# Patient Record
Sex: FEMALE | Race: WHITE | NOT HISPANIC OR LATINO | Employment: OTHER | URBAN - METROPOLITAN AREA
[De-identification: names, ages, dates, MRNs, and addresses within clinical notes are randomized per-mention and may not be internally consistent; named-entity substitution may affect disease eponyms.]

---

## 2017-01-04 ENCOUNTER — COMMUNICATION - HEALTHEAST (OUTPATIENT)
Dept: CARDIOLOGY | Facility: CLINIC | Age: 62
End: 2017-01-04

## 2017-01-04 DIAGNOSIS — I25.10 CORONARY ARTERY DISEASE DUE TO CALCIFIED CORONARY LESION: ICD-10-CM

## 2017-01-04 DIAGNOSIS — I25.84 CORONARY ARTERY DISEASE DUE TO CALCIFIED CORONARY LESION: ICD-10-CM

## 2017-01-11 ENCOUNTER — COMMUNICATION - HEALTHEAST (OUTPATIENT)
Dept: FAMILY MEDICINE | Facility: CLINIC | Age: 62
End: 2017-01-11

## 2017-01-11 DIAGNOSIS — K21.9 GERD (GASTROESOPHAGEAL REFLUX DISEASE): ICD-10-CM

## 2017-02-08 ENCOUNTER — OFFICE VISIT - HEALTHEAST (OUTPATIENT)
Dept: FAMILY MEDICINE | Facility: CLINIC | Age: 62
End: 2017-02-08

## 2017-02-08 DIAGNOSIS — I10 ESSENTIAL HYPERTENSION: ICD-10-CM

## 2017-02-08 DIAGNOSIS — I67.89 ACUTE, BUT ILL-DEFINED, CEREBROVASCULAR DISEASE: ICD-10-CM

## 2017-02-08 DIAGNOSIS — Z00.00 ROUTINE GENERAL MEDICAL EXAMINATION AT A HEALTH CARE FACILITY: ICD-10-CM

## 2017-02-08 DIAGNOSIS — I25.10 CORONARY ATHEROSCLEROSIS: ICD-10-CM

## 2017-02-08 DIAGNOSIS — E11.9 DIABETES MELLITUS (H): ICD-10-CM

## 2017-02-08 DIAGNOSIS — E66.9 OBESITY, UNSPECIFIED: ICD-10-CM

## 2017-02-08 LAB
CHOLEST SERPL-MCNC: 236 MG/DL
FASTING STATUS PATIENT QL REPORTED: YES
HBA1C MFR BLD: 6.2 % (ref 3.5–6)
HDLC SERPL-MCNC: 50 MG/DL
LDLC SERPL CALC-MCNC: 169 MG/DL
TRIGL SERPL-MCNC: 87 MG/DL

## 2017-02-08 ASSESSMENT — MIFFLIN-ST. JEOR: SCORE: 1475.97

## 2017-02-09 ENCOUNTER — COMMUNICATION - HEALTHEAST (OUTPATIENT)
Dept: FAMILY MEDICINE | Facility: CLINIC | Age: 62
End: 2017-02-09

## 2017-02-09 DIAGNOSIS — J45.909 ASTHMA: ICD-10-CM

## 2017-02-17 ENCOUNTER — COMMUNICATION - HEALTHEAST (OUTPATIENT)
Dept: FAMILY MEDICINE | Facility: CLINIC | Age: 62
End: 2017-02-17

## 2017-02-17 DIAGNOSIS — I10 HYPERTENSION: ICD-10-CM

## 2017-03-06 ENCOUNTER — COMMUNICATION - HEALTHEAST (OUTPATIENT)
Dept: FAMILY MEDICINE | Facility: CLINIC | Age: 62
End: 2017-03-06

## 2017-03-07 ENCOUNTER — COMMUNICATION - HEALTHEAST (OUTPATIENT)
Dept: SCHEDULING | Facility: CLINIC | Age: 62
End: 2017-03-07

## 2017-03-08 ENCOUNTER — OFFICE VISIT - HEALTHEAST (OUTPATIENT)
Dept: FAMILY MEDICINE | Facility: CLINIC | Age: 62
End: 2017-03-08

## 2017-03-08 DIAGNOSIS — R51.9 HEADACHE: ICD-10-CM

## 2017-03-08 DIAGNOSIS — I25.84 CORONARY ATHEROSCLEROSIS DUE TO SEVERELY CALCIFIED CORONARY LESION: ICD-10-CM

## 2017-03-08 DIAGNOSIS — I10 ESSENTIAL HYPERTENSION: ICD-10-CM

## 2017-03-08 DIAGNOSIS — I10 ESSENTIAL HYPERTENSION, MALIGNANT: ICD-10-CM

## 2017-03-09 ENCOUNTER — COMMUNICATION - HEALTHEAST (OUTPATIENT)
Dept: SCHEDULING | Facility: CLINIC | Age: 62
End: 2017-03-09

## 2017-03-13 ENCOUNTER — OFFICE VISIT - HEALTHEAST (OUTPATIENT)
Dept: FAMILY MEDICINE | Facility: CLINIC | Age: 62
End: 2017-03-13

## 2017-03-13 ENCOUNTER — COMMUNICATION - HEALTHEAST (OUTPATIENT)
Dept: FAMILY MEDICINE | Facility: CLINIC | Age: 62
End: 2017-03-13

## 2017-03-13 DIAGNOSIS — I67.89 ACUTE, BUT ILL-DEFINED, CEREBROVASCULAR DISEASE: ICD-10-CM

## 2017-03-13 DIAGNOSIS — I25.10 CORONARY ATHEROSCLEROSIS: ICD-10-CM

## 2017-03-13 DIAGNOSIS — I10 ESSENTIAL HYPERTENSION: ICD-10-CM

## 2017-03-13 DIAGNOSIS — R00.2 PALPITATIONS: ICD-10-CM

## 2017-03-13 DIAGNOSIS — E78.00 PURE HYPERCHOLESTEROLEMIA: ICD-10-CM

## 2017-03-13 DIAGNOSIS — M79.10 MYALGIA: ICD-10-CM

## 2017-03-13 LAB
ATRIAL RATE - MUSE: 61 BPM
DIASTOLIC BLOOD PRESSURE - MUSE: NORMAL MMHG
INTERPRETATION ECG - MUSE: NORMAL
P AXIS - MUSE: 16 DEGREES
PR INTERVAL - MUSE: 144 MS
QRS DURATION - MUSE: 106 MS
QT - MUSE: 448 MS
QTC - MUSE: 450 MS
R AXIS - MUSE: 7 DEGREES
SYSTOLIC BLOOD PRESSURE - MUSE: NORMAL MMHG
T AXIS - MUSE: -4 DEGREES
VENTRICULAR RATE- MUSE: 61 BPM

## 2017-03-13 ASSESSMENT — MIFFLIN-ST. JEOR: SCORE: 1480.96

## 2017-03-14 ENCOUNTER — COMMUNICATION - HEALTHEAST (OUTPATIENT)
Dept: FAMILY MEDICINE | Facility: CLINIC | Age: 62
End: 2017-03-14

## 2017-03-15 ENCOUNTER — RECORDS - HEALTHEAST (OUTPATIENT)
Dept: ADMINISTRATIVE | Facility: OTHER | Age: 62
End: 2017-03-15

## 2017-03-16 ENCOUNTER — AMBULATORY - HEALTHEAST (OUTPATIENT)
Dept: NURSING | Facility: CLINIC | Age: 62
End: 2017-03-16

## 2017-03-16 ENCOUNTER — COMMUNICATION - HEALTHEAST (OUTPATIENT)
Dept: FAMILY MEDICINE | Facility: CLINIC | Age: 62
End: 2017-03-16

## 2017-03-23 ENCOUNTER — AMBULATORY - HEALTHEAST (OUTPATIENT)
Dept: NURSING | Facility: CLINIC | Age: 62
End: 2017-03-23

## 2017-03-23 ENCOUNTER — COMMUNICATION - HEALTHEAST (OUTPATIENT)
Dept: FAMILY MEDICINE | Facility: CLINIC | Age: 62
End: 2017-03-23

## 2017-04-04 ENCOUNTER — OFFICE VISIT - HEALTHEAST (OUTPATIENT)
Dept: CARDIOLOGY | Facility: CLINIC | Age: 62
End: 2017-04-04

## 2017-04-04 DIAGNOSIS — I10 ESSENTIAL HYPERTENSION: ICD-10-CM

## 2017-04-04 DIAGNOSIS — I25.10 CORONARY ARTERY DISEASE INVOLVING NATIVE CORONARY ARTERY OF NATIVE HEART WITHOUT ANGINA PECTORIS: ICD-10-CM

## 2017-04-04 DIAGNOSIS — E78.5 DYSLIPIDEMIA: ICD-10-CM

## 2017-04-04 ASSESSMENT — MIFFLIN-ST. JEOR: SCORE: 1499.1

## 2017-04-05 ENCOUNTER — COMMUNICATION - HEALTHEAST (OUTPATIENT)
Dept: CARDIOLOGY | Facility: CLINIC | Age: 62
End: 2017-04-05

## 2017-04-05 DIAGNOSIS — I10 HTN (HYPERTENSION): ICD-10-CM

## 2017-04-09 ENCOUNTER — COMMUNICATION - HEALTHEAST (OUTPATIENT)
Dept: FAMILY MEDICINE | Facility: CLINIC | Age: 62
End: 2017-04-09

## 2017-04-09 DIAGNOSIS — J45.909 ASTHMA: ICD-10-CM

## 2017-04-09 DIAGNOSIS — K21.9 GERD (GASTROESOPHAGEAL REFLUX DISEASE): ICD-10-CM

## 2017-04-10 ENCOUNTER — COMMUNICATION - HEALTHEAST (OUTPATIENT)
Dept: FAMILY MEDICINE | Facility: CLINIC | Age: 62
End: 2017-04-10

## 2017-04-10 DIAGNOSIS — K21.9 GERD (GASTROESOPHAGEAL REFLUX DISEASE): ICD-10-CM

## 2017-04-24 ENCOUNTER — COMMUNICATION - HEALTHEAST (OUTPATIENT)
Dept: CARDIOLOGY | Facility: CLINIC | Age: 62
End: 2017-04-24

## 2017-04-24 DIAGNOSIS — I10 HTN (HYPERTENSION): ICD-10-CM

## 2017-04-26 ENCOUNTER — COMMUNICATION - HEALTHEAST (OUTPATIENT)
Dept: CARDIOLOGY | Facility: CLINIC | Age: 62
End: 2017-04-26

## 2017-05-01 ENCOUNTER — COMMUNICATION - HEALTHEAST (OUTPATIENT)
Dept: FAMILY MEDICINE | Facility: CLINIC | Age: 62
End: 2017-05-01

## 2017-05-01 DIAGNOSIS — I25.84 CORONARY ATHEROSCLEROSIS DUE TO SEVERELY CALCIFIED CORONARY LESION: ICD-10-CM

## 2017-05-01 DIAGNOSIS — I10 ESSENTIAL HYPERTENSION, MALIGNANT: ICD-10-CM

## 2017-05-02 ENCOUNTER — HOSPITAL ENCOUNTER (OUTPATIENT)
Dept: ULTRASOUND IMAGING | Facility: CLINIC | Age: 62
Discharge: HOME | End: 2017-05-02
Attending: OTOLARYNGOLOGY

## 2017-05-02 ENCOUNTER — RECORDS - HEALTHEAST (OUTPATIENT)
Dept: ADMINISTRATIVE | Facility: OTHER | Age: 62
End: 2017-05-02

## 2017-05-02 DIAGNOSIS — K11.8 PAROTID GLAND FULLNESS: ICD-10-CM

## 2017-05-08 ENCOUNTER — OFFICE VISIT - HEALTHEAST (OUTPATIENT)
Dept: CARDIOLOGY | Facility: CLINIC | Age: 62
End: 2017-05-08

## 2017-05-08 DIAGNOSIS — E78.5 DYSLIPIDEMIA: ICD-10-CM

## 2017-05-08 DIAGNOSIS — I25.10 CORONARY ARTERY DISEASE INVOLVING NATIVE CORONARY ARTERY OF NATIVE HEART WITHOUT ANGINA PECTORIS: ICD-10-CM

## 2017-05-08 DIAGNOSIS — I10 ESSENTIAL HYPERTENSION: ICD-10-CM

## 2017-05-08 ASSESSMENT — MIFFLIN-ST. JEOR: SCORE: 1499.1

## 2017-05-17 ENCOUNTER — COMMUNICATION - HEALTHEAST (OUTPATIENT)
Dept: CARDIOLOGY | Facility: CLINIC | Age: 62
End: 2017-05-17

## 2017-05-17 DIAGNOSIS — I10 HYPERTENSION: ICD-10-CM

## 2017-05-22 ENCOUNTER — HOSPITAL ENCOUNTER (OUTPATIENT)
Dept: MAMMOGRAPHY | Facility: CLINIC | Age: 62
Discharge: HOME | End: 2017-05-22
Attending: FAMILY MEDICINE

## 2017-05-22 DIAGNOSIS — Z12.31 VISIT FOR SCREENING MAMMOGRAM: ICD-10-CM

## 2017-05-24 ENCOUNTER — COMMUNICATION - HEALTHEAST (OUTPATIENT)
Dept: CARDIOLOGY | Facility: CLINIC | Age: 62
End: 2017-05-24

## 2017-05-31 ENCOUNTER — COMMUNICATION - HEALTHEAST (OUTPATIENT)
Dept: FAMILY MEDICINE | Facility: CLINIC | Age: 62
End: 2017-05-31

## 2017-05-31 DIAGNOSIS — J45.909 ASTHMA: ICD-10-CM

## 2017-06-01 ENCOUNTER — OFFICE VISIT - HEALTHEAST (OUTPATIENT)
Dept: FAMILY MEDICINE | Facility: CLINIC | Age: 62
End: 2017-06-01

## 2017-06-01 DIAGNOSIS — E66.9 OBESITY: ICD-10-CM

## 2017-06-01 DIAGNOSIS — R06.02 SOB (SHORTNESS OF BREATH): ICD-10-CM

## 2017-06-01 DIAGNOSIS — I10 HYPERTENSION: ICD-10-CM

## 2017-06-01 ASSESSMENT — MIFFLIN-ST. JEOR: SCORE: 1499.1

## 2017-06-08 ENCOUNTER — COMMUNICATION - HEALTHEAST (OUTPATIENT)
Dept: CARDIOLOGY | Facility: CLINIC | Age: 62
End: 2017-06-08

## 2017-06-08 ENCOUNTER — HOSPITAL ENCOUNTER (OUTPATIENT)
Dept: RESPIRATORY THERAPY | Facility: CLINIC | Age: 62
Discharge: HOME | End: 2017-06-08
Attending: FAMILY MEDICINE

## 2017-06-08 DIAGNOSIS — I10 HTN (HYPERTENSION): ICD-10-CM

## 2017-06-08 DIAGNOSIS — R06.02 SOB (SHORTNESS OF BREATH): ICD-10-CM

## 2017-06-13 ENCOUNTER — OFFICE VISIT - HEALTHEAST (OUTPATIENT)
Dept: CARDIOLOGY | Facility: CLINIC | Age: 62
End: 2017-06-13

## 2017-06-13 DIAGNOSIS — I25.10 CORONARY ARTERY DISEASE INVOLVING NATIVE CORONARY ARTERY OF NATIVE HEART WITHOUT ANGINA PECTORIS: ICD-10-CM

## 2017-06-13 DIAGNOSIS — E78.5 DYSLIPIDEMIA: ICD-10-CM

## 2017-06-13 DIAGNOSIS — I10 ESSENTIAL HYPERTENSION: ICD-10-CM

## 2017-06-13 ASSESSMENT — MIFFLIN-ST. JEOR: SCORE: 1490.03

## 2017-06-23 ENCOUNTER — COMMUNICATION - HEALTHEAST (OUTPATIENT)
Dept: CARDIOLOGY | Facility: CLINIC | Age: 62
End: 2017-06-23

## 2017-06-23 DIAGNOSIS — I25.84 CORONARY ATHEROSCLEROSIS DUE TO SEVERELY CALCIFIED CORONARY LESION: ICD-10-CM

## 2017-07-04 ENCOUNTER — COMMUNICATION - HEALTHEAST (OUTPATIENT)
Dept: FAMILY MEDICINE | Facility: CLINIC | Age: 62
End: 2017-07-04

## 2017-07-04 DIAGNOSIS — J45.909 ASTHMA: ICD-10-CM

## 2017-07-07 ENCOUNTER — COMMUNICATION - HEALTHEAST (OUTPATIENT)
Dept: CARDIOLOGY | Facility: CLINIC | Age: 62
End: 2017-07-07

## 2017-07-07 DIAGNOSIS — I25.10 CAD (CORONARY ARTERY DISEASE): ICD-10-CM

## 2017-07-14 ENCOUNTER — HOSPITAL ENCOUNTER (OUTPATIENT)
Dept: CARDIOLOGY | Facility: CLINIC | Age: 62
Discharge: HOME | End: 2017-07-14
Attending: INTERNAL MEDICINE

## 2017-07-14 DIAGNOSIS — I25.10 CAD (CORONARY ARTERY DISEASE): ICD-10-CM

## 2017-07-14 LAB
AORTIC ROOT: 3.3 CM
AORTIC VALVE MEAN VELOCITY: 104 CM/S
AV DIMENSIONLESS INDEX VTI: 0.5
AV MEAN GRADIENT: 5 MMHG
AV PEAK GRADIENT: 12 MMHG
AV VALVE AREA: 1.4 CM2
AV VELOCITY RATIO: 0.5
BSA FOR ECHO PROCEDURE: 2.08 M2
CV BLOOD PRESSURE: NORMAL MMHG
CV ECHO HEIGHT: 62 IN
CV ECHO WEIGHT: 218 LBS
DOP CALC AO PEAK VEL: 173 CM/S
DOP CALC AO VTI: 41.7 CM
DOP CALC LVOT AREA: 2.83 CM2
DOP CALC LVOT DIAMETER: 1.9 CM
DOP CALC LVOT PEAK VEL: 83.5 CM/S
DOP CALC LVOT STROKE VOLUME: 58.1 CM3
DOP CALCLVOT PEAK VEL VTI: 20.5 CM
ECHO EJECTION FRACTION ESTIMATED: 65 %
EJECTION FRACTION: 62 % (ref 55–75)
FRACTIONAL SHORTENING: 48.6 % (ref 28–44)
INTERVENTRICULAR SEPTUM IN END DIASTOLE: 1.1 CM (ref 0.6–0.9)
IVS/PW RATIO: 1
LA AREA 1: 23.7 CM2
LA AREA 2: 21 CM2
LEFT ATRIUM LENGTH: 5.82 CM
LEFT ATRIUM SIZE: 3 CM
LEFT ATRIUM VOLUME INDEX: 34.9 ML/M2
LEFT ATRIUM VOLUME: 72.7 CM3
LEFT VENTRICLE CARDIAC INDEX: 1.7 L/MIN/M2
LEFT VENTRICLE CARDIAC OUTPUT: 3.5 L/MIN
LEFT VENTRICLE DIASTOLIC VOLUME INDEX: 32.7 CM3/M2 (ref 34–74)
LEFT VENTRICLE DIASTOLIC VOLUME: 68 CM3 (ref 46–106)
LEFT VENTRICLE HEART RATE: 60 BPM
LEFT VENTRICLE MASS INDEX: 102.2 G/M2
LEFT VENTRICLE SYSTOLIC VOLUME INDEX: 12.5 CM3/M2 (ref 11–31)
LEFT VENTRICLE SYSTOLIC VOLUME: 26 CM3 (ref 14–42)
LEFT VENTRICULAR INTERNAL DIMENSION IN DIASTOLE: 5.08 CM (ref 3.8–5.2)
LEFT VENTRICULAR INTERNAL DIMENSION IN SYSTOLE: 2.61 CM (ref 2.2–3.5)
LEFT VENTRICULAR MASS: 212.5 G
LEFT VENTRICULAR OUTFLOW TRACT MEAN GRADIENT: 2 MMHG
LEFT VENTRICULAR OUTFLOW TRACT MEAN VELOCITY: 57.3 CM/S
LEFT VENTRICULAR OUTFLOW TRACT PEAK GRADIENT: 3 MMHG
LEFT VENTRICULAR POSTERIOR WALL IN END DIASTOLE: 1.1 CM (ref 0.6–0.9)
LV STROKE VOLUME INDEX: 27.9 ML/M2
MITRAL VALVE E/A RATIO: 1.4
MV AVERAGE E/E' RATIO: 15.7 CM/S
MV DECELERATION TIME: 197 MS
MV E'TISSUE VEL-LAT: 6.92 CM/S
MV E'TISSUE VEL-MED: 5.56 CM/S
MV LATERAL E/E' RATIO: 14.2
MV MEDIAL E/E' RATIO: 17.7
MV PEAK A VELOCITY: 68.1 CM/S
MV PEAK E VELOCITY: 98.2 CM/S
NUC REST DIASTOLIC VOLUME INDEX: 3488 LBS
NUC REST SYSTOLIC VOLUME INDEX: 62 IN
TRICUSPID REGURGITATION PEAK PRESSURE GRADIENT: 13.4 MMHG
TRICUSPID VALVE ANULAR PLANE SYSTOLIC EXCURSION: 2.2 CM
TRICUSPID VALVE PEAK REGURGITANT VELOCITY: 183 CM/S

## 2017-07-14 ASSESSMENT — MIFFLIN-ST. JEOR: SCORE: 1482.09

## 2017-08-04 ENCOUNTER — COMMUNICATION - HEALTHEAST (OUTPATIENT)
Dept: CARDIOLOGY | Facility: CLINIC | Age: 62
End: 2017-08-04

## 2017-08-07 ENCOUNTER — COMMUNICATION - HEALTHEAST (OUTPATIENT)
Dept: CARDIOLOGY | Facility: CLINIC | Age: 62
End: 2017-08-07

## 2017-08-07 DIAGNOSIS — R06.02 SHORTNESS OF BREATH: ICD-10-CM

## 2017-08-15 ENCOUNTER — HOSPITAL ENCOUNTER (OUTPATIENT)
Dept: NUCLEAR MEDICINE | Facility: CLINIC | Age: 62
Discharge: HOME | End: 2017-08-15
Attending: INTERNAL MEDICINE

## 2017-08-15 ENCOUNTER — HOSPITAL ENCOUNTER (OUTPATIENT)
Dept: CARDIOLOGY | Facility: CLINIC | Age: 62
Discharge: HOME | End: 2017-08-15
Attending: INTERNAL MEDICINE

## 2017-08-15 DIAGNOSIS — R06.02 SHORTNESS OF BREATH: ICD-10-CM

## 2017-08-15 LAB
CV STRESS CURRENT BP HE: NORMAL
CV STRESS CURRENT HR HE: 100
CV STRESS CURRENT HR HE: 102
CV STRESS CURRENT HR HE: 103
CV STRESS CURRENT HR HE: 103
CV STRESS CURRENT HR HE: 64
CV STRESS CURRENT HR HE: 66
CV STRESS CURRENT HR HE: 68
CV STRESS CURRENT HR HE: 68
CV STRESS CURRENT HR HE: 69
CV STRESS CURRENT HR HE: 70
CV STRESS CURRENT HR HE: 72
CV STRESS CURRENT HR HE: 77
CV STRESS CURRENT HR HE: 83
CV STRESS CURRENT HR HE: 90
CV STRESS CURRENT HR HE: 94
CV STRESS CURRENT HR HE: 94
CV STRESS CURRENT HR HE: 96
CV STRESS CURRENT HR HE: 98
CV STRESS DEVIATION TIME HE: NORMAL
CV STRESS ECHO PERCENT HR HE: NORMAL
CV STRESS EXERCISE STAGE HE: NORMAL
CV STRESS FINAL RESTING BP HE: NORMAL
CV STRESS FINAL RESTING HR HE: 68
CV STRESS MAX HR HE: 103
CV STRESS MAX TREADMILL GRADE HE: 0
CV STRESS MAX TREADMILL SPEED HE: 0
CV STRESS PEAK DIA BP HE: NORMAL
CV STRESS PEAK SYS BP HE: NORMAL
CV STRESS PHASE HE: NORMAL
CV STRESS PROTOCOL HE: NORMAL
CV STRESS RESTING PT POSITION HE: NORMAL
CV STRESS ST DEVIATION AMOUNT HE: NORMAL
CV STRESS ST DEVIATION ELEVATION HE: NORMAL
CV STRESS ST EVELATION AMOUNT HE: NORMAL
CV STRESS TEST TYPE HE: NORMAL
CV STRESS TOTAL STAGE TIME MIN 1 HE: NORMAL
NUC STRESS EJECTION FRACTION: 70 %
STRESS ECHO BASELINE BP: NORMAL
STRESS ECHO BASELINE HR: 73
STRESS ECHO CALCULATED PERCENT HR: 65 %
STRESS ECHO LAST STRESS BP: NORMAL
STRESS ECHO LAST STRESS HR: 98

## 2017-08-18 ENCOUNTER — OFFICE VISIT - HEALTHEAST (OUTPATIENT)
Dept: FAMILY MEDICINE | Facility: CLINIC | Age: 62
End: 2017-08-18

## 2017-08-18 DIAGNOSIS — R05.9 COUGH: ICD-10-CM

## 2017-08-18 DIAGNOSIS — J06.9 VIRAL URI: ICD-10-CM

## 2017-09-02 ENCOUNTER — COMMUNICATION - HEALTHEAST (OUTPATIENT)
Dept: FAMILY MEDICINE | Facility: CLINIC | Age: 62
End: 2017-09-02

## 2017-09-02 DIAGNOSIS — I25.84 CORONARY ATHEROSCLEROSIS DUE TO SEVERELY CALCIFIED CORONARY LESION: ICD-10-CM

## 2017-09-14 ENCOUNTER — OFFICE VISIT - HEALTHEAST (OUTPATIENT)
Dept: CARDIOLOGY | Facility: CLINIC | Age: 62
End: 2017-09-14

## 2017-09-14 DIAGNOSIS — I25.10 CORONARY ARTERY DISEASE INVOLVING NATIVE CORONARY ARTERY OF NATIVE HEART WITHOUT ANGINA PECTORIS: ICD-10-CM

## 2017-09-14 DIAGNOSIS — I10 ESSENTIAL HYPERTENSION: ICD-10-CM

## 2017-09-14 DIAGNOSIS — E78.5 DYSLIPIDEMIA: ICD-10-CM

## 2017-09-14 ASSESSMENT — MIFFLIN-ST. JEOR: SCORE: 1459.41

## 2017-09-21 ENCOUNTER — COMMUNICATION - HEALTHEAST (OUTPATIENT)
Dept: FAMILY MEDICINE | Facility: CLINIC | Age: 62
End: 2017-09-21

## 2017-09-21 DIAGNOSIS — I10 ESSENTIAL HYPERTENSION, MALIGNANT: ICD-10-CM

## 2017-09-30 ENCOUNTER — COMMUNICATION - HEALTHEAST (OUTPATIENT)
Dept: FAMILY MEDICINE | Facility: CLINIC | Age: 62
End: 2017-09-30

## 2017-09-30 DIAGNOSIS — K21.9 GERD (GASTROESOPHAGEAL REFLUX DISEASE): ICD-10-CM

## 2018-03-06 ENCOUNTER — COMMUNICATION - HEALTHEAST (OUTPATIENT)
Dept: FAMILY MEDICINE | Facility: CLINIC | Age: 63
End: 2018-03-06

## 2018-03-06 DIAGNOSIS — J45.909 ASTHMA: ICD-10-CM

## 2018-03-20 ENCOUNTER — RECORDS - HEALTHEAST (OUTPATIENT)
Dept: ADMINISTRATIVE | Facility: OTHER | Age: 63
End: 2018-03-20

## 2018-04-03 ENCOUNTER — COMMUNICATION - HEALTHEAST (OUTPATIENT)
Dept: FAMILY MEDICINE | Facility: CLINIC | Age: 63
End: 2018-04-03

## 2018-04-03 ENCOUNTER — COMMUNICATION - HEALTHEAST (OUTPATIENT)
Dept: CARDIOLOGY | Facility: CLINIC | Age: 63
End: 2018-04-03

## 2018-04-03 DIAGNOSIS — I25.84 CORONARY ATHEROSCLEROSIS DUE TO SEVERELY CALCIFIED CORONARY LESION: ICD-10-CM

## 2018-04-03 DIAGNOSIS — I10 ESSENTIAL HYPERTENSION, MALIGNANT: ICD-10-CM

## 2018-04-11 ENCOUNTER — COMMUNICATION - HEALTHEAST (OUTPATIENT)
Dept: CARDIOLOGY | Facility: CLINIC | Age: 63
End: 2018-04-11

## 2018-04-11 DIAGNOSIS — I25.10 CORONARY ARTERY DISEASE DUE TO CALCIFIED CORONARY LESION: ICD-10-CM

## 2018-04-11 DIAGNOSIS — I25.84 CORONARY ARTERY DISEASE DUE TO CALCIFIED CORONARY LESION: ICD-10-CM

## 2018-04-19 ENCOUNTER — OFFICE VISIT - HEALTHEAST (OUTPATIENT)
Dept: FAMILY MEDICINE | Facility: CLINIC | Age: 63
End: 2018-04-19

## 2018-04-19 DIAGNOSIS — E78.00 PURE HYPERCHOLESTEROLEMIA: ICD-10-CM

## 2018-04-19 DIAGNOSIS — I10 ESSENTIAL HYPERTENSION: ICD-10-CM

## 2018-04-19 DIAGNOSIS — I25.10 CORONARY ATHEROSCLEROSIS: ICD-10-CM

## 2018-04-19 DIAGNOSIS — Z00.00 HEALTH CARE MAINTENANCE: ICD-10-CM

## 2018-04-19 LAB
ERYTHROCYTE [DISTWIDTH] IN BLOOD BY AUTOMATED COUNT: 12.5 % (ref 11–14.5)
HCT VFR BLD AUTO: 42.5 % (ref 35–47)
HGB BLD-MCNC: 14.1 G/DL (ref 12–16)
MCH RBC QN AUTO: 28.4 PG (ref 27–34)
MCHC RBC AUTO-ENTMCNC: 33.1 G/DL (ref 32–36)
MCV RBC AUTO: 86 FL (ref 80–100)
PLATELET # BLD AUTO: 315 THOU/UL (ref 140–440)
PMV BLD AUTO: 8.1 FL (ref 7–10)
RBC # BLD AUTO: 4.95 MILL/UL (ref 3.8–5.4)
WBC: 9.2 THOU/UL (ref 4–11)

## 2018-04-19 ASSESSMENT — MIFFLIN-ST. JEOR: SCORE: 1445.8

## 2018-04-20 LAB
ANION GAP SERPL CALCULATED.3IONS-SCNC: 15 MMOL/L (ref 5–18)
BUN SERPL-MCNC: 25 MG/DL (ref 8–22)
CALCIUM SERPL-MCNC: 10.4 MG/DL (ref 8.5–10.5)
CHLORIDE BLD-SCNC: 102 MMOL/L (ref 98–107)
CHOLEST SERPL-MCNC: 241 MG/DL
CO2 SERPL-SCNC: 23 MMOL/L (ref 22–31)
CREAT SERPL-MCNC: 0.88 MG/DL (ref 0.6–1.1)
FASTING STATUS PATIENT QL REPORTED: NO
GFR SERPL CREATININE-BSD FRML MDRD: >60 ML/MIN/1.73M2
GLUCOSE BLD-MCNC: 103 MG/DL (ref 70–125)
HDLC SERPL-MCNC: 48 MG/DL
LDLC SERPL CALC-MCNC: 172 MG/DL
POTASSIUM BLD-SCNC: 3.6 MMOL/L (ref 3.5–5)
SODIUM SERPL-SCNC: 140 MMOL/L (ref 136–145)
TRIGL SERPL-MCNC: 105 MG/DL
TSH SERPL DL<=0.005 MIU/L-ACNC: 0.97 UIU/ML (ref 0.3–5)

## 2018-05-13 ENCOUNTER — COMMUNICATION - HEALTHEAST (OUTPATIENT)
Dept: CARDIOLOGY | Facility: CLINIC | Age: 63
End: 2018-05-13

## 2018-05-13 DIAGNOSIS — I10 HYPERTENSION: ICD-10-CM

## 2018-05-17 ENCOUNTER — COMMUNICATION - HEALTHEAST (OUTPATIENT)
Dept: FAMILY MEDICINE | Facility: CLINIC | Age: 63
End: 2018-05-17

## 2018-05-17 DIAGNOSIS — J45.909 ASTHMA: ICD-10-CM

## 2018-05-29 ENCOUNTER — COMMUNICATION - HEALTHEAST (OUTPATIENT)
Dept: FAMILY MEDICINE | Facility: CLINIC | Age: 63
End: 2018-05-29

## 2018-05-29 DIAGNOSIS — J45.909 ASTHMA: ICD-10-CM

## 2018-07-24 ENCOUNTER — COMMUNICATION - HEALTHEAST (OUTPATIENT)
Dept: FAMILY MEDICINE | Facility: CLINIC | Age: 63
End: 2018-07-24

## 2018-07-25 ENCOUNTER — OFFICE VISIT - HEALTHEAST (OUTPATIENT)
Dept: FAMILY MEDICINE | Facility: CLINIC | Age: 63
End: 2018-07-25

## 2018-07-25 DIAGNOSIS — J32.9 SINUSITIS: ICD-10-CM

## 2018-07-25 ASSESSMENT — MIFFLIN-ST. JEOR: SCORE: 1425.39

## 2018-07-26 ENCOUNTER — COMMUNICATION - HEALTHEAST (OUTPATIENT)
Dept: FAMILY MEDICINE | Facility: CLINIC | Age: 63
End: 2018-07-26

## 2018-08-04 ENCOUNTER — COMMUNICATION - HEALTHEAST (OUTPATIENT)
Dept: FAMILY MEDICINE | Facility: CLINIC | Age: 63
End: 2018-08-04

## 2018-08-04 DIAGNOSIS — I10 ESSENTIAL HYPERTENSION, MALIGNANT: ICD-10-CM

## 2018-08-15 ENCOUNTER — OFFICE VISIT - HEALTHEAST (OUTPATIENT)
Dept: FAMILY MEDICINE | Facility: CLINIC | Age: 63
End: 2018-08-15

## 2018-08-15 DIAGNOSIS — R73.9 HYPERGLYCEMIA: ICD-10-CM

## 2018-08-15 DIAGNOSIS — M79.10 MYALGIA: ICD-10-CM

## 2018-08-15 LAB
ERYTHROCYTE [SEDIMENTATION RATE] IN BLOOD BY WESTERGREN METHOD: 9 MM/HR (ref 0–20)
HBA1C MFR BLD: 6.5 % (ref 3.5–6)

## 2018-08-15 ASSESSMENT — MIFFLIN-ST. JEOR: SCORE: 1423.12

## 2018-08-16 LAB
C REACTIVE PROTEIN LHE: 0.2 MG/DL (ref 0–0.8)
CK SERPL-CCNC: 69 U/L (ref 30–190)

## 2018-08-17 LAB — B BURGDOR IGG+IGM SER QL: 0.03 INDEX VALUE

## 2018-08-20 LAB — ANA SER QL: 0.6 U

## 2018-08-28 ENCOUNTER — COMMUNICATION - HEALTHEAST (OUTPATIENT)
Dept: ADMINISTRATIVE | Facility: CLINIC | Age: 63
End: 2018-08-28

## 2018-08-28 ENCOUNTER — COMMUNICATION - HEALTHEAST (OUTPATIENT)
Dept: FAMILY MEDICINE | Facility: CLINIC | Age: 63
End: 2018-08-28

## 2018-09-05 ENCOUNTER — COMMUNICATION - HEALTHEAST (OUTPATIENT)
Dept: FAMILY MEDICINE | Facility: CLINIC | Age: 63
End: 2018-09-05

## 2018-09-05 DIAGNOSIS — I25.84 CORONARY ATHEROSCLEROSIS DUE TO SEVERELY CALCIFIED CORONARY LESION: ICD-10-CM

## 2018-09-27 ENCOUNTER — OFFICE VISIT - HEALTHEAST (OUTPATIENT)
Dept: FAMILY MEDICINE | Facility: CLINIC | Age: 63
End: 2018-09-27

## 2018-09-27 DIAGNOSIS — E11.9 DM II (DIABETES MELLITUS, TYPE II), CONTROLLED (H): ICD-10-CM

## 2018-09-27 DIAGNOSIS — Z23 NEED FOR TETANUS BOOSTER: ICD-10-CM

## 2018-09-27 DIAGNOSIS — E66.01 MORBID OBESITY (H): ICD-10-CM

## 2018-09-27 DIAGNOSIS — Z23 NEED FOR VARICELLA VACCINE: ICD-10-CM

## 2018-09-27 DIAGNOSIS — J32.1 FRONTAL SINUSITIS: ICD-10-CM

## 2018-09-27 DIAGNOSIS — I10 ESSENTIAL HYPERTENSION: ICD-10-CM

## 2018-09-27 DIAGNOSIS — Z23 NEED FOR INFLUENZA VACCINATION: ICD-10-CM

## 2018-09-27 DIAGNOSIS — Z00.00 HEALTH CARE MAINTENANCE: ICD-10-CM

## 2018-09-27 LAB
ANION GAP SERPL CALCULATED.3IONS-SCNC: 13 MMOL/L (ref 5–18)
BASOPHILS # BLD AUTO: 0.1 THOU/UL (ref 0–0.2)
BASOPHILS NFR BLD AUTO: 1 % (ref 0–2)
BUN SERPL-MCNC: 14 MG/DL (ref 8–22)
CALCIUM SERPL-MCNC: 10.6 MG/DL (ref 8.5–10.5)
CHLORIDE BLD-SCNC: 100 MMOL/L (ref 98–107)
CO2 SERPL-SCNC: 27 MMOL/L (ref 22–31)
CREAT SERPL-MCNC: 0.78 MG/DL (ref 0.6–1.1)
EOSINOPHIL # BLD AUTO: 0.2 THOU/UL (ref 0–0.4)
EOSINOPHIL NFR BLD AUTO: 3 % (ref 0–6)
ERYTHROCYTE [DISTWIDTH] IN BLOOD BY AUTOMATED COUNT: 12.2 % (ref 11–14.5)
GFR SERPL CREATININE-BSD FRML MDRD: >60 ML/MIN/1.73M2
GLUCOSE BLD-MCNC: 94 MG/DL (ref 70–125)
HBA1C MFR BLD: 6.2 % (ref 3.5–6)
HCT VFR BLD AUTO: 41.2 % (ref 35–47)
HGB BLD-MCNC: 14.1 G/DL (ref 12–16)
LYMPHOCYTES # BLD AUTO: 3 THOU/UL (ref 0.8–4.4)
LYMPHOCYTES NFR BLD AUTO: 38 % (ref 20–40)
MCH RBC QN AUTO: 29.1 PG (ref 27–34)
MCHC RBC AUTO-ENTMCNC: 34.2 G/DL (ref 32–36)
MCV RBC AUTO: 85 FL (ref 80–100)
MONOCYTES # BLD AUTO: 0.6 THOU/UL (ref 0–0.9)
MONOCYTES NFR BLD AUTO: 8 % (ref 2–10)
NEUTROPHILS # BLD AUTO: 4.1 THOU/UL (ref 2–7.7)
NEUTROPHILS NFR BLD AUTO: 51 % (ref 50–70)
PLATELET # BLD AUTO: 328 THOU/UL (ref 140–440)
PMV BLD AUTO: 7.8 FL (ref 7–10)
POTASSIUM BLD-SCNC: 3.9 MMOL/L (ref 3.5–5)
RBC # BLD AUTO: 4.84 MILL/UL (ref 3.8–5.4)
SODIUM SERPL-SCNC: 140 MMOL/L (ref 136–145)
WBC: 7.9 THOU/UL (ref 4–11)

## 2018-09-27 ASSESSMENT — MIFFLIN-ST. JEOR: SCORE: 1400.44

## 2018-10-11 ENCOUNTER — COMMUNICATION - HEALTHEAST (OUTPATIENT)
Dept: ADMINISTRATIVE | Facility: CLINIC | Age: 63
End: 2018-10-11

## 2018-10-12 ENCOUNTER — COMMUNICATION - HEALTHEAST (OUTPATIENT)
Dept: CARDIOLOGY | Facility: CLINIC | Age: 63
End: 2018-10-12

## 2018-10-12 ENCOUNTER — COMMUNICATION - HEALTHEAST (OUTPATIENT)
Dept: FAMILY MEDICINE | Facility: CLINIC | Age: 63
End: 2018-10-12

## 2018-10-12 DIAGNOSIS — K21.9 GERD (GASTROESOPHAGEAL REFLUX DISEASE): ICD-10-CM

## 2018-10-12 DIAGNOSIS — I25.84 CORONARY ATHEROSCLEROSIS DUE TO SEVERELY CALCIFIED CORONARY LESION: ICD-10-CM

## 2018-11-05 ENCOUNTER — OFFICE VISIT - HEALTHEAST (OUTPATIENT)
Dept: FAMILY MEDICINE | Facility: CLINIC | Age: 63
End: 2018-11-05

## 2018-11-05 DIAGNOSIS — L65.9 HAIR LOSS: ICD-10-CM

## 2018-11-05 DIAGNOSIS — I10 ESSENTIAL HYPERTENSION: ICD-10-CM

## 2018-11-05 DIAGNOSIS — E66.01 MORBID OBESITY (H): ICD-10-CM

## 2018-11-05 DIAGNOSIS — N39.41 URGE INCONTINENCE OF URINE: ICD-10-CM

## 2018-11-05 LAB
ALBUMIN SERPL-MCNC: 3.9 G/DL (ref 3.5–5)
ALP SERPL-CCNC: 94 U/L (ref 45–120)
ALT SERPL W P-5'-P-CCNC: 21 U/L (ref 0–45)
ANION GAP SERPL CALCULATED.3IONS-SCNC: 10 MMOL/L (ref 5–18)
AST SERPL W P-5'-P-CCNC: 17 U/L (ref 0–40)
BILIRUB SERPL-MCNC: 0.3 MG/DL (ref 0–1)
BUN SERPL-MCNC: 16 MG/DL (ref 8–22)
CALCIUM SERPL-MCNC: 10.4 MG/DL (ref 8.5–10.5)
CHLORIDE BLD-SCNC: 101 MMOL/L (ref 98–107)
CO2 SERPL-SCNC: 30 MMOL/L (ref 22–31)
CREAT SERPL-MCNC: 0.8 MG/DL (ref 0.6–1.1)
ERYTHROCYTE [DISTWIDTH] IN BLOOD BY AUTOMATED COUNT: 12.1 % (ref 11–14.5)
GFR SERPL CREATININE-BSD FRML MDRD: >60 ML/MIN/1.73M2
GLUCOSE BLD-MCNC: 119 MG/DL (ref 70–125)
HCT VFR BLD AUTO: 41.2 % (ref 35–47)
HGB BLD-MCNC: 13.8 G/DL (ref 12–16)
IRON SATN MFR SERPL: 12 % (ref 20–50)
IRON SERPL-MCNC: 49 UG/DL (ref 42–175)
MAGNESIUM SERPL-MCNC: 2.3 MG/DL (ref 1.8–2.6)
MCH RBC QN AUTO: 28.4 PG (ref 27–34)
MCHC RBC AUTO-ENTMCNC: 33.4 G/DL (ref 32–36)
MCV RBC AUTO: 85 FL (ref 80–100)
PLATELET # BLD AUTO: 349 THOU/UL (ref 140–440)
PMV BLD AUTO: 7.8 FL (ref 7–10)
POTASSIUM BLD-SCNC: 3.9 MMOL/L (ref 3.5–5)
PROT SERPL-MCNC: 7.2 G/DL (ref 6–8)
RBC # BLD AUTO: 4.83 MILL/UL (ref 3.8–5.4)
SODIUM SERPL-SCNC: 141 MMOL/L (ref 136–145)
T4 FREE SERPL-MCNC: 1.2 NG/DL (ref 0.7–1.8)
TIBC SERPL-MCNC: 413 UG/DL (ref 313–563)
TRANSFERRIN SERPL-MCNC: 330 MG/DL (ref 212–360)
TSH SERPL DL<=0.005 MIU/L-ACNC: 0.91 UIU/ML (ref 0.3–5)
WBC: 8.5 THOU/UL (ref 4–11)

## 2018-11-05 ASSESSMENT — MIFFLIN-ST. JEOR: SCORE: 1418.59

## 2018-11-06 ENCOUNTER — COMMUNICATION - HEALTHEAST (OUTPATIENT)
Dept: FAMILY MEDICINE | Facility: CLINIC | Age: 63
End: 2018-11-06

## 2018-11-06 ENCOUNTER — AMBULATORY - HEALTHEAST (OUTPATIENT)
Dept: FAMILY MEDICINE | Facility: CLINIC | Age: 63
End: 2018-11-06

## 2018-11-06 LAB
25(OH)D3 SERPL-MCNC: 28.4 NG/ML (ref 30–80)
25(OH)D3 SERPL-MCNC: 28.4 NG/ML (ref 30–80)

## 2018-11-13 ENCOUNTER — OFFICE VISIT - HEALTHEAST (OUTPATIENT)
Dept: EDUCATION SERVICES | Facility: CLINIC | Age: 63
End: 2018-11-13

## 2018-11-13 DIAGNOSIS — E11.9 DIABETES MELLITUS, TYPE 2 (H): ICD-10-CM

## 2018-11-28 ENCOUNTER — RECORDS - HEALTHEAST (OUTPATIENT)
Dept: ADMINISTRATIVE | Facility: OTHER | Age: 63
End: 2018-11-28

## 2018-12-05 ENCOUNTER — COMMUNICATION - HEALTHEAST (OUTPATIENT)
Dept: CARDIOLOGY | Facility: CLINIC | Age: 63
End: 2018-12-05

## 2018-12-05 DIAGNOSIS — I10 HYPERTENSION: ICD-10-CM

## 2018-12-06 ENCOUNTER — OFFICE VISIT - HEALTHEAST (OUTPATIENT)
Dept: CARDIOLOGY | Facility: CLINIC | Age: 63
End: 2018-12-06

## 2018-12-06 DIAGNOSIS — E78.5 DYSLIPIDEMIA: ICD-10-CM

## 2018-12-06 DIAGNOSIS — I10 ESSENTIAL HYPERTENSION: ICD-10-CM

## 2018-12-06 DIAGNOSIS — I25.10 CORONARY ARTERY DISEASE INVOLVING NATIVE CORONARY ARTERY OF NATIVE HEART WITHOUT ANGINA PECTORIS: ICD-10-CM

## 2018-12-06 ASSESSMENT — MIFFLIN-ST. JEOR: SCORE: 1423.12

## 2018-12-10 ENCOUNTER — COMMUNICATION - HEALTHEAST (OUTPATIENT)
Dept: FAMILY MEDICINE | Facility: CLINIC | Age: 63
End: 2018-12-10

## 2018-12-20 ENCOUNTER — AMBULATORY - HEALTHEAST (OUTPATIENT)
Dept: FAMILY MEDICINE | Facility: CLINIC | Age: 63
End: 2018-12-20

## 2018-12-20 ENCOUNTER — AMBULATORY - HEALTHEAST (OUTPATIENT)
Dept: NURSING | Facility: CLINIC | Age: 63
End: 2018-12-20

## 2018-12-20 DIAGNOSIS — Z23 NEED FOR VACCINATION: ICD-10-CM

## 2018-12-21 ENCOUNTER — COMMUNICATION - HEALTHEAST (OUTPATIENT)
Dept: SCHEDULING | Facility: CLINIC | Age: 63
End: 2018-12-21

## 2019-01-17 ENCOUNTER — COMMUNICATION - HEALTHEAST (OUTPATIENT)
Dept: CARDIOLOGY | Facility: CLINIC | Age: 64
End: 2019-01-17

## 2019-01-17 DIAGNOSIS — I10 HYPERTENSION: ICD-10-CM

## 2019-01-17 DIAGNOSIS — I25.84 CORONARY ATHEROSCLEROSIS DUE TO SEVERELY CALCIFIED CORONARY LESION: ICD-10-CM

## 2019-01-18 ENCOUNTER — COMMUNICATION - HEALTHEAST (OUTPATIENT)
Dept: CARDIOLOGY | Facility: CLINIC | Age: 64
End: 2019-01-18

## 2019-01-18 DIAGNOSIS — I10 ESSENTIAL HYPERTENSION: ICD-10-CM

## 2019-02-14 ENCOUNTER — OFFICE VISIT - HEALTHEAST (OUTPATIENT)
Dept: FAMILY MEDICINE | Facility: CLINIC | Age: 64
End: 2019-02-14

## 2019-02-14 DIAGNOSIS — J06.9 UPPER RESPIRATORY TRACT INFECTION, UNSPECIFIED TYPE: ICD-10-CM

## 2019-02-15 ENCOUNTER — COMMUNICATION - HEALTHEAST (OUTPATIENT)
Dept: SCHEDULING | Facility: CLINIC | Age: 64
End: 2019-02-15

## 2019-02-17 ENCOUNTER — COMMUNICATION - HEALTHEAST (OUTPATIENT)
Dept: SCHEDULING | Facility: CLINIC | Age: 64
End: 2019-02-17

## 2019-04-09 ENCOUNTER — COMMUNICATION - HEALTHEAST (OUTPATIENT)
Dept: FAMILY MEDICINE | Facility: CLINIC | Age: 64
End: 2019-04-09

## 2019-04-15 ENCOUNTER — OFFICE VISIT - HEALTHEAST (OUTPATIENT)
Dept: FAMILY MEDICINE | Facility: CLINIC | Age: 64
End: 2019-04-15

## 2019-04-15 DIAGNOSIS — R10.31 RIGHT INGUINAL PAIN: ICD-10-CM

## 2019-05-17 ENCOUNTER — OFFICE VISIT - HEALTHEAST (OUTPATIENT)
Dept: FAMILY MEDICINE | Facility: CLINIC | Age: 64
End: 2019-05-17

## 2019-05-17 DIAGNOSIS — Z12.31 VISIT FOR SCREENING MAMMOGRAM: ICD-10-CM

## 2019-05-17 DIAGNOSIS — R10.31 CHRONIC GROIN PAIN, RIGHT: ICD-10-CM

## 2019-05-17 DIAGNOSIS — G47.33 OBSTRUCTIVE SLEEP APNEA (ADULT) (PEDIATRIC): ICD-10-CM

## 2019-05-17 DIAGNOSIS — J30.2 SEASONAL ALLERGIES: ICD-10-CM

## 2019-05-17 DIAGNOSIS — G89.29 CHRONIC GROIN PAIN, RIGHT: ICD-10-CM

## 2019-05-17 DIAGNOSIS — E11.9 CONTROLLED TYPE 2 DIABETES MELLITUS WITHOUT COMPLICATION, WITHOUT LONG-TERM CURRENT USE OF INSULIN (H): ICD-10-CM

## 2019-05-17 DIAGNOSIS — T78.3XXA ANGIOEDEMA, INITIAL ENCOUNTER: ICD-10-CM

## 2019-05-17 DIAGNOSIS — R06.02 SHORTNESS OF BREATH: ICD-10-CM

## 2019-05-17 DIAGNOSIS — I25.10 ATHEROSCLEROSIS OF NATIVE CORONARY ARTERY OF NATIVE HEART WITHOUT ANGINA PECTORIS: ICD-10-CM

## 2019-05-17 DIAGNOSIS — I10 ESSENTIAL HYPERTENSION: ICD-10-CM

## 2019-05-17 LAB
ALBUMIN SERPL-MCNC: 4.1 G/DL (ref 3.5–5)
ALP SERPL-CCNC: 88 U/L (ref 45–120)
ALT SERPL W P-5'-P-CCNC: 25 U/L (ref 0–45)
ANION GAP SERPL CALCULATED.3IONS-SCNC: 11 MMOL/L (ref 5–18)
AST SERPL W P-5'-P-CCNC: 18 U/L (ref 0–40)
ATRIAL RATE - MUSE: 65 BPM
BASOPHILS # BLD AUTO: 0.1 THOU/UL (ref 0–0.2)
BASOPHILS NFR BLD AUTO: 1 % (ref 0–2)
BILIRUB SERPL-MCNC: 0.2 MG/DL (ref 0–1)
BUN SERPL-MCNC: 23 MG/DL (ref 8–22)
CALCIUM SERPL-MCNC: 10.7 MG/DL (ref 8.5–10.5)
CHLORIDE BLD-SCNC: 103 MMOL/L (ref 98–107)
CO2 SERPL-SCNC: 26 MMOL/L (ref 22–31)
CREAT SERPL-MCNC: 0.85 MG/DL (ref 0.6–1.1)
DIASTOLIC BLOOD PRESSURE - MUSE: NORMAL MMHG
EOSINOPHIL # BLD AUTO: 0.3 THOU/UL (ref 0–0.4)
EOSINOPHIL NFR BLD AUTO: 3 % (ref 0–6)
ERYTHROCYTE [DISTWIDTH] IN BLOOD BY AUTOMATED COUNT: 12.5 % (ref 11–14.5)
GFR SERPL CREATININE-BSD FRML MDRD: >60 ML/MIN/1.73M2
GLUCOSE BLD-MCNC: 96 MG/DL (ref 70–125)
HBA1C MFR BLD: 6.1 % (ref 3.5–6)
HCT VFR BLD AUTO: 41.5 % (ref 35–47)
HGB BLD-MCNC: 13.7 G/DL (ref 12–16)
INTERPRETATION ECG - MUSE: NORMAL
LYMPHOCYTES # BLD AUTO: 3.2 THOU/UL (ref 0.8–4.4)
LYMPHOCYTES NFR BLD AUTO: 33 % (ref 20–40)
MCH RBC QN AUTO: 29.5 PG (ref 27–34)
MCHC RBC AUTO-ENTMCNC: 33 G/DL (ref 32–36)
MCV RBC AUTO: 89 FL (ref 80–100)
MONOCYTES # BLD AUTO: 0.8 THOU/UL (ref 0–0.9)
MONOCYTES NFR BLD AUTO: 8 % (ref 2–10)
NEUTROPHILS # BLD AUTO: 5.3 THOU/UL (ref 2–7.7)
NEUTROPHILS NFR BLD AUTO: 55 % (ref 50–70)
P AXIS - MUSE: 10 DEGREES
PLATELET # BLD AUTO: 398 THOU/UL (ref 140–440)
PMV BLD AUTO: 8.2 FL (ref 7–10)
POTASSIUM BLD-SCNC: 3.9 MMOL/L (ref 3.5–5)
PR INTERVAL - MUSE: 152 MS
PROT SERPL-MCNC: 7.3 G/DL (ref 6–8)
QRS DURATION - MUSE: 110 MS
QT - MUSE: 430 MS
QTC - MUSE: 447 MS
R AXIS - MUSE: 8 DEGREES
RBC # BLD AUTO: 4.65 MILL/UL (ref 3.8–5.4)
SODIUM SERPL-SCNC: 140 MMOL/L (ref 136–145)
SYSTOLIC BLOOD PRESSURE - MUSE: NORMAL MMHG
T AXIS - MUSE: -6 DEGREES
VENTRICULAR RATE- MUSE: 65 BPM
WBC: 9.6 THOU/UL (ref 4–11)

## 2019-05-17 ASSESSMENT — MIFFLIN-ST. JEOR: SCORE: 1473.7

## 2019-05-21 ENCOUNTER — COMMUNICATION - HEALTHEAST (OUTPATIENT)
Dept: FAMILY MEDICINE | Facility: CLINIC | Age: 64
End: 2019-05-21

## 2019-06-11 ENCOUNTER — COMMUNICATION - HEALTHEAST (OUTPATIENT)
Dept: FAMILY MEDICINE | Facility: CLINIC | Age: 64
End: 2019-06-11

## 2019-06-11 DIAGNOSIS — J45.909 ASTHMA: ICD-10-CM

## 2019-06-18 ENCOUNTER — OFFICE VISIT - HEALTHEAST (OUTPATIENT)
Dept: FAMILY MEDICINE | Facility: CLINIC | Age: 64
End: 2019-06-18

## 2019-06-18 DIAGNOSIS — T78.3XXD ANGIOEDEMA, SUBSEQUENT ENCOUNTER: ICD-10-CM

## 2019-06-18 DIAGNOSIS — E11.59 TYPE 2 DIABETES MELLITUS WITH OTHER CIRCULATORY COMPLICATION, WITHOUT LONG-TERM CURRENT USE OF INSULIN (H): ICD-10-CM

## 2019-06-18 DIAGNOSIS — E66.01 MORBID OBESITY (H): ICD-10-CM

## 2019-06-18 DIAGNOSIS — I10 ESSENTIAL HYPERTENSION: ICD-10-CM

## 2019-06-18 DIAGNOSIS — K21.9 GASTROESOPHAGEAL REFLUX DISEASE WITHOUT ESOPHAGITIS: ICD-10-CM

## 2019-06-18 ASSESSMENT — MIFFLIN-ST. JEOR: SCORE: 1476.42

## 2019-07-08 ENCOUNTER — HOSPITAL ENCOUNTER (OUTPATIENT)
Dept: MAMMOGRAPHY | Facility: CLINIC | Age: 64
Discharge: HOME | End: 2019-07-08
Attending: FAMILY MEDICINE

## 2019-07-08 DIAGNOSIS — Z12.31 VISIT FOR SCREENING MAMMOGRAM: ICD-10-CM

## 2019-07-17 ENCOUNTER — RECORDS - HEALTHEAST (OUTPATIENT)
Dept: HEALTH INFORMATION MANAGEMENT | Facility: CLINIC | Age: 64
End: 2019-07-17

## 2019-07-23 ENCOUNTER — AMBULATORY - HEALTHEAST (OUTPATIENT)
Dept: FAMILY MEDICINE | Facility: CLINIC | Age: 64
End: 2019-07-23

## 2019-07-26 ENCOUNTER — OFFICE VISIT - HEALTHEAST (OUTPATIENT)
Dept: FAMILY MEDICINE | Facility: CLINIC | Age: 64
End: 2019-07-26

## 2019-07-26 DIAGNOSIS — G47.33 OBSTRUCTIVE SLEEP APNEA (ADULT) (PEDIATRIC): ICD-10-CM

## 2019-07-26 DIAGNOSIS — I10 ESSENTIAL HYPERTENSION: ICD-10-CM

## 2019-07-26 DIAGNOSIS — I25.10 ATHEROSCLEROSIS OF CORONARY ARTERY OF NATIVE HEART WITHOUT ANGINA PECTORIS, UNSPECIFIED VESSEL OR LESION TYPE: ICD-10-CM

## 2019-07-26 DIAGNOSIS — E11.59 TYPE 2 DIABETES MELLITUS WITH OTHER CIRCULATORY COMPLICATION, WITHOUT LONG-TERM CURRENT USE OF INSULIN (H): ICD-10-CM

## 2019-07-26 LAB
CHOLEST SERPL-MCNC: 228 MG/DL
CREAT UR-MCNC: 39.2 MG/DL
FASTING STATUS PATIENT QL REPORTED: NO
HDLC SERPL-MCNC: 52 MG/DL
LDLC SERPL CALC-MCNC: 140 MG/DL
MICROALBUMIN UR-MCNC: <0.5 MG/DL (ref 0–1.99)
MICROALBUMIN/CREAT UR: NORMAL MG/G
TRIGL SERPL-MCNC: 178 MG/DL

## 2019-07-28 ENCOUNTER — AMBULATORY - HEALTHEAST (OUTPATIENT)
Dept: FAMILY MEDICINE | Facility: CLINIC | Age: 64
End: 2019-07-28

## 2019-07-28 ENCOUNTER — COMMUNICATION - HEALTHEAST (OUTPATIENT)
Dept: FAMILY MEDICINE | Facility: CLINIC | Age: 64
End: 2019-07-28

## 2019-07-28 DIAGNOSIS — E78.00 PURE HYPERCHOLESTEROLEMIA: ICD-10-CM

## 2019-08-08 ENCOUNTER — COMMUNICATION - HEALTHEAST (OUTPATIENT)
Dept: FAMILY MEDICINE | Facility: CLINIC | Age: 64
End: 2019-08-08

## 2019-08-08 DIAGNOSIS — I10 ESSENTIAL HYPERTENSION, MALIGNANT: ICD-10-CM

## 2019-08-09 ENCOUNTER — OFFICE VISIT - HEALTHEAST (OUTPATIENT)
Dept: FAMILY MEDICINE | Facility: CLINIC | Age: 64
End: 2019-08-09

## 2019-08-09 DIAGNOSIS — L23.89 ALLERGIC CONTACT DERMATITIS DUE TO OTHER AGENTS: ICD-10-CM

## 2019-08-09 DIAGNOSIS — J30.89 SEASONAL ALLERGIC RHINITIS DUE TO OTHER ALLERGIC TRIGGER: ICD-10-CM

## 2019-08-09 DIAGNOSIS — H92.02 LEFT EAR PAIN: ICD-10-CM

## 2019-08-19 ENCOUNTER — COMMUNICATION - HEALTHEAST (OUTPATIENT)
Dept: FAMILY MEDICINE | Facility: CLINIC | Age: 64
End: 2019-08-19

## 2019-08-19 DIAGNOSIS — J45.909 ASTHMA: ICD-10-CM

## 2019-09-20 ENCOUNTER — COMMUNICATION - HEALTHEAST (OUTPATIENT)
Dept: FAMILY MEDICINE | Facility: CLINIC | Age: 64
End: 2019-09-20

## 2019-09-20 DIAGNOSIS — I25.84 CORONARY ATHEROSCLEROSIS DUE TO SEVERELY CALCIFIED CORONARY LESION: ICD-10-CM

## 2019-10-20 ENCOUNTER — COMMUNICATION - HEALTHEAST (OUTPATIENT)
Dept: FAMILY MEDICINE | Facility: CLINIC | Age: 64
End: 2019-10-20

## 2019-10-20 DIAGNOSIS — K21.9 GERD (GASTROESOPHAGEAL REFLUX DISEASE): ICD-10-CM

## 2019-10-21 ENCOUNTER — OFFICE VISIT - HEALTHEAST (OUTPATIENT)
Dept: FAMILY MEDICINE | Facility: CLINIC | Age: 64
End: 2019-10-21

## 2019-10-21 DIAGNOSIS — E61.1 IRON DEFICIENCY: ICD-10-CM

## 2019-10-21 DIAGNOSIS — H92.02 OTALGIA, LEFT: ICD-10-CM

## 2019-10-21 DIAGNOSIS — I25.84 CORONARY ARTERY DISEASE DUE TO CALCIFIED CORONARY LESION: ICD-10-CM

## 2019-10-21 DIAGNOSIS — I25.10 CORONARY ARTERY DISEASE DUE TO CALCIFIED CORONARY LESION: ICD-10-CM

## 2019-10-21 DIAGNOSIS — R06.09 DOE (DYSPNEA ON EXERTION): ICD-10-CM

## 2019-10-21 DIAGNOSIS — R07.9 ACUTE CHEST PAIN: ICD-10-CM

## 2019-10-21 DIAGNOSIS — Z23 NEED FOR VACCINATION: ICD-10-CM

## 2019-10-21 DIAGNOSIS — M25.561 ACUTE PAIN OF RIGHT KNEE: ICD-10-CM

## 2019-10-21 LAB
ALBUMIN SERPL-MCNC: 4.1 G/DL (ref 3.5–5)
ALP SERPL-CCNC: 99 U/L (ref 45–120)
ALT SERPL W P-5'-P-CCNC: 27 U/L (ref 0–45)
ANION GAP SERPL CALCULATED.3IONS-SCNC: 9 MMOL/L (ref 5–18)
AST SERPL W P-5'-P-CCNC: 21 U/L (ref 0–40)
ATRIAL RATE - MUSE: 62 BPM
BASOPHILS # BLD AUTO: 0.1 THOU/UL (ref 0–0.2)
BASOPHILS NFR BLD AUTO: 1 % (ref 0–2)
BILIRUB SERPL-MCNC: 0.4 MG/DL (ref 0–1)
BUN SERPL-MCNC: 17 MG/DL (ref 8–22)
CALCIUM SERPL-MCNC: 10.9 MG/DL (ref 8.5–10.5)
CHLORIDE BLD-SCNC: 103 MMOL/L (ref 98–107)
CO2 SERPL-SCNC: 30 MMOL/L (ref 22–31)
CREAT SERPL-MCNC: 0.77 MG/DL (ref 0.6–1.1)
DIASTOLIC BLOOD PRESSURE - MUSE: NORMAL
EOSINOPHIL # BLD AUTO: 0.3 THOU/UL (ref 0–0.4)
EOSINOPHIL NFR BLD AUTO: 4 % (ref 0–6)
ERYTHROCYTE [DISTWIDTH] IN BLOOD BY AUTOMATED COUNT: 12.3 % (ref 11–14.5)
GFR SERPL CREATININE-BSD FRML MDRD: >60 ML/MIN/1.73M2
GLUCOSE BLD-MCNC: 97 MG/DL (ref 70–125)
HCT VFR BLD AUTO: 39.5 % (ref 35–47)
HGB BLD-MCNC: 13.5 G/DL (ref 12–16)
INTERPRETATION ECG - MUSE: NORMAL
IRON SATN MFR SERPL: 17 % (ref 20–50)
IRON SERPL-MCNC: 72 UG/DL (ref 42–175)
LYMPHOCYTES # BLD AUTO: 2.8 THOU/UL (ref 0.8–4.4)
LYMPHOCYTES NFR BLD AUTO: 34 % (ref 20–40)
MCH RBC QN AUTO: 29.5 PG (ref 27–34)
MCHC RBC AUTO-ENTMCNC: 34.3 G/DL (ref 32–36)
MCV RBC AUTO: 86 FL (ref 80–100)
MONOCYTES # BLD AUTO: 0.7 THOU/UL (ref 0–0.9)
MONOCYTES NFR BLD AUTO: 8 % (ref 2–10)
NEUTROPHILS # BLD AUTO: 4.4 THOU/UL (ref 2–7.7)
NEUTROPHILS NFR BLD AUTO: 54 % (ref 50–70)
P AXIS - MUSE: 46 DEGREES
PLATELET # BLD AUTO: 333 THOU/UL (ref 140–440)
PMV BLD AUTO: 7.8 FL (ref 7–10)
POTASSIUM BLD-SCNC: 4.1 MMOL/L (ref 3.5–5)
PR INTERVAL - MUSE: 176 MS
PROT SERPL-MCNC: 7.3 G/DL (ref 6–8)
QRS DURATION - MUSE: 108 MS
QT - MUSE: 450 MS
QTC - MUSE: 456 MS
R AXIS - MUSE: 4 DEGREES
RBC # BLD AUTO: 4.58 MILL/UL (ref 3.8–5.4)
SODIUM SERPL-SCNC: 142 MMOL/L (ref 136–145)
SYSTOLIC BLOOD PRESSURE - MUSE: NORMAL
T AXIS - MUSE: 1 DEGREES
TIBC SERPL-MCNC: 417 UG/DL (ref 313–563)
TRANSFERRIN SERPL-MCNC: 334 MG/DL (ref 212–360)
VENTRICULAR RATE- MUSE: 62 BPM
WBC: 8.3 THOU/UL (ref 4–11)

## 2019-10-21 ASSESSMENT — MIFFLIN-ST. JEOR: SCORE: 1481.53

## 2019-10-22 ENCOUNTER — COMMUNICATION - HEALTHEAST (OUTPATIENT)
Dept: CARDIOLOGY | Facility: CLINIC | Age: 64
End: 2019-10-22

## 2019-10-22 ENCOUNTER — OFFICE VISIT - HEALTHEAST (OUTPATIENT)
Dept: CARDIOLOGY | Facility: CLINIC | Age: 64
End: 2019-10-22

## 2019-10-22 DIAGNOSIS — I10 ESSENTIAL HYPERTENSION: ICD-10-CM

## 2019-10-22 DIAGNOSIS — R07.89 OTHER CHEST PAIN: ICD-10-CM

## 2019-10-22 LAB — TROPONIN I SERPL-MCNC: <0.01 NG/ML (ref 0–0.29)

## 2019-10-22 ASSESSMENT — MIFFLIN-ST. JEOR: SCORE: 1476.71

## 2019-10-25 ENCOUNTER — HOSPITAL ENCOUNTER (OUTPATIENT)
Dept: CARDIOLOGY | Facility: CLINIC | Age: 64
Discharge: HOME | End: 2019-10-25
Attending: INTERNAL MEDICINE

## 2019-10-25 ENCOUNTER — HOSPITAL ENCOUNTER (OUTPATIENT)
Dept: NUCLEAR MEDICINE | Facility: CLINIC | Age: 64
Discharge: HOME | End: 2019-10-25
Attending: INTERNAL MEDICINE

## 2019-10-25 DIAGNOSIS — R07.89 OTHER CHEST PAIN: ICD-10-CM

## 2019-10-25 LAB
CV STRESS CURRENT DIA BP HE: 100
CV STRESS CURRENT DIA BP HE: 104
CV STRESS CURRENT DIA BP HE: 107
CV STRESS CURRENT DIA BP HE: 107
CV STRESS CURRENT DIA BP HE: 118
CV STRESS CURRENT DIA BP HE: 95
CV STRESS CURRENT HR HE: 60
CV STRESS CURRENT HR HE: 60
CV STRESS CURRENT HR HE: 65
CV STRESS CURRENT HR HE: 77
CV STRESS CURRENT HR HE: 77
CV STRESS CURRENT HR HE: 78
CV STRESS CURRENT HR HE: 79
CV STRESS CURRENT HR HE: 81
CV STRESS CURRENT HR HE: 84
CV STRESS CURRENT HR HE: 87
CV STRESS CURRENT HR HE: 92
CV STRESS CURRENT HR HE: 92
CV STRESS CURRENT HR HE: 95
CV STRESS CURRENT HR HE: 96
CV STRESS CURRENT SYS BP HE: 185
CV STRESS CURRENT SYS BP HE: 191
CV STRESS CURRENT SYS BP HE: 195
CV STRESS CURRENT SYS BP HE: 226
CV STRESS CURRENT SYS BP HE: 226
CV STRESS CURRENT SYS BP HE: 232
CV STRESS DEVIATION TIME HE: NORMAL
CV STRESS ECHO PERCENT HR HE: NORMAL
CV STRESS EXERCISE STAGE HE: NORMAL
CV STRESS FINAL RESTING BP DIASTOLIC HE: 100
CV STRESS FINAL RESTING BP SYSTOLIC HE: 191
CV STRESS FINAL RESTING HR HE: 77
CV STRESS MAX HR HE: 97
CV STRESS MAX TREADMILL GRADE HE: 0
CV STRESS MAX TREADMILL SPEED HE: 0
CV STRESS PEAK DIA BP DIA HE: 118
CV STRESS PEAK DIA BP SYS HE: 232
CV STRESS PEAK SYS BP DIA HE: 118
CV STRESS PEAK SYS BP SYS HE: 232
CV STRESS PHASE HE: NORMAL
CV STRESS PROTOCOL HE: NORMAL
CV STRESS RESTING PT POSITION HE: NORMAL
CV STRESS ST DEVIATION AMOUNT HE: NORMAL
CV STRESS ST DEVIATION ELEVATION HE: NORMAL
CV STRESS ST EVELATION AMOUNT HE: NORMAL
CV STRESS TEST TYPE HE: NORMAL
CV STRESS TOTAL STAGE TIME MIN 1 HE: NORMAL
RATE PRESSURE PRODUCT: NORMAL
STRESS ECHO BASELINE DIASTOLIC HE: 95
STRESS ECHO BASELINE HR: 60
STRESS ECHO BASELINE SYSTOLIC BP: 195
STRESS ECHO CALCULATED PERCENT HR: 63 %
STRESS ECHO LAST STRESS DIASTOLIC BP: 118
STRESS ECHO LAST STRESS HR: 92
STRESS ECHO LAST STRESS SYSTOLIC BP: 232
STRESS ECHO TARGET HR: 155

## 2019-11-20 ENCOUNTER — OFFICE VISIT - HEALTHEAST (OUTPATIENT)
Dept: CARDIOLOGY | Facility: CLINIC | Age: 64
End: 2019-11-20

## 2019-11-20 DIAGNOSIS — I10 ESSENTIAL HYPERTENSION: ICD-10-CM

## 2019-11-20 DIAGNOSIS — E78.5 DYSLIPIDEMIA: ICD-10-CM

## 2019-11-20 DIAGNOSIS — I25.10 CORONARY ARTERY DISEASE INVOLVING NATIVE CORONARY ARTERY OF NATIVE HEART WITHOUT ANGINA PECTORIS: ICD-10-CM

## 2019-11-20 DIAGNOSIS — R06.02 SOB (SHORTNESS OF BREATH): ICD-10-CM

## 2019-11-20 LAB — BNP SERPL-MCNC: 28 PG/ML (ref 0–106)

## 2019-11-20 ASSESSMENT — MIFFLIN-ST. JEOR: SCORE: 1490.03

## 2019-12-10 ENCOUNTER — HOSPITAL ENCOUNTER (OUTPATIENT)
Dept: CARDIOLOGY | Facility: CLINIC | Age: 64
Discharge: HOME | End: 2019-12-10
Attending: INTERNAL MEDICINE

## 2019-12-10 DIAGNOSIS — R06.02 SOB (SHORTNESS OF BREATH): ICD-10-CM

## 2019-12-10 LAB
AORTIC ROOT: 3.3 CM
BSA FOR ECHO PROCEDURE: 2.09 M2
CV BLOOD PRESSURE: ABNORMAL MMHG
CV ECHO HEIGHT: 62.5 IN
CV ECHO WEIGHT: 218 LBS
DOP CALC LVOT AREA: 2.83 CM2
DOP CALC LVOT DIAMETER: 1.9 CM
DOP CALC LVOT PEAK VEL: 111 CM/S
DOP CALC LVOT STROKE VOLUME: 77.9 CM3
DOP CALCLVOT PEAK VEL VTI: 27.5 CM
EJECTION FRACTION: 62 % (ref 55–75)
FRACTIONAL SHORTENING: 33.4 % (ref 28–44)
INTERVENTRICULAR SEPTUM IN END DIASTOLE: 1.36 CM (ref 0.6–0.9)
IVS/PW RATIO: 1
LA AREA 1: 23.7 CM2
LA AREA 2: 23.7 CM2
LEFT ATRIUM LENGTH: 5.62 CM
LEFT ATRIUM SIZE: 3.7 CM
LEFT ATRIUM TO AORTIC ROOT RATIO: 1.12 NO UNITS
LEFT ATRIUM VOLUME INDEX: 40.6 ML/M2
LEFT ATRIUM VOLUME: 85 ML
LEFT VENTRICLE CARDIAC INDEX: 2.6 L/MIN/M2
LEFT VENTRICLE CARDIAC OUTPUT: 5.5 L/MIN
LEFT VENTRICLE DIASTOLIC VOLUME INDEX: 63.6 CM3/M2 (ref 29–61)
LEFT VENTRICLE DIASTOLIC VOLUME: 133 CM3 (ref 46–106)
LEFT VENTRICLE HEART RATE: 70 BPM
LEFT VENTRICLE MASS INDEX: 111.4 G/M2
LEFT VENTRICLE SYSTOLIC VOLUME INDEX: 23.9 CM3/M2 (ref 8–24)
LEFT VENTRICLE SYSTOLIC VOLUME: 50 CM3 (ref 14–42)
LEFT VENTRICULAR INTERNAL DIMENSION IN DIASTOLE: 4.34 CM (ref 3.8–5.2)
LEFT VENTRICULAR INTERNAL DIMENSION IN SYSTOLE: 2.89 CM (ref 2.2–3.5)
LEFT VENTRICULAR MASS: 232.9 G
LEFT VENTRICULAR OUTFLOW TRACT MEAN GRADIENT: 3 MMHG
LEFT VENTRICULAR OUTFLOW TRACT MEAN VELOCITY: 76 CM/S
LEFT VENTRICULAR OUTFLOW TRACT PEAK GRADIENT: 5 MMHG
LEFT VENTRICULAR POSTERIOR WALL IN END DIASTOLE: 1.42 CM (ref 0.6–0.9)
LV STROKE VOLUME INDEX: 37.3 ML/M2
MITRAL VALVE E/A RATIO: 1.3
MV AVERAGE E/E' RATIO: 15.5 CM/S
MV DECELERATION TIME: 215 MS
MV E'TISSUE VEL-LAT: 7.51 CM/S
MV E'TISSUE VEL-MED: 6.04 CM/S
MV LATERAL E/E' RATIO: 14
MV MEDIAL E/E' RATIO: 17.4
MV PEAK A VELOCITY: 81.4 CM/S
MV PEAK E VELOCITY: 105 CM/S
NUC REST DIASTOLIC VOLUME INDEX: 3488 LBS
NUC REST SYSTOLIC VOLUME INDEX: 62.5 IN
TRICUSPID REGURGITATION PEAK PRESSURE GRADIENT: 20.1 MMHG
TRICUSPID VALVE ANULAR PLANE SYSTOLIC EXCURSION: 2.5 CM
TRICUSPID VALVE PEAK REGURGITANT VELOCITY: 224 CM/S

## 2019-12-10 ASSESSMENT — MIFFLIN-ST. JEOR: SCORE: 1490.03

## 2019-12-11 ENCOUNTER — COMMUNICATION - HEALTHEAST (OUTPATIENT)
Dept: FAMILY MEDICINE | Facility: CLINIC | Age: 64
End: 2019-12-11

## 2019-12-11 DIAGNOSIS — J45.909 ASTHMA: ICD-10-CM

## 2019-12-20 ENCOUNTER — COMMUNICATION - HEALTHEAST (OUTPATIENT)
Dept: CARDIOLOGY | Facility: CLINIC | Age: 64
End: 2019-12-20

## 2019-12-20 DIAGNOSIS — I10 HYPERTENSION: ICD-10-CM

## 2019-12-23 ENCOUNTER — OFFICE VISIT - HEALTHEAST (OUTPATIENT)
Dept: FAMILY MEDICINE | Facility: CLINIC | Age: 64
End: 2019-12-23

## 2019-12-23 DIAGNOSIS — J06.9 VIRAL UPPER RESPIRATORY TRACT INFECTION: ICD-10-CM

## 2019-12-23 DIAGNOSIS — I51.89 DIASTOLIC DYSFUNCTION: ICD-10-CM

## 2019-12-23 DIAGNOSIS — E11.9 TYPE 2 DIABETES MELLITUS WITHOUT COMPLICATION, WITHOUT LONG-TERM CURRENT USE OF INSULIN (H): ICD-10-CM

## 2019-12-23 DIAGNOSIS — I10 ESSENTIAL HYPERTENSION: ICD-10-CM

## 2020-01-13 ENCOUNTER — OFFICE VISIT - HEALTHEAST (OUTPATIENT)
Dept: CARDIOLOGY | Facility: CLINIC | Age: 65
End: 2020-01-13

## 2020-01-13 DIAGNOSIS — E78.5 DYSLIPIDEMIA: ICD-10-CM

## 2020-01-13 DIAGNOSIS — E78.00 PURE HYPERCHOLESTEROLEMIA: ICD-10-CM

## 2020-01-13 DIAGNOSIS — I25.10 CORONARY ARTERY DISEASE INVOLVING NATIVE CORONARY ARTERY OF NATIVE HEART WITHOUT ANGINA PECTORIS: ICD-10-CM

## 2020-01-13 ASSESSMENT — MIFFLIN-ST. JEOR: SCORE: 1490.03

## 2020-02-25 ENCOUNTER — OFFICE VISIT - HEALTHEAST (OUTPATIENT)
Dept: FAMILY MEDICINE | Facility: CLINIC | Age: 65
End: 2020-02-25

## 2020-02-25 DIAGNOSIS — J01.90 ACUTE SINUSITIS WITH SYMPTOMS > 10 DAYS: ICD-10-CM

## 2020-02-25 DIAGNOSIS — J30.2 SEASONAL ALLERGIES: ICD-10-CM

## 2020-02-25 DIAGNOSIS — Z12.11 SCREEN FOR COLON CANCER: ICD-10-CM

## 2020-03-18 ENCOUNTER — COMMUNICATION - HEALTHEAST (OUTPATIENT)
Dept: SCHEDULING | Facility: CLINIC | Age: 65
End: 2020-03-18

## 2020-03-18 ENCOUNTER — AMBULATORY - HEALTHEAST (OUTPATIENT)
Dept: FAMILY MEDICINE | Facility: CLINIC | Age: 65
End: 2020-03-18

## 2020-03-18 DIAGNOSIS — J01.90 ACUTE SINUSITIS WITH SYMPTOMS > 10 DAYS: ICD-10-CM

## 2020-04-03 ENCOUNTER — COMMUNICATION - HEALTHEAST (OUTPATIENT)
Dept: CARDIOLOGY | Facility: CLINIC | Age: 65
End: 2020-04-03

## 2020-04-03 DIAGNOSIS — I10 HYPERTENSION: ICD-10-CM

## 2020-04-03 DIAGNOSIS — E78.00 PURE HYPERCHOLESTEROLEMIA: ICD-10-CM

## 2020-04-03 DIAGNOSIS — I10 ESSENTIAL HYPERTENSION: ICD-10-CM

## 2020-04-29 ENCOUNTER — COMMUNICATION - HEALTHEAST (OUTPATIENT)
Dept: FAMILY MEDICINE | Facility: CLINIC | Age: 65
End: 2020-04-29

## 2020-05-01 ENCOUNTER — OFFICE VISIT - HEALTHEAST (OUTPATIENT)
Dept: FAMILY MEDICINE | Facility: CLINIC | Age: 65
End: 2020-05-01

## 2020-05-01 DIAGNOSIS — T14.8XXA BRUISING: ICD-10-CM

## 2020-05-01 DIAGNOSIS — R53.83 FATIGUE, UNSPECIFIED TYPE: ICD-10-CM

## 2020-05-01 DIAGNOSIS — E11.59 TYPE 2 DIABETES MELLITUS WITH OTHER CIRCULATORY COMPLICATION, WITHOUT LONG-TERM CURRENT USE OF INSULIN (H): ICD-10-CM

## 2020-05-01 DIAGNOSIS — M79.89 SWELLING OF RIGHT HAND: ICD-10-CM

## 2020-05-01 DIAGNOSIS — Z86.39 HISTORY OF IRON DEFICIENCY: ICD-10-CM

## 2020-05-01 LAB
ALBUMIN SERPL-MCNC: 4 G/DL (ref 3.5–5)
ALP SERPL-CCNC: 97 U/L (ref 45–120)
ALT SERPL W P-5'-P-CCNC: 19 U/L (ref 0–45)
ANION GAP SERPL CALCULATED.3IONS-SCNC: 8 MMOL/L (ref 5–18)
AST SERPL W P-5'-P-CCNC: 15 U/L (ref 0–40)
BILIRUB SERPL-MCNC: 0.3 MG/DL (ref 0–1)
BUN SERPL-MCNC: 20 MG/DL (ref 8–22)
C REACTIVE PROTEIN LHE: 0.2 MG/DL (ref 0–0.8)
CALCIUM SERPL-MCNC: 10.4 MG/DL (ref 8.5–10.5)
CHLORIDE BLD-SCNC: 102 MMOL/L (ref 98–107)
CO2 SERPL-SCNC: 30 MMOL/L (ref 22–31)
CREAT SERPL-MCNC: 0.78 MG/DL (ref 0.6–1.1)
ERYTHROCYTE [DISTWIDTH] IN BLOOD BY AUTOMATED COUNT: 12.7 % (ref 11–14.5)
ERYTHROCYTE [SEDIMENTATION RATE] IN BLOOD BY WESTERGREN METHOD: 9 MM/HR (ref 0–20)
FERRITIN SERPL-MCNC: 11 NG/ML (ref 10–130)
GFR SERPL CREATININE-BSD FRML MDRD: >60 ML/MIN/1.73M2
GLUCOSE BLD-MCNC: 106 MG/DL (ref 70–125)
HBA1C MFR BLD: 6.2 % (ref 3.5–6)
HCT VFR BLD AUTO: 41.4 % (ref 35–47)
HGB BLD-MCNC: 13.7 G/DL (ref 12–16)
INR PPP: 0.94 (ref 0.9–1.1)
IRON SATN MFR SERPL: 21 % (ref 20–50)
IRON SERPL-MCNC: 92 UG/DL (ref 42–175)
MCH RBC QN AUTO: 28.9 PG (ref 27–34)
MCHC RBC AUTO-ENTMCNC: 33.1 G/DL (ref 32–36)
MCV RBC AUTO: 88 FL (ref 80–100)
PLATELET # BLD AUTO: 340 THOU/UL (ref 140–440)
PMV BLD AUTO: 8.3 FL (ref 7–10)
POTASSIUM BLD-SCNC: 3.7 MMOL/L (ref 3.5–5)
PROT SERPL-MCNC: 7.3 G/DL (ref 6–8)
RBC # BLD AUTO: 4.73 MILL/UL (ref 3.8–5.4)
SODIUM SERPL-SCNC: 140 MMOL/L (ref 136–145)
TIBC SERPL-MCNC: 433 UG/DL (ref 313–563)
TRANSFERRIN SERPL-MCNC: 346 MG/DL (ref 212–360)
WBC: 7.2 THOU/UL (ref 4–11)

## 2020-05-01 ASSESSMENT — MIFFLIN-ST. JEOR: SCORE: 1462.82

## 2020-05-05 ENCOUNTER — COMMUNICATION - HEALTHEAST (OUTPATIENT)
Dept: FAMILY MEDICINE | Facility: CLINIC | Age: 65
End: 2020-05-05

## 2020-05-05 DIAGNOSIS — M25.40 JOINT SWELLING: ICD-10-CM

## 2020-05-07 ENCOUNTER — COMMUNICATION - HEALTHEAST (OUTPATIENT)
Dept: FAMILY MEDICINE | Facility: CLINIC | Age: 65
End: 2020-05-07

## 2020-05-07 DIAGNOSIS — I10 ESSENTIAL HYPERTENSION: ICD-10-CM

## 2020-05-16 ENCOUNTER — COMMUNICATION - HEALTHEAST (OUTPATIENT)
Dept: FAMILY MEDICINE | Facility: CLINIC | Age: 65
End: 2020-05-16

## 2020-05-16 DIAGNOSIS — I10 ESSENTIAL HYPERTENSION, MALIGNANT: ICD-10-CM

## 2020-05-29 ENCOUNTER — COMMUNICATION - HEALTHEAST (OUTPATIENT)
Dept: FAMILY MEDICINE | Facility: CLINIC | Age: 65
End: 2020-05-29

## 2020-06-02 ENCOUNTER — OFFICE VISIT - HEALTHEAST (OUTPATIENT)
Dept: FAMILY MEDICINE | Facility: CLINIC | Age: 65
End: 2020-06-02

## 2020-06-02 DIAGNOSIS — G50.8: ICD-10-CM

## 2020-06-05 ENCOUNTER — RECORDS - HEALTHEAST (OUTPATIENT)
Dept: ADMINISTRATIVE | Facility: OTHER | Age: 65
End: 2020-06-05

## 2020-07-27 ENCOUNTER — RECORDS - HEALTHEAST (OUTPATIENT)
Dept: ADMINISTRATIVE | Facility: OTHER | Age: 65
End: 2020-07-27

## 2020-07-30 ENCOUNTER — RECORDS - HEALTHEAST (OUTPATIENT)
Dept: ADMINISTRATIVE | Facility: OTHER | Age: 65
End: 2020-07-30

## 2020-08-26 ENCOUNTER — RECORDS - HEALTHEAST (OUTPATIENT)
Dept: ADMINISTRATIVE | Facility: OTHER | Age: 65
End: 2020-08-26

## 2020-09-03 ENCOUNTER — RECORDS - HEALTHEAST (OUTPATIENT)
Dept: ADMINISTRATIVE | Facility: OTHER | Age: 65
End: 2020-09-03

## 2020-09-20 ENCOUNTER — COMMUNICATION - HEALTHEAST (OUTPATIENT)
Dept: FAMILY MEDICINE | Facility: CLINIC | Age: 65
End: 2020-09-20

## 2020-09-20 DIAGNOSIS — I25.84 CORONARY ATHEROSCLEROSIS DUE TO SEVERELY CALCIFIED CORONARY LESION: ICD-10-CM

## 2020-09-28 ENCOUNTER — OFFICE VISIT - HEALTHEAST (OUTPATIENT)
Dept: FAMILY MEDICINE | Facility: CLINIC | Age: 65
End: 2020-09-28

## 2020-09-28 DIAGNOSIS — E55.9 VITAMIN D DEFICIENCY: ICD-10-CM

## 2020-09-28 DIAGNOSIS — M54.2 NECK PAIN: ICD-10-CM

## 2020-09-28 DIAGNOSIS — R53.83 FATIGUE, UNSPECIFIED TYPE: ICD-10-CM

## 2020-09-28 DIAGNOSIS — E56.9 VITAMIN DEFICIENCY: ICD-10-CM

## 2020-09-28 DIAGNOSIS — Z23 NEED FOR VACCINATION: ICD-10-CM

## 2020-09-28 DIAGNOSIS — L65.9 HAIR LOSS: ICD-10-CM

## 2020-09-28 ASSESSMENT — MIFFLIN-ST. JEOR: SCORE: 1453.74

## 2020-10-01 ENCOUNTER — AMBULATORY - HEALTHEAST (OUTPATIENT)
Dept: LAB | Facility: CLINIC | Age: 65
End: 2020-10-01

## 2020-10-01 DIAGNOSIS — R53.83 FATIGUE, UNSPECIFIED TYPE: ICD-10-CM

## 2020-10-01 DIAGNOSIS — L65.9 HAIR LOSS: ICD-10-CM

## 2020-10-01 DIAGNOSIS — E55.9 VITAMIN D DEFICIENCY: ICD-10-CM

## 2020-10-01 LAB
TSH SERPL DL<=0.005 MIU/L-ACNC: 1.37 UIU/ML (ref 0.3–5)
VIT B12 SERPL-MCNC: 379 PG/ML (ref 213–816)

## 2020-10-02 ENCOUNTER — COMMUNICATION - HEALTHEAST (OUTPATIENT)
Dept: FAMILY MEDICINE | Facility: CLINIC | Age: 65
End: 2020-10-02

## 2020-10-02 DIAGNOSIS — J45.909 ASTHMA: ICD-10-CM

## 2020-10-02 LAB
25(OH)D3 SERPL-MCNC: 23 NG/ML (ref 30–80)
25(OH)D3 SERPL-MCNC: 23 NG/ML (ref 30–80)

## 2020-10-04 ENCOUNTER — AMBULATORY - HEALTHEAST (OUTPATIENT)
Dept: FAMILY MEDICINE | Facility: CLINIC | Age: 65
End: 2020-10-04

## 2020-10-04 DIAGNOSIS — E55.9 VITAMIN D DEFICIENCY: ICD-10-CM

## 2020-12-16 ENCOUNTER — COMMUNICATION - HEALTHEAST (OUTPATIENT)
Dept: FAMILY MEDICINE | Facility: CLINIC | Age: 65
End: 2020-12-16

## 2020-12-16 DIAGNOSIS — J45.909 ASTHMA: ICD-10-CM

## 2020-12-20 ENCOUNTER — OFFICE VISIT - HEALTHEAST (OUTPATIENT)
Dept: FAMILY MEDICINE | Facility: CLINIC | Age: 65
End: 2020-12-20

## 2020-12-20 ENCOUNTER — COMMUNICATION - HEALTHEAST (OUTPATIENT)
Dept: SCHEDULING | Facility: CLINIC | Age: 65
End: 2020-12-20

## 2020-12-20 DIAGNOSIS — S00.411A ABRASION OF RIGHT EAR CANAL, INITIAL ENCOUNTER: ICD-10-CM

## 2020-12-20 DIAGNOSIS — D23.21 DERMOID CYST OF EAR, RIGHT: ICD-10-CM

## 2020-12-20 DIAGNOSIS — E11.59 TYPE 2 DIABETES MELLITUS WITH OTHER CIRCULATORY COMPLICATION, WITHOUT LONG-TERM CURRENT USE OF INSULIN (H): ICD-10-CM

## 2020-12-22 ENCOUNTER — OFFICE VISIT - HEALTHEAST (OUTPATIENT)
Dept: FAMILY MEDICINE | Facility: CLINIC | Age: 65
End: 2020-12-22

## 2020-12-22 DIAGNOSIS — E78.00 PURE HYPERCHOLESTEROLEMIA: ICD-10-CM

## 2020-12-22 DIAGNOSIS — Z00.01 ENCOUNTER FOR GENERAL ADULT MEDICAL EXAMINATION WITH ABNORMAL FINDINGS: ICD-10-CM

## 2020-12-22 DIAGNOSIS — E61.1 IRON DEFICIENCY: ICD-10-CM

## 2020-12-22 DIAGNOSIS — R73.9 HYPERGLYCEMIA: ICD-10-CM

## 2020-12-22 DIAGNOSIS — Z78.0 ASYMPTOMATIC MENOPAUSE: ICD-10-CM

## 2020-12-22 DIAGNOSIS — R53.83 FATIGUE, UNSPECIFIED TYPE: ICD-10-CM

## 2020-12-22 DIAGNOSIS — E55.9 VITAMIN D DEFICIENCY: ICD-10-CM

## 2020-12-22 DIAGNOSIS — I10 ESSENTIAL HYPERTENSION: ICD-10-CM

## 2020-12-22 DIAGNOSIS — Z23 NEED FOR VACCINATION: ICD-10-CM

## 2020-12-22 DIAGNOSIS — L65.9 HAIR LOSS: ICD-10-CM

## 2020-12-22 DIAGNOSIS — Z13.820 SCREENING FOR OSTEOPOROSIS: ICD-10-CM

## 2020-12-22 LAB — HBA1C MFR BLD: 6.1 %

## 2020-12-23 ENCOUNTER — OFFICE VISIT - HEALTHEAST (OUTPATIENT)
Dept: OTOLARYNGOLOGY | Facility: CLINIC | Age: 65
End: 2020-12-23

## 2020-12-23 DIAGNOSIS — H60.321 ACUTE HEMORRHAGIC OTITIS EXTERNA OF RIGHT EAR: ICD-10-CM

## 2020-12-23 LAB
ALBUMIN SERPL-MCNC: 4.2 G/DL (ref 3.5–5)
ALP SERPL-CCNC: 82 U/L (ref 45–120)
ALT SERPL W P-5'-P-CCNC: 17 U/L (ref 0–45)
ANION GAP SERPL CALCULATED.3IONS-SCNC: 13 MMOL/L (ref 5–18)
AST SERPL W P-5'-P-CCNC: 16 U/L (ref 0–40)
BILIRUB SERPL-MCNC: 0.3 MG/DL (ref 0–1)
BUN SERPL-MCNC: 18 MG/DL (ref 8–22)
CALCIUM SERPL-MCNC: 10.5 MG/DL (ref 8.5–10.5)
CHLORIDE BLD-SCNC: 100 MMOL/L (ref 98–107)
CHOLEST SERPL-MCNC: 239 MG/DL
CO2 SERPL-SCNC: 27 MMOL/L (ref 22–31)
CREAT SERPL-MCNC: 0.94 MG/DL (ref 0.6–1.1)
CREAT UR-MCNC: 38.4 MG/DL
FASTING STATUS PATIENT QL REPORTED: ABNORMAL
FERRITIN SERPL-MCNC: 38 NG/ML (ref 10–130)
GFR SERPL CREATININE-BSD FRML MDRD: 60 ML/MIN/1.73M2
GLUCOSE BLD-MCNC: 88 MG/DL (ref 70–125)
HDLC SERPL-MCNC: 57 MG/DL
LDLC SERPL CALC-MCNC: 159 MG/DL
MICROALBUMIN UR-MCNC: 0.94 MG/DL (ref 0–1.99)
MICROALBUMIN/CREAT UR: 24.5 MG/G
POTASSIUM BLD-SCNC: 4 MMOL/L (ref 3.5–5)
PROT SERPL-MCNC: 7.3 G/DL (ref 6–8)
SODIUM SERPL-SCNC: 140 MMOL/L (ref 136–145)
TRIGL SERPL-MCNC: 115 MG/DL

## 2020-12-24 LAB
25(OH)D3 SERPL-MCNC: 35.2 NG/ML (ref 30–80)
25(OH)D3 SERPL-MCNC: 35.2 NG/ML (ref 30–80)

## 2021-01-06 ENCOUNTER — RECORDS - HEALTHEAST (OUTPATIENT)
Dept: ADMINISTRATIVE | Facility: OTHER | Age: 66
End: 2021-01-06

## 2021-01-14 ENCOUNTER — COMMUNICATION - HEALTHEAST (OUTPATIENT)
Dept: FAMILY MEDICINE | Facility: CLINIC | Age: 66
End: 2021-01-14

## 2021-01-14 DIAGNOSIS — I10 ESSENTIAL HYPERTENSION: ICD-10-CM

## 2021-01-18 ENCOUNTER — RECORDS - HEALTHEAST (OUTPATIENT)
Dept: ADMINISTRATIVE | Facility: OTHER | Age: 66
End: 2021-01-18

## 2021-01-18 ENCOUNTER — RECORDS - HEALTHEAST (OUTPATIENT)
Dept: BONE DENSITY | Facility: CLINIC | Age: 66
End: 2021-01-18

## 2021-01-18 DIAGNOSIS — Z13.820 ENCOUNTER FOR SCREENING FOR OSTEOPOROSIS: ICD-10-CM

## 2021-01-18 DIAGNOSIS — Z00.01 ENCOUNTER FOR GENERAL ADULT MEDICAL EXAMINATION WITH ABNORMAL FINDINGS: ICD-10-CM

## 2021-01-18 DIAGNOSIS — Z78.0 ASYMPTOMATIC MENOPAUSAL STATE: ICD-10-CM

## 2021-02-12 ENCOUNTER — COMMUNICATION - HEALTHEAST (OUTPATIENT)
Dept: FAMILY MEDICINE | Facility: CLINIC | Age: 66
End: 2021-02-12

## 2021-02-12 DIAGNOSIS — I10 ESSENTIAL HYPERTENSION: ICD-10-CM

## 2021-03-09 ENCOUNTER — COMMUNICATION - HEALTHEAST (OUTPATIENT)
Dept: FAMILY MEDICINE | Facility: CLINIC | Age: 66
End: 2021-03-09

## 2021-03-10 ENCOUNTER — AMBULATORY - HEALTHEAST (OUTPATIENT)
Dept: NURSING | Facility: CLINIC | Age: 66
End: 2021-03-10

## 2021-03-24 ENCOUNTER — OFFICE VISIT - HEALTHEAST (OUTPATIENT)
Dept: FAMILY MEDICINE | Facility: CLINIC | Age: 66
End: 2021-03-24

## 2021-03-24 DIAGNOSIS — G47.33 OBSTRUCTIVE SLEEP APNEA (ADULT) (PEDIATRIC): ICD-10-CM

## 2021-03-24 DIAGNOSIS — I25.10 ATHEROSCLEROSIS OF CORONARY ARTERY OF NATIVE HEART WITHOUT ANGINA PECTORIS, UNSPECIFIED VESSEL OR LESION TYPE: ICD-10-CM

## 2021-03-24 DIAGNOSIS — I10 ESSENTIAL HYPERTENSION: ICD-10-CM

## 2021-03-24 DIAGNOSIS — M47.812 CERVICAL SPONDYLOSIS WITHOUT MYELOPATHY: ICD-10-CM

## 2021-03-24 DIAGNOSIS — R40.0 SOMNOLENCE, DAYTIME: ICD-10-CM

## 2021-03-24 DIAGNOSIS — E11.59 TYPE 2 DIABETES MELLITUS WITH OTHER CIRCULATORY COMPLICATION, WITHOUT LONG-TERM CURRENT USE OF INSULIN (H): ICD-10-CM

## 2021-03-29 ENCOUNTER — COMMUNICATION - HEALTHEAST (OUTPATIENT)
Dept: CARDIOLOGY | Facility: CLINIC | Age: 66
End: 2021-03-29

## 2021-03-29 DIAGNOSIS — I10 HYPERTENSION: ICD-10-CM

## 2021-03-31 ENCOUNTER — AMBULATORY - HEALTHEAST (OUTPATIENT)
Dept: NURSING | Facility: CLINIC | Age: 66
End: 2021-03-31

## 2021-04-14 ENCOUNTER — RECORDS - HEALTHEAST (OUTPATIENT)
Dept: ADMINISTRATIVE | Facility: OTHER | Age: 66
End: 2021-04-14

## 2021-04-16 ENCOUNTER — OFFICE VISIT - HEALTHEAST (OUTPATIENT)
Dept: FAMILY MEDICINE | Facility: CLINIC | Age: 66
End: 2021-04-16

## 2021-04-16 ENCOUNTER — AMBULATORY - HEALTHEAST (OUTPATIENT)
Dept: MULTI SPECIALTY CLINIC | Facility: CLINIC | Age: 66
End: 2021-04-16

## 2021-04-16 DIAGNOSIS — M47.812 CERVICAL SPONDYLOSIS WITHOUT MYELOPATHY: ICD-10-CM

## 2021-04-16 DIAGNOSIS — T78.40XA ALLERGY, INITIAL ENCOUNTER: ICD-10-CM

## 2021-04-16 DIAGNOSIS — Z12.31 ENCOUNTER FOR SCREENING MAMMOGRAM FOR BREAST CANCER: ICD-10-CM

## 2021-04-16 ASSESSMENT — MIFFLIN-ST. JEOR: SCORE: 1395.91

## 2021-04-18 ENCOUNTER — COMMUNICATION - HEALTHEAST (OUTPATIENT)
Dept: CARDIOLOGY | Facility: CLINIC | Age: 66
End: 2021-04-18

## 2021-04-18 DIAGNOSIS — I10 ESSENTIAL HYPERTENSION: ICD-10-CM

## 2021-04-21 ENCOUNTER — COMMUNICATION - HEALTHEAST (OUTPATIENT)
Dept: FAMILY MEDICINE | Facility: CLINIC | Age: 66
End: 2021-04-21

## 2021-04-21 DIAGNOSIS — J01.00 ACUTE NON-RECURRENT MAXILLARY SINUSITIS: ICD-10-CM

## 2021-05-03 ENCOUNTER — COMMUNICATION - HEALTHEAST (OUTPATIENT)
Dept: SCHEDULING | Facility: CLINIC | Age: 66
End: 2021-05-03

## 2021-05-04 ENCOUNTER — OFFICE VISIT - HEALTHEAST (OUTPATIENT)
Dept: CARDIOLOGY | Facility: CLINIC | Age: 66
End: 2021-05-04

## 2021-05-04 DIAGNOSIS — I25.10 CORONARY ARTERY DISEASE INVOLVING NATIVE CORONARY ARTERY OF NATIVE HEART WITHOUT ANGINA PECTORIS: ICD-10-CM

## 2021-05-04 DIAGNOSIS — I10 ESSENTIAL HYPERTENSION: ICD-10-CM

## 2021-05-04 DIAGNOSIS — E78.5 DYSLIPIDEMIA: ICD-10-CM

## 2021-05-15 ENCOUNTER — HOSPITAL ENCOUNTER (OUTPATIENT)
Dept: MAMMOGRAPHY | Facility: CLINIC | Age: 66
Discharge: HOME | End: 2021-05-15
Payer: COMMERCIAL

## 2021-05-15 DIAGNOSIS — Z12.31 ENCOUNTER FOR SCREENING MAMMOGRAM FOR BREAST CANCER: ICD-10-CM

## 2021-05-25 ENCOUNTER — RECORDS - HEALTHEAST (OUTPATIENT)
Dept: ADMINISTRATIVE | Facility: CLINIC | Age: 66
End: 2021-05-25

## 2021-05-26 ENCOUNTER — RECORDS - HEALTHEAST (OUTPATIENT)
Dept: ADMINISTRATIVE | Facility: CLINIC | Age: 66
End: 2021-05-26

## 2021-05-27 ENCOUNTER — RECORDS - HEALTHEAST (OUTPATIENT)
Dept: ADMINISTRATIVE | Facility: CLINIC | Age: 66
End: 2021-05-27

## 2021-05-27 VITALS
SYSTOLIC BLOOD PRESSURE: 134 MMHG | OXYGEN SATURATION: 98 % | RESPIRATION RATE: 18 BRPM | DIASTOLIC BLOOD PRESSURE: 72 MMHG | HEART RATE: 59 BPM | WEIGHT: 198 LBS

## 2021-05-27 NOTE — TELEPHONE ENCOUNTER
Patient Returning Call  Reason for call:  Patient returning clinic call    Information relayed to patient:  Patient informed of message from clinic regarding scheduling an appointment.  Patient is scheduled for Monday 04/15 at 4:45pm with Dr. Simpson.    Patient has additional questions:  No     If YES, what are your questions/concerns:  N/A    Okay to leave a detailed message?: No call back needed

## 2021-05-27 NOTE — TELEPHONE ENCOUNTER
Left message to call back for: Pt.  Information to relay to patient:  She is due for a diabetic check. Please schedule at earliest convenience. Since Dr. Green is out Jong Claudio CNP has been seeing her pt's. She can schedule with him or anyone else she is comfortable seeing.     Jennifer Paige CMA

## 2021-05-27 NOTE — PROGRESS NOTES
Chief Complaint   Patient presents with     Abdominal Pain     Pt c/o sharp R side lower abdominal pain/cramping gassy x 3wks.      Diabetes     Pt would like her DM labs done today       HPI: 63-year-old female with a history of prediabetes, and heart disease presents today for pain in her right groin.  She has had pain in her right groin for the past 3 weeks.  She has had similar pain in the past and approximately 10 years ago she tells me that there was a tear in her femoral artery that was repaired.  Those notes unfortunately not available immediately.  She tells me that she had physical therapy after the groin repair which helped the pain.  The pain that she had at that time is very similar to what she has now.    ROS: No bowel or bladder issues.  No vomiting.  No paresthesias.    SH:    reports that she has never smoked. She has never used smokeless tobacco. She reports that she drinks alcohol. She reports that she does not use drugs.      FH: The Patient's family history includes Breast cancer in her maternal aunt; Diabetes in her mother; JEZ disease in her sister; Heart attack (age of onset: 60) in her mother; Heart disease in her maternal grandfather and mother; Hypertension in her father and mother; Stomach cancer in her mother.     Meds:  Alvina has a current medication list which includes the following prescription(s): aspirin, azelastine, blood glucose test, cholecalciferol (vitamin d3), ferrous fumarate-b12-vitamic c-folic acid, generic lancets, hydrochlorothiazide, lactobacillus rhamnosus gg, lansoprazole, lisinopril, magnesium glycinate, methylcellulose, metoprolol succinate, nifedipine, nitroglycerin, fish oil-omega-3 fatty acids, polyethylene glycol, potassium chloride, and singulair.    O:  /78   Pulse 63   Resp 16   Wt 216 lb (98 kg)   LMP 05/22/2002   SpO2 97%   BMI 39.51 kg/m    Patient is alert conversant in no acute distress  Neuro-moves all 4 extremities pupils are equal and she  gets up out of a chair and walks without difficulty  Abdomen is obese but nontender to palpation.  No pain to palpation of the right groin.  No protrusions or bulging to Valsalva maneuver.  No evidence of tissue compromise in the right groin region.  Skin Pink and dry    A/P:   1. Right inguinal pain  This most likely represents scar tissue.  We discussed possibilities in detail and she is in no distress.  She would like physical therapy which was initiated.    2.  Diabetes  -Patient's labs were reviewed showing that her last A1c was approximately 6 months ago and was 6.2.  I recommended that she make an appoint with Dr. Green for full physical exam when she returns.    3.  Heart disease  -Follow-up with cardiology  - Ambulatory referral to Adult PT- External

## 2021-05-28 ENCOUNTER — RECORDS - HEALTHEAST (OUTPATIENT)
Dept: ADMINISTRATIVE | Facility: CLINIC | Age: 66
End: 2021-05-28

## 2021-05-28 ASSESSMENT — ASTHMA QUESTIONNAIRES
ACT_TOTALSCORE: 24
ACT_TOTALSCORE: 23
ACT_TOTALSCORE: 23

## 2021-05-28 NOTE — PROGRESS NOTES
ASSESSMENT/PLAN:       1. Visit for screening mammogram    - Mammo Screening Bilateral; Future    2. Essential hypertension    - Comprehensive Metabolic Panel  - losartan (COZAAR) 100 MG tablet; Take 1 tablet (100 mg total) by mouth daily.  Dispense: 30 tablet; Refill: 11  The patient has had difficulty controlling her blood pressure and the readings today are lower than usual.  Because of the symptoms that look and sound like angioedema I would like to have her stop lisinopril and begin losartan  Blood pressure will need to be followed.    3. Controlled type 2 diabetes mellitus without complication, without long-term current use of insulin (H)    - Glycosylated Hemoglobin A1c, 6.1  - Comprehensive Metabolic Panel, reviewed and no significant abnormalities    4. Shortness of breath    - Electrocardiogram Perform and Read, incomplete right bundle branch block with some nonspecific ST changes with little change from previous EKG  - XR Chest 2 Views, borderline inspiration suggesting enlargement of the heart and possible cephalization of vasculature in the lungs however radiologist report or negative for the pulmonary vasculature and heart size  - HM1(CBC and Differential) white blood cell count and hemoglobin normal  - Comprehensive Metabolic Panel      5. Atherosclerosis of native coronary artery of native heart without angina pectoris    - Electrocardiogram Perform and Read  - HM1(CBC and Differential)  - Comprehensive Metabolic Panel  - HM1 (CBC with Diff)  The patient denies symptoms of angina.  Note that weight is down about 1-1/2 pounds over the last month.  No significant peripheral edema    6. Seasonal allergies    - cetirizine (ZYRTEC) 10 MG tablet; Take 1 tablet (10 mg total) by mouth daily.  Dispense: 30 tablet; Refill: 2  Like to have the patient resume her fluticasone nasal spray and also use her albuterol inhaler if having symptoms of shortness of breath    7. Angioedema, initial encounter  The patient  has been on lisinopril for a few years but still with her signs and symptoms I think we need to stop lisinopril as it may be part of the cause for her angioedema.  She also will keep the house closed up and use the air conditioner if needed as there is certainly are other signs and symptoms of allergic rhinitis and other allergic symptoms.    8. Obstructive sleep apnea (adult) (pediatric)  The patient will continue with the use of CPAP    9. Chronic groin pain, right  The etiology of this groin pain if persists may need to be evaluated with imaging such as CT or ultrasound.  She has good peripheral pulses and it does not seem to be an issue with her joint as the symptoms actually improved with walking and she had a negative pain in the right hip with internal and external rotation.  She has had a total hysterectomy.  Note that the comprehensive metabolic panel did not show any abnormalities of the liver function or kidney function.      She would like to be called with test results    Atilio Baez MD      PROGRESS NOTE   5/18/2019    SUBJECTIVE:  Alvina Maynard is a 63 y.o. female  who presents for   Chief Complaint   Patient presents with     Abdominal Pain     Lower abdominal pain, nauseous, hurts to the touch going on for more than 4 months      Breathing Problem     felt like throat was getting thick and it was hard to breathe, feeling closed up and sore   #1 difficulty breathing with swelling of the face throat and nasal congestion    For the last 4 nights the patient has experienced trouble breathing which seems to occur when she goes to bed and lays down.  She feels as if her throat is closing up and she is noticed swelling of the neck region and face.  She thought it might be related to allergies that she does have springtime allergies and with the warmer weather has had her windows open some.  She is also noticed a stuffy nose.  The patient does use CPAP for struct of sleep apnea.    #2 right lower  quadrant/groin pain    For about 4 months the patient has experienced some right lower abdominal/groin pain.  She has had a complete hysterectomy with oophorectomy.  Her bowel movements tend to be at times on the constipated form.  No urinary symptoms.  She points to more of the groin area and wonders if it could be related to a femoral artery tear that occurred at the time of an angiogram and scar tissue in that area.  Pain can be present with sitting but also when she first gets up and walks but often feels better as she continues to walk.  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        azelastine (ASTELIN) 137 mcg (0.1 %) nasal spray INSTILL 2 SPRAYS IN EACH NOSTRIL TWICE A DAY AS NEEDED 90 mL 3     blood glucose test strips Use 1 each As Directed 2 (two) times a day. 100 strip 3     cholecalciferol, vitamin D3, 1,000 unit tablet Take 1,000 Units by mouth daily.       generic lancets (FINGERSTIX LANCETS) 1 each by In Vitro route 2 (two) times a day. 100 each 3     hydroCHLOROthiazide (HYDRODIURIL) 25 MG tablet TAKE 2 TABLETS(50 MG) BY MOUTH DAILY 180 tablet 2     Lactobacillus rhamnosus GG (CULTURELLE) 10-15 Billion cell capsule Take 1 capsule by mouth  daily.       lansoprazole (PREVACID) 30 MG capsule Take 1 capsule (30 mg total) by mouth 2 (two) times a day. 180 capsule 3     MAGNESIUM GLYCINATE ORAL Take 400 mg by mouth daily.       methylcellulose (CITRUCEL) Take 2 g by mouth. Use as directed.  Take in the PM       metoprolol succinate (TOPROL-XL) 100 MG 24 hr tablet Take 1 tablet (100 mg total) by mouth 2 (two) times a day. 180 tablet 3     NIFEdipine (PROCARDIA XL) 30 MG 24 hr tablet Take 1 tablet (30 mg total) by mouth daily. 30 tablet 11     OMEGA-3/DHA/EPA/FISH OIL (FISH OIL-OMEGA-3 FATTY ACIDS) 300-1,000 mg capsule Take 2 g by mouth daily.       polyethylene glycol (MIRALAX) 17 gram packet Take 17 g by mouth daily.       potassium chloride (K-DUR,KLOR-CON) 20 MEQ tablet TAKE 2 TABLETS(40 MEQ) BY MOUTH TWICE DAILY 360 tablet 3     SINGULAIR 10 mg tablet TAKE 1 TABLET BY MOUTH ONCE DAILY. 90 tablet 3     cetirizine (ZYRTEC) 10 MG tablet Take 1 tablet (10 mg total) by mouth daily. 30 tablet 2     fluticasone propionate (FLONASE) 50 mcg/actuation nasal spray SHAKE LQ AND U 1 SPR IEN QD  0     losartan (COZAAR) 100 MG tablet Take 1 tablet (100 mg total) by mouth daily. 30 tablet 11     nitroglycerin (NITROSTAT) 0.4 MG SL tablet PLACE 1 TABLET UNDER THE TONGUE EVERY 5 MINUTES AS NEEDED FOR CHEST PAIN 3 Bottle 2     No current facility-administered medications for this visit.        Social History     Tobacco Use   Smoking Status Never Smoker   Smokeless Tobacco Never Used           OBJECTIVE:        Recent Results (from the past 240 hour(s))   Electrocardiogram Perform and Read   Result Value Ref Range    SYSTOLIC BLOOD PRESSURE  mmHg    DIASTOLIC BLOOD PRESSURE  mmHg    VENTRICULAR RATE 65 BPM    ATRIAL RATE 65 BPM    P-R INTERVAL 152 ms    QRS DURATION 110 ms    Q-T INTERVAL 430 ms    QTC CALCULATION (BEZET) 447 ms    P Axis 10 degrees    R AXIS 8 degrees    T AXIS -6 degrees    MUSE DIAGNOSIS       Sinus rhythm with marked sinus arrhythmia  Incomplete right  "bundle branch block  Nonspecific ST abnormality  Abnormal ECG  When compared with ECG of 13-MAR-2017 08:34,  No significant change was found  Confirmed by ARMANDO JAMISON MD LOC: ([id number]) on 5/17/2019 5:03:00 PM     Glycosylated Hemoglobin A1c   Result Value Ref Range    Hemoglobin A1c 6.1 (H) 3.5 - 6.0 %   Comprehensive Metabolic Panel   Result Value Ref Range    Sodium 140 136 - 145 mmol/L    Potassium 3.9 3.5 - 5.0 mmol/L    Chloride 103 98 - 107 mmol/L    CO2 26 22 - 31 mmol/L    Anion Gap, Calculation 11 5 - 18 mmol/L    Glucose 96 70 - 125 mg/dL    BUN 23 (H) 8 - 22 mg/dL    Creatinine 0.85 0.60 - 1.10 mg/dL    GFR MDRD Af Amer >60 >60 mL/min/1.73m2    GFR MDRD Non Af Amer >60 >60 mL/min/1.73m2    Bilirubin, Total 0.2 0.0 - 1.0 mg/dL    Calcium 10.7 (H) 8.5 - 10.5 mg/dL    Protein, Total 7.3 6.0 - 8.0 g/dL    Albumin 4.1 3.5 - 5.0 g/dL    Alkaline Phosphatase 88 45 - 120 U/L    AST 18 0 - 40 U/L    ALT 25 0 - 45 U/L   HM1 (CBC with Diff)   Result Value Ref Range    WBC 9.6 4.0 - 11.0 thou/uL    RBC 4.65 3.80 - 5.40 mill/uL    Hemoglobin 13.7 12.0 - 16.0 g/dL    Hematocrit 41.5 35.0 - 47.0 %    MCV 89 80 - 100 fL    MCH 29.5 27.0 - 34.0 pg    MCHC 33.0 32.0 - 36.0 g/dL    RDW 12.5 11.0 - 14.5 %    Platelets 398 140 - 440 thou/uL    MPV 8.2 7.0 - 10.0 fL    Neutrophils % 55 50 - 70 %    Lymphocytes % 33 20 - 40 %    Monocytes % 8 2 - 10 %    Eosinophils % 3 0 - 6 %    Basophils % 1 0 - 2 %    Neutrophils Absolute 5.3 2.0 - 7.7 thou/uL    Lymphocytes Absolute 3.2 0.8 - 4.4 thou/uL    Monocytes Absolute 0.8 0.0 - 0.9 thou/uL    Eosinophils Absolute 0.3 0.0 - 0.4 thou/uL    Basophils Absolute 0.1 0.0 - 0.2 thou/uL       Vitals:    05/17/19 1439 05/17/19 1444   BP: 145/78 140/78   Pulse: 60    SpO2: 93%    Weight: 214 lb 6.4 oz (97.3 kg)    Height: 5' 2.5\" (1.588 m)      Weight: 214 lb 6.4 oz (97.3 kg)          Physical Exam:  GENERAL APPEARANCE: Very pleasant 63-year-old female, NAD, well hydrated, well " nourished  SKIN:  Normal skin turgor, note that the patient has some redness and swelling of the skin and facial area under her eyes and onto the cheeks.  HEENT: moist mucous membranes, no rhinorrhea, there is no swelling of the tongue and the posterior pharynx is narrow but likely at her baseline with her obstructive sleep apnea  NECK: Normal without adenopathy or masses  CV: RRR, no M/G/R   LUNGS: CTAB  ABDOMEN: Patient has some right upper quadrant abdominal pain and right lower abdominal pain including right groin just over the superior femoral scar from previous femoral artery repair.  I do not appreciate any evidence to suggest a femoral hernia  EXTREMITY: no edema and full ROM of all joints  NEURO: no focal findings

## 2021-05-29 ENCOUNTER — COMMUNICATION - HEALTHEAST (OUTPATIENT)
Dept: FAMILY MEDICINE | Facility: CLINIC | Age: 66
End: 2021-05-29

## 2021-05-29 ENCOUNTER — RECORDS - HEALTHEAST (OUTPATIENT)
Dept: ADMINISTRATIVE | Facility: CLINIC | Age: 66
End: 2021-05-29

## 2021-05-29 DIAGNOSIS — I10 ESSENTIAL HYPERTENSION, MALIGNANT: ICD-10-CM

## 2021-05-29 NOTE — TELEPHONE ENCOUNTER
RN cannot approve Refill Request    RN can NOT refill this medication med is not covered by policy/route to provider     . Last office visit: 11/5/2018 Emilie Green MD Last Physical: Visit date not found Last MTM visit: Visit date not found Last visit same specialty: 5/17/2019 Atilio Baez MD.  Next visit within 3 mo: Visit date not found  Next physical within 3 mo: Visit date not found      Tonya Robb, [facility] Triage/Med Refill 6/12/2019    Requested Prescriptions   Pending Prescriptions Disp Refills     SINGULAIR 10 mg tablet [Pharmacy Med Name: SINGULAIR 10MG TABLETS] 90 tablet 3     Sig: TAKE 1 TABLET BY MOUTH ONCE DAILY.       There is no refill protocol information for this order

## 2021-05-29 NOTE — TELEPHONE ENCOUNTER
----- Message from Atilio Baez MD sent at 5/21/2019  1:27 PM CDT -----  Please call the patient and let her know the results of her tests.  The patient's metabolic panel was essentially normal with no concerning abnormalities.  Blood sugar was 96, creatinine which is a measure of your kidney function was normal, liver function is normal, and the patient's glycohemoglobin which is a measure that looks at how her blood sugars have been over the last 3 months was 6.1 which is great.  The CBC shows normal white blood cells, red blood cells and platelets.    The chest x-ray was read as normal with no infiltrate, tumors fluid in the lungs or problems with the heart.  EKG showed no significant change from previous EKGs.    Please see how she is doing in regard to her spells that she had been getting at night where she would feel like her throat was closing up and she could not breathe.  I do want her to come back in about a month for follow-up in regard to her blood pressure as we changed her blood pressure medicine.  If she is continuing to have trouble with shortness of breath with a feeling of tightness in her throat and like her throat is closing up she needs to be seen again this week..    Dr. Baez

## 2021-05-29 NOTE — PROGRESS NOTES
ASSESSMENT/PLAN:       1. Morbid obesity (H)  Current BMI is 38.7 and will continue to restrict salt out only with a saltshaker but also processed foods.    2. Essential hypertension  She is sure now that the lisinopril has been put away and she will let the pharmacy know that that medicine should be taken off her med list and she will continue with losartan.  In about 4 to 5 weeks had like for her to recheck her blood pressure and have her bring her blood pressure readings in with her.  At that time we will want to recheck a microalbumin as well as her creatinine and electrolytes.    3. Angioedema, subsequent encounter  I think is very likely that the symptoms related to the lisinopril and she will make sure that she is not taking that.  The edema that she is experiencing in her lower extremity is not related to the angioedema but more likely related to the warmer weather, humidity and also her nifedipine contributes to lower extremity edema    4. Gastroesophageal reflux disease without esophagitis  We discussed this briefly and at this point I would suggest that she not try to go off of the Prevacid we might be able to consider that in the future.  If we do that I would suggest that she begin ranitidine 150 mg twice a day and she is on that decrease the pravastatin from 30 mg twice a day to 30 mg once a day for 2 weeks then go to 30 mg every other day for 1 week and then to 30 mg every 3 days for 2 weeks and then if doing well can stop and just continue on the ranitidine    #5 history of adenomatous colon polyp  I think it would be best for the patient have another colonoscopy in 2020 because of her history of repeated adenomatous colon polyps    #6 diabetes mellitus exam  Completed today and no changes in medication but will check her microalbumin next time she goes to the lab and her foot exam was satisfactory today.          Atilio Baez MD      PROGRESS NOTE   6/18/2019    SUBJECTIVE:  Alvina Maynard is a  63 y.o. female  who presents for   Chief Complaint   Patient presents with     Follow-up     bp      1. Morbid obesity (H)  The patient felt that she is been working hard at watching her diet thought she may be was losing some weight but disappointed that her weight is essentially unchanged.  She has had some increased swelling in her lower legs and ankles lately.    2. Essential hypertension  I switch the patient's lisinopril to losartan because of angioedema symptoms which I thought might be related to the lisinopril.  This was about 3 weeks ago and she felt that her symptoms of swelling in the throat and also swelling of the face was improving however then by mistake she did add lisinopril into her pillbox and use that for about 4 days just recently started noticing her symptoms coming back but her blood pressure was good in fact almost to low.  She did not have any blood pressure readings when she was just on the losartan along with the nifedipine, metoprolol and hydrochlorothiazide    3. Angioedema, subsequent encounter  She still notices some swelling of her face tightness in her chest and some dysfunction of her vocal cords.  Has not taken any lisinopril now today    4. Gastroesophageal reflux disease without esophagitis  The patient question if she could go off of Prevacid and may be go on ranitidine.  Her last EGD was in 2015 which showed a gastric polyp but otherwise a normal esophagus and negative H. pylori.  The patient has used an acid suppressor primarily for heartburn symptoms as well as some dyspepsia.    #5 history of adenomatous colon polyps  Last colonoscopy was in 2015 and there was a 3 mm adenomatous polyp with low-grade dysplasia removed.  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        azelastine (ASTELIN) 137 mcg (0.1 %) nasal spray INSTILL 2 SPRAYS IN EACH NOSTRIL TWICE A DAY AS NEEDED 90 mL 3     blood glucose test strips Use 1 each As Directed 2 (two) times a day. 100 strip 3     cholecalciferol, vitamin D3, 1,000 unit tablet Take 1,000 Units by mouth daily.       fluticasone propionate (FLONASE) 50 mcg/actuation nasal spray SHAKE LQ AND U 1 SPR IEN QD  0     generic lancets (FINGERSTIX LANCETS) 1 each by In Vitro route 2 (two) times a day. 100 each 3     hydroCHLOROthiazide (HYDRODIURIL) 25 MG tablet TAKE 2 TABLETS(50 MG) BY MOUTH DAILY 180 tablet 2     Lactobacillus rhamnosus GG (CULTURELLE) 10-15 Billion cell capsule Take 1 capsule by mouth daily.       lansoprazole (PREVACID) 30 MG capsule Take 1 capsule (30 mg total) by mouth 2 (two) times a day. 180 capsule 3     losartan (COZAAR) 100 MG tablet Take 1 tablet (100 mg total) by mouth daily. 30 tablet 11     MAGNESIUM GLYCINATE ORAL Take 400 mg by mouth daily.       metoprolol succinate (TOPROL-XL) 100 MG 24 hr tablet Take 1 tablet (100 mg total) by mouth 2 (two) times a day. 180 tablet 3     NIFEdipine (PROCARDIA XL) 30 MG 24 hr tablet Take 1 tablet (30 mg total) by mouth daily. 30 tablet 11     OMEGA-3/DHA/EPA/FISH OIL (FISH OIL-OMEGA-3 FATTY ACIDS) 300-1,000 mg capsule Take 2 g by mouth daily.       potassium chloride (K-DUR,KLOR-CON) 20 MEQ tablet TAKE 2 TABLETS(40 MEQ) BY MOUTH TWICE DAILY 360 tablet 3     SINGULAIR  "10 mg tablet TAKE 1 TABLET BY MOUTH ONCE DAILY. 90 tablet 1     cetirizine (ZYRTEC) 10 MG tablet Take 1 tablet (10 mg total) by mouth daily. 30 tablet 2     methylcellulose (CITRUCEL) Take 2 g by mouth. Use as directed.  Take in the PM       nitroglycerin (NITROSTAT) 0.4 MG SL tablet PLACE 1 TABLET UNDER THE TONGUE EVERY 5 MINUTES AS NEEDED FOR CHEST PAIN 3 Bottle 2     polyethylene glycol (MIRALAX) 17 gram packet Take 17 g by mouth daily.       No current facility-administered medications for this visit.        Social History     Tobacco Use   Smoking Status Never Smoker   Smokeless Tobacco Never Used           OBJECTIVE:        No results found for this or any previous visit (from the past 240 hour(s)).    Vitals:    06/18/19 1601   BP: 130/84   Pulse: 65   SpO2: 98%   Weight: 215 lb (97.5 kg)   Height: 5' 2.5\" (1.588 m)     Weight: 215 lb (97.5 kg)          Physical Exam:  GENERAL APPEARANCE: 63-year-old female, NAD, well hydrated, well nourished  SKIN:  Normal skin turgor, no lesions/rashes   EXTREMITY: 1+ edema bilateral feet and ankles and full ROM of all joints   Pedal pulses 3 out of 4 bilaterally  NEURO: no focal findings, sensation with 5.07-month filament 4 out of 4 on the dorsum and plantar surface of both feet    "

## 2021-05-29 NOTE — TELEPHONE ENCOUNTER
Patient Returning Call  Reason for call:  Return call  Information relayed to patient:  Patient was informed of Dr. Baez's message below in regards to her lab work, chest x-ray, and EKG.  Patient has additional questions:  Yes  If YES, what are your questions/concerns:  Please mail these results to the patient.  Okay to leave a detailed message?: No call back needed       Patient reported she is greatly improved and the spells are better now. Patient feels her throat has opened up.

## 2021-05-29 NOTE — TELEPHONE ENCOUNTER
Left message to call back for: Pt.  Information to relay to patient:  Message Below from Dr. Baez

## 2021-05-30 ENCOUNTER — RECORDS - HEALTHEAST (OUTPATIENT)
Dept: ADMINISTRATIVE | Facility: CLINIC | Age: 66
End: 2021-05-30

## 2021-05-30 VITALS — WEIGHT: 214.9 LBS | BODY MASS INDEX: 38.08 KG/M2 | HEIGHT: 63 IN

## 2021-05-30 VITALS — BODY MASS INDEX: 38.27 KG/M2 | WEIGHT: 216 LBS | HEIGHT: 63 IN

## 2021-05-30 VITALS — HEIGHT: 63 IN | BODY MASS INDEX: 38.98 KG/M2 | WEIGHT: 220 LBS

## 2021-05-30 NOTE — PROGRESS NOTES
ASSESSMENT/PLAN:       1. Atherosclerosis of coronary artery of native heart without angina pectoris, unspecified vessel or lesion type    - Lipid [place] FASTING  Patient has had multiple intolerance to statins but certainly with her history of cerebrovascular disease and coronary disease may be a candidate for Zetia    2. Type 2 diabetes mellitus with other circulatory complication, without long-term current use of insulin (H)    - Microalbumin, Random Urine  Eye exam is been completed and negative for retinopathy  Send patient letter with test results    3. Obstructive sleep apnea (adult)   Continue with use of CPAP    4. Essential hypertension  Continue on current medication which includes 4 medicines for her blood pressure and her blood pressure seems to be satisfactory in the office with sporadic blood pressure elevations at home.  Recommended that she split up her blood pressure medicine so it is not all taken in the morning    The patient should return in about 6 months    Atilio Baez MD      PROGRESS NOTE   7/26/2019    SUBJECTIVE:  Alvina Maynard is a 63 y.o. female  who presents for   Chief Complaint   Patient presents with     Follow-up     Blood pressure follow up, left leg is a little swollen        1. Atherosclerosis of coronary artery of native heart without angina pectoris, unspecified vessel or lesion type  The patient is due to have lipids checked.  Her cholesterol in 2018 was 241 with an LDL 72  In 2017 the patient had a pharmacologic stress test with no inducible myocardial ischemia and left ventricular ejection fraction greater than 70%  Coronary angiogram done in September 2015 which showed some mild disease and previous evidence of a stent in the LAD    2. Type 2 diabetes mellitus with other circulatory complication, without long-term current use of insulin (H)  Hemoglobin A1c 6.1 in May 2019  Eye exam March 2019- except for cataracts    3. Obstructive sleep apnea (adult)   The patient  uses CPAP for obstructive sleep apnea    4. Essential hypertension    For blood pressure patient is on hydrochlorothiazide, losartan, metoprolol and Procardia and her home blood pressures tend to run occasionally in the 140s.  Blood pressure has been good in the office the last 2 times.  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        azelastine (ASTELIN) 137 mcg (0.1 %) nasal spray INSTILL 2 SPRAYS IN EACH NOSTRIL TWICE A DAY AS NEEDED 90 mL 3     cetirizine (ZYRTEC) 10 MG tablet Take 1 tablet (10 mg total) by mouth daily. 30 tablet 2     fluticasone propionate (FLONASE) 50 mcg/actuation nasal spray SHAKE LQ AND U 1 SPR IEN QD  0     hydroCHLOROthiazide (HYDRODIURIL) 25 MG tablet TAKE 2 TABLETS(50 MG) BY MOUTH DAILY 180 tablet 2     Lactobacillus rhamnosus GG (CULTURELLE) 10-15 Billion cell capsule Take 1 capsule by mouth daily.       lansoprazole (PREVACID) 30 MG  capsule Take 1 capsule (30 mg total) by mouth 2 (two) times a day. 180 capsule 3     losartan (COZAAR) 100 MG tablet Take 1 tablet (100 mg total) by mouth daily. 30 tablet 11     MAGNESIUM GLYCINATE ORAL Take 400 mg by mouth daily.       methylcellulose (CITRUCEL) Take 2 g by mouth. Use as directed.  Take in the PM       metoprolol succinate (TOPROL-XL) 100 MG 24 hr tablet Take 1 tablet (100 mg total) by mouth 2 (two) times a day. 180 tablet 3     NIFEdipine (PROCARDIA XL) 30 MG 24 hr tablet Take 1 tablet (30 mg total) by mouth daily. 30 tablet 11     OMEGA-3/DHA/EPA/FISH OIL (FISH OIL-OMEGA-3 FATTY ACIDS) 300-1,000 mg capsule Take 2 g by mouth daily.       polyethylene glycol (MIRALAX) 17 gram packet Take 17 g by mouth daily.       potassium chloride (K-DUR,KLOR-CON) 20 MEQ tablet TAKE 2 TABLETS(40 MEQ) BY MOUTH TWICE DAILY 360 tablet 3     SINGULAIR 10 mg tablet TAKE 1 TABLET BY MOUTH ONCE DAILY. 90 tablet 1     blood glucose test strips Use 1 each As Directed 2 (two) times a day. 100 strip 3     cholecalciferol, vitamin D3, 1,000 unit tablet Take 1,000 Units by mouth daily.       generic lancets (FINGERSTIX LANCETS) 1 each by In Vitro route 2 (two) times a day. 100 each 3     nitroglycerin (NITROSTAT) 0.4 MG SL tablet PLACE 1 TABLET UNDER THE TONGUE EVERY 5 MINUTES AS NEEDED FOR CHEST PAIN 3 Bottle 2     No current facility-administered medications for this visit.        Social History     Tobacco Use   Smoking Status Never Smoker   Smokeless Tobacco Never Used           OBJECTIVE:        Recent Results (from the past 240 hour(s))   Lipid [place] FASTING   Result Value Ref Range    Cholesterol 228 (H) <=199 mg/dL    Triglycerides 178 (H) <=149 mg/dL    HDL Cholesterol 52 >=50 mg/dL    LDL Calculated 140 (H) <=129 mg/dL    Patient Fasting > 8hrs? No    Microalbumin, Random Urine   Result Value Ref Range    Microalbumin, Random Urine <0.50 0.00 - 1.99 mg/dL    Creatinine, Urine 39.2 mg/dL    Microalbumin/Creatinine  Ratio Random Urine  <=19.9 mg/g       Vitals:    07/26/19 0930   BP: 135/80   Pulse: 63   SpO2: 97%   Weight: 215 lb 9.6 oz (97.8 kg)     Weight: 215 lb 9.6 oz (97.8 kg)          Physical Exam:  GENERAL APPEARANCE: 63-year-old female very pleasant, NAD, well hydrated, well nourished  SKIN:  Normal skin turgor, no lesions/rashes   HEENT: moist mucous membranes, no rhinorrhea  NECK: Normal without adenopathy or masses, no carotid bruits  CV: RRR, no M/G/R   LUNGS: CTAB  ABDOMEN: S&NT, no masses or enlarged organs   EXTREMITY: Mild edema left ankle and foot and full ROM of all joints  NEURO: no focal findings

## 2021-05-31 ENCOUNTER — RECORDS - HEALTHEAST (OUTPATIENT)
Dept: ADMINISTRATIVE | Facility: CLINIC | Age: 66
End: 2021-05-31

## 2021-05-31 VITALS — WEIGHT: 220 LBS | BODY MASS INDEX: 38.98 KG/M2 | HEIGHT: 63 IN

## 2021-05-31 VITALS — HEIGHT: 62 IN | WEIGHT: 213 LBS | BODY MASS INDEX: 39.2 KG/M2

## 2021-05-31 VITALS — WEIGHT: 212 LBS | BODY MASS INDEX: 38.78 KG/M2

## 2021-05-31 VITALS — HEIGHT: 62 IN | BODY MASS INDEX: 40.12 KG/M2 | WEIGHT: 218 LBS

## 2021-05-31 VITALS — WEIGHT: 218 LBS | HEIGHT: 63 IN | BODY MASS INDEX: 38.62 KG/M2

## 2021-05-31 VITALS — WEIGHT: 220 LBS | HEIGHT: 63 IN | BODY MASS INDEX: 38.98 KG/M2

## 2021-05-31 NOTE — TELEPHONE ENCOUNTER
Refill Approved    Rx renewed per Medication Renewal Policy. Medication was last renewed on 8/4/18.    Madeleine Oviedo, [facility] Triage/Med Refill 8/10/2019     Requested Prescriptions   Pending Prescriptions Disp Refills     potassium chloride (K-DUR,KLOR-CON) 20 MEQ tablet [Pharmacy Med Name: POTASSIUM CL 20MEQ ER TABLETS] 360 tablet 0     Sig: TAKE 2 TABLETS(40 MEQ) BY MOUTH TWICE DAILY       Potassium Supplements Refill Protocol Passed - 8/8/2019  7:46 PM        Passed - PCP or prescribing provider visit in past 12 months       Last office visit with prescriber/PCP: 11/5/2018 Emilie Green MD OR same dept: 7/26/2019 Atilio Baez MD OR same specialty: 7/26/2019 Atilio Baez MD  Last physical: Visit date not found Last MTM visit: Visit date not found   Next visit within 3 mo: Visit date not found  Next physical within 3 mo: Visit date not found  Prescriber OR PCP: Emilie Green MD  Last diagnosis associated with med order: 1. Essential hypertension, malignant  - potassium chloride (K-DUR,KLOR-CON) 20 MEQ tablet [Pharmacy Med Name: POTASSIUM CL 20MEQ ER TABLETS]; TAKE 2 TABLETS(40 MEQ) BY MOUTH TWICE DAILY  Dispense: 360 tablet; Refill: 0    If protocol passes may refill for 12 months if within 3 months of last provider visit (or a total of 15 months).             Passed - Potassium level in last 12 months     Lab Results   Component Value Date    Potassium 3.9 05/17/2019

## 2021-05-31 NOTE — TELEPHONE ENCOUNTER
Refill Approved    Rx renewed per Medication Renewal Policy. Medication was last renewed on 5/17/18.    Tonya Robb, [facility] Triage/Med Refill 8/21/2019     Requested Prescriptions   Pending Prescriptions Disp Refills     azelastine (ASTELIN) 137 mcg (0.1 %) nasal spray [Pharmacy Med Name: AZELASTINE 0.1%(137MCG) NASAL-200SP] 90 mL 0     Sig: USE 2 SPRAYS IN EACH NOSTRIL TWICE DAILY AS NEEDED       Nasal Spray Protocol Passed - 8/19/2019  7:48 PM        Passed - Patient has had office visit/physical in last 1 year     Last office visit with prescriber/PCP: 11/5/2018 Emilie Green MD OR same dept: 8/9/2019 Atilio Baez MD OR same specialty: 8/9/2019 Atilio Baez MD Last physical: Visit date not found Last MTM visit: Visit date not found    Next visit within 3 mo: Visit date not found  Next physical within 3 mo: Visit date not found  Prescriber OR PCP: Emilie Green MD  Last diagnosis associated with med order: 1. Asthma  - azelastine (ASTELIN) 137 mcg (0.1 %) nasal spray [Pharmacy Med Name: AZELASTINE 0.1%(137MCG) NASAL-200SP]; USE 2 SPRAYS IN EACH NOSTRIL TWICE DAILY AS NEEDED  Dispense: 90 mL; Refill: 0

## 2021-05-31 NOTE — PROGRESS NOTES
ASSESSMENT/PLAN:       1. Left ear pain  I told the patient that the eardrops that I looked at to prescribe for mild otitis externa all have ethylene glycol that I expect is used as a preservative.  Also considered an antibiotic drop such as fluoroquinolones but she is had allergies to those.  I did not feel comfortable irrigating the ear because I am concerned that could make her condition worse rather than better.  She has an appointment with ENT in 5 days and I think she needs to keep that appointment and have them remove the debris in the left ear canal.  She has had this done before successfully.    2. Allergic contact dermatitis due to other agents  Remaining allergies make it challenging to use something that would not cause harm to regularly with the eardrops    3. Seasonal allergic rhinitis due to other allergic trigger  I suggested that she use Zyrtec 10 mg on a daily basis as well as consider switching the nasal steroid to Nasacort and continuing the Astelin nasal spray    We talked about doing some blood test because of her nausea fever at home and fatigue but after discussion we decided not to do that.  We can certainly do those at a later date  Patient has had blood work done within the last month    25 minutes spent with the patient total face-to-face with greater than 50% of my time being spent in counseling in regard to the above problems    Atilio Baez MD      PROGRESS NOTE   8/9/2019    SUBJECTIVE:  Alvina Maynard is a 63 y.o. female  who presents for   Chief Complaint   Patient presents with     Ear Fullness     4 weeks it has been feeling full keeps closing and opening and it aches, sore throat from possible drainage, feeling nauseated, feeling like the congrestion is not coming out when using wayne pot, side of neck and side of head is hurting,     Fever     ranging from .9     For the last 4 weeks the patient has complained of fullness in the left ear with mild discomfort as well as  soreness in her throat feeling nauseous congested in the nose and a noted fever.  Slightly more short of breath tired is noticed some swelling nodes in her neck.  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        azelastine (ASTELIN) 137 mcg (0.1 %) nasal spray INSTILL 2 SPRAYS IN EACH NOSTRIL TWICE A DAY AS NEEDED 90 mL 3     blood glucose test strips Use 1 each As Directed 2 (two) times a day. 100 strip 3     cetirizine (ZYRTEC) 10 MG tablet Take 1 tablet (10 mg total) by mouth daily. 30 tablet 2     cholecalciferol, vitamin D3, 1,000 unit tablet Take 1,000 Units by mouth daily.       fluticasone propionate (FLONASE) 50 mcg/actuation nasal spray SHAKE LQ AND U 1 SPR IEN QD  0     generic lancets (FINGERSTIX LANCETS) 1 each by In Vitro route 2 (two) times a day. 100 each 3     hydroCHLOROthiazide (HYDRODIURIL) 25 MG tablet TAKE 2 TABLETS(50 MG) BY MOUTH DAILY 180 tablet 2     Lactobacillus rhamnosus GG (CULTURELLE) 10-15 Billion cell capsule Take 1 capsule by mouth daily.       lansoprazole (PREVACID) 30 MG capsule Take 1 capsule (30 mg total) by mouth 2 (two) times a day. 180  capsule 3     losartan (COZAAR) 100 MG tablet Take 1 tablet (100 mg total) by mouth daily. 30 tablet 11     MAGNESIUM GLYCINATE ORAL Take 400 mg by mouth daily.       methylcellulose (CITRUCEL) Take 2 g by mouth. Use as directed.  Take in the PM       metoprolol succinate (TOPROL-XL) 100 MG 24 hr tablet Take 1 tablet (100 mg total) by mouth 2 (two) times a day. 180 tablet 3     NIFEdipine (PROCARDIA XL) 30 MG 24 hr tablet Take 1 tablet (30 mg total) by mouth daily. 30 tablet 11     nitroglycerin (NITROSTAT) 0.4 MG SL tablet PLACE 1 TABLET UNDER THE TONGUE EVERY 5 MINUTES AS NEEDED FOR CHEST PAIN 3 Bottle 2     OMEGA-3/DHA/EPA/FISH OIL (FISH OIL-OMEGA-3 FATTY ACIDS) 300-1,000 mg capsule Take 2 g by mouth daily.       polyethylene glycol (MIRALAX) 17 gram packet Take 17 g by mouth daily.       potassium chloride (K-DUR,KLOR-CON) 20 MEQ tablet TAKE 2 TABLETS(40 MEQ) BY MOUTH TWICE DAILY 360 tablet 3     SINGULAIR 10 mg tablet TAKE 1 TABLET BY MOUTH ONCE DAILY. 90 tablet 1     ezetimibe (ZETIA) 10 mg tablet Take 1 tablet (10 mg total) by mouth daily. 30 tablet 11     No current facility-administered medications for this visit.        Social History     Tobacco Use   Smoking Status Never Smoker   Smokeless Tobacco Never Used           OBJECTIVE:        No results found for this or any previous visit (from the past 240 hour(s)).    Vitals:    08/09/19 0810   BP: 128/72   Pulse: 62   Temp: 98.2  F (36.8  C)   SpO2: (!) 87%     Weight: 215 lb 9.6 oz (97.8 kg)          Physical Exam:  GENERAL APPEARANCE: 63-year-old female, NAD, well hydrated, well nourished  SKIN:  Normal skin turgor, no lesions/rashes   HEENT: moist mucous membranes, clear rhinorrhea, left ear canal near the eardrum reveals a white rind of debris that appears to be adherent to the canal and possibly even the eardrum.  The ear canal itself is only mildly inflamed and does not seem to be tender on examination.  The right ear canal and tympanic membrane are  normal  NECK: Normal with 1+ right posterior auricular adenopathy or masses  CV: RRR, no M/G/R   LUNGS: CTAB  ABDOMEN: S&NT, no masses or enlarged organs   EXTREMITY: no edema and full ROM of all joints  NEURO: no focal findings

## 2021-06-01 ENCOUNTER — RECORDS - HEALTHEAST (OUTPATIENT)
Dept: ADMINISTRATIVE | Facility: CLINIC | Age: 66
End: 2021-06-01

## 2021-06-01 VITALS — BODY MASS INDEX: 38.64 KG/M2 | WEIGHT: 210 LBS | HEIGHT: 62 IN

## 2021-06-01 VITALS — HEIGHT: 62 IN | BODY MASS INDEX: 37.73 KG/M2 | WEIGHT: 205 LBS

## 2021-06-01 VITALS — HEIGHT: 62 IN | BODY MASS INDEX: 37.81 KG/M2 | WEIGHT: 205.5 LBS

## 2021-06-01 NOTE — TELEPHONE ENCOUNTER
Refill Approved    Rx renewed per Medication Renewal Policy. Medication was last renewed on 9/5/18.    Tonya Robb, [facility] Triage/Med Refill 9/20/2019     Requested Prescriptions   Pending Prescriptions Disp Refills     metoprolol succinate (TOPROL-XL) 100 MG 24 hr tablet [Pharmacy Med Name: METOPROLOL ER SUCCINATE 100MG TABS] 180 tablet 0     Sig: TAKE 1 TABLET(100 MG) BY MOUTH TWICE DAILY       Beta-Blockers Refill Protocol Passed - 9/20/2019  7:27 AM        Passed - PCP or prescribing provider visit in past 12 months or next 3 months     Last office visit with prescriber/PCP: 11/5/2018 Emilie Green MD OR same dept: 8/9/2019 Atilio Baez MD OR same specialty: 8/9/2019 Atilio Baez MD  Last physical: Visit date not found Last MTM visit: Visit date not found   Next visit within 3 mo: Visit date not found  Next physical within 3 mo: Visit date not found  Prescriber OR PCP: Emilie Green MD  Last diagnosis associated with med order: 1. Coronary atherosclerosis due to severely calcified coronary lesion  - metoprolol succinate (TOPROL-XL) 100 MG 24 hr tablet [Pharmacy Med Name: METOPROLOL ER SUCCINATE 100MG TABS]; TAKE 1 TABLET(100 MG) BY MOUTH TWICE DAILY  Dispense: 180 tablet; Refill: 0    If protocol passes may refill for 12 months if within 3 months of last provider visit (or a total of 15 months).             Passed - Blood pressure filed in past 12 months     BP Readings from Last 1 Encounters:   08/09/19 128/72

## 2021-06-02 ENCOUNTER — RECORDS - HEALTHEAST (OUTPATIENT)
Dept: ADMINISTRATIVE | Facility: CLINIC | Age: 66
End: 2021-06-02

## 2021-06-02 VITALS — BODY MASS INDEX: 37.54 KG/M2 | HEIGHT: 62 IN | WEIGHT: 204 LBS

## 2021-06-02 VITALS — BODY MASS INDEX: 37.73 KG/M2 | HEIGHT: 62 IN | WEIGHT: 205 LBS

## 2021-06-02 VITALS — BODY MASS INDEX: 36.8 KG/M2 | WEIGHT: 200 LBS | HEIGHT: 62 IN

## 2021-06-02 NOTE — TELEPHONE ENCOUNTER
Refill Approved    Rx renewed per Medication Renewal Policy. Medication was last renewed on 10/12/18.    Aneta Gunter, [facility] Connection Triage/Med Refill 10/20/2019     Requested Prescriptions   Pending Prescriptions Disp Refills     lansoprazole (PREVACID) 30 MG capsule [Pharmacy Med Name: LANSOPRAZOLE 30MG DR CAPSULES] 180 capsule 0     Sig: TAKE 1 CAPSULE BY MOUTH TWICE DAILY       GI Medications Refill Protocol Passed - 10/20/2019  5:05 PM        Passed - PCP or prescribing provider visit in last 12 or next 3 months.     Last office visit with prescriber/PCP: 11/5/2018 Emilie Green MD OR same dept: 8/9/2019 Atilio Baez MD OR same specialty: 8/9/2019 Atilio Baez MD  Last physical: Visit date not found Last MTM visit: Visit date not found   Next visit within 3 mo: Visit date not found  Next physical within 3 mo: Visit date not found  Prescriber OR PCP: Emilie Green MD  Last diagnosis associated with med order: 1. GERD (gastroesophageal reflux disease)  - lansoprazole (PREVACID) 30 MG capsule [Pharmacy Med Name: LANSOPRAZOLE 30MG DR CAPSULES]; TAKE 1 CAPSULE BY MOUTH TWICE DAILY  Dispense: 180 capsule; Refill: 0    If protocol passes may refill for 12 months if within 3 months of last provider visit (or a total of 15 months).

## 2021-06-03 VITALS
BODY MASS INDEX: 38.11 KG/M2 | HEART RATE: 70 BPM | RESPIRATION RATE: 16 BRPM | OXYGEN SATURATION: 98 % | HEIGHT: 63 IN | DIASTOLIC BLOOD PRESSURE: 90 MMHG | SYSTOLIC BLOOD PRESSURE: 142 MMHG | WEIGHT: 215.06 LBS

## 2021-06-03 VITALS — BODY MASS INDEX: 37.99 KG/M2 | WEIGHT: 214.4 LBS | HEIGHT: 63 IN

## 2021-06-03 VITALS — BODY MASS INDEX: 38.81 KG/M2 | WEIGHT: 215.6 LBS

## 2021-06-03 VITALS
SYSTOLIC BLOOD PRESSURE: 134 MMHG | WEIGHT: 216.13 LBS | OXYGEN SATURATION: 98 % | HEIGHT: 63 IN | DIASTOLIC BLOOD PRESSURE: 88 MMHG | HEART RATE: 68 BPM | BODY MASS INDEX: 38.29 KG/M2

## 2021-06-03 VITALS — WEIGHT: 216 LBS | BODY MASS INDEX: 39.51 KG/M2

## 2021-06-03 VITALS — WEIGHT: 215 LBS | BODY MASS INDEX: 38.09 KG/M2 | HEIGHT: 63 IN

## 2021-06-04 VITALS
WEIGHT: 211.4 LBS | OXYGEN SATURATION: 97 % | DIASTOLIC BLOOD PRESSURE: 82 MMHG | TEMPERATURE: 98.8 F | HEART RATE: 76 BPM | BODY MASS INDEX: 38.05 KG/M2 | SYSTOLIC BLOOD PRESSURE: 141 MMHG

## 2021-06-04 VITALS
SYSTOLIC BLOOD PRESSURE: 138 MMHG | WEIGHT: 218 LBS | RESPIRATION RATE: 16 BRPM | HEIGHT: 63 IN | DIASTOLIC BLOOD PRESSURE: 84 MMHG | HEART RATE: 80 BPM | BODY MASS INDEX: 38.62 KG/M2

## 2021-06-04 VITALS
SYSTOLIC BLOOD PRESSURE: 136 MMHG | BODY MASS INDEX: 37.56 KG/M2 | TEMPERATURE: 98.6 F | OXYGEN SATURATION: 98 % | HEART RATE: 67 BPM | HEIGHT: 63 IN | WEIGHT: 212 LBS | DIASTOLIC BLOOD PRESSURE: 76 MMHG

## 2021-06-04 VITALS
SYSTOLIC BLOOD PRESSURE: 138 MMHG | OXYGEN SATURATION: 97 % | RESPIRATION RATE: 16 BRPM | HEART RATE: 61 BPM | DIASTOLIC BLOOD PRESSURE: 74 MMHG

## 2021-06-04 VITALS
BODY MASS INDEX: 38.62 KG/M2 | WEIGHT: 218 LBS | SYSTOLIC BLOOD PRESSURE: 135 MMHG | HEART RATE: 72 BPM | RESPIRATION RATE: 14 BRPM | HEIGHT: 63 IN | DIASTOLIC BLOOD PRESSURE: 82 MMHG

## 2021-06-04 VITALS
BODY MASS INDEX: 38.84 KG/M2 | TEMPERATURE: 98.1 F | HEART RATE: 62 BPM | DIASTOLIC BLOOD PRESSURE: 84 MMHG | SYSTOLIC BLOOD PRESSURE: 140 MMHG | OXYGEN SATURATION: 98 % | WEIGHT: 215.8 LBS

## 2021-06-04 VITALS — HEIGHT: 63 IN | BODY MASS INDEX: 38.62 KG/M2 | WEIGHT: 218 LBS

## 2021-06-04 NOTE — TELEPHONE ENCOUNTER
RN cannot approve Refill Request    RN can NOT refill this medication med is not covered by policy/route to provider. Last office visit: Visit date not found Last Physical: Visit date not found Last MTM visit: Visit date not found Last visit same specialty: 10/21/2019 Atilio Baez MD.  Next visit within 3 mo: Visit date not found  Next physical within 3 mo: Visit date not found      Aneta Gunter, [facility] Triage/Med Refill 12/11/2019    Requested Prescriptions   Pending Prescriptions Disp Refills     SINGULAIR 10 mg tablet [Pharmacy Med Name: SINGULAIR 10MG TABLETS] 90 tablet 0     Sig: TAKE 1 TABLET BY MOUTH EVERY DAY       There is no refill protocol information for this order

## 2021-06-04 NOTE — PROGRESS NOTES
ASSESSMENT/PLAN:       1. Essential hypertension  I did not make any changes in her blood pressure medication.  She will begin to check her home blood pressure readings and encouraged her to follow-up with cardiology on January 13.      2. Diastolic dysfunction  Appreciate cardiology's input as to if they feel her dyspnea on exertion could be related to her elevated left ventricular filling pressures.  Chest pain that she describes sounds more musculoskeletal in origin and she does have some tenderness over the costochondral junction on the right side at the third and fourth juncture.  It seems that her chest pain is worsened by movements of her upper extremity.    3. Type 2 diabetes mellitus without complication, without long-term current use of insulin (H)  Will need to check a hemoglobin A1c  The patient had a comprehensive metabolic panel done in October of this year and the only abnormal value was her calcium at 10.9.  Hemoglobin was 13.5 at that time    4. Viral upper respiratory tract infection  Upper respiratory symptoms for the last 48 hours which are improving.  That makes me feel less suspicious that it is influenza.  Continue to treat symptomatically    The patient has not noticed any of her symptoms of chest pain or shortness of breath to be related to eating.  The chest symptoms do not seem to be heartburn-like.  Could consider repeating pulmonary function tests and getting a pulmonary consult if cardiology feels that her current symptoms of dyspnea are not related to the findings on her heart studies.          Atilio Baez MD      PROGRESS NOTE   12/23/2019    SUBJECTIVE:  Alvina Maynard is a 64 y.o. female  who presents for   Chief Complaint   Patient presents with     Follow-up     Patient is here today to follow up from her echo.      Fever     Patient states that yesterday she started to have a fever of 100.5, temp this morning was 95.0. Other symptoms include neck soreness/swelling, nasal  drainage, body aches     1. Essential hypertension  The patient's blood pressure is mildly elevated today.  After being evaluated by cardiology her nifedipine had been increased from 30 mg to 60 mg daily and then she developed low blood pressures with orthostasis and significant lightheadedness and on one occasion had a syncopal episode.  Thus the nifedipine is back at 30 mg daily.  She also is on hydrochlorothiazide 25 mg tablets but takes 2 of them daily, losartan 100 mg daily, metoprolol  mg daily and she said she takes potassium 40 mEq twice a day.  She does have the ability to check her blood pressure out of the office but has not been doing that    2. Diastolic dysfunction  The patient had a echocardiogram December 11, 2019 which showed normal systolic function but evidence for ventricular diastolic dysfunction.  Her ejection fraction was 62% and she had elevated left ventricle filling pressures.  No valvular heart disease.  There was no significant change on the echocardiogram now compared to 2017.  The patient has a follow-up with cardiology on January 13, 2020.  At this point there have not been any other changes in her medications.  The patient continues to complain of a gripping discomfort in her anterior chest that does not radiate.  She also did have a nuclear perfusion scan which showed no evidence for coronary disease of significance.  She also has significant dyspnea on exertion.  Of note is that patient did have an evaluation for his COPD with a pulmonary function test and 2017 which was completely.  She had a normal chest x-ray 2 months ago.    3. Type 2 diabetes mellitus without complication, without long-term current use of insulin (H)  The patient has diet-controlled diabetes with the highest A1c over the last 2 years being 6.5 and the last one 6 months ago was 6.1.    4. Viral upper respiratory tract infection  For 2 days now she has developed a respiratory infection with some myalgias  fever nasal drainage and mild cough.  Her symptoms today are better than yesterday.  O2 saturation is 98%.  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        azelastine (ASTELIN) 137 mcg (0.1 %) nasal spray USE 2 SPRAYS IN EACH NOSTRIL TWICE DAILY AS NEEDED 90 mL 3     blood glucose test strips Use 1 each As Directed 2 (two) times a day. 100 strip 3     cetirizine (ZYRTEC) 10 MG tablet Take 1 tablet (10 mg total) by mouth daily. (Patient taking differently: Take 10 mg by mouth as needed.       ) 30 tablet 2     ezetimibe (ZETIA) 10 mg tablet Take 1 tablet (10 mg total) by mouth daily. 30 tablet 11     fluticasone propionate (FLONASE) 50 mcg/actuation nasal spray SHAKE LQ AND U 1 SPR IEN QD  0     generic lancets (FINGERSTIX LANCETS) 1 each by In Vitro route 2 (two) times a day. 100 each 3     hydroCHLOROthiazide (HYDRODIURIL) 25 MG tablet TAKE 2 TABLETS(50 MG) BY MOUTH DAILY 180 tablet 2     Lactobacillus rhamnosus GG (CULTURELLE) 10-15 Billion cell capsule Take 1 capsule by mouth daily.       lansoprazole (PREVACID) 30 MG capsule Take 1 capsule (30 mg total) by mouth 2 (two) times a day. 180 capsule 3     losartan (COZAAR) 100 MG tablet Take 1 tablet (100 mg total) by mouth daily. 30 tablet 11     MAGNESIUM GLYCINATE ORAL Take 400 mg by mouth daily.       methylcellulose (CITRUCEL) Take 2 g by mouth. Use as directed.  Take in the PM       metoprolol succinate (TOPROL-XL) 100 MG 24 hr tablet TAKE 1 TABLET(100 MG) BY  MOUTH TWICE DAILY 180 tablet 3     NIFEdipine (PROCARDIA XL) 30 MG 24 hr tablet Take 1 tablet (30 mg total) by mouth daily. 90 tablet 3     nitroglycerin (NITROSTAT) 0.4 MG SL tablet PLACE 1 TABLET UNDER THE TONGUE EVERY 5 MINUTES AS NEEDED FOR CHEST PAIN 1 Bottle 2     OMEGA-3/DHA/EPA/FISH OIL (FISH OIL-OMEGA-3 FATTY ACIDS) 300-1,000 mg capsule Take 2 g by mouth daily.       polyethylene glycol (MIRALAX) 17 gram packet Take 17 g by mouth daily.       potassium chloride (K-DUR,KLOR-CON) 20 MEQ tablet TAKE 2 TABLETS(40 MEQ) BY MOUTH TWICE DAILY 360 tablet 2     SINGULAIR 10 mg tablet TAKE 1 TABLET BY MOUTH EVERY DAY 90 tablet 3     No current facility-administered medications for this visit.        Social History     Tobacco Use   Smoking Status Never Smoker   Smokeless Tobacco Never Used           OBJECTIVE:        No results found for this or any previous visit (from the past 240 hour(s)).    Vitals:    12/23/19 1543   BP: 140/84   Patient Site: Right Arm   Patient Position: Sitting   Cuff Size: Adult Large   Pulse: 62   Temp: 98.1  F (36.7  C)   TempSrc: Oral   SpO2: 98%   Weight: 215 lb 12.8 oz (97.9 kg)     Weight: 215 lb 12.8 oz (97.9 kg)          Physical Exam:  GENERAL APPEARANCE: Pleasant 64-year-old female, NAD, well hydrated, well nourished  SKIN:  Normal skin turgor, no lesions/rashes   HEENT: moist mucous membranes, no rhinorrhea, mild sinus percussion tenderness over the maxillary sinuses bilaterally, oropharynx is normal and tympanic membranes are normal  NECK: Normal without adenopathy or masses  CV: RRR, no M/G/R   LUNGS: CTAB  ABDOMEN: S&NT, no masses or enlarged organs   EXTREMITY: Trace edema bilateral ankles and full ROM of all joints  NEURO: no focal findings

## 2021-06-05 ENCOUNTER — RECORDS - HEALTHEAST (OUTPATIENT)
Dept: VASCULAR SURGERY | Facility: CLINIC | Age: 66
End: 2021-06-05

## 2021-06-05 ENCOUNTER — RECORDS - HEALTHEAST (OUTPATIENT)
Dept: NEUROLOGY | Facility: CLINIC | Age: 66
End: 2021-06-05

## 2021-06-05 VITALS
SYSTOLIC BLOOD PRESSURE: 138 MMHG | OXYGEN SATURATION: 97 % | HEIGHT: 63 IN | HEART RATE: 67 BPM | WEIGHT: 210 LBS | BODY MASS INDEX: 37.21 KG/M2 | DIASTOLIC BLOOD PRESSURE: 80 MMHG

## 2021-06-05 VITALS
DIASTOLIC BLOOD PRESSURE: 76 MMHG | WEIGHT: 200 LBS | HEART RATE: 62 BPM | SYSTOLIC BLOOD PRESSURE: 132 MMHG | BODY MASS INDEX: 36 KG/M2 | OXYGEN SATURATION: 98 %

## 2021-06-05 VITALS
BODY MASS INDEX: 37.96 KG/M2 | WEIGHT: 210.9 LBS | OXYGEN SATURATION: 98 % | SYSTOLIC BLOOD PRESSURE: 160 MMHG | HEART RATE: 60 BPM | DIASTOLIC BLOOD PRESSURE: 78 MMHG

## 2021-06-05 VITALS
SYSTOLIC BLOOD PRESSURE: 138 MMHG | DIASTOLIC BLOOD PRESSURE: 80 MMHG | WEIGHT: 199 LBS | BODY MASS INDEX: 36.62 KG/M2 | HEIGHT: 62 IN | OXYGEN SATURATION: 99 % | HEART RATE: 55 BPM

## 2021-06-05 VITALS
WEIGHT: 213 LBS | DIASTOLIC BLOOD PRESSURE: 88 MMHG | RESPIRATION RATE: 18 BRPM | SYSTOLIC BLOOD PRESSURE: 173 MMHG | HEART RATE: 74 BPM | TEMPERATURE: 98.4 F | BODY MASS INDEX: 38.34 KG/M2 | OXYGEN SATURATION: 98 %

## 2021-06-05 DIAGNOSIS — M79.609 PAIN IN SOFT TISSUES OF LIMB: ICD-10-CM

## 2021-06-05 DIAGNOSIS — R20.9 DISTURBANCE OF SKIN SENSATION: ICD-10-CM

## 2021-06-06 NOTE — PROGRESS NOTES
ASSESSMENT/PLAN:       1. Screen for colon cancer    - Ambulatory referral for Colonoscopy that should be done in May 2020.  Five-year plan history of adenomatous colon polyp    2. Acute sinusitis with symptoms > 10 days    - doxycycline (MONODOX) 100 MG capsule; Take 1 capsule (100 mg total) by mouth 2 (two) times a day for 7 days.  Dispense: 14 capsule; Refill: 0  Continue with the use of Siri pot  Drink lots of fluids  Take long hot showers  Follow-up if getting worse or not completely better in 2 weeks    3. Seasonal allergies    - cetirizine (ZYRTEC) 10 MG tablet; Take 1 tablet (10 mg total) by mouth as needed.    The patient was informed that is certainly fine for her to use the Zyrtec more regularly if needed.    The patient will need follow-up in the next 3 months in regard to her diagnosis of diabetes which is questionable.  The patient did have a A1c on one occasion of 6.5 but that was in the setting of a hospitalization with an infection.  I explained to the patient that certainly she has at a minimum prediabetes and we need to continue to monitor her A1c which will be done at the next visit.  Also she will need to address her recurrent dyspnea on exertion and chest pain which has had a cardiovascular and pulmonary evaluation over the last year with unclear etiology.        Atilio Baez MD      PROGRESS NOTE   2/25/2020    SUBJECTIVE:  Alvina Maynard is a 64 y.o. female  who presents for   Chief Complaint   Patient presents with     Nasal Congestion     going on mildly for a couple weeks but worse for 3 days, eyes are burning, hard to breathe, left side of ear and throat hurts, teeth and jaw pain, joints are achey       The patient has had a 2-week history of an upper respiratory type infection which has now for the last 3 days gotten worse with more sinus congestion pain sore throat left ear discomfort and colored nasal drainage.  It hurts into the upper teeth and worse with bending forward.  The  patient has a history of environmental and seasonal allergies including a cat allergy and the patient does have a cat in the home.  She uses Zyrtec on a as needed basis.  She has not noticed any fever chills or night sweats    The patient this spring is due for colon cancer screening because of previous colon polyps.    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        azelastine (ASTELIN) 137 mcg (0.1 %) nasal spray USE 2 SPRAYS IN EACH NOSTRIL TWICE DAILY AS NEEDED 90 mL 3     blood glucose test strips Use 1 each As Directed 2 (two) times a day. 100 strip 3     cetirizine (ZYRTEC) 10 MG tablet Take 1 tablet (10 mg total) by mouth as needed.       ezetimibe (ZETIA) 10 mg tablet Take 1 tablet (10 mg total) by mouth daily. 90 tablet 3     fluticasone propionate (FLONASE) 50 mcg/actuation nasal spray SHAKE LQ AND U 1 SPR IEN QD  0     generic lancets (FINGERSTIX LANCETS) 1 each by In Vitro route 2 (two) times a day. 100 each 3     hydroCHLOROthiazide (HYDRODIURIL) 25 MG tablet TAKE 2 TABLETS(50 MG) BY MOUTH DAILY 180 tablet 2     Lactobacillus rhamnosus GG (CULTURELLE) 10-15 Billion cell capsule Take 1 capsule by mouth daily.       lansoprazole (PREVACID) 30 MG capsule Take 1 capsule (30 mg total) by mouth 2 (two) times a day. 180 capsule 3     losartan (COZAAR) 100 MG tablet Take 1 tablet (100 mg total) by mouth daily. 30 tablet 11     MAGNESIUM GLYCINATE ORAL Take 400 mg by mouth daily.       methylcellulose  (CITRUCEL) Take 2 g by mouth. Use as directed.  Take in the PM       metoprolol succinate (TOPROL-XL) 100 MG 24 hr tablet TAKE 1 TABLET(100 MG) BY MOUTH TWICE DAILY 180 tablet 3     NIFEdipine (PROCARDIA XL) 30 MG 24 hr tablet Take 1 tablet (30 mg total) by mouth daily. 90 tablet 3     nitroglycerin (NITROSTAT) 0.4 MG SL tablet PLACE 1 TABLET UNDER THE TONGUE EVERY 5 MINUTES AS NEEDED FOR CHEST PAIN 1 Bottle 2     OMEGA-3/DHA/EPA/FISH OIL (FISH OIL-OMEGA-3 FATTY ACIDS) 300-1,000 mg capsule Take 2 g by mouth daily.       polyethylene glycol (MIRALAX) 17 gram packet Take 17 g by mouth daily.       potassium chloride (K-DUR,KLOR-CON) 20 MEQ tablet TAKE 2 TABLETS(40 MEQ) BY MOUTH TWICE DAILY 360 tablet 2     SINGULAIR 10 mg tablet TAKE 1 TABLET BY MOUTH EVERY DAY 90 tablet 3     doxycycline (MONODOX) 100 MG capsule Take 1 capsule (100 mg total) by mouth 2 (two) times a day for 7 days. 14 capsule 0     No current facility-administered medications for this visit.        Social History     Tobacco Use   Smoking Status Never Smoker   Smokeless Tobacco Never Used           OBJECTIVE:        No results found for this or any previous visit (from the past 240 hour(s)).    Vitals:    02/25/20 1422 02/25/20 1427   BP: 145/82 141/82   Pulse: 76    Temp: 98.8  F (37.1  C)    SpO2: 97%    Weight: 211 lb 6.4 oz (95.9 kg)      Weight: 211 lb 6.4 oz (95.9 kg)          Physical Exam:  GENERAL APPEARANCE:  very pleasant 64-year-old female, NAD, well hydrated, well nourished  SKIN:  Normal skin turgor, no lesions/rashes   HEENT: moist mucous membranes,  positive rhinorrhea, both ear canals and tympanic membranes are normal in appearance and there is some sinus percussion tenderness over the frontal and maxillary sinuses left side more than the right.  Conjunctiva is clear and the oropharynx is normal  NECK: Normal without adenopathy or masses, no carotid bruits  CV: RRR, no M/G/R   LUNGS: CTAB  ABDOMEN: S&NT, no masses or enlarged organs    EXTREMITY: no edema and full ROM of all joints  NEURO: no focal findings

## 2021-06-06 NOTE — TELEPHONE ENCOUNTER
In and saw Dr. Baez on Feb. 25th. Given an  Antibiotic..  Finished the antibiotic , and she felt lkie she got somewhat better, but now she is having symptoms as if the infection is starting over again.    Sx now;  Draining.  Behind sinus's in back of throat.  Coughing somewhat.  Left ear has pressure.  Tenderness under eye 's     Using nasal sprays that she usually uses for allergies.  Helping a little    She is asking for another  ZPAC>.  She states the sinus infection feels exactly like it did   In the beginning.   Uses neti pot x 2 times daily.    Yokasta Swift RN  [facility] Triage/refill nurse        Reason for Disposition    Taking antibiotic and nose still blocked    Protocols used: SINUS INFECTION ON ANTIBIOTIC FOLLOW-UP CALL-A-[place]

## 2021-06-07 NOTE — TELEPHONE ENCOUNTER
Patient Returning Call  Reason for call:  Return call.  Information relayed to patient:  Patient was informed of her results below  Patient has additional questions:  Yes  If YES, what are your questions/concerns:  Patient stated if her iron level is low, is she supposed to take anything for it?    Patient stated her hand/wrist is extremely painful in the joints since Friday  Okay to leave a detailed message?: Yes  [phone] - please call the patient at work until 4:30 PM and then call her at home after 4:30 PM.

## 2021-06-07 NOTE — TELEPHONE ENCOUNTER
Probably will need an in person visit to assess hand swelling x2 months, especially since I haven't seen her before unless she wants to wait until Dr. Baez is back next week and see what he says.    Jong Claudio, CNP

## 2021-06-07 NOTE — TELEPHONE ENCOUNTER
Let's give the prednisone we talked about a try for the painful joints. Don't start until tomorrow morning.    As far as taking iron, she could restart ferrous gluconate which usually sits easier in peoples stomachs than ferrous sulfate. Taking along side vitamin C (orange juice, citrus fruit, etc) will aid in absorption. Since she gets gut upset with iron, she can even take it every other day to keep her levels stable while avoiding GI problems.    Jong Claudio, CNP

## 2021-06-07 NOTE — PROGRESS NOTES
Chief Complaint   Patient presents with     Joint Swelling     Right hand swelling that started within a week of taking her last antibiotic for sinus infection. Swelling has gotten worse. Feels the swelling in joints.     Skin Problem     Noticed it probably since last week. Has skin discoloration on right wrist, right forearm and right ankle. No pain with discoloration.        HPI: Patient presents today with complaints of right hand swelling and pain that started approximately 2-3 days after starting doxycycline earlier this year which she took for sinusitis.  She noticed pain primarily along all the joints in her fingers along with decreased range of motion secondary to swelling.  The swelling and pain extended into the wrist as well.  Left hand started to hurt as well, but did not swell up as much and the pain in the left hand has since self resolved.  She noticed some discoloration in the wrist as well which she says kind of looks like a bruise.  Afebrile without chills.  No urinary changes.  No yellowing of the skin or eyes.  She started some iron supplements a week ago, got constipated, and then stopped.  No GI symptoms.  The reason she started the iron supplement is that she has a history of iron deficiency and felt more fatigued than usual so she attributed it to her iron deficiency.    Patient has a known history of type 2 diabetes.  Last A1c showed 6.1% this is managed primarily through lifestyle.  No polydipsia, polyuria, tremors, headaches, vision changes.    ROS:Review of Systems - negative except for what's listed in the HPI    SH: The Patient's  reports that she has never smoked. She has never used smokeless tobacco. She reports current alcohol use. She reports that she does not use drugs.      FH: The Patient's family history includes Breast cancer in her maternal aunt; Diabetes in her mother; JEZ disease in her sister; Heart attack (age of onset: 60) in her mother; Heart disease in her maternal  grandfather and mother; Hypertension in her father and mother; Stomach cancer in her mother.     Meds:    Current Outpatient Medications on File Prior to Visit   Medication Sig Dispense Refill     aspirin 81 MG EC tablet Take 81 mg by mouth bedtime.        azelastine (ASTELIN) 137 mcg (0.1 %) nasal spray USE 2 SPRAYS IN EACH NOSTRIL TWICE DAILY AS NEEDED 90 mL 3     cetirizine (ZYRTEC) 10 MG tablet Take 1 tablet (10 mg total) by mouth as needed.       fluticasone propionate (FLONASE) 50 mcg/actuation nasal spray SHAKE LQ AND U 1 SPR IEN QD  0     hydroCHLOROthiazide (HYDRODIURIL) 25 MG tablet TAKE 2 TABLETS(50 MG) BY MOUTH DAILY 180 tablet 3     lansoprazole (PREVACID) 30 MG capsule Take 1 capsule (30 mg total) by mouth 2 (two) times a day. 180 capsule 3     losartan (COZAAR) 100 MG tablet Take 1 tablet (100 mg total) by mouth daily. 30 tablet 11     MAGNESIUM GLYCINATE ORAL Take 400 mg by mouth daily.       methylcellulose (CITRUCEL) Take 2 g by mouth. Use as directed.  Take in the PM       metoprolol succinate (TOPROL-XL) 100 MG 24 hr tablet TAKE 1 TABLET(100 MG) BY MOUTH TWICE DAILY 180 tablet 3     NIFEdipine (PROCARDIA XL) 30 MG 24 hr tablet Take 1 tablet (30 mg total) by mouth daily. 90 tablet 3     OMEGA-3/DHA/EPA/FISH OIL (FISH OIL-OMEGA-3 FATTY ACIDS) 300-1,000 mg capsule Take 2 g by mouth daily.       polyethylene glycol (MIRALAX) 17 gram packet Take 17 g by mouth daily.       potassium chloride (K-DUR,KLOR-CON) 20 MEQ tablet TAKE 2 TABLETS(40 MEQ) BY MOUTH TWICE DAILY 360 tablet 2     SINGULAIR 10 mg tablet TAKE 1 TABLET BY MOUTH EVERY DAY 90 tablet 3     ezetimibe (ZETIA) 10 mg tablet Take 1 tablet (10 mg total) by mouth daily. 90 tablet 3     Lactobacillus rhamnosus GG (CULTURELLE) 10-15 Billion cell capsule Take 1 capsule by mouth daily.       nitroglycerin (NITROSTAT) 0.4 MG SL tablet PLACE 1 TABLET UNDER THE TONGUE EVERY 5 MINUTES AS NEEDED FOR CHEST PAIN 1 Bottle 2     No current  "facility-administered medications on file prior to visit.        O:  /76   Pulse 67   Temp 98.6  F (37  C) (Oral)   Ht 5' 2.5\" (1.588 m)   Wt 212 lb (96.2 kg)   LMP 05/22/2002   SpO2 98%   BMI 38.16 kg/m      Physical Examination:   General appearance - alert, well appearing, and in no distress  Mental status - alert, oriented to person, place, and time  Eyes -PERRLA, conjunctiva pink.  Sclera white  Mouth - mucous membranes moist  Neck - no significant adenopathy  Lymphatics - no palpable lymphadenopathy, no hepatosplenomegaly  Chest - clear to auscultation, no wheezes, rales or rhonchi, symmetric air entry  Heart - normal rate and regular rhythm, S1 and S2 normal, no murmurs noted  Abdomen - soft, nontender, nondistended, no masses or organomegaly  Neurological - alert, oriented, normal speech, no focal findings or movement disorder noted,  motor and sensory grossly normal bilaterally, normal muscle tone, no tremors, strength 5/5  Musculoskeletal -there is faint diffuse swelling along the right hand.  Decreased range of motion secondary to pain.  Full range of motion in the wrist.  No crepitus.    Extremities - peripheral pulses normal  Skin - Along the anterior wrist there is an area which appears to be slightly darker and tender to touch.  Seems consistent with bruising.  This measures approximately 3 cm in diameter.  Oval-shaped.      A/P:     Problem List Items Addressed This Visit        [facility] Cardiac Problems    Type 2 diabetes mellitus with other circulatory complication, without long-term current use of insulin (H)    Relevant Orders    Comprehensive Metabolic Panel (Completed)    Glycosylated Hemoglobin A1c (Completed)      Other Visit Diagnoses     Bruising    -  Primary    Relevant Orders    Comprehensive Metabolic Panel (Completed)    INR (Completed)    HM2(CBC w/o Differential) (Completed)    Swelling of right hand        Relevant Orders    Sedimentation Rate (Completed)    " C-Reactive Protein(CRP) (Completed)    Fatigue, unspecified type        Relevant Orders    Iron and Transferrin Iron Binding Capacity (Completed)    Ferritin (Completed)    History of iron deficiency        Relevant Orders    Iron and Transferrin Iron Binding Capacity (Completed)    Ferritin (Completed)        Unclear cause.  Could be residual side effects from the doxycycline.  Low suspicion for autoimmune process given that it is only affecting one limb.  Potential for forgotten trauma.  Rule out inflammatory process.  Recheck diabetic labs.  Given new fatigue and history of iron deficiency, recheck iron levels.  Patient does not have access to her [facility] so results will need to be called/mailed.    1. Bruising  - Comprehensive Metabolic Panel  - INR  - HM2(CBC w/o Differential)    2. Swelling of right hand  - Sedimentation Rate  - C-Reactive Protein(CRP)    3. Type 2 diabetes mellitus with other circulatory complication, without long-term current use of insulin (H)  - Comprehensive Metabolic Panel  - Glycosylated Hemoglobin A1c    4. Fatigue, unspecified type  - Iron and Transferrin Iron Binding Capacity  - Ferritin    5. History of iron deficiency  - Iron and Transferrin Iron Binding Capacity  - Ferritin        Jong Claudio, CNP

## 2021-06-07 NOTE — TELEPHONE ENCOUNTER
----- Message from Jong Claudio CNP sent at 5/5/2020  9:11 AM CDT -----  Please call the patient as she does not have access to her MyChart easily.    A1c shows continued good control of blood sugars.    Blood counts look good.    Normal kidney function and liver function and electrolytes.    Iron levels are acceptable, but her iron storage is a little on the lower side.    Inflammatory markers all look fine.    Please ask her how the hand/wrist is doing today compared to Friday.      Jong Claudio CNP

## 2021-06-07 NOTE — PATIENT INSTRUCTIONS - HE
Blood work today to assess the swelling and bruising issues.    Since we're drawing blood anyway, we can recheck iron levels and your a1c.    We'll get in contact with you once results come in next week.

## 2021-06-07 NOTE — TELEPHONE ENCOUNTER
Doxycycline last prescribed for sinusitis on 2/25/20. Symptoms started shortly after 2/25/20.     Appears patient was then prescribed Azithromycin on 3/18/20.     Please review. Apt tomorrow for a in clinic check?

## 2021-06-07 NOTE — TELEPHONE ENCOUNTER
Calls are dropping.  Patient is tired of waiting on hold for triage and getting disconnected.  Please address.  Symptom  Describe your symptoms: Patient states she has prone to drug reactions but normally they go away after the medication is done.  The medication was doxycycline .  The issue started in right hand finger 2. The hand started getting stiff and finger started to bend.  Painful in hand and wrist area.  She finished the medication but the hand is increasing in symptoms  The hand now is puffy swelling in joints.  Has a green strip of something circling around from anterior hand to posterior wrist. The finger is bent and will not straighten.  The blood vessels on posterior hand are enlarger.  There is some heat but no redness. Patient very worried about the green strip.  Please advise  Any pain: yes  New/Ongoing: New  How long have you been having symptoms: started shortly after 2/25/20    Have you been seen for this:  No  Appointment offered?: no  Triage offered?: Yes, declined  Home remedies tried: did not offer anything  Requested Pharmacy: [facility]  Okay to leave a detailed message? No

## 2021-06-08 NOTE — TELEPHONE ENCOUNTER
Who is calling:  patient  Reason for Call:  She saw Jong Claudio on 05/01/2020.  States someone called and left a message for her on either 05/14 or 05/15 and asked her to call back.   Could not find any notes.  She also did mention the pain in her hand is back and has spread into the fingers/joints. Please advise.   Date of last appointment with primary care: 02/25/2020  Okay to leave a detailed message: Yes. If before 4:00pm call her work number, after 4:30 pm call her home number.

## 2021-06-08 NOTE — PROGRESS NOTES
"   Visit Vitals     /90     Pulse 66     Temp 98.5  F (36.9  C)     Ht 5' 2.5\" (1.588 m)     Wt 214 lb 14.4 oz (97.5 kg)     BMI 38.68 kg/m2       Alvina Maynard is a 61 y.o. female here for physical.  She has no specific concerns about her health.  She acknowledges that her weight is up and she has not been exercising as much.  She does not usually monitor any blood sugars and her blood pressures have been kind of borderline.  She has been under more stress at work lately but she is planning on continuing to work until 64.  She is contemplating a move over next her sister and I encouraged her on this.    We reviewed heart disease prevention and cancer detection and I encouraged her to follow through with her mammogram.  I also encouraged her to follow through with the eye doctor.  She is also due for an update on her orthotics for her left foot bunion.    She continues to have some epigastric abdominal pains which she associates with irritable bowel disease.  Please see recent GI notes.  The last medication they gave her did help but seemed to cause her some eye symptoms and so she stopped it.  Active Ambulatory Problems     Diagnosis Date Noted     Vocal Cord Disorder      Benign Adenomatous Polyp Of The Large Intestine      Alopecia      Chest Pain      Myalgia And Myositis      Abnormal Weight Gain      Shortness Of Breath      Obesity      Snoring (Symptom)      Limb Swelling      Acute Sinusitis      Pain In The Leg (Below The Knee)      Anxiety      Contact Dermatitis      Diabetes mellitus      Stroke Syndrome      Aneurysm Of The Right Middle Cerebral Artery      Headache      Blurry Vision      Essential Hypertension      Ischemic Embolic Stroke      Vertigo      Groin (Inguinal) Pain Right Side      Essential Hypercholesterolemia      Coronary Artery Disease      Asthma      Numbness (Hypesthesia)      Sleep Disturbances      Feeling Tired Or Poorly      Esophageal Reflux      Low back pain " 04/14/2015     Posterior cervical adenopathy 04/24/2015     Obstructive sleep apnea (adult) (pediatric) 09/16/2015     Allergic contact dermatitis 05/17/2016     External hemorrhoid 06/15/2016     Resolved Ambulatory Problems     Diagnosis Date Noted     No Resolved Ambulatory Problems     Past Medical History:   Diagnosis Date     Arthritis      Cancer      Colitis      Coronary artery disease      Fibrocystic breast      Hyperlipidemia      Hypertension      Sleep apnea      Stroke      Past Surgical History:   Procedure Laterality Date     CARDIAC CATHETERIZATION  9/28/15     CEREBRAL ANEURYSM REPAIR       CORONARY STENT PLACEMENT  9/20/12    bare metal stent lad     HYSTERECTOMY       OOPHORECTOMY       SC HEMORRHOIDECTOMY INTERNAL RUBBER BAND LIGATIONS      Description: Hemorrhoidectomy;  Recorded: 04/21/2010;     SC NASAL/SINUS ENDOSCOPY,BX/RMV POLYP/DEBRID      Description: Nasal Endoscopy Polypectomy;  Recorded: 05/30/2011;  Comments: 1998     SC REMOVAL OF OVARY/TUBE(S)      Description: Salpingo-oophorectomy Right Side;  Recorded: 04/21/2010;     SC TOTAL ABDOM HYSTERECTOMY      Description: Total Abdominal Hysterectomy;  Proc Date: 08/01/2002;     Family History   Problem Relation Age of Onset     Breast cancer Maternal Aunt      Hypertension Father      Hypertension Mother      Heart disease Mother      Diabetes Mother      Stomach cancer Mother      Heart disease Maternal Grandfather      JEZ disease Sister        Current Outpatient Prescriptions:      aspirin 81 MG EC tablet, Take 81 mg by mouth bedtime. , Disp: , Rfl:      azelastine (ASTELIN) 137 mcg (0.1 %) nasal spray, INSTILL 2 SPRAYS IN EACH NOSTRIL TWICE DAILY AS NEEDED, Disp: 30 mL, Rfl: 4     cholecalciferol, vitamin D3, 1,000 unit tablet, Take 1,000 Units by mouth daily., Disp: , Rfl:      hydrochlorothiazide (HYDRODIURIL) 25 MG tablet, TAKE 1 TABLET BY MOUTH EVERY DAY, Disp: 90 tablet, Rfl: 1     lisinopril (PRINIVIL,ZESTRIL) 20 MG tablet,  Take 1 tablet (20 mg total) by mouth 2 (two) times a day., Disp: 180 tablet, Rfl: 0     lisinopril (PRINIVIL,ZESTRIL) 20 MG tablet, TAKE 1 TABLET BY MOUTH TWICE DAILY, Disp: 180 tablet, Rfl: 3     MAGNESIUM GLYCINATE ORAL, Take 400 mg by mouth daily., Disp: , Rfl:      methylcellulose (CITRUCEL), Take 2 g by mouth. Use as directed.  Take in the PM, Disp: , Rfl:      metoprolol succinate (TOPROL-XL) 100 MG 24 hr tablet, TAKE 1 TABLET BY MOUTH EVERY DAY, Disp: 90 tablet, Rfl: 1     mometasone (NASONEX) 50 mcg/actuation nasal spray, 2 sprays into each nostril daily., Disp: 17 g, Rfl: 4     nystatin (MYCOSTATIN) cream, Apply to rash BID, Disp: 30 g, Rfl: 0     OMEGA-3/DHA/EPA/FISH OIL (FISH OIL-OMEGA-3 FATTY ACIDS) 300-1,000 mg capsule, Take 2 g by mouth daily., Disp: , Rfl:      polyethylene glycol (MIRALAX) 17 gram packet, Take 17 g by mouth daily., Disp: , Rfl:      potassium chloride SA (K-DUR,KLOR-CON) 20 MEQ tablet, TAKE 1 TABLET BY MOUTH TWICE DAILY, Disp: 180 tablet, Rfl: 1     ranitidine (ZANTAC) 300 MG tablet, Take 1 tablet (300 mg total) by mouth bedtime., Disp: 30 tablet, Rfl: 1     SINGULAIR 10 mg tablet, TAKE 1 TABLET BY MOUTH EVERY DAY, Disp: 90 tablet, Rfl: 0     amLODIPine (NORVASC) 10 MG tablet, Take 1/2 tablet qd, Disp: 90 tablet, Rfl: 3     azelaic acid (FINACEA) 15 % gel, Apply sparingly and massage in well to affected area(s) twice daily., Disp: 50 g, Rfl: 1     Lactobacillus rhamnosus GG (CULTURELLE) 10-15 Billion cell capsule, Take 1 capsule by mouth daily., Disp: , Rfl:      lansoprazole (PREVACID) 30 MG capsule, TAKE ONE CAPSULE BY MOUTH TWICE DAILY, Disp: 180 capsule, Rfl: 0     nitroglycerin (NITROSTAT) 0.4 MG SL tablet, PLACE 1 TABLET UNDER THE TONGUE EVERY 5 MINUTES AS NEEDED FOR CHEST PAIN, Disp: 25 tablet, Rfl: 0     phenol-glycerin (CHLORASEPTIC MAX SORE THROAT) 1.5-33 % Spry, 1 spray by Mucous Membrane route 3 (three) times a day., Disp: 1 Bottle, Rfl: 0  Allergies   Allergen Reactions      Atorvastatin Myalgia     Carvedilol Hives     Cephalosporins Hives     Clindamycin Hives     Dilantin Infatabs [Phenytoin] Hives     Lovastatin Myalgia     Meperidine Nausea And Vomiting     Metronidazole Hives     Nickel      Penicillins Hives     Pravastatin Sodium Myalgia     Propylene Glycol      Rosuvastatin Myalgia     Simvastatin Myalgia     Sulfa (Sulfonamide Antibiotics) Myalgia     Immunization History   Administered Date(s) Administered     DT (pediatric) 08/01/1990, 04/23/2002     Hep A, historic 05/13/2008, 11/18/2008     Influenza, inj, historic 10/16/2007, 10/14/2008, 10/13/2011, 10/15/2013     Influenza, seasonal,quad inj 36+ mos 09/15/2015     Influenza, seasonal,quad inj 6-35 mos 09/29/2009, 09/21/2010, 10/01/2012     Pneumo Conj 13-V (2010&after) 06/14/2016     Pneumo Polysac 23-V 01/03/2014     Td, historic 05/13/2008     Tdap 05/13/2008     ZOSTER 02/08/2017     Social History     Social History     Marital status: Single     Spouse name: N/A     Number of children: N/A     Years of education: N/A     Occupational History     Not on file.     Social History Main Topics     Smoking status: Never Smoker     Smokeless tobacco: Never Used     Alcohol use Yes     Drug use: No     Sexual activity: Not on file     Other Topics Concern     Not on file     Social History Narrative    habits: She is a nonsmoker rare alcohol user does use caffeine twice a day  Her review of systems all otherwise negative      General Appearance:    Alert, cooperative, no distress, appears stated age   Head:    Normocephalic, without obvious abnormality, atraumatic   Eyes:    PERRL, conjunctiva/corneas clear, EOM's intact, fundi     benign, both eyes     glasses are worn    Ears:    Normal TM's and external ear canals, both ears   Nose:   Nares normal, septum midline, mucosa normal, no drainage    or sinus tenderness   Throat:   Lips, mucosa, and tongue normal; teeth and gums normal   Neck:   Supple, symmetrical,  trachea midline, no adenopathy;        thyroid:  No enlargement/tenderness/nodules; no carotid    bruit or JVD   Back:     Symmetric, no curvature, ROM normal, no CVA tenderness   Lungs:     Clear to auscultation bilaterally, respirations unlabored   Chest wall:    No tenderness or deformity; breast exam she has mild to moderate fibrocystic breast disease bilaterally without other abnormality    Heart:    Regular rate and rhythm, S1 and S2 normal, no murmur, rub   or gallop   Abdomen:     Soft, she has some mild epigastric tenderness as well as suprapubic tenderness which again she questions if it's associated with her irritable bowel disease she has moderate obesity otherwise normal abdominal exam    Genitalia:    declined   Rectal:    declined   Extremities:   Extremities normal, atraumatic, no cyanosis or edema   Pulses:   2+ and symmetric all extremities   Skin:   Skin color, texture, turgor normal, no rashes or lesions   Lymph nodes:   Cervical, supraclavicular, and axillary nodes normal   Neurologic:   CNII-XII intact. Normal strength, sensation and reflexes       throughout    Labs pending    Assessment: Gen. physical coronary artery disease previous CVA diabetes hypertension obesity    Plan: We will call with labs when available encouraged her to follow through with specialists as directed including podiatry and ophthalmology work at diet exercise and weight loss okay for shingles shot here today and she'll follow up here in 6 months or as needed

## 2021-06-08 NOTE — PROGRESS NOTES
Chief Complaint   Patient presents with     Hand Problem     Pt c/o R pointer finger down into palm pain       HPI: Patient presents today for recheck.  She continues to have problem with her left hand.  She is unable to grasp and she particularly has stiffness in her middle finger.  She was treated over the phone and given prednisone which helped slightly but has not made the situation improved.  She is complex as she has diabetes, hyperlipidemia and heart disease.    ROS: No paresthesias distally in the finger.  Positive for decreased  strength    SH: Reviewed-see Snapshot for review.     FH: Reviewed-see Snapshot for review.    Meds:  Alvina has a current medication list which includes the following prescription(s): aspirin, azelastine, cetirizine, ezetimibe, fluticasone propionate, hydrochlorothiazide, lactobacillus rhamnosus gg, lansoprazole, losartan, magnesium glycinate, methylcellulose, metoprolol succinate, nifedipine, nitroglycerin, fish oil-omega-3 fatty acids, polyethylene glycol, potassium chloride, singulair, and prednisone.    O:  /74   Pulse 61   Resp 16   LMP 05/22/2002   SpO2 97%   Constitutional:    --Vitals as above  --No acute distress  Eyes-  --Sclera noninjected  --Lids and conjunctiva normal  Musculoskeletal-  -- Patient has thickening in the tendon sheath for the flexor tendon of the left third finger.  Skin-  --Pink and dry    A/P:   1.  Dupuytren's contracture  Patient has Dupuytren's contracture that needs to be treated with steroid injection.  Referral to Ortho hand.  - Ambulatory referral to Orthopedics

## 2021-06-08 NOTE — TELEPHONE ENCOUNTER
Seen 5/1/20 in clinic. Labs done and f/u call 5/5/20 when pt prescribed Prednisone. Steroid helped but still lingered in your middle finger. Now, it's back with full force. Palm of hand and other finger joints.

## 2021-06-08 NOTE — TELEPHONE ENCOUNTER
Refill Approved    Rx renewed per Medication Renewal Policy. Medication was last renewed on 5/17/19.    Tonya Robb, [facility] Connection Triage/Med Refill 5/8/2020     Requested Prescriptions   Pending Prescriptions Disp Refills     losartan (COZAAR) 100 MG tablet [Pharmacy Med Name: LOSARTAN 100MG TABLETS] 30 tablet 11     Sig: TAKE 1 TABLET BY MOUTH EVERY DAY       Angiotensin Receptor Blocker Protocol Passed - 5/7/2020 12:23 PM        Passed - PCP or prescribing provider visit in past 12 months       Last office visit with prescriber/PCP: 2/25/2020 Atilio Baez MD OR same dept: 5/1/2020 Jong Claudio CNP OR same specialty: 5/1/2020 Jong Claudio CNP  Last physical: Visit date not found Last MTM visit: Visit date not found   Next visit within 3 mo: Visit date not found  Next physical within 3 mo: Visit date not found  Prescriber OR PCP: Atilio Baez MD  Last diagnosis associated with med order: 1. Essential hypertension  - losartan (COZAAR) 100 MG tablet [Pharmacy Med Name: LOSARTAN 100MG TABLETS]; TAKE 1 TABLET BY MOUTH EVERY DAY  Dispense: 30 tablet; Refill: 11    If protocol passes may refill for 12 months if within 3 months of last provider visit (or a total of 15 months).             Passed - Serum potassium within the past 12 months     Lab Results   Component Value Date    Potassium 3.7 05/01/2020             Passed - Blood pressure filed in past 12 months     BP Readings from Last 1 Encounters:   05/01/20 136/76             Passed - Serum creatinine within the past 12 months     Creatinine   Date Value Ref Range Status   05/01/2020 0.78 0.60 - 1.10 mg/dL Final

## 2021-06-08 NOTE — TELEPHONE ENCOUNTER
Refill Approved    Rx renewed per Medication Renewal Policy. Medication was last renewed on 8/10/19.    Tonya Robb, [facility] Connection Triage/Med Refill 5/19/2020     Requested Prescriptions   Pending Prescriptions Disp Refills     potassium chloride (K-DUR,KLOR-CON) 20 MEQ tablet [Pharmacy Med Name: POTASSIUM CL 20MEQ ER TABLETS] 360 tablet 2     Sig: TAKE 2 TABLETS(40 MEQ) BY MOUTH TWICE DAILY       Potassium Supplements Refill Protocol Passed - 5/16/2020  3:07 PM        Passed - PCP or prescribing provider visit in past 12 months       Last office visit with prescriber/PCP: 2/25/2020 Atilio Baez MD OR same dept: 5/1/2020 Jong Claudio CNP OR same specialty: 5/1/2020 Jong Claudio CNP  Last physical: Visit date not found Last MTM visit: Visit date not found   Next visit within 3 mo: Visit date not found  Next physical within 3 mo: Visit date not found  Prescriber OR PCP: Atilio Baez MD  Last diagnosis associated with med order: 1. Essential hypertension, malignant  - potassium chloride (K-DUR,KLOR-CON) 20 MEQ tablet [Pharmacy Med Name: POTASSIUM CL 20MEQ ER TABLETS]; TAKE 2 TABLETS(40 MEQ) BY MOUTH TWICE DAILY  Dispense: 360 tablet; Refill: 2    If protocol passes may refill for 12 months if within 3 months of last provider visit (or a total of 15 months).             Passed - Potassium level in last 12 months     Lab Results   Component Value Date    Potassium 3.7 05/01/2020

## 2021-06-09 NOTE — PROGRESS NOTES
Assessment:  #1.  Hypertension, not controlled.  2.  Headache, certainly partly might be from the hypertension.  #3.  Hypokalemia.  #4.  Anxiety.  #5.  History of brain aneurysm.  #6.  History of swelling from amlodipine.  #7.  Multiple allergies.    Plan: Recommend increase metoprolol to 100 mg of the succinate twice daily, #60 refillable ×1 sent to her pharmacy.  Recommend increase potassium to a total of 2 tablets twice a day i.e. 4 pills per day-#120 refillable ×1 sent to her pharmacy.  Follow up with Dr. Mosquera next week, will need recheck potassium or basic profile at that time.  Recommend she be very careful in terms of low sodium diet.  If any worsening of symptoms certainly let us know.  Spent in excess of 25 minutes with at least 50% in counseling regarding her multiple questions.    Subjective: 61-year-old female presenting for follow-up of elevated blood pressure and see the ER visit from 2 days ago.  She has had some dull headache in the forehead that has persisted but is better than she was 2 days ago.  No nausea or vomiting.  No abdominal pain.  She does have a history of the brain aneurysm and previous surgery.  Past Surgical History:   Procedure Laterality Date     CARDIAC CATHETERIZATION  9/28/15     CEREBRAL ANEURYSM REPAIR       CORONARY STENT PLACEMENT  9/20/12    bare metal stent lad     HYSTERECTOMY       OOPHORECTOMY       IL HEMORRHOIDECTOMY INTERNAL RUBBER BAND LIGATIONS      Description: Hemorrhoidectomy;  Recorded: 04/21/2010;     IL NASAL/SINUS ENDOSCOPY,BX/RMV POLYP/DEBRID      Description: Nasal Endoscopy Polypectomy;  Recorded: 05/30/2011;  Comments: 1998     IL REMOVAL OF OVARY/TUBE(S)      Description: Salpingo-oophorectomy Right Side;  Recorded: 04/21/2010;     IL TOTAL ABDOM HYSTERECTOMY      Description: Total Abdominal Hysterectomy;  Proc Date: 08/01/2002;     Past Medical History:   Diagnosis Date     Arthritis      Cancer     basel cell     Colitis      Coronary artery  disease      Fibrocystic breast      Hyperlipidemia      Hypertension      Sleep apnea     c-pap     Stroke      Allergies   Allergen Reactions     Atorvastatin Myalgia     Carvedilol Hives     Cephalosporins Hives     Clindamycin Hives     Dilantin Infatabs [Phenytoin] Hives     Lovastatin Myalgia     Meperidine Nausea And Vomiting     Metronidazole Hives     Nickel      Penicillins Hives     Pravastatin Sodium Myalgia     Propylene Glycol      Rosuvastatin Myalgia     Simvastatin Myalgia     Sulfa (Sulfonamide Antibiotics) Myalgia     Current Outpatient Prescriptions   Medication Sig Dispense Refill     aspirin 81 MG EC tablet Take 81 mg by mouth bedtime.        azelastine (ASTELIN) 137 mcg (0.1 %) nasal spray INSTILL 2 SPRAYS IN EACH NOSTRIL TWICE DAILY AS NEEDED 30 mL 4     cholecalciferol, vitamin D3, 1,000 unit tablet Take 1,000 Units by mouth daily.       hydroCHLOROthiazide (HYDRODIURIL) 25 MG tablet TAKE 1 TABLET BY MOUTH EVERY DAY 90 tablet 3     lansoprazole (PREVACID) 30 MG capsule TAKE ONE CAPSULE BY MOUTH TWICE DAILY 180 capsule 0     lisinopril (PRINIVIL,ZESTRIL) 20 MG tablet Take 1 tablet (20 mg total) by mouth 2 (two) times a day. 180 tablet 0     MAGNESIUM GLYCINATE ORAL Take 400 mg by mouth daily.       methylcellulose (CITRUCEL) Take 2 g by mouth. Use as directed.  Take in the PM       metoprolol succinate (TOPROL-XL) 100 MG 24 hr tablet Take 1 tablet (100 mg total) by mouth 2 (two) times a day. 60 tablet 1     OMEGA-3/DHA/EPA/FISH OIL (FISH OIL-OMEGA-3 FATTY ACIDS) 300-1,000 mg capsule Take 2 g by mouth daily.       polyethylene glycol (MIRALAX) 17 gram packet Take 17 g by mouth daily.       potassium chloride SA (K-DUR,KLOR-CON) 20 MEQ tablet Take 2 tablets (40 mEq total) by mouth 2 (two) times a day. 120 tablet 1     SINGULAIR 10 mg tablet TAKE 1 TABLET BY MOUTH EVERY DAY 90 tablet 0     Lactobacillus rhamnosus GG (CULTURELLE) 10-15 Billion cell capsule Take 1 capsule by mouth daily.        nitroglycerin (NITROSTAT) 0.4 MG SL tablet PLACE 1 TABLET UNDER THE TONGUE EVERY 5 MINUTES AS NEEDED FOR CHEST PAIN 25 tablet 0     No current facility-administered medications for this visit.      Note that the above list of medications is after I made the changes today on the metoprolol and potassium.  She also had questions about some soreness in her left ring finger.  She does work in  i.e. does type a lot.  She also has known osteoarthritis in multiple joints.    Objective:  Visit Vitals     BP (!) 168/94 (Patient Site: Left Arm, Patient Position: Sitting, Cuff Size: Adult Large)     Pulse 72     HEENT exam shows no acute change.  She is alert with clear speech.  Looks comfortable talking.  Neck supple.  Lungs clear.  Heart regular rate and rhythm without murmur.  Abdomen shows no masses tenderness or hepatosplenomegaly.  No significant pedal edema.  Has [place] nodes in multiple fingers.  Her left ring finger does not have any significant joint swelling or erythema or tenderness etc.

## 2021-06-09 NOTE — PROGRESS NOTES
"Alvina Maynard is a 61-year-old with a history of coronary artery disease previous CVA diabetes hypertension hypercholesterolemia who presents today for follow-up of her elevated blood pressure.  Please see recent labs notes and ER visit.  She continues to have a sense of some headache and even some palpitations or heart racing.  She feels this is associated with her blood pressure.  She acknowledges she has not really changed her diet she is not doing any regular exercise she is frustrated that her weight is not down but realistically she is not doing that much to try and make it go down.    She continues to have a sense of myalgias with achiness into her upper back and neck and lower back.  She is not sure if this is associated with her blood pressure or not.  She does not recall any act injury or overuse that may have caused this.  She is having no new neurosensory symptoms.  Her hand symptoms that were present at her ER visit are now improved.    We reviewed her recent labs and the need for better cholesterol control.  We discussed the idea of starting Repatha and she is willing to give that a try.  She is giving herself shots in the past when she was briefly on Lovenox.  We discussed how uncorrected cholesterol problems could lead to further heart issues or stroke issues.    We reviewed heart disease prevention and cancer detection and she will be setting up a mammogram in the near future.    Objective:  Visit Vitals     BP (!) 148/94     Pulse 92     Resp 18     Ht 5' 2.5\" (1.588 m)     Wt 216 lb (98 kg)     Breastfeeding No     BMI 38.88 kg/m2     Anxious appearing woman in no obvious distress  Neck is supple without lymphadenopathy thyromegaly or bruits lungs are clear heart with a regular rate and rhythm without murmur lower extremities are free of edema skin appears normal neurological examination is nonfocal    EKG shows normal sinus rhythm without acute change other labs pending    Assessment: " Hypertension with palpitations myalgia history of hypercholesterolemia coronary artery disease and previous stroke    Plan: We will have her start diltiazem 120 mg daily continue to monitor blood pressures weekly follow-up in 1 month let me know how things are, I have also given her a prescription for Repatha-she is aware her insurance may not want to cover this and yet we need to do something to help reduce her risk factors I have encouraged her on diet and exercise and she will follow-up here otherwise as directed, follow through with mammogram.

## 2021-06-11 NOTE — PROGRESS NOTES
PROGRESS NOTE       SUBJECTIVE:  Alvina Maynard is a 61 y.o. female   Chief Complaint   Patient presents with     Follow-up     bp issues has been seeing cardiologist, med changes because pt  has been SOB , thinking meds were causing, having some throat issues too ? maybe causing breathing issues, swollen neck/glands     I have not seen this patient since 2011.    Angiogram done 12/31/2013.  Has hx of vocal cord dysfunction.        Angiographic Findings 9/22/15: (Her stent remains open)       Diagnostic Findings       Cardiac Arteries and Lesion Findings      LAD:        Lesion on Prox LAD: 20% [id number] mm length .    The lesion was previously treated with the following techniques: Non Drug    Eluting Stent .        Lesion on Mid LAD: 35% stenosis2 mm length .        Lesion on Dist LAD: 35% stenosis2 mm length .        Lesion on 1st Diag: Ostial.50% stenosis5 mm length .      RCA:        Lesion on Mid RCA: 50% [id number] mm length .        Lesion on Dist RCA: 35% stenosis5 mm length .    Patient's clonidine was discontinued and she was placed on minoxidil 2.5 mg daily.  She is no longer taking this medication.  She continues to take metoprolol  mg twice daily and lisinopril 20 mg twice daily.  She also takes hydrochlorothiazide 50 mg daily.  On further reviewing her chart patient currently weighs 220 pounds with a body mass index of 39.6.    Patient Active Problem List   Diagnosis     Vocal Cord Disorder     Benign Adenomatous Polyp Of The Large Intestine     Alopecia     Chest Pain     Myalgia And Myositis     Obesity     Snoring (Symptom)     Limb Swelling     Pain In The Leg (Below The Knee)     Anxiety     Contact Dermatitis     Diabetes mellitus     Stroke Syndrome     Aneurysm Of The Right Middle Cerebral Artery     Headache     Blurry Vision     Essential Hypertension     Ischemic Embolic Stroke     Vertigo     Essential Hypercholesterolemia     Coronary Artery Disease     Asthma     Sleep  Disturbances     Feeling Tired Or Poorly     Esophageal Reflux     Low back pain     Obstructive sleep apnea (adult) (pediatric)     Allergic contact dermatitis     External hemorrhoid       Current Outpatient Prescriptions   Medication Sig Dispense Refill     aspirin 81 MG EC tablet Take 81 mg by mouth bedtime.        azelastine (ASTELIN) 137 mcg (0.1 %) nasal spray INSTILL 2 SPRAYS IN EACH NOSTRIL TWICE A DAY AS NEEDED 30 mL 5     cholecalciferol, vitamin D3, 1,000 unit tablet Take 1,000 Units by mouth daily.       hydroCHLOROthiazide (HYDRODIURIL) 25 MG tablet Take 2 tablets (50 mg total) by mouth daily. 60 tablet 10     Lactobacillus rhamnosus GG (CULTURELLE) 10-15 Billion cell capsule Take 1 capsule by mouth daily.       lansoprazole (PREVACID) 30 MG capsule Take 1 capsule (30 mg total) by mouth 2 (two) times a day. 180 capsule 1     lisinopril (PRINIVIL,ZESTRIL) 20 MG tablet Take 1 tablet (20 mg total) by mouth 2 (two) times a day. 180 tablet 0     MAGNESIUM GLYCINATE ORAL Take 400 mg by mouth daily.       methylcellulose (CITRUCEL) Take 2 g by mouth. Use as directed.  Take in the PM       metoprolol succinate (TOPROL-XL) 100 MG 24 hr tablet TAKE 1 TABLET BY MOUTH TWICE DAILY. 60 tablet 3     OMEGA-3/DHA/EPA/FISH OIL (FISH OIL-OMEGA-3 FATTY ACIDS) 300-1,000 mg capsule Take 2 g by mouth daily.       polyethylene glycol (MIRALAX) 17 gram packet Take 17 g by mouth daily.       potassium chloride SA (K-DUR,KLOR-CON) 20 MEQ tablet TAKE 2 TABLETS(40 MEQ) BY MOUTH TWICE DAILY 120 tablet 3     evolocumab (REPATHA SURECLICK) 140 mg/mL PnIj Inject 140 mg under the skin every 14 (fourteen) days. 6 Syringe 1     nitroglycerin (NITROSTAT) 0.4 MG SL tablet PLACE 1 TABLET UNDER THE TONGUE EVERY 5 MINUTES AS NEEDED FOR CHEST PAIN 25 tablet 0     SINGULAIR 10 mg tablet TAKE 1 TABLET BY MOUTH EVERY DAY. 90 tablet 0     No current facility-administered medications for this visit.        History   Smoking Status     Never Smoker    Smokeless Tobacco     Never Used       REVIEW OF SYSTEMS:  Patient denies fever, chills, dizziness, headache, visual change, cough, chest pain, abdominal pain, edema.     Patient is rather vague regarding her shortness of breath.  She is wondering if it is symptoms of her vocal cord dysfunction again.      OBJECTIVE:       Vitals:    06/01/17 1152   BP: (!) 158/92   Pulse:    SpO2:      Weight: 220 lb (99.8 kg)  Wt Readings from Last 3 Encounters:   06/01/17 220 lb (99.8 kg)   05/08/17 220 lb (99.8 kg)   04/04/17 220 lb (99.8 kg)     Body mass index is 39.6 kg/(m^2).    Patient weighed 180 pounds on 9/25/2012, 190 9 March 2014, and 2 12 September 2015.  A slowly progressive weight gain which is very hard on her body and complicates her medical problems.     Physical Exam:  GENERAL APPEARANCE: A&A, NAD, well hydrated, well nourished  SKIN:  Normal skin turgor, no lesions/rashes   EARS: TM's normal, gray with nl light reflex  OROPHARYNX: without erythema, no post nasal drainage or thrush  NECK: Supple, without lymphadenopathy, no thyroid mass.  Her voice is slightly raspy and we talked about how this could be related to environmental allergies as well.  CV: RRR, no M/G/R   LUNGS: CTAB, normal respiratory effort  ABDOMEN: S&NT, no masses, no organomegaly   EXTREMITY: no edema   NEURO: no gross deficits   PSYCHIATRIC;  Mood appropriate, memory intact        ASSESSMENT/PLAN:     1. SOB (shortness of breath)  History of secondhand smoke exposure.  I recommend formal pulmonary function testing.  If abnormal will obtain pulmonary consult.  - PFT Spirometry Pre and Post With Diffusion; Future    2. Hypertension  Cardiology is managing, but patient has been intolerant to many medications.    3. Obesity  Body mass index is almost 40.  We had a conversation regarding how when in the face of medications failing her it becomes mandatory to manage things as best she can with healthy lifestyle.  This means choosing as healthy  foods as possible on a daily basis, decreasing her total intake order to manage her blood pressure.  This can be done by going vegetarian with paying attention to protein intake.  Weight loss would be helpful for her shortness of breath and managing her blood pressure.   I have strongly advised her to consider a weight loss program.  Referral to the bariatric center for nutritional and dietary counseling is offered.  Patient declines today.      There are no Patient Instructions on file for this visit.  Medications Discontinued During This Encounter   Medication Reason     minoxidil (LONITEN) 2.5 MG tablet Side effects     No Follow-up on file.    The visit lasted a total of 25 minutes face to face with the patient.  Over 50% of the time spent counseling and educating the patient about all of the above.      Emilie Green MD

## 2021-06-11 NOTE — PROGRESS NOTES
ASSESSMENT/PLAN:       1. Hair loss  Discussion with the patient about the multiple causes and scenarios with hair loss some of which can be transient and recover.  Good diet and trace minerals along with of vitamins can be helpful.  If symptoms seem to progress further without an etiology consider referral to dermatology  I did not notice that there was a problem with the eyelashes    - Vitamin B12; Future    2. Vitamin deficiency  We will check a vitamin B12    3. Vitamin D deficiency     - Vitamin D, Total (25-Hydroxy); Future    4. Fatigue, unspecified type     - Thyroid Stimulating Hormone (TSH); Future  - Vitamin B12; Future  I think likely the fatigue is multifactorial including related to the stress of the season and her job.  Her seasonal allergies can be quite severe.  She does need to be on a high dose of a beta-blocker which could contribute to some fatigue and quality of sleep could improve    5. Need for vaccination     - Influenza,Quad,High Dose,PF 65 YR+    6. Neck pain  The hyperalgesia on the right side of the neck is perplexing in view of a history of a lump behind her right ear that now is gone.  She does have underlying myofascial pain in her upper back and at this point will take a wait-and-see approach to see if this just simply resolves.  I suspect the myalgia and myofascial pain in her upper back will persist.    Patient will get a message by my chart with results          Atilio Baez MD      PROGRESS NOTE   9/29/2020    SUBJECTIVE:  Alvina Maynard is a 65 y.o. female  who presents for   Chief Complaint   Patient presents with     Flu Vaccine     Hair/Scalp Problem     pt has been having hairloss issues on going the last year.     Neck Pain     neck and shoulder pain        1. Hair loss  The patient's primary complaint today is that of hair loss.  The hair loss is primarily on the top of her head and she has noticed for most of this year a lot of additional loss of hair in the shower and  she actually brought in clumps of hair that she has collected.  There is no scalp pain or itching to speak of.  She has not used any new products in her hair and her hairdresser has commented on the thinning of her hair as well.  She actually feels like her hair is more coarse and dry.  She also feels that there is been some changes in her eyelashes but no other areas of her body where there is been hair changes.  No problems with her nails.  This year is been very stressful with COVID-19 and all that is affected her work.  There is not been any changes in her medications    2. Vitamin deficiency  Patient questions if she might have a vitamin B12 deficiency.  That is something that has not been checked.  She did have a CBC checked in May 2020 which was normal.    3. Vitamin D deficiency  I could not find evidence of a vitamin D and yet a diagnosis has been made.  She is not on any vitamin D supplement    4. Fatigue, unspecified type  Thyroid tests were last checked in 2018.  The patient has a general feeling of fatigue.  She does have seasonal allergies which bother her this time of the year.  She is on metoprolol  mg daily which could cause some fatigue    5. Need for vaccination  The patient would like to have an influenza vaccine today    6. Neck pain  A week ago the patient had a lump behind her right ear and that has resolved but she continues to have hypersensitivity to the skin on the right side of her neck.  She also in general has pain in the upper back bilaterally.    Patient Active Problem List   Diagnosis     Vocal Cord Disorder     Alopecia     Anxiety     Aneurysm Of The Right Middle Cerebral Artery     Essential Hypertension     Vertigo     Essential Hypercholesterolemia     Coronary Artery Disease     Asthma     Esophageal Reflux     Obstructive sleep apnea (adult) (pediatric)     Allergic contact dermatitis     Obesity (BMI 35.0-39.9) with comorbidity (H)     Statin intolerance     Type 2  diabetes mellitus with other circulatory complication, without long-term current use of insulin (H)       Current Outpatient Medications   Medication Sig Dispense Refill     aspirin 81 MG EC tablet Take 81 mg by mouth bedtime.        azelastine (ASTELIN) 137 mcg (0.1 %) nasal spray USE 2 SPRAYS IN EACH NOSTRIL TWICE DAILY AS NEEDED 90 mL 3     cetirizine (ZYRTEC) 10 MG tablet Take 1 tablet (10 mg total) by mouth as needed.       fluticasone propionate (FLONASE) 50 mcg/actuation nasal spray SHAKE LQ AND U 1 SPR IEN QD  0     hydroCHLOROthiazide (HYDRODIURIL) 25 MG tablet TAKE 2 TABLETS(50 MG) BY MOUTH DAILY 180 tablet 3     Lactobacillus rhamnosus GG (CULTURELLE) 10-15 Billion cell capsule Take 1 capsule by mouth daily.       losartan (COZAAR) 100 MG tablet TAKE 1 TABLET BY MOUTH EVERY DAY 90 tablet 3     MAGNESIUM GLYCINATE ORAL Take 400 mg by mouth daily.       methylcellulose (CITRUCEL) Take 2 g by mouth. Use as directed.  Take in the PM       metoprolol succinate (TOPROL-XL) 100 MG 24 hr tablet TAKE 1 TABLET(100 MG) BY MOUTH TWICE DAILY 180 tablet 2     NIFEdipine (PROCARDIA XL) 30 MG 24 hr tablet Take 1 tablet (30 mg total) by mouth daily. 90 tablet 3     OMEGA-3/DHA/EPA/FISH OIL (FISH OIL-OMEGA-3 FATTY ACIDS) 300-1,000 mg capsule Take 2 g by mouth daily.       polyethylene glycol (MIRALAX) 17 gram packet Take 17 g by mouth daily.       potassium chloride (K-DUR,KLOR-CON) 20 MEQ tablet TAKE 2 TABLETS(40 MEQ) BY MOUTH TWICE DAILY 360 tablet 3     SINGULAIR 10 mg tablet TAKE 1 TABLET BY MOUTH EVERY DAY 90 tablet 3     ezetimibe (ZETIA) 10 mg tablet Take 1 tablet (10 mg total) by mouth daily. 90 tablet 3     lansoprazole (PREVACID) 30 MG capsule Take 1 capsule (30 mg total) by mouth 2 (two) times a day. 180 capsule 3     nitroglycerin (NITROSTAT) 0.4 MG SL tablet PLACE 1 TABLET UNDER THE TONGUE EVERY 5 MINUTES AS NEEDED FOR CHEST PAIN 1 Bottle 2     No current facility-administered medications for this visit.   "      Social History     Tobacco Use   Smoking Status Never Smoker   Smokeless Tobacco Never Used           OBJECTIVE:        No results found for this or any previous visit (from the past 240 hour(s)).    Vitals:    09/28/20 1656   BP: 138/80   Pulse: 67   SpO2: 97%   Weight: 210 lb (95.3 kg)   Height: 5' 2.5\" (1.588 m)     Weight: 210 lb (95.3 kg)          Physical Exam:  GENERAL APPEARANCE: 65-year-old female very pleasant, NAD, well hydrated, well nourished  SKIN:  Normal skin turgor, no lesions/rashes   HEENT: moist mucous membranes, no rhinorrhea, the patient does have noticeable thinning of her hair starting at the vertex going forward to about the mid scalp on the top with no evidence of recovery of hair or stubble.  The patient does color her hair  NECK: Normal without adenopathy or masses, the patient has tenderness to light touch on the right side of her neck starting behind her right ear and going down towards her levator scapular muscle on the right side.  Of note is she also does have tenderness in the muscles of the upper back bilaterally primarily the rhomboids and levator scapular muscle.  CV: RRR, no M/G/R   LUNGS: CTAB  EXTREMITY: no edema and full ROM of all joints  NEURO: no focal findings    "

## 2021-06-11 NOTE — TELEPHONE ENCOUNTER
Refill Approved    Rx renewed per Medication Renewal Policy. Medication was last renewed on 9/20/20.    Tonya Robb, [facility] Triage/Med Refill 9/22/2020     Requested Prescriptions   Pending Prescriptions Disp Refills     metoprolol succinate (TOPROL-XL) 100 MG 24 hr tablet [Pharmacy Med Name: METOPROLOL ER SUCCINATE 100MG TABS] 180 tablet 3     Sig: TAKE 1 TABLET(100 MG) BY MOUTH TWICE DAILY       Beta-Blockers Refill Protocol Passed - 9/20/2020  1:37 PM        Passed - PCP or prescribing provider visit in past 12 months or next 3 months     Last office visit with prescriber/PCP: 2/25/2020 Atilio Baez MD OR same dept: 6/2/2020 Vick Simpson MD OR same specialty: 6/2/2020 Vick Simpson MD  Last physical: Visit date not found Last MTM visit: Visit date not found   Next visit within 3 mo: Visit date not found  Next physical within 3 mo: Visit date not found  Prescriber OR PCP: Atilio Baez MD  Last diagnosis associated with med order: 1. Coronary atherosclerosis due to severely calcified coronary lesion  - metoprolol succinate (TOPROL-XL) 100 MG 24 hr tablet [Pharmacy Med Name: METOPROLOL ER SUCCINATE 100MG TABS]; TAKE 1 TABLET(100 MG) BY MOUTH TWICE DAILY  Dispense: 180 tablet; Refill: 3    If protocol passes may refill for 12 months if within 3 months of last provider visit (or a total of 15 months).             Passed - Blood pressure filed in past 12 months     BP Readings from Last 1 Encounters:   06/02/20 138/74

## 2021-06-11 NOTE — PROGRESS NOTES
RESPIRATORY CARE NOTE     Patient Name: Alvina Maynard  Today's Date: 6/8/2017     Pre and post spirometry with diffusion test completed only per MD's orders. Pt performed tests with good effort. Test results meet ATS criteria. Results scanned into epic. Pt left in no distress.       Prisca Swanson  RRT

## 2021-06-12 NOTE — PROGRESS NOTES
Assessment:     1. Viral URI     2. Cough  XR Chest PA and Lateral    HM1(CBC and Differential)    HM1 (CBC with Diff)          Plan:     Discussed the typical course of a viral upper respiratory infection.  No antibiotics indicated at this time.  X-ray and CBC unremarkable area recommend symptomatic treatment such as decongestants and acetominephen as needed.  Recommend follow up if getting worse or not improving.      Subjective:       61 y.o. female presents for evaluation of a couple day history of sore throat and hoarse voice.  She had a low-grade fever of 99.9 yesterday.  This morning she had a continued sore throat as well as some nasal drainage and a bit of a cough.  Her ears have been feeling plugged and her eyes have been burning.  She has not taken anything over-the-counter for her symptoms.  Cough has been mildly productive.  Her primary concern right now is her cough.  She occasionally feels short of breath and a heaviness in her chest.  She is occasionally dizzy and nauseous especially when she is coughing.    The following portions of the patient's history were reviewed and updated as appropriate: allergies, current medications, past family history, past medical history, past social history, past surgical history and problem list.    Review of Systems  A 12 point comprehensive review of systems was negative except as noted.     Objective:        Vitals:    08/18/17 1553   BP: 138/80   Pulse: 67   Resp: 18   Temp: 98.5  F (36.9  C)   SpO2: 97%     General Appearance:    Alert, pleasant, mildly ill-appearing, cooperative, no distress, appears stated age   Head:    Normocephalic, without obvious abnormality, atraumatic   Eyes:    Conjunctiva/corneas clear   Ears:    Normal TM's without erythema or bulging. Estela external ear canals, both ears   Nose:   Nares normal, septum midline, mucosa normal, no drainage    or sinus tenderness   Throat:   Lips, mucosa, and tongue normal; teeth and gums normal.  No  tonsilar hypertrophy or exudate.   Neck:    Cardiovascular:   Supple, symmetrical, trachea midline, no adenopathy;     thyroid:  no enlargement/tenderness/nodules  Regular rate and rhythm, no murmurs, rubs, or gallops.   Lungs:    Few scattered rhonchi heard at the right base.  Extremities: Warm and well-perfused  Skin: No rashes    Recent Results (from the past 24 hour(s))   HM1 (CBC with Diff)   Result Value Ref Range    WBC 10.2 4.0 - 11.0 thou/uL    RBC 4.73 3.80 - 5.40 mill/uL    Hemoglobin 13.5 12.0 - 16.0 g/dL    Hematocrit 41.6 35.0 - 47.0 %    MCV 88 80 - 100 fL    MCH 28.6 27.0 - 34.0 pg    MCHC 32.5 32.0 - 36.0 g/dL    RDW 12.5 11.0 - 14.5 %    Platelets 317 140 - 440 thou/uL    MPV 7.7 7.0 - 10.0 fL    Neutrophils % 64 50 - 70 %    Lymphocytes % 23 20 - 40 %    Monocytes % 10 2 - 10 %    Eosinophils % 3 0 - 6 %    Basophils % 1 0 - 2 %    Neutrophils Absolute 6.5 2.0 - 7.7 thou/uL    Lymphocytes Absolute 2.3 0.8 - 4.4 thou/uL    Monocytes Absolute 1.0 (H) 0.0 - 0.9 thou/uL    Eosinophils Absolute 0.3 0.0 - 0.4 thou/uL    Basophils Absolute 0.1 0.0 - 0.2 thou/uL     X-ray ordered and personally reviewed by myself.  No evidence of infiltrate suggesting a pneumonia.  Otherwise appears normal.                          This note has been dictated using voice recognition software. Any grammatical or context distortions are unintentional and inherent to the software

## 2021-06-12 NOTE — PROGRESS NOTES
Alvina,  Your test results are back and your vitamin D level is low at 23 and it should be in the 30s or higher.  It actually is lower than it was a year ago.  I think it is necessary for you to take a vitamin D3 supplement at 2000 international units daily.  This can be purchased over-the-counter.    The vitamin B12 level is 379 which is in the normal range.  It seems like you are getting enough of this vitamin from the food that you eat.    Because of your complaint of fatigue and the hair loss I checked your thyroid function again and that continues to be normal.    If you have any particular questions please do not hesitate to send me a message.    I think the vitamin D supplement should be something you take all year round once daily.  We will see if this might help with your regrowth of hair.    Best regards,  Dr. Baez

## 2021-06-12 NOTE — TELEPHONE ENCOUNTER
Refill Approved    Rx renewed per Medication Renewal Policy. Medication was last renewed on 8/21/19.    Tonya Robb, [facility] Triage/Med Refill 10/5/2020     Requested Prescriptions   Pending Prescriptions Disp Refills     azelastine (ASTELIN) 137 mcg (0.1 %) nasal spray [Pharmacy Med Name: AZELASTINE 0.1%(137MCG) NASAL-200SP] 90 mL 3     Sig: USE 2 SPRAYS IN EACH NOSTRIL TWICE DAILY AS NEEDED       Nasal Spray Protocol Passed - 10/2/2020  9:23 AM        Passed - Patient has had office visit/physical in last 1 year     Last office visit with prescriber/PCP: 9/28/2020 Atilio Baez MD OR same dept: 9/28/2020 Atilio Baez MD OR same specialty: 9/28/2020 Atilio Baez MD Last physical: Visit date not found Last MTM visit: Visit date not found    Next visit within 3 mo: Visit date not found  Next physical within 3 mo: Visit date not found  Prescriber OR PCP: Atilio Baez MD  Last diagnosis associated with med order: 1. Asthma  - azelastine (ASTELIN) 137 mcg (0.1 %) nasal spray [Pharmacy Med Name: AZELASTINE 0.1%(137MCG) NASAL-200SP]; USE 2 SPRAYS IN EACH NOSTRIL TWICE DAILY AS NEEDED  Dispense: 90 mL; Refill: 3

## 2021-06-13 ENCOUNTER — HEALTH MAINTENANCE LETTER (OUTPATIENT)
Age: 66
End: 2021-06-13

## 2021-06-13 NOTE — TELEPHONE ENCOUNTER
RN cannot approve Refill Request    RN can NOT refill this medication med is not covered by policy/route to provider. Last office visit: 9/28/2020 Atilio Baez MD Last Physical: Visit date not found Last MTM visit: Visit date not found Last visit same specialty: 9/28/2020 Atilio Baez MD.  Next visit within 3 mo: Visit date not found  Next physical within 3 mo: Visit date not found      Saima Palacios, [facility] Triage/Med Refill 12/16/2020    Requested Prescriptions   Pending Prescriptions Disp Refills     SINGULAIR 10 mg tablet [Pharmacy Med Name: SINGULAIR 10MG TABLETS] 90 tablet 3     Sig: TAKE 1 TABLET BY MOUTH EVERY DAY       There is no refill protocol information for this order

## 2021-06-13 NOTE — TELEPHONE ENCOUNTER
"Patient calling stating she was cleaning her ear with a q-tip at 845 a.m. this morning. Reporting around 10 a.m. she noticed bright red blood from ear canal that had run down her face. Stating when she dabs at right ear with toilet paper there is spots of bright red blood. Denies pain or headache. Stating the ear felt really \"tight prior to cleaning.\"   Afebrile.     Disposition to see provider with in 4 hours.      Patient plans to go to [facility] now.    Cass Moran RN  [facility]    COVID 19 Nurse Triage Plan/Patient Instructions    Please be aware that novel coronavirus (COVID-19) may be circulating in the community. If you develop symptoms such as fever, cough, or SOB or if you have concerns about the presence of another infection including coronavirus (COVID-19), please contact your health care provider or visit [email or web address].     Disposition/Instructions    In-Person Visit with provider recommended. Reference Visit Selection Guide.    Thank you for taking steps to prevent the spread of this virus.  o Limit your contact with others.  o Wear a simple mask to cover your cough.  o Wash your hands well and often.    Resources    [facility]: About COVID-19: [email or web address]    CDC: What to Do If You're Sick: www.cdc.gov/coronavirus/2019-ncov/about/steps-when-sick.html    CDC: Ending Home Isolation: www.cdc.gov/coronavirus/2019-ncov/hcp/disposition-in-home-patients.html     CDC: Caring for Someone: www.cdc.gov/coronavirus/2019-ncov/if-you-are-sick/care-for-someone.html     [facility]: Interim Guidance for Hospital Discharge to Home: www.health.[facility].[place]/diseases/coronavirus/hcp/hospdischarge.pdf    [facility] clinical trials (COVID-19 research studies): [email or web address]     Below are the COVID-19 hotlines at the [place] Department of Health ([facility]). Interpreters are available.   o For health questions: Call 221-720-4763 or " 1-294.589.4320 (7 a.m. to 7 p.m.)  o For questions about schools and childcare: Call 296-392-2640 or 1-876.118.6531 (7 a.m. to 7 p.m.)       Additional Information    Negative: Knocked out (unconscious) > 1 minute    Negative: Difficult to awaken or acting confused (e.g., disoriented, slurred speech)    Negative: Severe neck pain    Negative: Sounds like a life-threatening emergency to the triager    Negative: [1] Bleeding AND [2] won't stop after 10 minutes of direct pressure (using correct technique)    Negative: Skin is split open or gaping (or length > 1/4 inch or 6 mm)    Negative: [1] Animal or human bite AND [2] skin is broken (abraded, lacerated)    Negative: Walking is unsteady (i.e., falling or tilting to one side)    Negative: Sounds like a serious injury to the triager    Negative: [1] SEVERE pain AND [2] not improved 2 hours after pain medicine    [1] Pointed object was inserted into the ear canal AND [2] now has bleeding or earache     (Exception: doctor's ear exam)    Negative: Wound looks infected    Negative: Pierced ear, problems and symptoms    Negative: Caused by lightning    Protocols used: EAR INJURY-A-AH

## 2021-06-14 NOTE — PROGRESS NOTES
Assessment and Plan:      Medicare annual wellness visit    Patient has been advised of split billing requirements and indicates understanding: Yes     1. Encounter for general adult medical examination with abnormal findings  Note that the patient had a positive mini cog with a score of 2 remembering only 1 word out of 3 and not placing the hands on the clock correctly to measure the time.  May want to consider a short test of memory upon return    The patient will follow up with the ENT in regard to the trauma to her right ear canal and discouraged the use of Q-tips    - DXA Bone Density Scan; Future    2. Vitamin D deficiency    - Vitamin D, Total (25-Hydroxy), 35.2 which is now normal and patient will continue with vitamin D3 2000 international units daily    3. Hair loss  The problem persists and it does not seem to be related to mineral, vitamin or other deficiencies.  Could be related to androgen excess.  Could consider topical treatment such as minoxidil    4. Fatigue, unspecified type  Untreated obstructive sleep apnea could contribute to this symptom  Ferritin level is now normal    5. Iron deficiency    - Ferritin, 38 which is normal    6. Essential hypertension  Blood pressure is elevated from where I like to see it and she is taking hydrochlorothiazide 50 mg daily  Metoprolol  mg twice daily  Losartan 100 mg daily  Nifedipine 30 mg daily    - Comprehensive Metabolic Panel, results reviewed and normal    - Microalbumin, Random Urine mildly elevated microalbumin/creatinine ratio in the urine at 24.5    Patient needs to avoid nonsteroidal anti-inflammatory medications    7. Hyperglycemia  Prediabetes    - Glycosylated Hemoglobin A1c, 6.1 currently diet controlled    8. Essential Hypercholesterolemia    - Lipid Cascade RANDOM, 239/115/57/159 and this is with the patient taking Zetia 10 mg daily  The patient has been on more than 3 statins and has had myalgias with all of them.  Patient does seem to  have myalgias with the use of Zetia    9. Need for vaccination    - pneumococcal vaccine (PNEUMOVAX-23) 25 mcg/0.5 mL injection; Inject 0.5 mL into the shoulder, thigh, or buttocks Once prior to discharge for 1 dose.  Dispense: 0.5 mL; Refill: 0    10. Asymptomatic menopause    - DXA Bone Density Scan; Future    11. Screening for osteoporosis    - DXA Bone Density Scan; Future    MyChart message with test results  Return to clinic 3 months for recheck blood pressure and review of her other chronic medical conditions    The patient's current medical problems were reviewed.    The following high BMI interventions were performed this visit: encouragement to exercise, weight monitoring, exercise promotion: walking/step counting and weight loss from baseline weight  The following health maintenance schedule was reviewed with the patient and provided in printed form in the after visit summary:   Health Maintenance   Topic Date Due     ASTHMA ACTION PLAN  1955     DEXA SCAN  1955     ADVANCE CARE PLANNING  09/10/1972     Pneumococcal Vaccine: 65+ Years (2 of 2 - PPSV23) 09/10/2019     DIABETIC EYE EXAM  03/20/2020     DIABETIC FOOT EXAM  06/18/2020     LIPID  07/26/2020     MICROALBUMIN  07/26/2020     A1C  11/01/2020     BMP  05/01/2021     MAMMOGRAM  07/08/2021     COLORECTAL CANCER SCREENING  07/27/2021     Asthma Control Test  09/28/2021     MEDICARE ANNUAL WELLNESS VISIT  12/22/2021     FALL RISK ASSESSMENT  12/22/2021     TD 18+ HE  09/27/2028     HEPATITIS C SCREENING  Completed     INFLUENZA VACCINE RULE BASED  Completed     ZOSTER VACCINES  Completed     Pneumococcal Vaccine: Pediatrics (0 to 5 Years) and At-Risk Patients (6 to 64 Years)  Aged Out        Subjective:   Chief Complaint: Alvina Maynard is an 65 y.o. female here for an Annual Wellness visit.   HPI:     Health risk assessment:  The patient considers her physical health to be good  Exercises 0-2 times per week  Alcoholic use 0-1 drink per  day  Does not get 4 servings of fruits and vegetables on a daily basis  Has a smoke detector and carbon monoxide detector in her home  Does not need assistance with activities of daily living including chores around her house, financial management and transportation.  No concerns with her hearing  Does have some urge urinary incontinence and wears 1 pad daily often which does not have any leakage  Considers her mental and emotional health to be good with no loss of interest in activities and does not feel down or depressed  Does not have an advanced directive  No falls in the last 12 months  Specialty care with ENT and GI this past year    Review of Systems:    Please see above.  The rest of the review of systems are negative for all systems.    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     Essential Hypertension     Vertigo     Essential Hypercholesterolemia     Coronary Artery Disease     Asthma     Esophageal Reflux     Obstructive sleep apnea (adult) (pediatric)     Allergic contact dermatitis     Obesity (BMI 35.0-39.9) with comorbidity (H)     Statin intolerance     Type 2 diabetes mellitus with other circulatory complication, without long-term current use of insulin (H)     Past Medical History:   Diagnosis Date     Arthritis      Benign Adenomatous Polyp Of The Large Intestine      Brain aneurysm     removed on 12/31/13     Cancer (H)     basel cell     Cerebral vascular accident (H) 12/31/2013     Colitis      Coronary artery disease      External hemorrhoid 6/15/2016     Fibrocystic breast      Hyperlipidemia      Hypertension      Ischemic Embolic Stroke      Myalgia And  Myositis      S/P hysterectomy 7/28/2019    Total hysterectomy with BSO benign disease, edometriosis?     Sleep apnea, obstructive     c-pap     Statin intolerance      Stroke (H)       Past Surgical History:   Procedure Laterality Date     CARDIAC CATHETERIZATION  9/28/15    mild to moderate coronary disease only without obstructive stenosis     CEREBRAL ANEURYSM REPAIR  12/31/2013    [facility] Aneurysm clipping, Dr Harvey     CORONARY STENT PLACEMENT  9/20/12    bare metal stent in the proximal LAD      TX HEMORRHOIDECTOMY INTERNAL RUBBER BAND LIGATIONS  1975     TX NASAL/SINUS ENDOSCOPY,BX/RMV POLYP/DEBRID  1998    Description: Nasal Endoscopy Polypectomy;     TX REMOVAL OF OVARY/TUBE(S) Right      Salpingo-oophorectomy Right Side     TX TOTAL ABDOM HYSTERECTOMY  08/01/2002    endometriosis      Family History   Problem Relation Age of Onset     Breast cancer Maternal Aunt      Hypertension Father      Hypertension Mother      Heart disease Mother      Diabetes Mother      Stomach cancer Mother      Heart attack Mother 60     Heart disease Maternal Grandfather      JEZ disease Sister       Social History     Socioeconomic History     Marital status: Single     Spouse name: Not on file     Number of children: Not on file     Years of education: Not on file     Highest education level: Not on file   Occupational History     Employer: [employer]   Social Mumum[employer]o     Financial resource strain: Not on file     Food insecurity     Worry: Not on file     Inability: Not on file     Transportation needs     Medical: Not on file     Non-medical: Not on file   Tobacco Use     Smoking status: Never Smoker     Smokeless tobacco: Never Used   Substance and Sexual Activity     Alcohol use: Yes     Drug use: No     Sexual activity: Not on file   Lifestyle     Physical activity     Days per week: Not on file     Minutes per session: Not on file     Stress: Not on file   Relationships     Social connections     Talks on phone: Not  on file     Gets together: Not on file     Attends Spiritism service: Not on file     Active member of club or organization: Not on file     Attends meetings of clubs or organizations: Not on file     Relationship status: Not on file     Intimate partner violence     Fear of current or ex partner: Not on file     Emotionally abused: Not on file     Physically abused: Not on file     Forced sexual activity: Not on file   Other Topics Concern     Not on file   Social History Narrative     Not on file      Current Outpatient Medications   Medication Sig Dispense Refill     aspirin 81 MG EC tablet Take 81 mg by mouth bedtime.        azelastine (ASTELIN) 137 mcg (0.1 %) nasal spray USE 2 SPRAYS IN EACH NOSTRIL TWICE DAILY AS NEEDED 90 mL 3     cetirizine (ZYRTEC) 10 MG tablet Take 1 tablet (10 mg total) by mouth as needed.       cholecalciferol, vitamin D3, (VITAMIN D3) 50 mcg (2,000 unit) Tab Take 1 tablet (2,000 Units total) by mouth daily. 100 tablet 3     cimetidine (TAGAMET) 200 MG tablet Take 200 mg by mouth 4 (four) times a day.       ezetimibe (ZETIA) 10 mg tablet Take 1 tablet (10 mg total) by mouth daily. 90 tablet 3     fluticasone propionate (FLONASE) 50 mcg/actuation nasal spray SHAKE LQ AND U 1 SPR IEN QD  0     hydroCHLOROthiazide (HYDRODIURIL) 25 MG tablet TAKE 2 TABLETS(50 MG) BY MOUTH DAILY 180 tablet 3     Lactobacillus rhamnosus GG (CULTURELLE) 10-15 Billion cell capsule Take 1 capsule by mouth daily.       losartan (COZAAR) 100 MG tablet TAKE 1 TABLET BY MOUTH EVERY DAY 90 tablet 3     MAGNESIUM GLYCINATE ORAL Take 400 mg by mouth daily.       methylcellulose (CITRUCEL) Take 2 g by mouth. Use as directed.  Take in the PM       metoprolol succinate (TOPROL-XL) 100 MG 24 hr tablet TAKE 1 TABLET(100 MG) BY MOUTH TWICE DAILY 180 tablet 2     NIFEdipine (PROCARDIA XL) 30 MG 24 hr tablet Take 1 tablet (30 mg total) by mouth daily. 90 tablet 3     ofloxacin (FLOXIN) 0.3 % otic solution Administer 5 drops to  the right ear 2 (two) times a day for 10 days. 10 mL 0     OMEGA-3/DHA/EPA/FISH OIL (FISH OIL-OMEGA-3 FATTY ACIDS) 300-1,000 mg capsule Take 2 g by mouth daily.       polyethylene glycol (MIRALAX) 17 gram packet Take 17 g by mouth daily.       potassium chloride (K-DUR,KLOR-CON) 20 MEQ tablet TAKE 2 TABLETS(40 MEQ) BY MOUTH TWICE DAILY 360 tablet 3     SINGULAIR 10 mg tablet TAKE 1 TABLET BY MOUTH EVERY DAY 90 tablet 3     nitroglycerin (NITROSTAT) 0.4 MG SL tablet PLACE 1 TABLET UNDER THE TONGUE EVERY 5 MINUTES AS NEEDED FOR CHEST PAIN 1 Bottle 2     No current facility-administered medications for this visit.       Objective:   Vital Signs:   Visit Vitals  /90   Pulse 60   Wt 210 lb 14.4 oz (95.7 kg)   LMP 05/22/2002   SpO2 98%   BMI 37.96 kg/m           VisionScreening:  No exam data present     PHYSICAL EXAM  Physical Exam  Vitals signs and nursing note reviewed.   Constitutional:       General: She is not in acute distress.     Appearance: Normal appearance. She is not ill-appearing.   HENT:      Head: Normocephalic and atraumatic.      Right Ear: Tympanic membrane and external ear normal.      Left Ear: Tympanic membrane, ear canal and external ear normal.      Ears:      Comments: There is an abrasion of the ear canal on the right side with some old blood in the canal but the tympanic membrane looks normal     Nose: Nose normal.      Mouth/Throat:      Mouth: Mucous membranes are moist.      Pharynx: Oropharynx is clear. No oropharyngeal exudate or posterior oropharyngeal erythema.   Eyes:      Extraocular Movements: Extraocular movements intact.      Conjunctiva/sclera: Conjunctivae normal.      Pupils: Pupils are equal, round, and reactive to light.   Neck:      Musculoskeletal: Normal range of motion and neck supple.      Vascular: No carotid bruit.   Cardiovascular:      Rate and Rhythm: Normal rate and regular rhythm.      Pulses: Normal pulses.      Heart sounds: No murmur.   Pulmonary:       Effort: Pulmonary effort is normal. No respiratory distress.      Breath sounds: Normal breath sounds. No wheezing, rhonchi or rales.   Abdominal:      General: Bowel sounds are normal.      Palpations: Abdomen is soft. There is no mass.      Tenderness: There is no abdominal tenderness.      Hernia: No hernia is present.   Musculoskeletal: Normal range of motion.         General: No deformity.      Right lower leg: No edema.      Left lower leg: No edema.   Lymphadenopathy:      Cervical: No cervical adenopathy.   Skin:     General: Skin is warm.      Capillary Refill: Capillary refill takes 2 to 3 seconds.      Findings: No lesion or rash.      Comments: Note that the patient does have thinning of her hair on the top of her head midline   Neurological:      General: No focal deficit present.      Mental Status: She is alert and oriented to person, place, and time. Mental status is at baseline.      Cranial Nerves: No cranial nerve deficit.      Sensory: No sensory deficit.      Motor: No weakness.      Gait: Gait normal.   Psychiatric:         Mood and Affect: Mood normal.         Behavior: Behavior normal.         Thought Content: Thought content normal.         Judgment: Judgment normal.         Assessment Results 12/22/2020   Activities of Daily Living No help needed   Instrumental Activities of Daily Living No help needed   Mini Cog Total Score 2   Some recent data might be hidden     A Mini-Cog score of 0-2 suggests the possibility of dementia, score of 3-5 suggests no dementia    Identified Health Risks:     She is at risk for lack of exercise and has been provided with information to increase physical activity for the benefit of her well-being.  The patient was counseled and encouraged to consider modifying their diet and eating habits. She was provided with information on recommended healthy diet options.  Information on urinary incontinence and treatment options given to patient.  Information regarding  advance directives (living [place]), including where she can download the appropriate form, was provided to the patient via the AVS.       The patient was provided with appropriate referrals to address her memory problem.

## 2021-06-14 NOTE — PROGRESS NOTES
Alvina,  It was a pleasure to see you in the clinic this past week.  I will review your test results.    The vitamin D level is now normal at 35.2.  Please continue on your present vitamin D3 supplement on a daily basis.    The metabolic panel showed that your electrolytes are normal including your calcium.  The screening test for diabetes was negative.  Your BUN, creatinine and GFR are satisfactory and measure your kidney function.  Remainder of the tests look at your liver function which is normal.    Your lipids are not quite as good as they were a year ago.  Make sure you continue to take Zetia 10 mg daily.  Would like to see the total cholesterol below 200 and the LDL cholesterol which is the bad cholesterol below 100.  These numbers are going to be hard to achieve since you are not able to tolerate statins.  Do the best she can with staying active, moving and watching your caloric intake with efforts to lose weight.    Your ferritin is a measure of your iron stores and is normal    The urine test showed that your microalbumin level was mildly elevated at 24.5 and the ratio should be 19.9 or less.  It is important to drink good amounts of fluids particularly water and to avoid nonsteroidal anti-inflammatory medications such as ibuprofen and naproxen which are over-the-counter.    The hemoglobin A1c you was in the prediabetes range at 6.1.    Please proceed with the bone density study that was ordered.    I would like to see you back in the clinic in 3 months to recheck your blood pressure and other chronic conditions.  If you are able please bring your medications with you so I can review those and make sure we have an accurate list on your medications.  Best regards,  Dr. Baez

## 2021-06-14 NOTE — PROGRESS NOTES
Alvina,    I am happy to report that your bone density study looks completely normal.  You have excellent bone density in your spine, hips and wrists.  I would continue to take your vitamin D3 daily.  No other additional supplements are needed at this time.  Continue to stay active and walking for exercise where it is safe and low risk of falling.    Would suggest another bone density study at about age 69.    Please let me know if you have other questions.  Best regards,    Dr. Baez

## 2021-06-14 NOTE — TELEPHONE ENCOUNTER
Refill Approved    Rx renewed per Medication Renewal Policy. Medication was last renewed on 5/8/20.    Tonya Robb, [facility] Triage/Med Refill 1/15/2021     Requested Prescriptions   Pending Prescriptions Disp Refills     losartan (COZAAR) 100 MG tablet [Pharmacy Med Name: LOSARTAN 100MG TABLETS] 90 tablet 3     Sig: TAKE 1 TABLET BY MOUTH EVERY DAY       Angiotensin Receptor Blocker Protocol Passed - 1/14/2021  5:02 PM        Passed - PCP or prescribing provider visit in past 12 months       Last office visit with prescriber/PCP: 9/28/2020 Atilio Baez MD OR same dept: 9/28/2020 Atilio Baez MD OR same specialty: 9/28/2020 Atilio Baez MD  Last physical: 12/22/2020 Last MTM visit: Visit date not found   Next visit within 3 mo: Visit date not found  Next physical within 3 mo: Visit date not found  Prescriber OR PCP: Atilio Baez MD  Last diagnosis associated with med order: 1. Essential hypertension  - losartan (COZAAR) 100 MG tablet [Pharmacy Med Name: LOSARTAN 100MG TABLETS]; TAKE 1 TABLET BY MOUTH EVERY DAY  Dispense: 90 tablet; Refill: 3    If protocol passes may refill for 12 months if within 3 months of last provider visit (or a total of 15 months).             Passed - Serum potassium within the past 12 months     Lab Results   Component Value Date    Potassium 4.0 12/22/2020             Passed - Blood pressure filed in past 12 months     BP Readings from Last 1 Encounters:   12/22/20 160/78             Passed - Serum creatinine within the past 12 months     Creatinine   Date Value Ref Range Status   12/22/2020 0.94 0.60 - 1.10 mg/dL Final

## 2021-06-15 NOTE — TELEPHONE ENCOUNTER
Refill Approved    Rx renewed per Medication Renewal Policy. Medication was last renewed on 1/15/21, last OV 12/22/20.    Vanda Short, [facility] Triage/Med Refill 2/14/2021     Requested Prescriptions   Pending Prescriptions Disp Refills     losartan (COZAAR) 100 MG tablet [Pharmacy Med Name: LOSARTAN 100MG TABLETS] 90 tablet 3     Sig: TAKE 1 TABLET BY MOUTH EVERY DAY       Angiotensin Receptor Blocker Protocol Passed - 2/12/2021  5:05 PM        Passed - PCP or prescribing provider visit in past 12 months       Last office visit with prescriber/PCP: 9/28/2020 Atilio Baez MD OR same dept: 9/28/2020 Atilio Baez MD OR same specialty: 9/28/2020 Atilio Baez MD  Last physical: 12/22/2020 Last MTM visit: Visit date not found   Next visit within 3 mo: Visit date not found  Next physical within 3 mo: Visit date not found  Prescriber OR PCP: Atilio Baez MD  Last diagnosis associated with med order: 1. Essential hypertension  - losartan (COZAAR) 100 MG tablet [Pharmacy Med Name: LOSARTAN 100MG TABLETS]; TAKE 1 TABLET BY MOUTH EVERY DAY  Dispense: 90 tablet; Refill: 3    If protocol passes may refill for 12 months if within 3 months of last provider visit (or a total of 15 months).             Passed - Serum potassium within the past 12 months     Lab Results   Component Value Date    Potassium 4.0 12/22/2020             Passed - Blood pressure filed in past 12 months     BP Readings from Last 1 Encounters:   12/22/20 160/78             Passed - Serum creatinine within the past 12 months     Creatinine   Date Value Ref Range Status   12/22/2020 0.94 0.60 - 1.10 mg/dL Final

## 2021-06-16 NOTE — TELEPHONE ENCOUNTER
Telephone Encounter by Liz Aldana RN at 1/21/2019  9:58 AM     Author: Liz Aldana RN Service: -- Author Type: Registered Nurse    Filed: 1/21/2019 10:27 AM Encounter Date: 1/18/2019 Status: Signed    : Liz Aldana RN (Registered Nurse)       Dr. Rae  Alvina is hesitant to initiate amlodipine,once again, because she reports it has given her significant lower extremity edema in the past.   She is also intolerant to clonidine (insomnia), minoxidil (shortness of breath) and methyldopa (extreme tiredness).   I have encouraged her to attempt amlodipine now that she is taking hydrochlorothiazide 50 mg daily.  She is requesting you to please recommend an alternative; although she is aware options are running out.  Any new recommendation?  Thank you,  Liz    ==============================  Latonia Rae MD   You 4 minutes ago (9:53 AM)      Pressure running a bit high yet.  If patient willing, would recommend we start low-dose amlodipine in addition to current medical regimen at 2.5 mg daily.  Would also advocate efforts of weight loss and exercise.  Would advocate patient continue to monitor blood pressures and if after a couple of weeks still having a tendency to run high, to call us.  Thanks.

## 2021-06-16 NOTE — TELEPHONE ENCOUNTER
4/16/21  3. Allergy, initial encounter  Patient will use steroid taper to relieve inflammation. If symptoms persist, update provider. Consider antibiotic therapy if symptoms worsen or do not get better  - methylPREDNISolone (MEDROL DOSEPACK) 4 mg tablet; Take 1 tablet (4 mg total) by mouth daily for 6 days. Follow package directions  Dispense: 21 tablet; Refill: 0     [employer] [place] Student     Agree with above documentation from student.     Jong Claudio, CNP

## 2021-06-16 NOTE — PATIENT INSTRUCTIONS - HE
You'll get a call about that mammogram.    Medrol dose pack steroid sent to see if that can clear this up.    If no improvement by mid next week, let me know and we can follow up with an antibiotic.     Traction DME prescription given to you today.

## 2021-06-16 NOTE — PROGRESS NOTES
Chief Complaint   Patient presents with     Ear Pain     Left side ear pain that started about 3 weeks ago, but in the last week it has gotten worse. Goes down the neck.      Dental Pain     Did have a little bit of dental pain on left side.      Other     Needs to know where she can go buy a neck machine.        HPI: Alvina is a 65 year old female with a history of esophageal reflux, allergic contact dermatitis, and seasonal allergies presents today with left ear pain. She noticed that her allergies flared up about three weeks ago and attributed many of her symptoms to allergies the last few weeks. The last few days she developed left ear pain that has intensified and the pain began to run down her neck and cause sore left-sided throat pain. Related symptoms include, post nasal drip, headache, sinus pressure and she had some nausea yesterday without emesis. She denies decrease in hearing or ear pressure. No fever, chills    ROS:Review of Systems - negative except for what's listed in the HPI    SH: The Patient's  reports that she has never smoked. She has never used smokeless tobacco. She reports current alcohol use. She reports that she does not use drugs.      FH: The Patient's family history includes Breast cancer in her maternal aunt; Diabetes in her mother; JEZ disease in her sister; Heart attack (age of onset: 60) in her mother; Heart disease in her maternal grandfather and mother; Hypertension in her father and mother; Stomach cancer in her mother.     Meds:    Current Outpatient Medications on File Prior to Visit   Medication Sig Dispense Refill     aspirin 81 MG EC tablet Take 81 mg by mouth bedtime.        azelastine (ASTELIN) 137 mcg (0.1 %) nasal spray USE 2 SPRAYS IN EACH NOSTRIL TWICE DAILY AS NEEDED 90 mL 3     cholecalciferol, vitamin D3, (VITAMIN D3) 50 mcg (2,000 unit) Tab Take 1 tablet (2,000 Units total) by mouth daily. 100 tablet 3     cimetidine (TAGAMET) 200 MG tablet Take 200 mg by mouth 2  "(two) times a day.       fluticasone propionate (FLONASE) 50 mcg/actuation nasal spray SHAKE LQ AND U 1 SPR IEN QD  0     hydroCHLOROthiazide (HYDRODIURIL) 25 MG tablet TAKE 2 TABLETS(50 MG) BY MOUTH DAILY 180 tablet 0     losartan (COZAAR) 100 MG tablet Take 1 tablet (100 mg total) by mouth daily. 90 tablet 2     MAGNESIUM GLYCINATE ORAL Take 400 mg by mouth daily.       methylcellulose (CITRUCEL) Take 2 g by mouth. Use as directed.  Take in the PM       metoprolol succinate (TOPROL-XL) 100 MG 24 hr tablet TAKE 1 TABLET(100 MG) BY MOUTH TWICE DAILY 180 tablet 2     NIFEdipine (PROCARDIA XL) 30 MG 24 hr tablet Take 1 tablet (30 mg total) by mouth daily. 90 tablet 3     OMEGA-3/DHA/EPA/FISH OIL (FISH OIL-OMEGA-3 FATTY ACIDS) 300-1,000 mg capsule Take 2 g by mouth daily.       polyethylene glycol (MIRALAX) 17 gram packet Take 17 g by mouth daily.       potassium chloride (K-DUR,KLOR-CON) 20 MEQ tablet TAKE 2 TABLETS(40 MEQ) BY MOUTH TWICE DAILY 360 tablet 3     SINGULAIR 10 mg tablet TAKE 1 TABLET BY MOUTH EVERY DAY 90 tablet 3     biotin 10 mg Tab Take by mouth.       cetirizine (ZYRTEC) 10 MG tablet Take 1 tablet (10 mg total) by mouth as needed.       ezetimibe (ZETIA) 10 mg tablet Take 1 tablet (10 mg total) by mouth daily. 90 tablet 3     Lactobacillus rhamnosus GG (CULTURELLE) 10-15 Billion cell capsule Take 1 capsule by mouth daily.       nitroglycerin (NITROSTAT) 0.4 MG SL tablet PLACE 1 TABLET UNDER THE TONGUE EVERY 5 MINUTES AS NEEDED FOR CHEST PAIN 1 Bottle 2     No current facility-administered medications on file prior to visit.        O:  /80   Pulse (!) 55   Ht 5' 2\" (1.575 m)   Wt 199 lb (90.3 kg)   LMP 05/22/2002   SpO2 99%   BMI 36.40 kg/m      Physical Examination:   General appearance - alert, well appearing, and in no distress  Mental status - alert, oriented to person, place, and time  Eyes -PERRLA  Ears - bilateral TM's and external ear canals normal.  Nose -  Patent no discharge or " polyps, septum intact. Nasal mucosa is inflamed and erythemic with the left greater than right  Mouth - mucous membranes moist. No oral lesions. Pharynx is irritated/erythremic with left > right  Neck - no significant adenopathy  Lymphatics - no palpable lymphadenopathy  Chest - clear to auscultation, no wheezes, rales or rhonchi, symmetric air entry  Heart - normal rate and regular rhythm, S1 and S2 normal, no murmurs noted  Abdomen - soft, nontender, nondistended  Neurological - alert, oriented, normal speech, no focal findings or movement disorder noted, neck supple  Skin - normal coloration and turgor.      A/P:     Problem List Items Addressed This Visit     Cervical spondylosis without myelopathy - Primary    Relevant Orders    Cervical Traction      Other Visit Diagnoses     Encounter for screening mammogram for breast cancer        Relevant Orders    Mammo Screening Bilateral    Allergy, initial encounter        Relevant Medications    methylPREDNISolone (MEDROL DOSEPACK) 4 mg tablet            1. Cervical spondylosis without myelopathy  Patient misplaced information given on cervical traction at past appointment, reprinted and provided to patient  - Cervical Traction    2. Encounter for screening mammogram for breast cancer  Referral sent  - Mammo Screening Bilateral; Future    3. Allergy, initial encounter  Patient will use steroid taper to relieve inflammation. If symptoms persist, update provider. Consider antibiotic therapy if symptoms worsen or do not get better  - methylPREDNISolone (MEDROL DOSEPACK) 4 mg tablet; Take 1 tablet (4 mg total) by mouth daily for 6 days. Follow package directions  Dispense: 21 tablet; Refill: 0    [employer] [place] Student    Agree with above documentation from student.    Jong Claudio, CNP      This note has been dictated using voice recognition software. Any grammatical or context distortions are unintentional and inherent to the software.

## 2021-06-16 NOTE — TELEPHONE ENCOUNTER
Reason for call:  Patient reporting a symptom    Symptom or request: saw devika last Friday, has not gotten any better, knows it is a sinus infection,blowing green now    Duration (how long have symptoms been present): 3.5 weeks- steroids did help her ear a bit.    Have you been treated for this before? No    Additional comments: uses [employer] [facility] [employer],    Phone Number patient can be reached at:  Work number on file:  [phone] (work)    Best Time:      Can we leave a detailed message on this number: Yes    Call taken on 4/21/2021 at 8:18 AM by Yolanda Patrick

## 2021-06-16 NOTE — TELEPHONE ENCOUNTER
Thanks for the updated symptoms. Azithromycin sent. This is what she used last time, so hopefully will nip it this time too.    Jong Claudio, CNP

## 2021-06-16 NOTE — PROGRESS NOTES
ASSESSMENT/PLAN:       1. Cervical spondylosis without myelopathy    - Cervical Traction, [facility] DME order printed given to patient with options for purchasing    2. Type 2 diabetes mellitus with other circulatory complication, without long-term current use of insulin (H)  Prediabetes  No medication continue to follow for worsening of blood sugar and A1c  Encourage exercise and weight loss    3. Essential hypertension  Is possible that the evening dose of metoprolol and nifedipine are causing some sedation and suggested that she take those before bedtime instead of around 6:00 in the evening.  Blood pressure seems well controlled  The patient had some blood pressures that she is taken at home that show a blood pressure in the 110/60 range and shared with her that that is not too low.  I told her I did not feel that a blood pressure that level should be making her sleepy    4. Obstructive sleep apnea (adult) (pediatric)  Continue regular use of CPAP    5. Somnolence, daytime  Change suppertime blood pressure meds to bedtime blood pressure meds and see if that will help with her sleepiness in the early evening    6. Atherosclerosis of coronary artery of native heart without angina pectoris, unspecified vessel or lesion type  Manage lipids inpatient with statin intolerance  Currently on Zetia 10 mg daily with not very well controlled LDL being 159 in December 2020  In the past efforts to get Repatha prescribed and covered were not successful or somehow fell through  May want to approach this again  Of note is that the patient also has had a cerebrovascular accident with no residual deficiencies    7.  History of adenomatous colon polyps  July/ August 2021 will need to have another colonoscopy with a 2-day prep  2020 fair prep 6 polyps removed    Follow-up 3 months for med management  We will need to make sure that colonoscopy gets ordered  Follow-up on daytime somnolence  Approach lipid issue with her history of  cerebral and coronary vascular disease  Level of medical decision making moderate  Number and complexity of problems addressed included 2 or more chronic stable conditions  Amount of data reviewed included review of past medical history in regard to her sleep apnea as well as coronary disease  Risk of complications moderate requiring management of her prescriptions            Atilio Baez MD      PROGRESS NOTE   3/25/2021    SUBJECTIVE:  Alvina Maynard is a 65 y.o. female  who presents for   Chief Complaint   Patient presents with     Hypertension     See patient's log     Hand Pain     Pain in both her hands when she bends them     The patient has been seen by [facility] for her hand issues.  She has significant degenerative joint disease in the distal joints but also is having some problems in the MCP joints as well as the flexor tendons.  Few months ago he had injections in her hand which helped but now starting to bother her again making it difficult to make a fist and also to fully extend her fingers.    1. Cervical spondylosis without myelopathy  The patient has a [facility] cervical traction unit that is no longer working and her spine care team had suggested that she have this and use it as needed for neck pain.  She would like for me to place a DME order for this.  She brought her old unit with her and it does not pump up anymore.    2. Type 2 diabetes mellitus with other circulatory complication, without long-term current use of insulin (H)  The patient's diagnosis of diabetes is in question and looking back through her record is probably more consistent with prediabetes.  Difficult to get diabetes of her problem list  Thus will complete the requirements including foot exam  Last hemoglobin A1c 6.1 and on no medication    3. Essential hypertension  The patient's blood pressure is satisfactory and she currently is on nifedipine XL 30 mg daily which she takes in the evening  Metoprolol  mg  twice daily with her 2nd dose in the evening  Losartan 100 mg daily  Hydrochlorothiazide 25 mg daily    4. Obstructive sleep apnea (adult) (pediatric)  The patient has a longstanding history of obstructive sleep apnea states that she is using CPAP on a regular basis.  Recently had a checkup on that and seemed to be compliant with use and no changes were made    5. Somnolence, daytime  The past few months the patient has noticed in the evening after eating supper and taking her nighttime nifedipine and metoprolol that she feels sleepy and often will fall asleep.  She is wondering why this is happening and if it could be from her medication.    6. Atherosclerosis of coronary artery of native heart without angina pectoris, unspecified vessel or lesion type  The patient has a history of coronary artery disease with primarily noncritical narrowing on most recent review but has had a history of a stent.    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        azelastine (ASTELIN) 137 mcg (0.1 %) nasal spray USE 2 SPRAYS IN EACH NOSTRIL TWICE DAILY AS NEEDED 90 mL 3     cetirizine (ZYRTEC) 10 MG tablet Take 1 tablet (10 mg total) by mouth as needed.       cholecalciferol, vitamin D3, (VITAMIN D3) 50 mcg (2,000 unit) Tab Take 1 tablet (2,000 Units total) by mouth daily. 100 tablet 3      cimetidine (TAGAMET) 200 MG tablet Take 200 mg by mouth 2 (two) times a day.       fluticasone propionate (FLONASE) 50 mcg/actuation nasal spray SHAKE LQ AND U 1 SPR IEN QD  0     hydroCHLOROthiazide (HYDRODIURIL) 25 MG tablet TAKE 2 TABLETS(50 MG) BY MOUTH DAILY 180 tablet 3     Lactobacillus rhamnosus GG (CULTURELLE) 10-15 Billion cell capsule Take 1 capsule by mouth daily.       losartan (COZAAR) 100 MG tablet Take 1 tablet (100 mg total) by mouth daily. 90 tablet 2     MAGNESIUM GLYCINATE ORAL Take 400 mg by mouth daily.       methylcellulose (CITRUCEL) Take 2 g by mouth. Use as directed.  Take in the PM       metoprolol succinate (TOPROL-XL) 100 MG 24 hr tablet TAKE 1 TABLET(100 MG) BY MOUTH TWICE DAILY 180 tablet 2     NIFEdipine (PROCARDIA XL) 30 MG 24 hr tablet Take 1 tablet (30 mg total) by mouth daily. 90 tablet 3     OMEGA-3/DHA/EPA/FISH OIL (FISH OIL-OMEGA-3 FATTY ACIDS) 300-1,000 mg capsule Take 2 g by mouth daily.       polyethylene glycol (MIRALAX) 17 gram packet Take 17 g by mouth daily.       potassium chloride (K-DUR,KLOR-CON) 20 MEQ tablet TAKE 2 TABLETS(40 MEQ) BY MOUTH TWICE DAILY 360 tablet 3     SINGULAIR 10 mg tablet TAKE 1 TABLET BY MOUTH EVERY DAY 90 tablet 3     biotin 10 mg Tab Take by mouth.       ezetimibe (ZETIA) 10 mg tablet Take 1 tablet (10 mg total) by mouth daily. 90 tablet 3     nitroglycerin (NITROSTAT) 0.4 MG SL tablet PLACE 1 TABLET UNDER THE TONGUE EVERY 5 MINUTES AS NEEDED FOR CHEST PAIN 1 Bottle 2     No current facility-administered medications for this visit.        Social History     Tobacco Use   Smoking Status Never Smoker   Smokeless Tobacco Never Used           OBJECTIVE:        No results found for this or any previous visit (from the past 240 hour(s)).    Vitals:    03/24/21 0947   BP: 132/76   Pulse: 62   SpO2: 98%   Weight: 200 lb (90.7 kg)     Weight: 200 lb (90.7 kg)          Physical Exam:  GENERAL APPEARANCE: 65-year-old female very pleasant, NAD, well  hydrated, well nourished  SKIN:  Normal skin turgor, no lesions/rashes   HEENT: moist mucous membranes, no rhinorrhea  NECK: Normal without adenopathy or masses  CV: RRR, no M/G/R   LUNGS: CTAB  ABDOMEN: S&NT, no masses or enlarged organs   EXTREMITY: no edema, pedal pulses are 2+ bilaterally and full ROM of all joints, skin on her feet is not compromised.  NEURO: no focal findings, monofilament 5.07 used to check the sensation on her feet dorsal and plantar surface bilaterally 4 out of 4

## 2021-06-16 NOTE — TELEPHONE ENCOUNTER
Telephone Encounter by Liz Aldana RN at 1/24/2019  1:50 PM     Author: Liz Aldana RN Service: -- Author Type: Registered Nurse    Filed: 1/24/2019  2:01 PM Encounter Date: 1/18/2019 Status: Signed    : Liz Aldana RN (Registered Nurse)       Called patient with recommendations. She verbalized understanding and is agreeable to plan. She will start nifedipine ER 30 mg tablet tonight and monitor her blood pressure and pulse closely for 1 month and return call with any further questions or concerns.    Rx for nifedipine sent to [facility] as requested by patient.  ============================  Latonia Rae MD   You 39 minutes ago (1:06 PM)      Let us pursue trial of nifedipine ER 30 mg daily which may not have that much of an effect on edema.  Patient should continue to monitor not only blood pressure, but also pulse with the change.  Thanks.

## 2021-06-17 NOTE — TELEPHONE ENCOUNTER
Patient called and doing OK about 2 hours after taking AM meds.  Will hold PM Metoprolol.  Drink extra fluids  Watch for signs of low BP.  Precautions to no have this happen again.    Dr. Baez

## 2021-06-17 NOTE — PROGRESS NOTES
PROGRESS NOTE       SUBJECTIVE:  Alvina Maynard is a 62 y.o. female   Chief Complaint   Patient presents with     Medication Refill     med check and refills      This 62-year-old patient has not seen me for a year.  She states that things are about the same.  She is on admission to improve her health and wants to share with me that she is now taking over-the-counter tablets that have omega fats in them from ground-up Emu.  She also is contemplating beginning a celery seed tablet and wants to know my opinion.  I explained to her that there is no oversight in quality for these type of medications and I am leery.  I would rather see her eat healthy foods in order to obtain the same effect.  She is just started the Atkins diet which has worked for her.  I explained that well weight loss is certainly healthy for her it is best for her to figure out a way to change how she eats forever rather than considering a specific diet for weight loss.    Patient has a history of coronary artery disease, hypertension, hypercholesterolemia, obstructive sleep apnea and she finds it difficult to tolerate her face mask.  She also has vocal cord disorder.  She finds it difficult to exercise because she feels tightness in her upper chest near her throat.  She does not get short of breath.  She is noticed the problem more since she moved to a [facility] and on [address].  This requires her to go up and down more stairs.  She is not really happy about where she lives currently mostly because there is no parking space and there have been difficulties with snow removal this winter.    Patient weighed 214 pounds May 2016  220 pounds May 2017  210 pounds today.  Basically she gains and loses these pounds without moving beyond.  She also has a history of iron deficiency and has been treated with IV iron.    Patient has seen pulmonary and has been told that she is allergic to mold.  She is never been a smoker.  Limited exposure  secondhand from  a friend who smokes.    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        azelastine (ASTELIN) 137 mcg (0.1 %) nasal spray INSTILL 2 SPRAYS IN EACH NOSTRIL TWICE A DAY AS NEEDED 30 mL 5     cholecalciferol, vitamin D3, 1,000 unit tablet Take 1,000 Units by mouth daily.       hydroCHLOROthiazide (HYDRODIURIL) 25 MG tablet Take 2 tablets (50 mg total) by mouth daily. 60 tablet 10     lansoprazole (PREVACID) 30 MG capsule TAKE 1 CAPSULE BY MOUTH TWICE DAILY 180 capsule 3     lisinopril (PRINIVIL,ZESTRIL) 20 MG tablet Take 1 tablet (20 mg total) by mouth 2 (two) times a day. 180 tablet 0     lisinopril (PRINIVIL,ZESTRIL) 20 MG tablet TAKE 1 TABLET BY MOUTH TWICE DAILY 180 tablet 1     MAGNESIUM GLYCINATE ORAL Take 400 mg by mouth daily.       methylcellulose (CITRUCEL) Take 2 g by mouth. Use as directed.  Take in the PM       metoprolol succinate (TOPROL-XL) 100 MG 24 hr tablet TAKE 1 TABLET BY MOUTH TWICE DAILY. 180 tablet 3     OMEGA-3/DHA/EPA/FISH OIL (FISH OIL-OMEGA-3 FATTY ACIDS) 300-1,000 mg capsule Take 2 g by mouth daily.       polyethylene glycol (MIRALAX) 17 gram packet Take 17 g by mouth daily.        potassium chloride SA (K-DUR,KLOR-CON) 20 MEQ tablet TAKE 2 TABLETS(40 MEQ) BY MOUTH TWICE DAILY 360 tablet 0     SINGULAIR 10 mg tablet TAKE 1 TABLET BY MOUTH EVERY DAY. 90 tablet 0     UNABLE TO FIND Emu magic, omega magic emu oil Med Name:       amLODIPine (NORVASC) 2.5 MG tablet Take 1 tablet (2.5 mg total) by mouth daily. 90 tablet 3     evolocumab (REPATHA SURECLICK) 140 mg/mL PnIj Inject 140 mg under the skin every 14 (fourteen) days. 6 Syringe 1     Lactobacillus rhamnosus GG (CULTURELLE) 10-15 Billion cell capsule Take 1 capsule by mouth daily.       methyldopa (ALDOMET) 250 MG tablet Take 1 tablet (250 mg total) by mouth 2 (two) times a day. 180 tablet 1     nitroglycerin (NITROSTAT) 0.4 MG SL tablet PLACE 1 TABLET UNDER THE TONGUE EVERY 5 MINUTES AS NEEDED FOR CHEST PAIN 3 Bottle 2     No current facility-administered medications for this visit.        History   Smoking Status     Never Smoker   Smokeless Tobacco     Never Used       REVIEW OF SYSTEMS:  Patient denies fever, chills, dizziness, headache, visual change, cough, chest pain, shortness of breath, abdominal pain, extremity pain or swelling.    Positives: Upper chest discomfort with exercise.      OBJECTIVE:       Vitals:    04/19/18 1613   BP: 138/90   Pulse:    SpO2:      Weight: 210 lb (95.3 kg)  Wt Readings from Last 3 Encounters:   04/19/18 210 lb (95.3 kg)   09/14/17 213 lb (96.6 kg)   08/18/17 212 lb (96.2 kg)     Body mass index is 38.41 kg/(m^2).        Physical Exam:  GENERAL APPEARANCE: A&A, NAD, well hydrated, well nourished  SKIN:  Normal skin turgor, no lesions/rashes   EARS: TM's normal, gray with nl light reflex  OROPHARYNX: without erythema, no post nasal drainage or thrush  NECK: Supple, without lymphadenopathy, no thyroid mass  CV: RRR, no M/G/R   LUNGS: CTAB, normal respiratory effort  EXTREMITY: Extremities normal, atraumatic, no swelling  NEURO: no gross deficits   PSYCHIATRIC:  Mood appropriate, memory  intact        ASSESSMENT/PLAN:     1. Coronary atherosclerosis  Cardiology workup was done last summer and negative.  Dr. Rae's notes are reviewed.    2. Essential hypertension  Patient understands her blood pressure needs to be below 130/80.  She plans to bring it down by taking her Emu tablets and celery seed tablets.  Dr. Abrams for worked with her extensively last summer and tried multiple medications which she did not tolerate.  She currently takes aspirin daily.  Hydrochlorothiazide 50 mg daily.  Lisinopril 40 mg daily.  And metoprolol 100 mg twice daily.  - HM2(CBC w/o Differential)  - Basic Metabolic Panel    3. Essential Hypercholesterolemia    - Lipid Cascade    4. Health care maintenance    - Thyroid Stimulating Hormone (TSH)    I have counseled this patient to examine her diet and decrease her portion sizes specifically cutting her carbohydrates in half.  She should look for fresh foods such as fruits and vegetables daily.  While she is getting more exercise than she used to have encouraged her to try a short bursts throughout the day.  She should see me back in the next 3 months to follow-up on her blood pressure.  Blood work is pending.  I will inform her of results when available.    The visit lasted a total of45 minutes face to face with the patient.  Over 50% of the time spent counseling and educating the patient about all of the above.      Emilie Green MD

## 2021-06-17 NOTE — TELEPHONE ENCOUNTER
RN Triage:  Spoke with 65 yr old Alvina who c/o:    Believes she took a double dose of high blood pressure medications 1 hour ago.      Pt is currently at work and denies symptoms.    Losartan 100 mg, 1 tablet daily  (believes she took 2 of these)  Hydrochlorothiazide 25 mg, 2 tablets daily.  (believes she took 4 of these)  Metoprolol 100 mg daily  ( believes she took 2 of these)    Pt didn't take her BP this morning.    Pt would like to be contacted at work number [phone]    PLAN:  Protocol advises calling PCP now.  Will send urgent message.    Advised to drink fluids.  Phone number for poison control given to patient.    Emilie Snider RN   [facility] RN Triage      Reason for Disposition    DOUBLE DOSE (an extra dose or lesser amount) of prescription drug and NO symptoms (Exception: a double dose of antibiotics)    Additional Information    Negative: Drug overdose and triager unable to answer question    Negative: Caller requesting information unrelated to medicine    Negative: Caller requesting a prescription for Strep throat and has a positive culture result    Negative: Rash while taking a medication or within 3 days of stopping it    Negative: Immunization reaction suspected    Negative: Asthma and having symptoms of asthma (cough, wheezing, etc.)    Negative: Breastfeeding questions about mother's medicines and diet    Negative: MORE THAN A DOUBLE DOSE of a prescription or over-the-counter (OTC) drug    Negative: DOUBLE DOSE (an extra dose or lesser amount) of over-the-counter (OTC) drug and any symptoms (e.g., dizziness, nausea, pain, sleepiness)    Negative: DOUBLE DOSE (an extra dose or lesser amount) of prescription drug and any symptoms (e.g., dizziness, nausea, pain, sleepiness)    Negative: Took another person's prescription drug    Protocols used: MEDICATION QUESTION CALL-A-[place]

## 2021-06-18 NOTE — PATIENT INSTRUCTIONS - HE
Patient Instructions by Jarrell Alvarez PA-C at 12/20/2020  1:30 PM     Author: Jarrell Alvarez PA-C Service: -- Author Type: Physician Assistant    Filed: 12/20/2020  3:02 PM Encounter Date: 12/20/2020 Status: Addendum    : Jarrell Alvarez PA-C (Physician Assistant)    Related Notes: Original Note by Jarrell Alvarez PA-C (Physician Assistant) filed at 12/20/2020  3:00 PM       Keep the ear clean dry and protected.  Do not place anything into the ear canal to include Q-tips.  Use of the topical antibiotic drops.  Close follow-up with ENT for reevaluation and treatment.  You may return to the emergency room or walk-in care if new symptoms or concerns arise in the interim.      Patient Education     Epidermoid Cyst (Sebaceous Cyst), Infected (Incision and Drainage)  You have an epidermoid cyst. This is a small, painless lump under your skin. An epidermoid cyst (often called a sebaceous cyst, epidermal cyst, or epidermal inclusion cyst) is a term most often used for 2 similar types of cysts:    Epidermoid cysts. These cysts form slowly under the skin. They can be found on most parts of the body. But they are most often found on areas with more hair such as the scalp, face, upper back, and genitals.    Pilar cysts. These are similar to epidermoid cysts. But they start from a different part of the hair follicle. They are more likely to be on the scalp.  Some general facts about these cysts:    A cyst is a sac filled with material that is often cheesy, fatty, oily, or stringy. The material inside them can be thick. Or it can be a thin liquid.    You can usually move the cyst slightly if you try.    The cysts can be smaller than a pea or as large as a few inches.    The cysts are usually not painful, unless they become inflamed or infected.    The area around the cyst may smell bad. If the cyst breaks open, the material inside it often smells bad too.  Your cyst became inflamed or infected and your healthcare provider wanted  to drain it. Gauze packing may have been inserted into the cyst opening (cavity). This keeps the cyst open so it doesnt seal up before it has time to drain more. No matter how well it was cleaned out, no cleaning is perfect. The packing will need to be removed.  Once the pus is drained, antibiotics may not be needed unless the infection has spread into the skin around the wound. The wound will take about 1 to 2 weeks to heal, depending on the size of the abscess.  Home care  The following will help you care for your wound at home:    The wound may drain for the first 2 days. Cover the opening with a clean dry bandage. If the dressing becomes soaked with blood or pus, change it.    If a gauze packing was placed inside the opening of the cyst, it will need to be removed. Your healthcare provider will usually do this after 2 days. If it falls out sooner, do not try to put it back inside the wound. Once the packing is removed, you should wash the area carefully in the shower once a day, until the skin opening has closed. This could take up to 5 days depending on the size of the cyst. It is good to direct the shower spray directly into the opening if this is not too painful.    If you were prescribed antibiotics, take them as directed until they are all used up.    You may use over-the-counter pain medicine to control pain, unless another medicine was given. If you have chronic liver or kidney disease or ever had a stomach ulcer or GI bleeding, talk with your provider before using these medicines.  Prevention  Once this infection has healed, use these prevention tips to avoid another infection:    Keep the cyst opening clean by bathing or showering daily.    Avoid tight-fitting clothing in the cyst area.    Watch for the signs of infection listed below so that treatment may be started early.  Follow-up care  Follow up with your healthcare provider, or as advised. If a gauze packing was put in your wound, it should be  removed as instructed by your healthcare provider. Check your wound every day for the signs listed below.  When to seek medical advice  Call your healthcare provider right away if any of these occur:    Pus continues to come from the cyst 2 days after the incision and drainage    Increasing redness around the wound.    Increasing local pain or swelling    Fever of 100.4 F (38 C) or higher, or as directed by your provider  Date Last Reviewed: 10/1/2016    2979-3480 [employer]. [address] [place] [address]. All rights reserved. This information is not intended as a substitute for professional medical care. Always follow your healthcare professional's instructions.

## 2021-06-18 NOTE — PATIENT INSTRUCTIONS - HE
Patient Instructions by Atilio Baez MD at 12/22/2020  4:20 PM     Author: Atilio Baez MD Service: -- Author Type: Physician    Filed: 12/22/2020  4:29 PM Encounter Date: 12/22/2020 Status: Signed    : Atilio Baez MD (Physician)         Patient Education     Exercise for a Healthier Heart  You may wonder how you can improve the health of your heart. If youre thinking about exercise, youre on the right track. You dont need to become an athlete, but you do need a certain amount of brisk exercise to help strengthen your heart. If you have been diagnosed with a heart condition, your doctor may recommend exercise to help stabilize your condition. To help make exercise a habit, choose safe, fun activities.       Be sure to check with your health care provider before starting an exercise program.    Why exercise?  Exercising regularly offers many healthy rewards. It can help you do all of the following:    Improve your blood cholesterol levels to help prevent further heart trouble    Lower your blood pressure to help prevent a stroke or heart attack    Control diabetes, or reduce your risk of getting this disease    Improve your heart and lung function    Reach and maintain a healthy weight    Make your muscles stronger and more limber so you can stay active    Prevent falls and fractures by slowing the loss of bone mass (osteoporosis)    Manage stress better  Exercise tips  Ease into your routine. Set small goals. Then build on them.  Exercise on most days. Aim for a total of 150 or more minutes of moderate to  vigorous intensity activity each week. Consider 40 minutes, 3 to 4 times a week. For best results, activity should last for 40 minutes on average. It is OK to work up to the 40 minute period over time. Examples of moderate-intensity activity is walking one mile in 15 minutes or 30 to 45 minutes of yard work.  Step up your daily activity level. Along with your exercise program, try being more  active throughout the day. Walk instead of drive. Do more household tasks or yard work.  Choose one or more activities you enjoy. Walking is one of the easiest things you can do. You can also try swimming, riding a bike, or taking an exercise class.  Stop exercising and call your doctor if you:    Have chest pain or feel dizzy or lightheaded    Feel burning, tightness, pressure, or heaviness in your chest, neck, shoulders, back, or arms    Have unusual shortness of breath    Have increased joint or muscle pain    Have palpitations or an irregular heartbeat      [id number] Lorena Gaxi[employer]. [address] [place] [address]. All rights reserved. This information is not intended as a substitute for professional medical care. Always follow your healthcare professional's instructions.         Patient Education   Understanding [employer] MyPlate  The USDA (US Department of Agriculture) has guidelines to help you make healthy food choices. These are called MyPlate. MyPlate shows the food groups that make up healthy meals using the image of a place setting. Before you eat, think about the healthiest choices for what to put onto your plate or into your cup or bowl. To learn more about building a healthy plate, visit www.choosemyplate.gov.       The Food Groups    Fruits: Any fruit or 100% fruit juice counts as part of the Fruit Group. Fruits may be fresh, canned, frozen, or dried, and may be whole, cut-up, or pureed. Make half your plate fruits and vegetables.    Vegetables: Any vegetable or 100% vegetable juice counts as a member of the Vegetable Group. Vegetables may be fresh, frozen, canned, or dried. They can be served raw or cooked and may be whole, cut-up, or mashed. Make half your plate fruits and vegetables.     Grains: All foods made from grains are part of the Grains Group. These include wheat, rice, oats, cornmeal, and barley such as bread, pasta, oatmeal, cereal, tortillas, and grits. Grains should be  no more than a quarter of your plate. At least half of your grains should be whole grains.    Protein: This group includes meat, poultry, seafood, beans and peas, eggs, processed soy products (like tofu), nuts (including nut butters), and seeds. Make protein choices no more than a quarter of your plate. Meat and poultry choices should be lean or low fat.    Dairy: All fluid milk products and foods made from milk that contain calcium, like yogurt and cheese are part of the Dairy Group. (Foods that have little calcium, such as cream, butter, and cream cheese, are not part of the group.) Most dairy choices should be low-fat or fat-free.    Oils: These are fats that are liquid at room temperature. They include canola, corn, olive, soybean, and sunflower oil. Foods that are mainly oil include mayonnaise, certain salad dressings, and soft margarines. You should have only 5 to 7 teaspoons of oils a day. You probably already get this much from the food you eat.  Use [employer] to Help Build Your Meals  The [employer] can help you plan and track your meals and activity. You can look up individual foods to see or compare their nutritional value. You can get guidelines for what and how much you should eat. You can compare your food choices. And you can assess personal physical activities and see ways you can improve. Go to www.[employer].gov/supertracker/.    6728-2264 [employer]. [address] [place] [address]. All rights reserved. This information is not intended as a substitute for professional medical care. Always follow your healthcare professional's instructions.           Patient Education   Urinary Incontinence, Female (Adult)  Urinary incontinence means loss of control of the bladder. This problem affects many women, especially as they get older. If you have incontinence, you may be embarrassed to ask for help. But know that this problem can be treated.  Types of Incontinence  There  are different types of incontinence. Two of the main types are described here. You can have more than one type.    Stress incontinence. With this type, urine leaks when pressure (stress) is put on the bladder. This may happen when you cough, sneeze, or laugh. Stress incontinence most often occurs because the pelvic floor muscles that support the bladder and urethra are weak. This can happen after pregnancy and vaginal childbirth or a hysterectomy. It can also be due to excess body weight or hormone changes.    Urge incontinence (also called overactive bladder). With this type, a sudden urge to urinate is felt often. This may happen even though there may not be much urine in the bladder. The need to urinate often during the night is common. Urge incontinence most often occurs because of bladder spasms. This may be due to bladder irritation or infection. Damage to bladder nerves or pelvic muscles, constipation, and certain medicines can also lead to urge incontinence.  Treatment of urinary incontinence depends on the cause. Further evaluation is needed to find the type you have. This will likely include an exam and certain tests. Based on the results, you and your healthcare provider can then plan treatment. Until a diagnosis is made, the home care tips below can help relieve symptoms.  Home care    Do pelvic floor muscle exercises, if they are prescribed. The pelvic floor muscles help support the bladder and urethra. Many women find that their symptoms improve when doing special exercises that strengthen these muscles. To do the exercises contract the muscles you would use to stop your stream of urine, but do this when youre not urinating. Hold for 10 seconds, then relax. Repeat 10 to 20 times in a row, at least 3 times a day. Your provider may give you other instructions for how to do the exercises and how often.    Keep a bladder diary. This helps track how often and how much you urinate over a set period of time.  Bring this diary with you to your next visit with the provider. The information can help your provider learn more about your bladder problem.    Lose weight, if advised to by your provider. Excess weight puts pressure on the bladder. Your provider can help you create a weight-loss plan thats right for you. This may include exercising more and making certain diet changes.    Don't consume foods and drinks that may irritate the bladder. These can include alcohol and caffeinated drinks.    Quit smoking. Smoking and other tobacco use can lead to chronic cough that strains the pelvic floor muscles. Smoking may also damage the bladder and urethra. Talk with your provider about treatments or methods you can use to quit smoking.    If drinking large amounts of fluid causes you to have symptoms, you may be advised to limit your fluid intake. You may also be advised to drink most of your fluids during the day and to limit fluids at night.    If youre worried about urine leakage or accidents, you may wear absorbent pads to catch urine. Change the pads often. This helps reduce discomfort. It may also reduce the risk of skin or bladder infections.  Follow-up care  Follow up with your healthcare provider, or as directed. It may take some to find the right treatment for your problem. Your treatment plan may include special therapies or medicines. Certain procedures or surgery may also be options. Be sure to discuss any questions you have with your provider.  When to seek medical advice  Call the healthcare provider right away if any of these occur:    Fever of 100.4 F (38 C) or higher, or as directed by your provider    Bladder pain or fullness    Abdominal swelling    Nausea or vomiting    Back pain    Weakness, dizziness or fainting  Date Last Reviewed: 10/1/2017    2331-7539 [employer]. [address] [place] [address], [place] [address]. All rights reserved. This information is not intended as a substitute for  professional medical care. Always follow your healthcare professional's instructions.     Patient Education   Understanding Advance Care Planning  Advance care planning is the process of deciding ones own future medical care. It helps ensure that if you cant speak for yourself, your wishes can still be carried out. The plan is a series of legal documents that note a persons wishes. The documents vary by state. Advance care planning may be done when a person has a serious illness that is expected to get worse. It may be done before major surgery. And it can help you and your family be prepared in case of a major illness or injury. Advance care planning helps with making decisions at these times.       A health care proxy is a person who acts as the voice of a patient when the patient cant speak for himself or herself. The name of this role varies by state. It may be called a Durable Medical Power of  or Durable Power of  for Healthcare. It may be called an agent, surrogate, or advocate. Or it may be called a representative or decision maker. It is an official duty that is identified by a legal document. The document also varies by state.    Why Is Advance Care Planning Important?  If a person communicates their healthcare wishes:    They will be given medical care that matches their values and goals.    Their family members will not be forced to make decisions in a crisis with no guidance.  Creating a Plan  Making an advance care plan is often done in 3 steps:    Thinking about ones wishes. To create an advance care plan, you should think about what kind of medical treatment you would want if you lose the ability to communicate. Are there any situations in which you would refuse or stop treatment? Are there therapies you would want or not want? And whom do you want to make decisions for you? There are many places to learn more about how to plan for your care. Ask your doctor or  for  resources.    Picking a health care proxy. This means choosing a trusted person to speak for you only when you cant speak for yourself. When you cannot make medical decisions, your proxy makes sure the instructions in your advance care plan are followed. A proxy does not make decisions based on his or her own opinions. They must put aside those opinions and values if needed, and carry out your wishes.    Filling out the legal documents. There are several kinds of legal documents for advance care planning. Each one tells health care providers your wishes. The documents may vary by state. They must be signed and may need to be witnessed or notarized. You can cancel or change them whenever you wish. Depending on your state, the documents may include a Healthcare Proxy form, Living Will, Durable Medical Power of , Advance Directive, or others.  The Familys Role  The best help a family can give is to support their loved ones wishes. Open and honest communication is vital. Family should express any concerns they have about the patients choices while the patient can still make decisions.    1839-7059 [employer]. [address] [place] [address]. All rights reserved. This information is not intended as a substitute for professional medical care. Always follow your healthcare professional's instructions.         Also, [facility] offers a free, downloadable health care directive that allows you to share your treatment choices and personal preferences if you cannot communicate your wishes. It also allows you to appoint another person (called a health care agent) to make health care decisions if you are unable to do so. You can download an advance directive by going here: [email or web address]     Patient Education   Personalized Prevention Plan  You are due for the preventive services outlined below.  Your care team is available to assist you in  scheduling these services.  If you have already completed any of these items, please share that information with your care team to update in your medical record.  Health Maintenance   Topic Date Due   ? ASTHMA ACTION PLAN  1955   ? DEXA SCAN  1955   ? ADVANCE CARE PLANNING  09/10/1972   ? Pneumococcal Vaccine: 65+ Years (2 of 2 - PPSV23) 09/10/2019   ? DIABETIC EYE EXAM  03/20/2020   ? DIABETIC FOOT EXAM  06/18/2020   ? LIPID  07/26/2020   ? MICROALBUMIN  07/26/2020   ? A1C  11/01/2020   ? BMP  05/01/2021   ? MAMMOGRAM  07/08/2021   ? COLORECTAL CANCER SCREENING  07/27/2021   ? Asthma Control Test  09/28/2021   ? MEDICARE ANNUAL WELLNESS VISIT  12/22/2021   ? FALL RISK ASSESSMENT  12/22/2021   ? TD 18+ HE  09/27/2028   ? HEPATITIS C SCREENING  Completed   ? INFLUENZA VACCINE RULE BASED  Completed   ? ZOSTER VACCINES  Completed   ? Pneumococcal Vaccine: Pediatrics (0 to 5 Years) and At-Risk Patients (6 to 64 Years)  Aged Out

## 2021-06-19 NOTE — PROGRESS NOTES
PROGRESS NOTE       SUBJECTIVE:  Alvina Maynard is a 62 y.o. female   Chief Complaint   Patient presents with     Insect Bite     red bump , bite on left elbow noticed monday, not itchy , red swolleen, pt was on medcation for sinus infection, finished 1 week ago    Alvina was treated for frontal and maxillary sinusitis for 2 weeks with Levaquin 750 mg daily.  During that time she noted intense muscle aches all over her body.  It has persisted even though she finished the medication last Wednesday 1 week ago.  Although she does admit it is less intense than it was.  She states that she was quite sick with nausea and drainage and it was hard to clear both frontal and maxillary sinuses.  Her sinuses are better now.    Patient also has noted an insect bite over her left elbow.  She was originally scheduled to see dermatology but then decided she should come in to see me today.  The insect bite does not itch and there is been no rash with it.      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        azelastine (ASTELIN) 137 mcg (0.1 %) nasal spray INSTILL 2 SPRAYS IN EACH NOSTRIL TWICE A DAY AS NEEDED 90 mL 3     cholecalciferol, vitamin D3,  1,000 unit tablet Take 1,000 Units by mouth daily.       hydroCHLOROthiazide (HYDRODIURIL) 25 MG tablet TAKE 2 TABLETS(50 MG) BY MOUTH DAILY 180 tablet 1     Lactobacillus rhamnosus GG (CULTURELLE) 10-15 Billion cell capsule Take 1 capsule by mouth daily.       lansoprazole (PREVACID) 30 MG capsule TAKE 1 CAPSULE BY MOUTH TWICE DAILY 180 capsule 3     lisinopril (PRINIVIL,ZESTRIL) 20 MG tablet TAKE 1 TABLET BY MOUTH TWICE DAILY 180 tablet 1     MAGNESIUM GLYCINATE ORAL Take 400 mg by mouth daily.       methylcellulose (CITRUCEL) Take 2 g by mouth. Use as directed.  Take in the PM       metoprolol succinate (TOPROL-XL) 100 MG 24 hr tablet TAKE 1 TABLET BY MOUTH TWICE DAILY. 180 tablet 3     OMEGA-3/DHA/EPA/FISH OIL (FISH OIL-OMEGA-3 FATTY ACIDS) 300-1,000 mg capsule Take 2 g by mouth daily.       polyethylene glycol (MIRALAX) 17 gram packet Take 17 g by mouth daily.       potassium chloride (K-DUR,KLOR-CON) 20 MEQ tablet TAKE 2 TABLETS(40 MEQ) BY MOUTH TWICE DAILY 360 tablet 3     SINGULAIR 10 mg tablet TAKE 1 TABLET BY MOUTH ONCE DAILY. 90 tablet 3     nitroglycerin (NITROSTAT) 0.4 MG SL tablet PLACE 1 TABLET UNDER THE TONGUE EVERY 5 MINUTES AS NEEDED FOR CHEST PAIN 3 Bottle 2     UNABLE TO FIND Emu magic, omega magic emu oil Med Name:       No current facility-administered medications for this visit.        History   Smoking Status     Never Smoker   Smokeless Tobacco     Never Used       REVIEW OF SYSTEMS:  Patient denies fever, chills, dizziness, headache, visual change, cough, chest pain, shortness of breath, abdominal pain, extremity pain or swelling.      OBJECTIVE:       Vitals:    08/15/18 1518   BP: 128/80   Pulse: (!) 57   Temp: 98.4  F (36.9  C)   SpO2: 97%     Weight: 205 lb (93 kg)  Wt Readings from Last 3 Encounters:   08/15/18 205 lb (93 kg)   07/25/18 205 lb 8 oz (93.2 kg)   04/19/18 210 lb (95.3 kg)     Body mass index is 37.49 kg/(m^2).        Physical Exam:  GENERAL APPEARANCE: A&A, NAD, well  hydrated, well nourished  SKIN:  Normal skin turgor, no diffuse rash.  She has an insect bite over her left elbow an area that is red 2 cm in diameter with notable bite cristiana centrally.  There is no circumferential rash around it.  It is slightly swollen but not tender.  Muscles proximal arms proximal legs are sore to touch.  She does not note muscle weakness.  This muscle pain was more severe while she was on Levaquin.  Patient has obvious deformity of the distal joints in her hands consistent with degenerative osteoarthritis.      1. Myalgia  Given the amount of muscle pain I am recommending a rheumatologic workup/screen.  It is possible that this is PMR.  Patient agrees with the plan.  - Antinuclear Antibody (ISACC) [place]  - CK Total  - Erythrocyte Sedimentation Rate  - C-Reactive Protein  - Lyme Antibody [place] while I sincerely doubt Lyme disease I recommend screening for this.    2. Hyperglycemia  In reviewing her chart this patient has never technically met requirements for the diagnosis of type 2 diabetes mellitus.   She does have hyperglycemia and is at risk.  I recommend that she be screened minimally on a yearly basis.  Patient is counseled regarding limited concentrated sweets and carbohydrates.  - Glycosylated Hemoglobin A1c    I will inform her of lab results when available.  I anticipate that her muscle aches will slowly improve.  The visit lasted a total of 28 minutes face to face with the patient.  Over 50% of the time spent counseling and educating the patient about all of the above.      Emilie Green MD

## 2021-06-19 NOTE — PROGRESS NOTES
DATE OF SERVICE: 07/25/2018    SUBJECTIVE:  Very pleasant 62-year-old female who works for the [employer]  [email or web address], presents today with 2-1/2 week history of mild nasal  congestion, occasional cough, sore throat and frontal sinus pain that is constant,  this is in the context of having seasonal allergies.    REVIEW OF SYSTEMS:  Negative for fever, vomiting or diarrhea.    Socially, she does not smoke.    OBJECTIVE:  Examination shows 140/90, pulse 61, respirations 18.  HEENT shows TMs  clear, sclerae not injected.  Pharynx is mildly erythematous with postnasal drainage  and there is pain to palpation of the frontal sinuses bilaterally.  Neck is supple.   Lungs:  Clear.  Heart:  Regular.    ASSESSMENT:  Sinusitis.    PLAN:    1.  Levaquin 750 daily x14.  2.  Increase sinus irrigation.  3.  Return if not improving.      SERGE ALEXIS MD  pa  D 07/25/2018 11:13:37  T 07/25/2018 11:54:09  R 07/25/2018 11:54:09  [id number]        cc:SHERRON ALEXIS MD

## 2021-06-19 NOTE — LETTER
"Letter by Atilio Baez MD at      Author: Atilio Baez MD Service: -- Author Type: --    Filed:  Encounter Date: 7/28/2019 Status: (Other)         Alvina Maynard  [address] [place] [id number] July 28, 2019         Dear Ms. Maynard,    Below are the results from your recent visit:    Resulted Orders   Lipid [place] FASTING   Result Value Ref Range    Cholesterol 228 (H) <=199 mg/dL    Triglycerides 178 (H) <=149 mg/dL    HDL Cholesterol 52 >=50 mg/dL    LDL Calculated 140 (H) <=129 mg/dL    Patient Fasting > 8hrs? No    Microalbumin, Random Urine   Result Value Ref Range    Microalbumin, Random Urine <0.50 0.00 - 1.99 mg/dL    Creatinine, Urine 39.2 mg/dL    Microalbumin/Creatinine Ratio Random Urine  <=19.9 mg/g      Comment:      \"Unable to calculate: Creatinine and/or Microalbumin value below detectable level\"    Narrative    Microalbumin, Random Urine  <2.0 mg/dL . . . . . . . . Normal  3.0-30.0 mg/dL . . . . . . Microalbuminuria  >30.0 mg/dL . . . . . .  . Clinical Proteinuria    Microalbumin/Creatinine Ratio, Random Urine  <20 mg/g . . . . .. . . . Normal   mg/g . . . . . . . Microalbuminuria  >300 mg/g . . . . . . . . Clinical Proteinuria           Alvina, it was a pleasure to have seen you in the clinic this past week.  The test results are above and I would like to review those results.    Your lipids are improved compared to a year ago but still not at goal.  We would like to see the total cholesterol below 200, the triglycerides below 150 and the LDL cholesterol at 70.  The LDL is the bad cholesterol and when you have had a history of coronary artery disease the recommendation is to have that value at 70 rather than less than 130.  Because you have not been able to tolerate statins, which are the best medicine to lower the cholesterol, we could consider Zetia which I mentioned to you in the clinic  I do not think that it will get the LDL to 70 but it will improve it by " about 20% which still can be beneficial.  I will send a prescription for Zetia to your pharmacy which is "[facility]s and you can pick that up.  If you have other concerns or questions about this medicine certainly let me know.  The triglycerides are improved by weight loss, avoiding sweet drinks, desserts and lessening carbohydrates in general. Other foods that can be helpful for the cholesterol would be foods such as oatmeal, flaxseed, and in general more fruits and vegetables and less red meat and more fish.  If you use oil to cook I would suggest using olive oil and nuts that are healthy include walnuts and almonds.    The urine test for microalbumin was negative which is good and that suggests that your kidneys are healthy.    I would like to see you back in the clinic in 6 months but happy to see you sooner if other problems come up    Please call with questions or contact us using [email or web address].    Best regards,        Electronically signed by Atilio Baez MD

## 2021-06-21 NOTE — PROGRESS NOTES
PROGRESS NOTE       SUBJECTIVE:  Alvina Maynard is a 63 y.o. female   Chief Complaint   Patient presents with     Hair/Scalp Problem     PT NOTICED HAIR LOSS IN THE LAST FEW MONTHS , SKIN AND NAIL PROBLEMS ALSO      DISCUSS LAB RESULTS     Weight Loss     Alvina is here today because she has noted significant hair loss on the top of her head.  This does not run in her family as far she is aware of.  She has been picking up a fair amount of hair in the shower for quite some time now.  Her hairdresser has also noted it.   There are no bald spots is just thinning.      Patient also complains of urge incontinence.  I gave her instructions on how to retrain her bladder which can be helpful.  Medications can help but they certainly have side effects like dry mouth.  She prefers not to begin another medication at this time.    Patient Active Problem List   Diagnosis     Vocal Cord Disorder     Benign Adenomatous Polyp Of The Large Intestine     Alopecia     Chest Pain     Myalgia And Myositis     Obesity     Snoring (Symptom)     Limb Swelling     Pain In The Leg (Below The Knee)     Anxiety     Contact Dermatitis     Stroke Syndrome     Aneurysm Of The Right Middle Cerebral Artery     Headache     Essential Hypertension     Ischemic Embolic Stroke     Vertigo     Essential Hypercholesterolemia     Coronary Artery Disease     Asthma     Sleep Disturbances     Feeling Tired Or Poorly     Esophageal Reflux     Low back pain     Obstructive sleep apnea (adult) (pediatric)     Allergic contact dermatitis     External hemorrhoid     Obesity (BMI 35.0-39.9) with comorbidity (H)       Current Outpatient Prescriptions   Medication Sig Dispense Refill     aspirin 81 MG EC tablet Take 81 mg by mouth bedtime.        azelastine (ASTELIN) 137 mcg (0.1 %) nasal spray INSTILL 2 SPRAYS IN EACH NOSTRIL TWICE A DAY AS NEEDED 90 mL 3     azithromycin (ZITHROMAX Z-LILIYA) 250 MG tablet Take 2 tablets (500 mg) on  Day 1,  followed by 1 tablet  (250 mg) once daily on Days 2 through 5. 6 tablet 0     cholecalciferol, vitamin D3, 1,000 unit tablet Take 1,000 Units by mouth daily.       hydroCHLOROthiazide (HYDRODIURIL) 25 MG tablet TAKE 2 TABLETS(50 MG) BY MOUTH DAILY 180 tablet 1     Lactobacillus rhamnosus GG (CULTURELLE) 10-15 Billion cell capsule Take 1 capsule by mouth daily.       lansoprazole (PREVACID) 30 MG capsule Take 1 capsule (30 mg total) by mouth 2 (two) times a day. 180 capsule 3     lisinopril (PRINIVIL,ZESTRIL) 20 MG tablet TAKE 1 TABLET BY MOUTH TWICE DAILY 180 tablet 0     MAGNESIUM GLYCINATE ORAL Take 400 mg by mouth daily.       methylcellulose (CITRUCEL) Take 2 g by mouth. Use as directed.  Take in the PM       metoprolol succinate (TOPROL-XL) 100 MG 24 hr tablet Take 1 tablet (100 mg total) by mouth 2 (two) times a day. 180 tablet 3     nitroglycerin (NITROSTAT) 0.4 MG SL tablet PLACE 1 TABLET UNDER THE TONGUE EVERY 5 MINUTES AS NEEDED FOR CHEST PAIN 3 Bottle 2     OMEGA-3/DHA/EPA/FISH OIL (FISH OIL-OMEGA-3 FATTY ACIDS) 300-1,000 mg capsule Take 2 g by mouth daily.       polyethylene glycol (MIRALAX) 17 gram packet Take 17 g by mouth daily.       potassium chloride (K-DUR,KLOR-CON) 20 MEQ tablet TAKE 2 TABLETS(40 MEQ) BY MOUTH TWICE DAILY 360 tablet 3     SINGULAIR 10 mg tablet TAKE 1 TABLET BY MOUTH ONCE DAILY. 90 tablet 3     UNABLE TO FIND Emu magic, omega magic emu oil Med Name:       No current facility-administered medications for this visit.        History   Smoking Status     Never Smoker   Smokeless Tobacco     Never Used       REVIEW OF SYSTEMS:  Patient denies fever, chills, dizziness, headache, visual change, ear pain, cough, chest pain, shortness of breath, abdominal pain, extremity pain or swelling, rash,  depression or anxiety.          OBJECTIVE:       Vitals:    11/05/18 1457   BP: 138/88   Pulse:    SpO2:      Weight: 204 lb (92.5 kg)  Wt Readings from Last 3 Encounters:   11/05/18 204 lb (92.5 kg)   09/27/18 200 lb  (90.7 kg)   08/15/18 205 lb (93 kg)     Body mass index is 37.31 kg/(m^2).        Physical Exam:  GENERAL APPEARANCE: A&A, NAD, well hydrated, well nourished  SKIN:  Normal skin turgor, no lesions/rashes, her hair pattern is significantly thinned on the top of her head.  I do not see new hair coming in.  EARS: TM's normal, gray with nl light reflex  OROPHARYNX: without erythema, no post nasal drainage or thrush  NECK: Supple, without lymphadenopathy, no thyroid mass  CV: RRR, no M/G/R   LUNGS: CTAB, normal respiratory effort   EXTREMITY: Extremities normal, atraumatic, no swelling  NEURO: no gross deficits, DTR's are brisk and =  PSYCHIATRIC:  Mood appropriate, memory intact        ASSESSMENT/PLAN:     1. Hair loss  Recommended a metabolic evaluation for this condition although it may be an inherited problem.  It also can be a post stress phenomenon and then the hair would be expected to grow back.  - HM2(CBC w/o Differential)  - Vitamin D, Total (25-Hydroxy)  - Comprehensive Metabolic Panel  - Thyroid Stimulating Hormone (TSH)  - T4, Free  - Iron and Transferrin Iron Binding Capacity  - Magnesium    2. Urge incontinence of urine  Patient will try the bladder retraining I outlined for her.    3. Obesity (BMI 35.0-39.9) with comorbidity (H)  Continue to focus on eating healthy and getting more activity.    4. Essential hypertension  Adequately controlled at this time.        Patient Instructions   Bladder retraining:    Drink 1 cup of water on an hourly basis (your can decide how large the cup should be)  Empty the bladder by the clock possibly every 1 or 2 hours  Let the urine out, do not force.  Do this for 2 days, repeat if necessary    WE ALSO HAVE PILLS FOR BLADDER CONTRACTIONS:  Ditropan    There are no discontinued medications.  No Follow-up on file.    I spent a total of 28 minutes face to face with the patient.  Over 50% of the time spent counseling and educating the patient about all of the above.       Emilie Green MD

## 2021-06-21 NOTE — PROGRESS NOTES
Assessment: Alvina has new diagnosis of type 2 diabetes with Alc 6.5%.   She has been in the prediabetes range since 2013.   She is not taking any medications for diabetes at this point.   She has questions today about the BG results and prediabetes vs diabetes.  She typically eats 2-3 meals per day, she has tendency to skip meals, especially on weekends when busy, she does work full time, lives in duplex she shares with her sister - shared meals together.    FBS today was 133 mg/dl.   She does not have any regular scheduled exercise.     Food recall:   deviled eggs or breakfast sandwich from fast food     Lunch:  lean cuisine or leftovers, skips or popcorn          Dinner:   meat,  potato or goes out  likes milk,  fruit (I buy fruit but don't always remember to eat them),  vegetables (would like to eat more)    Plan:  Discussed type 2 diabetes and BG goals.  Reviewed nutrition guidelines and carb recommendations;  3 meals and 1-2 snacks.  She set goal to eat more regular meals and try to do more planning.   She was able to return demonstration of blood glucose meter, Accu-Chek Guide and set goal to check BG at home.  She agreed to keep food log.   Has some concerns about stress and eating during the holidays.   To return for follow up in 3-4 weeks.        Education Assessment: Readiness to learn:  acceptance           Plan / Patient Education:     Goals     None          Appointments to be scheduled:  Class 2 in 1-2 weeks.      Subjective:       Alvina Maynard is referred by Dr. Green for Diabetes Education.     Accompanied by: unaccompanied    Monitoring   Meter (per above goals): Assessed, Discussed, Literature Provided  Monitoring: Assessed, Discussed, Literature Provided  BG goals:  Assessed, Discussed, Literature Provided    Nutrition Management  Nutrition Management: Assessed, Discussed, Literature Provided  Weight: Assessed, Assessed, Discussed, Literature Provided  Portions/Balance: Assessed, Discussed,  Literature Provided  Carb ID/Count: Assessed, Discussed, Literature Provided  Physical Activity: Assessed, Discussed, Literature Provided- encouraged her to do what she can    Diabetes Disease Process: Assessed, Discussed, Literature Provided    Acute Complications: Prevent, Detect, Treat:  Hypoglycemia: Assessed, Discussed, Literature Provided  Hyperglycemia: Assessed, Discussed, Literature Provided    Chronic Complications  Goal Setting and Problem Solving: Assessed, Discussed, Literature Provided  Barriers: Assessed, Discussed, Literature Provided  Psychosocial Adjustments: Assessed, Discussed, Literature Provided    Time spent with the patient: 60 minutes for diabetes education and counseling.   Previous Education: no  Visit Type:HELLEN Cisneros  11/13/2018

## 2021-06-23 NOTE — TELEPHONE ENCOUNTER
----- Message from Gregorio Aguirre sent at 1/18/2019  1:09 PM CST -----  Contact: Alvina  General phone call:    Caller: Alvina  Primary cardiologist: Dr. Rae  Detailed reason for call: Patient is calling  In with 2 weeks of blood pressure readings.  New or active symptoms? Active  Best phone number: [phone]  Best time to contact: anytime  Ok to leave a detailed message? yes  Device? no    Additional Info:

## 2021-06-23 NOTE — TELEPHONE ENCOUNTER
Patient returned call with report of blood pressures.   B/P HR B/P HR B/P HR  1/7   182/105 58  1/8    159/92 60 173/98   59 154/88   61  1/9    154/94 57  1/10   154/91 64 156/95   60 149/96   60  1/11   158/99 57 147/91   60 161/98   59  1/15   158/92 61 163/95   56  1/16   159/95 62 169/95   62  1/18   162/93 71 159/98   61    Informed patient Dr. Rae is out of the clinic this week and I will return call next week with any new recommendations. Patient verbalized understanding and has no questions at this time.    Patient currently taking:   hydroCHLOROthiazide (HYDRODIURIL) 25 MG tablet TAKE 2 TABLETS(50 MG) BY MOUTH DAILY     lisinopril (PRINIVIL,ZESTRIL) 20 MG tablet TAKE 1 TABLET BY MOUTH TWICE DAILY     metoprolol succinate (TOPROL-XL) 100 MG 24 hr tablet Take 1 tablet (100 mg total) by mouth 2 (two) times a day.     Call Home number on Monday with recommendation.  ==============================    Dr. Rae,  Patient last seen in clinic 12/6/18 by you and she was instructed to monitor blood pressures at home and call with an update.  Any new orders or recommendations?  Thank you,  Liz

## 2021-06-24 NOTE — TELEPHONE ENCOUNTER
Pt seen yesterday  Thought to have bacterial infection and started on abx  Temp is not going down below 100.5  Asking if ok to take tylenol or acetaminophen with her meds  Ok to try tylenol as needed  Cont to monitor and call back if anything worsens  Pt was seen yesterday   Push fluids call back if anything worsens.    Maureen Alvarado, RN [facility] RN Triage    BP Readings from Last 3 Encounters:   02/14/19 138/88   12/06/18 162/90   11/05/18 138/88     Reason for Disposition    [1] Taking antibiotics < 48 hours AND [2] fever still present (SAME)    Protocols used: INFECTION ON ANTIBIOTIC FOLLOW-UP CALL-A-

## 2021-06-24 NOTE — PROGRESS NOTES
Chief Complaint   Patient presents with     Sinusitis     congestion, drainage, sx started Saturday, headache, possible fever , glands are swollen goes up into left ear        HPI: 63-year-old female presents with 5-day history of mild, constant, sore throat, nasal drainage and cough.  She also had a temperature 99.8 which is high for her.    ROS: Positive for fever negative for diarrhea vomiting or rashes rest jacqui distress    SH:    reports that  has never smoked. she has never used smokeless tobacco. She reports that she drinks alcohol. She reports that she does not use drugs.      FH: The Patient's family history includes Breast cancer in her maternal aunt; Diabetes in her mother; JEZ disease in her sister; Heart attack (age of onset: 60) in her mother; Heart disease in her maternal grandfather and mother; Hypertension in her father and mother; Stomach cancer in her mother.     Meds:  Alvina has a current medication list which includes the following prescription(s): aspirin, azelastine, cholecalciferol (vitamin d3), hydrochlorothiazide, lactobacillus rhamnosus gg, lansoprazole, lisinopril, magnesium glycinate, methylcellulose, metoprolol succinate, nifedipine, fish oil-omega-3 fatty acids, polyethylene glycol, potassium chloride, singulair, azithromycin, blood glucose test, generic lancets, nitroglycerin, and UNABLE TO FIND.    O:  /88   Pulse 66   Temp 99.2  F (37.3  C)   LMP 05/22/2002   SpO2 95%   Breastfeeding? No    Constitutional:    --Vitals as above  --No acute distress  Eyes-  --Sclera noninjected  --Lids and conjunctiva normal  ENT-  --TMs clear  --Sclera noninjected  --Pharynx moderately erythematous PND  Neck-  --Neck supple    Lymph-  --mild cervical lymphadenopathy  Lungs-  --Clear to Auscultation  Heart-  --Regular rate and rhythm  Skin-  --Pink and dry          A/P:   1. Upper respiratory tract infection, unspecified type  - azithromycin (ZITHROMAX Z-LILIYA) 250 MG tablet; 2 tabs today  then 1 daily x 4 days  Dispense: 6 tablet; Refill: 0    2.  Nasal congestion  -Over-the-counter Sudafed as needed

## 2021-06-24 NOTE — TELEPHONE ENCOUNTER
"Seen in clinic on Thursday and was started on a Zpak.    Low grade fever remains and has gone as high as 100.6 and does respond to Tylenol for a short time.    Cough remains and \"feels like a rattle in my lungs\".     She states that it is affecting her vocal cords now. He throat is \"really raw\". Hurts to cough and makes her throat sore.     Awake all night and unable to use her cPap due to congestion.     Really tight when asking her to take a deep breath.   Call placed to on-call provider (Sasha) and he advises patient be seen today.    Discussed possible locations of care for patient. She will go to [facility] [place].    Reason for Disposition    [1] Taking antibiotic > 48 hours (2 days) AND [2] fever persists    Protocols used: SINUS INFECTION ON ANTIBIOTIC FOLLOW-UP CALL-ANTON-VERONICA Wong RN [facility] Triage     "

## 2021-06-25 NOTE — PROGRESS NOTES
Progress Notes by Latonia Rae MD at 9/14/2017  8:10 AM     Author: Latonia Rae MD Service: -- Author Type: Physician    Filed: 9/14/2017  8:27 AM Encounter Date: 9/14/2017 Status: Signed    : Latonia Rae MD (Physician)                  [email or web address]           Thank you Dr. Green for asking the [facility] Heart Care team to participate in the care of your patient, Alvina Maynard.     Impression and Plan     1. Coronary artery disease.  Alvina has known coronary artery disease as outlined in detail in history of present illness below.  Most recently, she underwent a regadenoson nuclear perfusion study 17 August 2017 which revealed no evidence of ischemia.  Patient does report some intermittent discomfort in the chest though symptoms are fairly atypical and doubt this is ischemically mediated particularly given her recent favorable nuclear perfusion scan.     2. Hypertension.  Alvina has had tendency toward recent high blood pressures. She states that the clonidine patch has not worked for her because her integument seems to sweat and the patch falls off.  She also states that it seems to be causing her to have some insomnia. We pursued a trail of minoxidil, though this, too, the patient did not tolerate. We subsequently started methyldopa 250 mg bid, but patient states that she had some side effects on this therapy as well, but cannot recall today the actual symptoms she was experiencing.  Ultimately, we jointly decided to once again pursue a trial of amlodipine.  She had tolerated this therapy in the past with the exception of having some mild worsening of lower extremity edema on the therapy.  She indicates, however, that she would like to continue to try and lose weight as well as minimize her sodium and would like to try the amlodipine once again.  Plan:    Add amlodipine 2.5 mg daily.      3. Dyslipidemia. Patient more recently had been seen by primary provider in  due to intolerance to statin therapy was started on Repatha  (evolocumab). This therapy, however, was denied by her insurance company. Apparently, despite her primary provider being an actual physician the therapy was denied because it was prescribed by her primary provider, Dr. Azeem Mosquera, as opposed to a cardiology consultant.  At time of last visit, I did confirm with Alvina that she has been tried on and not tolerated the following statins: Atorvastatin, simvastatin, pravastatin, and rosuvastatin.  She had been on ezetimibe in the past as well, but this was not efficacious in getting to target lipids.  I discussed with Alvina again today possibly pursuing Repatha  (evolocumab) though she would like to continue to defer at this time.    Follow-up in 3 months.         History of Present Illness    Once again I would like to thank you again for asking me to participate in the care of your patient, Alvina Maynard.  As you know, but to reiterate for my own records, Alvina Maynard is a 62 y.o. female with history of hypertension and coronary disease. She underwent coronary angiography 20 September 2012 and had successful stenting of the LAD (bare metal).      She had recurrent chest discomfort symptoms and ultimately underwent repeat angiography 19 October 2012 at which time she had minimal in-stent restenosis, though had an ostial diagonal lesion of 80%, though this was unchanged from previous study. The vessel was diminutive/small. She otherwise had mild-moderate disease. Medical therapy was recommended and there was some thought that perhaps there may be a component of vasospasm.     Patient underwent repeat angiography 4 December 2013. This revealed no new significant obstructive coronary disease.     In September 2015, the patient had reported some intermittent discomfort in the chest and a regadenoson nuclear perfusion study was performed for further evaluation. The EKG portion of the test was felt positive  for ischemia though nuclear imaging did not show any disparate perfusion defects. Nonetheless, given the EKG changes coronary angiography was pursued 28 September 2015 which revealed mild to moderate coronary disease only without obstructive stenosis.    Most recently, patient underwent a regadenoson nuclear perfusion study 15 August 2017 which revealed no evidence of ischemia.     Presently, Alvina continues to report some mild shortness of breath with certain activities.  She minimizes chest discomfort with actual activity, however.   She does report some central sternal discomfort at other times not provoked with activity which does seem somewhat atypical for cardiac etiology, ischemic or otherwise.Alvina denies any palpitations or lightheadedness.    Further review of systems is otherwise negative/noncontributory (medical record and 13 point review of systems reviewed as well and pertinent positives noted).         Cardiac Diagnostics      Echocardiogram 14 July 2017:  1. Normal left ventricular size and systolic performance with ejection fraction of 65%.  2. No significant valvular heart disease.  3. Normal right ventricular size and systolic performance.  4. Mild left atrial enlargement.  Right atrium of normal dimension.    Echocardiogram 22 September 2015:  1. Normal left ventricular size and systolic performance with ejection fraction of 60-65%.  2. No significant valvular heart disease.    Regadenoson nuclear perfusion study 15 August 2017:  1. No evidence of infarct or ischemia.  2. Normal left ventricular systolic performance with ejection fraction of 70%.    Regadenoson nuclear perfusion study 9 September 2015 (pre-coronary angiogram that was performed 28 September 2015):  1. Stress electrocardiogram positive for inducible ischemia.  2. Lexiscan stress nuclear imaging was negative for inducible ischemia or infarct.  3. Left ventricular systolic performance was normal with ejection fraction of  "70%.    Coronary angiography 28 September 2015:  1. Mild-moderate coronary artery disease without significant obstructive stenosis.  2. Prior stent to proximal LAD without evidence of restenosis.  3. Left ventriculography revealed well-preserved LV systolic performance without wall motion abnormality.    Coronary angiogram performed 4 December 2013:  1. Moderate mid RCA disease.    2. There was mild codominant left circumflex disease.    3. Moderate proximal and mid LAD stenosis.    4. Ultimately, no intervention performed. Medical therapy with optimization given, non-obstructive coronary disease was recommended.         Physical Examination       /90 (Patient Site: Right Arm, Patient Position: Sitting, Cuff Size: Adult Large)  Pulse 60  Resp 18  Ht 5' 2\" (1.575 m)  Wt 213 lb (96.6 kg)  LMP 05/22/2002  BMI 38.96 kg/m2        Wt Readings from Last 3 Encounters:   09/14/17 213 lb (96.6 kg)   08/18/17 212 lb (96.2 kg)   07/14/17 218 lb (98.9 kg)     The patient is alert and oriented times three. Sclerae are anicteric. Mucosal membranes are moist. Jugular venous pressure is normal. No significant adenopathy/thyromegally appreciated. Lungs are clear with good expansion. On cardiovascular exam, the patient has a regular S1 and S2. Abdomen is soft and non-tender. Extremities reveal no clubbing, cyanosis, or edema.         Family History/Social History/Risk Factors   Patient does not smoke.  Family history of hypertension.         Medications  Allergies   Current Outpatient Prescriptions   Medication Sig Dispense Refill   ? aspirin 81 MG EC tablet Take 81 mg by mouth bedtime.      ? azelastine (ASTELIN) 137 mcg (0.1 %) nasal spray INSTILL 2 SPRAYS IN EACH NOSTRIL TWICE A DAY AS NEEDED 30 mL 5   ? cholecalciferol, vitamin D3, 1,000 unit tablet Take 1,000 Units by mouth daily.     ? hydroCHLOROthiazide (HYDRODIURIL) 25 MG tablet Take 2 tablets (50 mg total) by mouth daily. 60 tablet 10   ? Lactobacillus rhamnosus " GG (CULTURELLE) 10-15 Billion cell capsule Take 1 capsule by mouth daily.     ? lansoprazole (PREVACID) 30 MG capsule Take 1 capsule (30 mg total) by mouth 2 (two) times a day. 180 capsule 1   ? lisinopril (PRINIVIL,ZESTRIL) 20 MG tablet Take 1 tablet (20 mg total) by mouth 2 (two) times a day. 180 tablet 0   ? MAGNESIUM GLYCINATE ORAL Take 400 mg by mouth daily.     ? methylcellulose (CITRUCEL) Take 2 g by mouth. Use as directed.  Take in the PM     ? metoprolol succinate (TOPROL-XL) 100 MG 24 hr tablet TAKE 1 TABLET BY MOUTH TWICE DAILY. 180 tablet 3   ? nitroglycerin (NITROSTAT) 0.4 MG SL tablet PLACE 1 TABLET UNDER THE TONGUE EVERY 5 MINUTES AS NEEDED FOR CHEST PAIN 25 tablet 0   ? OMEGA-3/DHA/EPA/FISH OIL (FISH OIL-OMEGA-3 FATTY ACIDS) 300-1,000 mg capsule Take 2 g by mouth daily.     ? polyethylene glycol (MIRALAX) 17 gram packet Take 17 g by mouth daily.     ? potassium chloride SA (K-DUR,KLOR-CON) 20 MEQ tablet TAKE 2 TABLETS(40 MEQ) BY MOUTH TWICE DAILY 120 tablet 3   ? SINGULAIR 10 mg tablet TAKE 1 TABLET BY MOUTH EVERY DAY. 90 tablet 1   ? amLODIPine (NORVASC) 2.5 MG tablet Take 1 tablet (2.5 mg total) by mouth daily. 90 tablet 3   ? evolocumab (REPATHA SURECLICK) 140 mg/mL PnIj Inject 140 mg under the skin every 14 (fourteen) days. 6 Syringe 1   ? methyldopa (ALDOMET) 250 MG tablet Take 1 tablet (250 mg total) by mouth 2 (two) times a day. 180 tablet 1     No current facility-administered medications for this visit.       Allergies   Allergen Reactions   ? Mold Other (See Comments)     Asthma attack.   ? Nickel Hives     Nickel jewelry.   ? Other Environmental Allergy Other (See Comments)     Year round seasonal allergies.   ? Propylene Glycol Itching and Rash   ? Red Dye Rash     Any kind of red pills.   ? Atorvastatin Myalgia   ? Carvedilol Hives   ? Cephalosporins Hives   ? Clindamycin Hives   ? Dilantin Infatabs [Phenytoin] Hives   ? Lovastatin Myalgia   ? Meperidine Nausea And Vomiting   ?  Metronidazole Hives   ? Penicillins Hives   ? Pravastatin Sodium Myalgia   ? Rosuvastatin Myalgia   ? Simvastatin Myalgia   ? Sulfa (Sulfonamide Antibiotics) Myalgia   ? Adhesive Tape-Silicones Rash     Had rash from a tape used in the dermatology office.          Lab Results   Lab Results   Component Value Date     03/13/2017    K 4.4 03/13/2017     03/13/2017    CO2 27 03/13/2017    BUN 19 03/13/2017    CREATININE 0.78 03/13/2017    CALCIUM 10.2 03/13/2017     Lab Results   Component Value Date    WBC 10.2 08/18/2017    WBC 9.0 09/15/2015    HGB 13.5 08/18/2017    HCT 41.6 08/18/2017    MCV 88 08/18/2017     08/18/2017     Lab Results   Component Value Date    CHOL 236 (H) 02/08/2017    TRIG 87 02/08/2017    HDL 50 02/08/2017    LDLCALC 169 (H) 02/08/2017    LDLDIRECT 188 (H) 02/29/2016     Lab Results   Component Value Date    INR 0.92 12/27/2013     Lab Results   Component Value Date    BNP 28 12/20/2012     Lab Results   Component Value Date    CKTOTAL 62 10/02/2015    CKTOTAL 71 01/07/2010    TROPONINI <0.01 03/06/2017    TROPONINI <0.01 04/19/2015    TROPONINI <0.01 03/11/2014     Lab Results   Component Value Date    TSH 1.09 02/29/2016

## 2021-06-25 NOTE — PROGRESS NOTES
Progress Notes by Latonia Rae MD at 6/13/2017 11:10 AM     Author: Latonia Rae MD Service: -- Author Type: Physician    Filed: 6/13/2017 12:50 PM Encounter Date: 6/13/2017 Status: Signed    : Latonia Rae MD (Physician)                  [email or web address]           Thank you Dr. Mosquera for asking the [facility] Heart Care team to participate in the care of your patient, Alvina Maynard.     Impression and Plan     1. Coronary artery disease. Patient underwent prior stenting of the proximal left anterior descending. Patient underwent repeat angiography 28 September 2015 which revealed no significant obstructive coronary disease and patency of the prior stent deployed to the proximal left anterior descending.     Patient does continue to report some intermittent shortness of breath.  Patient recently underwent pulmonary function testing and will write the results of these findings before pursuing additional cardiac workup.     2. Hypertension.  Alvina has had tendency toward recent high blood pressures. She states that the clonidine patch has not worked for her because her integument seems to sweat and the patch falls off.  She also states that it seems to be causing her to have some insomnia. We pursued a trail of minoxidil, though this, too, the patient did not tolerate. We subsequently started methyldopa 250 mg bid.     Blood pressure today is fairly reasonable at 130/82 mmHg.      3. Dyslipidemia. Patient more recently had been seen by primary provider in due to intolerance to statin therapy was started on Repatha  (evolocumab). This therapy, however, was denied by her insurance company. Apparently, despite her primary provider being an actual physician the therapy denied because it was prescribed by her primary provider, Dr. Azeem Mosquera, as opposed to a cardiology consultant. An interview, I did confirm with Alvina that she has been tried on and not tolerated the  following statins: Atorvastatin, simvastatin, pravastatin, and rosuvastatin. We did discuss pursuing a trial of ezetimibe today, however, patient would rather defer for now until her blood pressure therapy is optimized.      Follow-up in 2-3 months.         History of Present Illness    Once again I would like to thank you again for asking me to participate in the care of your patient, Alvina Maynard.  As you know, but to reiterate for my own records, Alvina Maynard is a 61 y.o. female with history of hypertension and coronary disease. She underwent coronary angiography 20 September 2012 and had successful stenting of the LAD (bare metal).     She had recurrent chest discomfort symptoms and ultimately underwent repeat angiography 19 October 2012 at which time she had minimal in-stent restenosis, though had an ostial diagonal lesion of 80%, though this was unchanged from previous study. The vessel was diminutive/small. She otherwise had mild-moderate disease. Medical therapy was recommended and there was some thought that perhaps there may be a component of vasospasm.     Patient underwent repeat angiography 4 December 2013. This revealed no new significant obstructive coronary disease.     In September 2015, the patient had reported some intermittent discomfort in the chest and a regadenoson nuclear perfusion study was performed for further evaluation. The EKG portion of the test was felt positive for ischemia though nuclear imaging did not show any disparate perfusion defects. Nonetheless, given the EKG changes coronary angiography was pursued 28 September 2015 which revealed mild to moderate coronary disease only without obstructive stenosis.     Presently, Alvina continues to report some mild shortness of breath with certain activities.  She minimizes chest discomfort, however.  Alvina denies any palpitations or lightheadedness.    Further review of systems is otherwise negative/noncontributory (medical record and  "13 point review of systems reviewed as well and pertinent positives noted).         Cardiac Diagnostics      Echocardiogram 22 September 2015:  1. Normal left ventricular size and systolic performance with ejection fraction of 60-65%.  2. No significant valvular heart disease.     Coronary angiography 28 September 2015:  1. Mild-moderate coronary artery disease without significant obstructive stenosis.  2. Prior stent to proximal LAD without evidence of restenosis.  3. Left ventriculography revealed well-preserved LV systolic performance without wall motion abnormality.     Coronary angiogram performed 4 December 2013:  1. Moderate mid RCA disease.    2. There was mild codominant left circumflex disease.    3. Moderate proximal and mid LAD stenosis.    4. Ultimately, no intervention performed. Medical therapy with optimization given, non-obstructive coronary disease was recommended.     Regadenoson nuclear perfusion study 9 September 2015 (pre-coronary angiogram that was performed 28 September 2015):  1. Stress electrocardiogram positive for inducible ischemia.  2. Lexiscan stress nuclear imaging was negative for inducible ischemia or infarct.  3. Left ventricular systolic performance was normal with ejection fraction of 70%.            Physical Examination       /82 (Patient Site: Right Arm, Patient Position: Sitting, Cuff Size: Adult Large)  Pulse 60  Resp 18  Ht 5' 2.5\" (1.588 m)  Wt 218 lb (98.9 kg)  LMP 05/22/2002  BMI 39.24 kg/m2        Wt Readings from Last 3 Encounters:   06/13/17 218 lb (98.9 kg)   06/01/17 220 lb (99.8 kg)   05/08/17 220 lb (99.8 kg)     The patient is alert and oriented times three. Sclerae are anicteric. Mucosal membranes are moist. Jugular venous pressure is normal. No significant adenopathy/thyromegally appreciated. Lungs are clear with good expansion. On cardiovascular exam, the patient has a regular S1 and S2. Abdomen is soft and non-tender. Extremities reveal no " clubbing, cyanosis, or edema.         Family History/Social History/Risk Factors   Patient does not smoke.  Family history of hypertension.         Medications  Allergies   Current Outpatient Prescriptions   Medication Sig Dispense Refill   ? aspirin 81 MG EC tablet Take 81 mg by mouth bedtime.      ? azelastine (ASTELIN) 137 mcg (0.1 %) nasal spray INSTILL 2 SPRAYS IN EACH NOSTRIL TWICE A DAY AS NEEDED 30 mL 5   ? cholecalciferol, vitamin D3, 1,000 unit tablet Take 1,000 Units by mouth daily.     ? evolocumab (REPATHA SURECLICK) 140 mg/mL PnIj Inject 140 mg under the skin every 14 (fourteen) days. 6 Syringe 1   ? hydroCHLOROthiazide (HYDRODIURIL) 25 MG tablet Take 2 tablets (50 mg total) by mouth daily. 60 tablet 10   ? Lactobacillus rhamnosus GG (CULTURELLE) 10-15 Billion cell capsule Take 1 capsule by mouth daily.     ? lansoprazole (PREVACID) 30 MG capsule Take 1 capsule (30 mg total) by mouth 2 (two) times a day. 180 capsule 1   ? lisinopril (PRINIVIL,ZESTRIL) 20 MG tablet Take 1 tablet (20 mg total) by mouth 2 (two) times a day. 180 tablet 0   ? MAGNESIUM GLYCINATE ORAL Take 400 mg by mouth daily.     ? methylcellulose (CITRUCEL) Take 2 g by mouth. Use as directed.  Take in the PM     ? methyldopa (ALDOMET) 250 MG tablet Take 1 tablet (250 mg total) by mouth 2 (two) times a day. 180 tablet 1   ? metoprolol succinate (TOPROL-XL) 100 MG 24 hr tablet TAKE 1 TABLET BY MOUTH TWICE DAILY. 60 tablet 3   ? nitroglycerin (NITROSTAT) 0.4 MG SL tablet PLACE 1 TABLET UNDER THE TONGUE EVERY 5 MINUTES AS NEEDED FOR CHEST PAIN 25 tablet 0   ? OMEGA-3/DHA/EPA/FISH OIL (FISH OIL-OMEGA-3 FATTY ACIDS) 300-1,000 mg capsule Take 2 g by mouth daily.     ? polyethylene glycol (MIRALAX) 17 gram packet Take 17 g by mouth daily.     ? potassium chloride SA (K-DUR,KLOR-CON) 20 MEQ tablet TAKE 2 TABLETS(40 MEQ) BY MOUTH TWICE DAILY 120 tablet 3   ? SINGULAIR 10 mg tablet TAKE 1 TABLET BY MOUTH EVERY DAY. 90 tablet 0     No current  facility-administered medications for this visit.       Allergies   Allergen Reactions   ? Atorvastatin Myalgia   ? Carvedilol Hives   ? Cephalosporins Hives   ? Clindamycin Hives   ? Dilantin Infatabs [Phenytoin] Hives   ? Lovastatin Myalgia   ? Meperidine Nausea And Vomiting   ? Metronidazole Hives   ? Nickel    ? Penicillins Hives   ? Pravastatin Sodium Myalgia   ? Propylene Glycol    ? Rosuvastatin Myalgia   ? Simvastatin Myalgia   ? Sulfa (Sulfonamide Antibiotics) Myalgia          Lab Results   Lab Results   Component Value Date     03/13/2017    K 4.4 03/13/2017     03/13/2017    CO2 27 03/13/2017    BUN 19 03/13/2017    CREATININE 0.78 03/13/2017    CALCIUM 10.2 03/13/2017     Lab Results   Component Value Date    WBC 9.8 03/06/2017    WBC 9.0 09/15/2015    HGB 13.0 06/08/2017    HCT 41.7 03/06/2017    MCV 85 03/06/2017     03/06/2017     Lab Results   Component Value Date    CHOL 236 (H) 02/08/2017    TRIG 87 02/08/2017    HDL 50 02/08/2017    LDLCALC 169 (H) 02/08/2017    LDLDIRECT 188 (H) 02/29/2016     Lab Results   Component Value Date    INR 0.92 12/27/2013     Lab Results   Component Value Date    BNP 28 12/20/2012     Lab Results   Component Value Date    CKTOTAL 62 10/02/2015    CKTOTAL 71 01/07/2010    TROPONINI <0.01 03/06/2017    TROPONINI <0.01 04/19/2015    TROPONINI <0.01 03/11/2014     Lab Results   Component Value Date    TSH 1.09 02/29/2016

## 2021-06-25 NOTE — PROGRESS NOTES
Progress Notes by Latonia Rae MD at 4/4/2017  3:30 PM     Author: Latonia Rae MD Service: -- Author Type: Physician    Filed: 4/4/2017  3:43 PM Encounter Date: 4/4/2017 Status: Signed    : Latonia Rae MD (Physician)                  [email or web address]           Thank you Dr. Mosquera for asking the [facility] Care team to participate in the care of your patient, Alvina Maynard.     Impression and Plan      1. Coronary artery disease. Patient underwent prior stenting of the proximal left anterior descending. Patient underwent repeat angiography 28 September 2015 which revealed no significant obstructive coronary disease and patency of the prior stent deployed to the proximal left anterior descending.     Patient does continue to report some intermittent discomfort in the chest which is affected by movement of the torso and the like and atypical for ischemic etiology. At this time do not feel additional cardiac workup is required in this regard.  His is unchanged from last visit.     2. Hypertension. Blood pressure is somewhat elevated in the office today.  Patient states that she did not tolerate amlodipine.  She also reports that she had electric shock symptoms in her upper extremities on diltiazem.  Plan:    Trial of clonidine 0.1 mg twice a day.    Patient also is going to try to work more on judicious diet and exercise.    3. Dyslipidemia.  Patient more recently had been seen by primary provider in due to intolerance to statin therapy was started on Repatha  (evolocumab).  This therapy, however, was denied by her insurance company.  Apparently, despite her primary provider being an actual physician  the therapy denied because it was prescribed by her primary provider, Dr. Azeem Mosquera, as opposed to a cardiology consultant.  An interview, I did confirm with Alvina that she has been tried on an not tolerated the following statins: Atorvastatin, simvastatin,  pravastatin, and rosuvastatin.  We did discuss pursuing a trial of ezetimibe today, however, patient would rather defer for now until her blood pressure therapy is optimized.     Follow-up in 6 weeks.         History of Present Illness    Once again I would like to thank you again for asking me to participate in the care of your patient, Alvina Maynard.  As you know, but to reiterate for my own records, Alvina Maynard is a 61 y.o. female with history of hypertension and coronary disease. She underwent coronary angiography 20 September 2012 and had successful stenting of the LAD (bare metal). Prasugrel was recommended for a total of 1 year.      She had recurrent chest discomfort symptoms and ultimately underwent repeat angiography 19 October 2012 at which time she had minimal in-stent restenosis, though had an ostial diagonal lesion of 80%, though this was unchanged from previous study. The vessel was diminutive/small. She otherwise had mild-moderate disease. Medical therapy was recommended and there was some thought that perhaps there may be a component of vasospasm.     Patient underwent repeat angiography 4 December 2013. This revealed no new significant obstructive coronary disease.     In September 2015, the patient had reported some intermittent discomfort in the chest and a regadenoson nuclear perfusion study was performed for further evaluation. The EKG portion of the test was felt positive for ischemia though nuclear imaging did not show any disparate perfusion defects. Nonetheless, given the EKG changes coronary angiography was pursued 28 September 2015 which revealed mild to moderate coronary disease only without obstructive stenosis.     Presently, the patient denies any chest discomfort reminiscent of angina. She once again does report some discomfort in chest with movement of the arm and twisting of the torso which is most consistent with musculoskeletal discomfort. She states her breathing is  "comfortable. She reports no shortness of breath at night to suggest PND/orthopnea. Alvina denies lightheadedness or palpitations.    Further review of systems is otherwise negative/noncontributory (medical record and 13 point review of systems reviewed as well and pertinent positives noted).         Cardiac Diagnostics      Echocardiogram 22 September 2015:  1. Normal left ventricular size and systolic performance with ejection fraction of 60-65%.  2. No significant valvular heart disease.     Coronary angiography 28 September 2015:  1. Mild-moderate coronary artery disease without significant obstructive stenosis.  2. Prior stent to proximal LAD without evidence of restenosis.  3. Left ventriculography revealed well-preserved LV systolic performance without wall motion abnormality.     Coronary angiogram performed 4 December 2013:  1. Moderate mid RCA disease.    2. There was mild codominant left circumflex disease.    3. Moderate proximal and mid LAD stenosis.    4. Ultimately, no intervention performed. Medical therapy with optimization given, non-obstructive coronary disease was recommended.     Regadenoson nuclear perfusion study 9 September 2015 (pre-coronary angiogram that was performed 28 September 2015):  1. Stress electrocardiogram positive for inducible ischemia.  2. Lexiscan stress nuclear imaging was negative for inducible ischemia or infarct.  3. Left ventricular systolic performance was normal with ejection fraction of 70%.            Physical Examination       BP (!) 166/100 (Patient Site: Right Arm, Patient Position: Sitting, Cuff Size: Adult Large)  Pulse 60  Resp 16  Ht 5' 2.5\" (1.588 m)  Wt 220 lb (99.8 kg)  BMI 39.6 kg/m2        Wt Readings from Last 3 Encounters:   04/04/17 220 lb (99.8 kg)   03/13/17 216 lb (98 kg)   02/08/17 214 lb 14.4 oz (97.5 kg)     The patient is alert and oriented times three. Sclerae are anicteric. Mucosal membranes are moist. Jugular venous pressure is normal. No " significant adenopathy/thyromegally appreciated. Lungs are clear with good expansion. On cardiovascular exam, the patient has a regular S1 and S2. Abdomen is soft and non-tender. Extremities reveal no clubbing, cyanosis, or edema.         Family History/Social History/Risk Factors   Patient does not smoke.  Family history of hypertension.         Medications  Allergies   Current Outpatient Prescriptions   Medication Sig Dispense Refill   ? aspirin 81 MG EC tablet Take 81 mg by mouth bedtime.      ? azelastine (ASTELIN) 137 mcg (0.1 %) nasal spray INSTILL 2 SPRAYS IN EACH NOSTRIL TWICE DAILY AS NEEDED 30 mL 4   ? cholecalciferol, vitamin D3, 1,000 unit tablet Take 1,000 Units by mouth daily.     ? evolocumab (REPATHA SURECLICK) 140 mg/mL PnIj Inject 140 mg under the skin every 14 (fourteen) days. 6 Syringe 1   ? hydroCHLOROthiazide (HYDRODIURIL) 25 MG tablet TAKE 1 TABLET BY MOUTH EVERY DAY 90 tablet 3   ? Lactobacillus rhamnosus GG (CULTURELLE) 10-15 Billion cell capsule Take 1 capsule by mouth daily.     ? lansoprazole (PREVACID) 30 MG capsule TAKE ONE CAPSULE BY MOUTH TWICE DAILY 180 capsule 0   ? lisinopril (PRINIVIL,ZESTRIL) 20 MG tablet Take 1 tablet (20 mg total) by mouth 2 (two) times a day. 180 tablet 0   ? MAGNESIUM GLYCINATE ORAL Take 400 mg by mouth daily.     ? methylcellulose (CITRUCEL) Take 2 g by mouth. Use as directed.  Take in the PM     ? metoprolol succinate (TOPROL-XL) 100 MG 24 hr tablet Take 1 tablet (100 mg total) by mouth 2 (two) times a day. 60 tablet 1   ? nitroglycerin (NITROSTAT) 0.4 MG SL tablet PLACE 1 TABLET UNDER THE TONGUE EVERY 5 MINUTES AS NEEDED FOR CHEST PAIN 25 tablet 0   ? OMEGA-3/DHA/EPA/FISH OIL (FISH OIL-OMEGA-3 FATTY ACIDS) 300-1,000 mg capsule Take 2 g by mouth daily.     ? polyethylene glycol (MIRALAX) 17 gram packet Take 17 g by mouth daily.     ? potassium chloride SA (K-DUR,KLOR-CON) 20 MEQ tablet Take 2 tablets (40 mEq total) by mouth 2 (two) times a day. 120 tablet 1    ? SINGULAIR 10 mg tablet TAKE 1 TABLET BY MOUTH EVERY DAY 90 tablet 0   ? cloNIDine HCl (CATAPRES) 0.1 MG tablet Take 1 tablet (0.1 mg total) by mouth 2 (two) times a day. 180 tablet 3     No current facility-administered medications for this visit.       Allergies   Allergen Reactions   ? Atorvastatin Myalgia   ? Carvedilol Hives   ? Cephalosporins Hives   ? Clindamycin Hives   ? Dilantin Infatabs [Phenytoin] Hives   ? Lovastatin Myalgia   ? Meperidine Nausea And Vomiting   ? Metronidazole Hives   ? Nickel    ? Penicillins Hives   ? Pravastatin Sodium Myalgia   ? Propylene Glycol    ? Rosuvastatin Myalgia   ? Simvastatin Myalgia   ? Sulfa (Sulfonamide Antibiotics) Myalgia          Lab Results   Lab Results   Component Value Date     03/13/2017    K 4.4 03/13/2017     03/13/2017    CO2 27 03/13/2017    BUN 19 03/13/2017    CREATININE 0.78 03/13/2017    CALCIUM 10.2 03/13/2017     Lab Results   Component Value Date    WBC 9.8 03/06/2017    WBC 9.0 09/15/2015    HGB 13.8 03/06/2017    HCT 41.7 03/06/2017    MCV 85 03/06/2017     03/06/2017     Lab Results   Component Value Date    CHOL 236 (H) 02/08/2017    TRIG 87 02/08/2017    HDL 50 02/08/2017    LDLCALC 169 (H) 02/08/2017    LDLDIRECT 188 (H) 02/29/2016     Lab Results   Component Value Date    INR 0.92 12/27/2013     Lab Results   Component Value Date    BNP 28 12/20/2012     Lab Results   Component Value Date    CKTOTAL 62 10/02/2015    CKTOTAL 71 01/07/2010    TROPONINI <0.01 03/06/2017    TROPONINI <0.01 04/19/2015    TROPONINI <0.01 03/11/2014     Lab Results   Component Value Date    TSH 1.09 02/29/2016

## 2021-06-25 NOTE — TELEPHONE ENCOUNTER
Refill Approved    Rx renewed per Medication Renewal Policy. Medication was last renewed on 5/19/20, last OV 4/16/21.    Vanda Short, Care Connection Triage/Med Refill 5/30/2021     Requested Prescriptions   Pending Prescriptions Disp Refills     potassium chloride (K-DUR,KLOR-CON) 20 MEQ tablet [Pharmacy Med Name: POTASSIUM CL 20MEQ ER TABLETS] 360 tablet 3     Sig: TAKE 2 TABLETS BY MOUTH TWICE DAILY       Potassium Supplements Refill Protocol Passed - 5/29/2021 10:19 AM        Passed - PCP or prescribing provider visit in past 12 months       Last office visit with prescriber/PCP: 3/24/2021 Atilio Baez MD OR same dept: 4/16/2021 Jong Claudio CNP OR same specialty: 4/16/2021 Jong Claudio CNP  Last physical: 12/22/2020 Last MTM visit: Visit date not found   Next visit within 3 mo: Visit date not found  Next physical within 3 mo: Visit date not found  Prescriber OR PCP: Atilio Baez MD  Last diagnosis associated with med order: 1. Essential hypertension, malignant  - potassium chloride (K-DUR,KLOR-CON) 20 MEQ tablet [Pharmacy Med Name: POTASSIUM CL 20MEQ ER TABLETS]; TAKE 2 TABLETS BY MOUTH TWICE DAILY  Dispense: 360 tablet; Refill: 3    If protocol passes may refill for 12 months if within 3 months of last provider visit (or a total of 15 months).             Passed - Potassium level in last 12 months     Lab Results   Component Value Date    Potassium 4.0 12/22/2020

## 2021-06-25 NOTE — PROGRESS NOTES
Progress Notes by Latonia Rae MD at 5/8/2017  3:30 PM     Author: Latonia Rae MD Service: -- Author Type: Physician    Filed: 5/8/2017  3:56 PM Encounter Date: 5/8/2017 Status: Signed    : Latonia Rae MD (Physician)                  [email or web address]           Thank you Dr. Mosquera for asking the [facility] Heart Care team to participate in the care of your patient, Alvina Maynard.     Impression and Plan     1. Coronary artery disease. Patient underwent prior stenting of the proximal left anterior descending. Patient underwent repeat angiography 28 September 2015 which revealed no significant obstructive coronary disease and patency of the prior stent deployed to the proximal left anterior descending.     Patient does continue to report some intermittent discomfort in the chest which is affected by movement of the torso and the like and atypical for ischemic etiology. At this time do not feel additional cardiac workup is required in this regard. His is unchanged from last visit.     2. Hypertension. Blood pressure is somewhat elevated in the office today and has been on her readings from home as well.  She states that the clonidine patch has not worked for her because her integument seems to sweat and the patch falls off.  She also states that it seems to be causing her to have some insomnia.    Discontinue clonidine    Pursue a trial of minoxidil 2.5 mg daily which we can increase if she is tolerating and blood pressure still not quite at target.    3. Dyslipidemia. Patient more recently had been seen by primary provider in due to intolerance to statin therapy was started on Repatha  (evolocumab). This therapy, however, was denied by her insurance company. Apparently, despite her primary provider being an actual physician the therapy denied because it was prescribed by her primary provider, Dr. Azeem Mosquera, as opposed to a cardiology consultant. An interview, I  did confirm with Alvina that she has been tried on and not tolerated the following statins: Atorvastatin, simvastatin, pravastatin, and rosuvastatin. We did discuss pursuing a trial of ezetimibe today, however, patient would rather defer for now until her blood pressure therapy is optimized.     Follow-up in 6 weeks.         History of Present Illness    Once again I would like to thank you again for asking me to participate in the care of your patient, Alvina Maynard.  As you know, but to reiterate for my own records, Alvina Maynard is a 61 y.o. female with history of hypertension and coronary disease. She underwent coronary angiography 20 September 2012 and had successful stenting of the LAD (bare metal). Prasugrel was recommended for a total of 1 year.      She had recurrent chest discomfort symptoms and ultimately underwent repeat angiography 19 October 2012 at which time she had minimal in-stent restenosis, though had an ostial diagonal lesion of 80%, though this was unchanged from previous study. The vessel was diminutive/small. She otherwise had mild-moderate disease. Medical therapy was recommended and there was some thought that perhaps there may be a component of vasospasm.     Patient underwent repeat angiography 4 December 2013. This revealed no new significant obstructive coronary disease.     In September 2015, the patient had reported some intermittent discomfort in the chest and a regadenoson nuclear perfusion study was performed for further evaluation. The EKG portion of the test was felt positive for ischemia though nuclear imaging did not show any disparate perfusion defects. Nonetheless, given the EKG changes coronary angiography was pursued 28 September 2015 which revealed mild to moderate coronary disease only without obstructive stenosis.    Presently, the patient denies any chest discomfort reminiscent of angina. She once again does report some discomfort in chest with movement of the arm and  "twisting of the torso which is most consistent with musculoskeletal discomfort. This has been fairly chronic for her, however.   PendingShe states her breathing is comfortable. She reports no shortness of breath at night to suggest PND/orthopnea. Alvina denies lightheadedness or palpitations.       Further review of systems is otherwise negative/noncontributory (medical record and 13 point review of systems reviewed as well and pertinent positives noted).         Cardiac Diagnostics      Echocardiogram 22 September 2015:  1. Normal left ventricular size and systolic performance with ejection fraction of 60-65%.  2. No significant valvular heart disease.     Coronary angiography 28 September 2015:  1. Mild-moderate coronary artery disease without significant obstructive stenosis.  2. Prior stent to proximal LAD without evidence of restenosis.  3. Left ventriculography revealed well-preserved LV systolic performance without wall motion abnormality.     Coronary angiogram performed 4 December 2013:  1. Moderate mid RCA disease.    2. There was mild codominant left circumflex disease.    3. Moderate proximal and mid LAD stenosis.    4. Ultimately, no intervention performed. Medical therapy with optimization given, non-obstructive coronary disease was recommended.     Regadenoson nuclear perfusion study 9 September 2015 (pre-coronary angiogram that was performed 28 September 2015):  1. Stress electrocardiogram positive for inducible ischemia.  2. Lexiscan stress nuclear imaging was negative for inducible ischemia or infarct.  3. Left ventricular systolic performance was normal with ejection fraction of 70%.            Physical Examination       BP (!) 168/92 (Patient Site: Right Arm, Patient Position: Sitting, Cuff Size: Adult Large)  Resp 18  Ht 5' 2.5\" (1.588 m)  Wt 220 lb (99.8 kg)  BMI 39.6 kg/m2        Wt Readings from Last 3 Encounters:   05/08/17 220 lb (99.8 kg)   04/04/17 220 lb (99.8 kg)   03/13/17 216 lb " (98 kg)     The patient is alert and oriented times three. Sclerae are anicteric. Mucosal membranes are moist. Jugular venous pressure is normal. No significant adenopathy/thyromegally appreciated. Lungs are clear with good expansion. On cardiovascular exam, the patient has a regular S1 and S2. Abdomen is soft and non-tender. Extremities reveal no clubbing, cyanosis, or edema.         Family History/Social History/Risk Factors   Patient does not smoke.  Family history of hypertension.       Medications  Allergies   Current Outpatient Prescriptions   Medication Sig Dispense Refill   ? aspirin 81 MG EC tablet Take 81 mg by mouth bedtime.      ? azelastine (ASTELIN) 137 mcg (0.1 %) nasal spray INSTILL 2 SPRAYS IN EACH NOSTRIL TWICE A DAY AS NEEDED 30 mL 5   ? cholecalciferol, vitamin D3, 1,000 unit tablet Take 1,000 Units by mouth daily.     ? hydroCHLOROthiazide (HYDRODIURIL) 25 MG tablet TAKE 1 TABLET BY MOUTH EVERY DAY 90 tablet 3   ? Lactobacillus rhamnosus GG (CULTURELLE) 10-15 Billion cell capsule Take 1 capsule by mouth daily.     ? lansoprazole (PREVACID) 30 MG capsule Take 1 capsule (30 mg total) by mouth 2 (two) times a day. 180 capsule 1   ? lisinopril (PRINIVIL,ZESTRIL) 20 MG tablet Take 1 tablet (20 mg total) by mouth 2 (two) times a day. 180 tablet 0   ? MAGNESIUM GLYCINATE ORAL Take 400 mg by mouth daily.     ? methylcellulose (CITRUCEL) Take 2 g by mouth. Use as directed.  Take in the PM     ? metoprolol succinate (TOPROL-XL) 100 MG 24 hr tablet TAKE 1 TABLET BY MOUTH TWICE DAILY. 60 tablet 3   ? nitroglycerin (NITROSTAT) 0.4 MG SL tablet PLACE 1 TABLET UNDER THE TONGUE EVERY 5 MINUTES AS NEEDED FOR CHEST PAIN 25 tablet 0   ? OMEGA-3/DHA/EPA/FISH OIL (FISH OIL-OMEGA-3 FATTY ACIDS) 300-1,000 mg capsule Take 2 g by mouth daily.     ? polyethylene glycol (MIRALAX) 17 gram packet Take 17 g by mouth daily.     ? potassium chloride SA (K-DUR,KLOR-CON) 20 MEQ tablet TAKE 2 TABLETS(40 MEQ) BY MOUTH TWICE DAILY  120 tablet 3   ? SINGULAIR 10 mg tablet TAKE 1 TABLET BY MOUTH EVERY DAY 90 tablet 0   ? evolocumab (REPATHA SURECLICK) 140 mg/mL PnIj Inject 140 mg under the skin every 14 (fourteen) days. 6 Syringe 1   ? minoxidil (LONITEN) 2.5 MG tablet Take 1 tablet (2.5 mg total) by mouth daily. 90 tablet 3     No current facility-administered medications for this visit.       Allergies   Allergen Reactions   ? Atorvastatin Myalgia   ? Carvedilol Hives   ? Cephalosporins Hives   ? Clindamycin Hives   ? Dilantin Infatabs [Phenytoin] Hives   ? Lovastatin Myalgia   ? Meperidine Nausea And Vomiting   ? Metronidazole Hives   ? Nickel    ? Penicillins Hives   ? Pravastatin Sodium Myalgia   ? Propylene Glycol    ? Rosuvastatin Myalgia   ? Simvastatin Myalgia   ? Sulfa (Sulfonamide Antibiotics) Myalgia          Lab Results   Lab Results   Component Value Date     03/13/2017    K 4.4 03/13/2017     03/13/2017    CO2 27 03/13/2017    BUN 19 03/13/2017    CREATININE 0.78 03/13/2017    CALCIUM 10.2 03/13/2017     Lab Results   Component Value Date    WBC 9.8 03/06/2017    WBC 9.0 09/15/2015    HGB 13.8 03/06/2017    HCT 41.7 03/06/2017    MCV 85 03/06/2017     03/06/2017     Lab Results   Component Value Date    CHOL 236 (H) 02/08/2017    TRIG 87 02/08/2017    HDL 50 02/08/2017    LDLCALC 169 (H) 02/08/2017    LDLDIRECT 188 (H) 02/29/2016     Lab Results   Component Value Date    INR 0.92 12/27/2013     Lab Results   Component Value Date    BNP 28 12/20/2012     Lab Results   Component Value Date    CKTOTAL 62 10/02/2015    CKTOTAL 71 01/07/2010    TROPONINI <0.01 03/06/2017    TROPONINI <0.01 04/19/2015    TROPONINI <0.01 03/11/2014     Lab Results   Component Value Date    TSH 1.09 02/29/2016

## 2021-06-26 NOTE — PROGRESS NOTES
Progress Notes by Latonia Rae MD at 12/6/2018  8:10 AM     Author: Latonia Rae MD Service: -- Author Type: Physician    Filed: 12/6/2018  8:43 AM Encounter Date: 12/6/2018 Status: Signed    : Latonia Rae MD (Physician)                  [email or web address]  [phone]         Thank you Dr. Green for asking the [facility] team to participate in the care of your patient, Alvina Maynard.     Impression and Plan     1. Coronary artery disease.  Alvina has known coronary artery disease as outlined in detail in history of present illness below.  Most recently, she underwent a regadenoson nuclear perfusion study 17 August 2017 which revealed no evidence of ischemia.  Patient does report some intermittent discomfort in the chest though symptoms are fairly atypical and doubt this is ischemically mediated particularly given her recent favorable nuclear perfusion scan.    2. Hypertension.    Blood pressure is somewhat elevated in the office today though compared to the recent readings this is something of an aberration.  Patient attributes this to being under some stress and also he has had some issues with a dental bridge which is causing her a lot of discomfort.  She does have a home blood pressure monitoring device.  We jointly decided to simply continue to follow.  I asked that she call me should she have a tendency toward further higher readings.      3. Dyslipidemia. Lipid profile 19 April 2018 revealed  mg/dL and HDL 48 mg/dL.  The that she has tried on and not tolerated the following statins: Atorvastatin, simvastatin, pravastatin, and rosuvastatin.  She had been on ezetimibe in the past as well, but this was not efficacious in getting to target lipids.  I discussed with Alvina the use of Repatha  (evolocumab) and and although we pursued a preauthorization, her insurance was not sufficient to assist in the inordinate cost of the therapy ultimately she could  not afford it.     Follow-up in one year.         History of Present Illness    Once again I would like to thank you again for asking me to participate in the care of your patient, Alvina Maynard.  As you know, but to reiterate for my own records, Alvina Maynard is a 63 y.o. female with history of hypertension and coronary disease. She underwent coronary angiography 20 September 2012 and had successful stenting of the LAD (bare metal).      She had recurrent chest discomfort symptoms and ultimately underwent repeat angiography 19 October 2012 at which time she had minimal in-stent restenosis, though had an ostial diagonal lesion of 80%, though this was unchanged from previous study. The vessel was diminutive/small. She otherwise had mild-moderate disease. Medical therapy was recommended and there was some thought that perhaps there may be a component of vasospasm.     Patient underwent repeat angiography 4 December 2013. This revealed no new significant obstructive coronary disease.     In September 2015, the patient had reported some intermittent discomfort in the chest and a regadenoson nuclear perfusion study was performed for further evaluation. The EKG portion of the test was felt positive for ischemia though nuclear imaging did not show any disparate perfusion defects. Nonetheless, given the EKG changes coronary angiography was pursued 28 September 2015 which revealed mild to moderate coronary disease only without obstructive stenosis.     Alvina had a regadenoson nuclear perfusion study 15 August 2017 which revealed no evidence of ischemia.     Presently, Alvina reports that from a cardiovascular standpoint she is pleased with how she is performing.  She specifically denies chest pain or shortness of breath.  She reports no palpitations or lightheadedness.       Further review of systems is otherwise negative/noncontributory (medical record and 13 point review of systems reviewed as well and pertinent positives  "noted).         Cardiac Diagnostics      Echocardiogram 14 July 2017:  1. Normal left ventricular size and systolic performance with ejection fraction of 65%.  2. No significant valvular heart disease.  3. Normal right ventricular size and systolic performance.  4. Mild left atrial enlargement.  Right atrium of normal dimension.    Echocardiogram 22 September 2015:  1. Normal left ventricular size and systolic performance with ejection fraction of 60-65%.  2. No significant valvular heart disease.    Regadenoson nuclear perfusion study 15 August 2017:  1. No evidence of infarct or ischemia.  2. Normal left ventricular systolic performance with ejection fraction of 70%.    Regadenoson nuclear perfusion study 9 September 2015 (pre-coronary angiogram that was performed 28 September 2015):  1. Stress electrocardiogram positive for inducible ischemia.  2. Lexiscan stress nuclear imaging was negative for inducible ischemia or infarct.  3. Left ventricular systolic performance was normal with ejection fraction of 70%.    Coronary angiography 28 September 2015:  1. Mild-moderate coronary artery disease without significant obstructive stenosis.  2. Prior stent to proximal LAD without evidence of restenosis.  3. Left ventriculography revealed well-preserved LV systolic performance without wall motion abnormality.    Coronary angiogram performed 4 December 2013:  1. Moderate mid RCA disease.    2. There was mild codominant left circumflex disease.    3. Moderate proximal and mid LAD stenosis.    4. Ultimately, no intervention performed. Medical therapy with optimization given, non-obstructive coronary disease was recommended.             Physical Examination       /90 (Patient Site: Left Arm, Patient Position: Sitting, Cuff Size: Adult Large)   Pulse 64   Resp 16   Ht 5' 2\" (1.575 m)   Wt 205 lb (93 kg)   LMP 05/22/2002   BMI 37.49 kg/m          Wt Readings from Last 3 Encounters:   12/06/18 205 lb (93 kg) "   11/05/18 204 lb (92.5 kg)   09/27/18 200 lb (90.7 kg)     The patient is alert and oriented times three. Sclerae are anicteric. Mucosal membranes are moist. Jugular venous pressure is normal. No significant adenopathy/thyromegally appreciated. Lungs are clear with good expansion. On cardiovascular exam, the patient has a regular S1 and S2. Abdomen is soft and non-tender. Extremities reveal no clubbing, cyanosis, or edema.       Family History/Social History/Risk Factors   Patient does not smoke.  Family history of hypertension.         Medications  Allergies   Current Outpatient Medications   Medication Sig Dispense Refill   ? aspirin 81 MG EC tablet Take 81 mg by mouth bedtime.      ? azelastine (ASTELIN) 137 mcg (0.1 %) nasal spray INSTILL 2 SPRAYS IN EACH NOSTRIL TWICE A DAY AS NEEDED 90 mL 3   ? blood glucose test strips Use 1 each As Directed 2 (two) times a day. 100 strip 3   ? cholecalciferol, vitamin D3, 1,000 unit tablet Take 1,000 Units by mouth daily.     ? generic lancets (FINGERSTIX LANCETS) 1 each by In Vitro route 2 (two) times a day. 100 each 3   ? hydroCHLOROthiazide (HYDRODIURIL) 25 MG tablet TAKE 2 TABLETS(50 MG) BY MOUTH DAILY 180 tablet 0   ? lansoprazole (PREVACID) 30 MG capsule Take 1 capsule (30 mg total) by mouth 2 (two) times a day. 180 capsule 3   ? lisinopril (PRINIVIL,ZESTRIL) 20 MG tablet TAKE 1 TABLET BY MOUTH TWICE DAILY 180 tablet 0   ? MAGNESIUM GLYCINATE ORAL Take 400 mg by mouth daily.     ? methylcellulose (CITRUCEL) Take 2 g by mouth. Use as directed.  Take in the PM     ? metoprolol succinate (TOPROL-XL) 100 MG 24 hr tablet Take 1 tablet (100 mg total) by mouth 2 (two) times a day. 180 tablet 3   ? nitroglycerin (NITROSTAT) 0.4 MG SL tablet PLACE 1 TABLET UNDER THE TONGUE EVERY 5 MINUTES AS NEEDED FOR CHEST PAIN 3 Bottle 2   ? OMEGA-3/DHA/EPA/FISH OIL (FISH OIL-OMEGA-3 FATTY ACIDS) 300-1,000 mg capsule Take 2 g by mouth daily.     ? polyethylene glycol (MIRALAX) 17 gram  packet Take 17 g by mouth daily.     ? potassium chloride (K-DUR,KLOR-CON) 20 MEQ tablet TAKE 2 TABLETS(40 MEQ) BY MOUTH TWICE DAILY 360 tablet 3   ? SINGULAIR 10 mg tablet TAKE 1 TABLET BY MOUTH ONCE DAILY. 90 tablet 3   ? azithromycin (ZITHROMAX Z-LILIYA) 250 MG tablet Take 2 tablets (500 mg) on  Day 1,  followed by 1 tablet (250 mg) once daily on Days 2 through 5. 6 tablet 0   ? Lactobacillus rhamnosus GG (CULTURELLE) 10-15 Billion cell capsule Take 1 capsule by mouth daily.     ? UNABLE TO FIND Emu magic, omega magic emu oil Med Name:       No current facility-administered medications for this visit.       Allergies   Allergen Reactions   ? Mold Other (See Comments)     Asthma attack.   ? Nickel Hives     Nickel jewelry.   ? Other Environmental Allergy Other (See Comments)     Year round seasonal allergies.   ? Propylene Glycol Itching and Rash   ? Red Dye Rash     Any kind of red pills.   ? Atorvastatin Myalgia   ? Carvedilol Hives   ? Cephalosporins Hives   ? Clindamycin Hives   ? Dilantin Infatabs [Phenytoin] Hives   ? Levaquin [Levofloxacin] Myalgia   ? Lovastatin Myalgia   ? Meperidine Nausea And Vomiting   ? Metronidazole Hives   ? Penicillins Hives   ? Pravastatin Sodium Myalgia   ? Rosuvastatin Myalgia   ? Simvastatin Myalgia   ? Sulfa (Sulfonamide Antibiotics) Hives   ? Adhesive Tape-Silicones Rash     Had rash from a tape used in the dermatology office.          Lab Results   Lab Results   Component Value Date     11/05/2018    K 3.9 11/05/2018     11/05/2018    CO2 30 11/05/2018    BUN 16 11/05/2018    CREATININE 0.80 11/05/2018    CALCIUM 10.4 11/05/2018     Lab Results   Component Value Date    WBC 8.5 11/05/2018    WBC 9.0 09/15/2015    HGB 13.8 11/05/2018    HCT 41.2 11/05/2018    MCV 85 11/05/2018     11/05/2018     Lab Results   Component Value Date    CHOL 241 (H) 04/19/2018    TRIG 105 04/19/2018    HDL 48 (L) 04/19/2018    LDLCALC 172 (H) 04/19/2018    LDLDIRECT 188 (H)  02/29/2016     Lab Results   Component Value Date    INR 0.92 12/27/2013     Lab Results   Component Value Date    BNP 28 12/20/2012     Lab Results   Component Value Date    CKTOTAL 69 08/15/2018    CKTOTAL 62 10/02/2015    CKTOTAL 71 01/07/2010    TROPONINI <0.01 03/06/2017    TROPONINI <0.01 04/19/2015    TROPONINI <0.01 03/11/2014     Lab Results   Component Value Date    TSH 0.91 11/05/2018

## 2021-06-26 NOTE — PROGRESS NOTES
Progress Notes by Emilie Green MD at 9/27/2018 11:40 AM     Author: Emilie Green MD Service: -- Author Type: Physician    Filed: 9/29/2018  1:27 PM Encounter Date: 9/27/2018 Status: Signed    : Emilie Green MD (Physician)        PROGRESS NOTE       SUBJECTIVE:  Alvina Maynard is a 63 y.o. female   Chief Complaint   Patient presents with   ? Flu Vaccine     shingles, Td    ? Diabetes   ? Sinusitis     congestion headache, try sinus, no fever      Alvina has found it difficult to manage her congestion and allergies.  She was treated for a sinus infection by Dr. Simpson.  Levaquin 750 mg daily.  She states that this caused significant muscle pain especially in her legs but she did finish the course.  Her infection seemed to resolve.  Now she has had persistent congestion again and has noted blood in her nose with pain in the frontal sinus areas for about a week.  She also has headache.    Otherwise patient is here in follow-up of her diabetes screening which was recently positive.  See hemoglobin A1c tests below.  Her hemoglobin A1c kasia to 6.5 on August 15, 2018.  Otherwise they had previously been very mildly elevated.  Patient eats a very healthy diet, low in sugar and carbs.  She has worked hard and has lost 10 pounds since April.  She has not been on any steroid medications so it is not clear why her A1c is higher because she is doing all the right things.  Patient also has hypertension and hyperlipidemia.                 Hemoglobin A1c 3.5 - 6.0 % 6.5 (H) 6.2 (H) 6.4 (H) 6.0 6.2 (H)       Patient Active Problem List   Diagnosis   ? Vocal Cord Disorder   ? Benign Adenomatous Polyp Of The Large Intestine   ? Alopecia   ? Chest Pain   ? Myalgia And Myositis   ? Obesity   ? Snoring (Symptom)   ? Limb Swelling   ? Pain In The Leg (Below The Knee)   ? Anxiety   ? Contact Dermatitis   ? Stroke Syndrome   ? Aneurysm Of The Right Middle Cerebral Artery   ? Headache   ? Essential Hypertension   ?  Ischemic Embolic Stroke   ? Vertigo   ? Essential Hypercholesterolemia   ? Coronary Artery Disease   ? Asthma   ? Sleep Disturbances   ? Feeling Tired Or Poorly   ? Esophageal Reflux   ? Low back pain   ? Obstructive sleep apnea (adult) (pediatric)   ? Allergic contact dermatitis   ? External hemorrhoid       Current Outpatient Prescriptions   Medication Sig Dispense Refill   ? aspirin 81 MG EC tablet Take 81 mg by mouth bedtime.      ? azelastine (ASTELIN) 137 mcg (0.1 %) nasal spray INSTILL 2 SPRAYS IN EACH NOSTRIL TWICE A DAY AS NEEDED 90 mL 3   ? cholecalciferol, vitamin D3, 1,000 unit tablet Take 1,000 Units by mouth daily.     ? hydroCHLOROthiazide (HYDRODIURIL) 25 MG tablet TAKE 2 TABLETS(50 MG) BY MOUTH DAILY 180 tablet 1   ? Lactobacillus rhamnosus GG (CULTURELLE) 10-15 Billion cell capsule Take 1 capsule by mouth daily.     ? lansoprazole (PREVACID) 30 MG capsule TAKE 1 CAPSULE BY MOUTH TWICE DAILY 180 capsule 3   ? lisinopril (PRINIVIL,ZESTRIL) 20 MG tablet TAKE 1 TABLET BY MOUTH TWICE DAILY 180 tablet 1   ? MAGNESIUM GLYCINATE ORAL Take 400 mg by mouth daily.     ? methylcellulose (CITRUCEL) Take 2 g by mouth. Use as directed.  Take in the PM     ? metoprolol succinate (TOPROL-XL) 100 MG 24 hr tablet Take 1 tablet (100 mg total) by mouth 2 (two) times a day. 180 tablet 3   ? OMEGA-3/DHA/EPA/FISH OIL (FISH OIL-OMEGA-3 FATTY ACIDS) 300-1,000 mg capsule Take 2 g by mouth daily.     ? polyethylene glycol (MIRALAX) 17 gram packet Take 17 g by mouth daily.     ? potassium chloride (K-DUR,KLOR-CON) 20 MEQ tablet TAKE 2 TABLETS(40 MEQ) BY MOUTH TWICE DAILY 360 tablet 3   ? SINGULAIR 10 mg tablet TAKE 1 TABLET BY MOUTH ONCE DAILY. 90 tablet 3   ? nitroglycerin (NITROSTAT) 0.4 MG SL tablet PLACE 1 TABLET UNDER THE TONGUE EVERY 5 MINUTES AS NEEDED FOR CHEST PAIN 3 Bottle 2   ? UNABLE TO FIND Emu magic omega magic emu oil Med Name:       No current facility-administered medications for this visit.        History    Smoking Status   ? Never Smoker   Smokeless Tobacco   ? Never Used       REVIEW OF SYSTEMS:  Patient denies fever, chills, dizziness, headache, visual change, ear pain, cough, chest pain, shortness of breath, abdominal pain, extremity pain or swelling, rash,  depression or anxiety.          OBJECTIVE:       Vitals:    09/27/18 1149   BP: 130/80   Pulse: (!) 56   Temp: 98.3  F (36.8  C)   SpO2: 97%     Weight: 200 lb (90.7 kg)  Wt Readings from Last 3 Encounters:   09/27/18 200 lb (90.7 kg)   08/15/18 205 lb (93 kg)   07/25/18 205 lb 8 oz (93.2 kg)     Body mass index is 36.58 kg/(m^2).        Physical Exam:  GENERAL APPEARANCE: A&A, NAD, well hydrated, well nourished  SKIN:  Normal skin turgor, no lesions/rashes   NECK: Supple, without lymphadenopathy, no thyroid mass  CV: RRR, no M/G/R   LUNGS: CTAB, normal respiratory effort  ABDOMEN: S&NT, no masses, no organomegaly   EXTREMITY: Extremities normal, atraumatic, no swelling  NEURO: no gross deficits   PSYCHIATRIC:  Mood appropriate, memory intact        ASSESSMENT/PLAN:     1. DM II (diabetes mellitus, type II), controlled (H)  This is a new diagnosis for her although not unexpected given her other problems.  The focus now is for her to learn as much as she can.  - Ambulatory referral to Diabetes Education Program (Existing Diagnosis)  - Glycosylated Hemoglobin A1c    2. Essential hypertension  Lab work is pending.  - HM1(CBC and Differential)  - Basic Metabolic Panel  - HM1 (CBC with Diff)    3. Morbid obesity (H)  10 pound weight loss this year.  She is commended on this and will continue her healthy eating lifestyle.    4. Frontal sinusitis  We will treat with a azithromycin this time she will continue her allergy medications.  Since we are expecting colder weather allergies should improve.  - azithromycin (ZITHROMAX Z-LILIYA) 250 MG tablet; Take 2 tablets (500 mg) on  Day 1,  followed by 1 tablet (250 mg) once daily on Days 2 through 5.  Dispense: 6 tablet;  Refill: 0    5. Health care maintenance  Discussed all the vaccines below and patient gives full informed consent.    6. Need for influenza vaccination    - Influenza, Seasonal,Quad Inj, 36+ MOS    7. Need for varicella vaccine    - Varicella Zoster, Recombinant Vaccine IM    8. Need for tetanus booster    - Td, Adult, PF        Patient will work with the diabetic nurse educator and learn how to check her blood sugars daily.  She will return with these records to see me after completing her education.  I did not choose to start her on medication today for diabetes.  I spent a total of 35minutes face to face with the patient.  Over 50% of the time spent counseling and educating the patient about all of the above.      Emilie Green MD

## 2021-06-28 NOTE — PROGRESS NOTES
"Progress Notes by Latonia Rae MD at 11/20/2019  3:10 PM     Author: Latonia Rae MD Service: -- Author Type: Physician    Filed: 11/20/2019  4:06 PM Encounter Date: 11/20/2019 Status: Signed    : Latonia Rae MD (Physician)                                       Thank you Dr. Baez for asking the [facility] team to participate in the care of your patient, Alvina Maynard.     Impression and Plan     1. Coronary artery disease.  Alvina has known coronary artery disease as outlined in detail in history of present illness below.  Most recently, she underwent a regadenoson nuclear perfusion study 25 October 2019 that revealed no evidence of ischemia.        Patient is not reporting any chest pain symptoms at this time.    2.  Shortness of breath.  Etiology not entirely clear.  With the exception of some lower extremity edema, patient appears volume neutral on exam.  She had a chest x-ray radiograph performed 21 October 2019 which was \"negative\".  As aforementioned, recent ischemic work-up was unremarkable.  Patient may have some degree of impaired diastolic filling which may be causing some shortness of breath even in the absence of volume retention.  Recommend:    Echocardiogram to rule out any structural heart disease which may be a contributor and this may also allow for us to assess diastolic filling.    B-type natriuretic peptide to evaluate for any evidence of myocardial strain.      2. Hypertension.    Blood pressure fairly reasonable in the office today.  In addition, readings from home have been favorable recently as well.     3. Dyslipidemia. Lipid profile 19 April 2018 revealed  mg/dL and HDL 48 mg/dL.  The that she has tried on and not tolerated the following statins: Atorvastatin, simvastatin, pravastatin, and rosuvastatin.  I discussed with Alvina the use of Repatha  (evolocumab) and and although we pursued a preauthorization, her insurance was not " sufficient to assist in the inordinate cost of the therapy ultimately she could not afford it.    Continue ezetimibe.    Follow-up and further recommendations pending test results.         History of Present Illness    Once again I would like to thank you again for asking me to participate in the care of your patient, Alvina Maynard.  As you know, but to reiterate for my own records, Alvina Maynard is a 64 y.o. female with history of hypertension and coronary disease. She underwent coronary angiography 20 September 2012 and had successful stenting of the LAD (bare metal).      She had recurrent chest discomfort symptoms and ultimately underwent repeat angiography 19 October 2012 at which time she had minimal in-stent restenosis, though had an ostial diagonal lesion of 80%, though this was unchanged from previous study. The vessel was diminutive/small. She otherwise had mild-moderate disease. Medical therapy was recommended and there was some thought that perhaps there may be a component of vasospasm.     Patient underwent repeat angiography 4 December 2013. This revealed no new significant obstructive coronary disease.     In September 2015, the patient had reported some intermittent discomfort in the chest and a regadenoson nuclear perfusion study was performed for further evaluation. The EKG portion of the test was felt positive for ischemia though nuclear imaging did not show any disparate perfusion defects. Nonetheless, given the EKG changes coronary angiography was pursued 28 September 2015 which revealed mild to moderate coronary disease only without obstructive stenosis.     Alvina had a regadenoson nuclear perfusion study 15 August 2017 which revealed no evidence of ischemia.  Repeat nuclear perfusion imaging study more recently on 25 October 2019 once again revealed no evidence of infarct or ischemia.  Ejection fraction 70%.     Presently, Alvina reports she has subjective shortness of breath with activity.   She minimizes.  She reports no palpitations.  She had had some light headedness, but on her own volition reduced her nifedipine from 60 mg daily to 30 mg daily with improvement in the symptoms.    Further review of systems is otherwise negative/noncontributory (medical record and 13 point review of systems reviewed as well and pertinent positives noted).         Cardiac Diagnostics      Echocardiogram 14 July 2017:  1. Normal left ventricular size and systolic performance with ejection fraction of 65%.  2. No significant valvular heart disease.  3. Normal right ventricular size and systolic performance.  4. Mild left atrial enlargement.  Right atrium of normal dimension.    Echocardiogram 22 September 2015:  1. Normal left ventricular size and systolic performance with ejection fraction of 60-65%.  2. No significant valvular heart disease.    Regadenoson nuclear perfusion study 25 October 2019:  1. No evidence of infarct or ischemia.  2. Normal left ventricular systolic performance with ejection fraction of 70%.    Regadenoson nuclear perfusion study 15 August 2017:  1. No evidence of infarct or ischemia.  2. Normal left ventricular systolic performance with ejection fraction of 70%.    Regadenoson nuclear perfusion study 9 September 2015 (pre-coronary angiogram that was performed 28 September 2015):  1. Stress electrocardiogram positive for inducible ischemia.  2. Lexiscan stress nuclear imaging was negative for inducible ischemia or infarct.  3. Left ventricular systolic performance was normal with ejection fraction of 70%.    Coronary angiography 28 September 2015:  1. Mild-moderate coronary artery disease without significant obstructive stenosis.  2. Prior stent to proximal LAD without evidence of restenosis.  3. Left ventriculography revealed well-preserved LV systolic performance without wall motion abnormality.    Coronary angiogram performed 4 December 2013:  1. Moderate mid RCA disease.    2. There was mild  "codominant left circumflex disease.    3. Moderate proximal and mid LAD stenosis.    4. Ultimately, no intervention performed. Medical therapy with optimization given, non-obstructive coronary disease was recommended.           Physical Examination       /84 (Patient Site: Right Arm, Patient Position: Sitting, Cuff Size: Adult Large)   Pulse 80   Resp 16   Ht 5' 2.5\" (1.588 m)   Wt 218 lb (98.9 kg)   LMP 05/22/2002   BMI 39.24 kg/m          Wt Readings from Last 3 Encounters:   11/20/19 218 lb (98.9 kg)   10/22/19 215 lb 1 oz (97.6 kg)   10/21/19 216 lb 2 oz (98 kg)       The patient is alert and oriented times three. Sclerae are anicteric. Mucosal membranes are moist. Jugular venous pressure is normal. No significant adenopathy/thyromegally appreciated. Lungs are clear with good expansion. On cardiovascular exam, the patient has a regular S1 and S2. Abdomen is soft and non-tender. Extremities reveal no clubbing, cyanosis, mild to moderate lower extremity edema.         Imaging     Chest radiograph 21 October 2019 per radiology report: Negative chest.          Family History/Social History/Risk Factors   Patient does not smoke.  Family history reviewed, and family history includes Breast cancer in her maternal aunt; Diabetes in her mother; JEZ disease in her sister; Heart attack (age of onset: 60) in her mother; Heart disease in her maternal grandfather and mother; Hypertension in her father and mother; Stomach cancer in her mother.          Medications  Allergies   Current Outpatient Medications   Medication Sig Dispense Refill   ? aspirin 81 MG EC tablet Take 81 mg by mouth bedtime.      ? azelastine (ASTELIN) 137 mcg (0.1 %) nasal spray USE 2 SPRAYS IN EACH NOSTRIL TWICE DAILY AS NEEDED 90 mL 3   ? blood glucose test strips Use 1 each As Directed 2 (two) times a day. 100 strip 3   ? cetirizine (ZYRTEC) 10 MG tablet Take 1 tablet (10 mg total) by mouth daily. (Patient taking differently: Take 10 mg by " mouth as needed.       ) 30 tablet 2   ? ezetimibe (ZETIA) 10 mg tablet Take 1 tablet (10 mg total) by mouth daily. 30 tablet 11   ? fluticasone propionate (FLONASE) 50 mcg/actuation nasal spray SHAKE LQ AND U 1 SPR IEN QD  0   ? generic lancets (FINGERSTIX LANCETS) 1 each by In Vitro route 2 (two) times a day. 100 each 3   ? hydroCHLOROthiazide (HYDRODIURIL) 25 MG tablet TAKE 2 TABLETS(50 MG) BY MOUTH DAILY 180 tablet 2   ? Lactobacillus rhamnosus GG (CULTURELLE) 10-15 Billion cell capsule Take 1 capsule by mouth daily.     ? lansoprazole (PREVACID) 30 MG capsule Take 1 capsule (30 mg total) by mouth 2 (two) times a day. 180 capsule 3   ? losartan (COZAAR) 100 MG tablet Take 1 tablet (100 mg total) by mouth daily. 30 tablet 11   ? MAGNESIUM GLYCINATE ORAL Take 400 mg by mouth daily.     ? methylcellulose (CITRUCEL) Take 2 g by mouth. Use as directed.  Take in the PM     ? metoprolol succinate (TOPROL-XL) 100 MG 24 hr tablet TAKE 1 TABLET(100 MG) BY MOUTH TWICE DAILY 180 tablet 3   ? nitroglycerin (NITROSTAT) 0.4 MG SL tablet PLACE 1 TABLET UNDER THE TONGUE EVERY 5 MINUTES AS NEEDED FOR CHEST PAIN 1 Bottle 2   ? OMEGA-3/DHA/EPA/FISH OIL (FISH OIL-OMEGA-3 FATTY ACIDS) 300-1,000 mg capsule Take 2 g by mouth daily.     ? polyethylene glycol (MIRALAX) 17 gram packet Take 17 g by mouth daily.     ? potassium chloride (K-DUR,KLOR-CON) 20 MEQ tablet TAKE 2 TABLETS(40 MEQ) BY MOUTH TWICE DAILY 360 tablet 2   ? SINGULAIR 10 mg tablet TAKE 1 TABLET BY MOUTH ONCE DAILY. 90 tablet 1   ? NIFEdipine (PROCARDIA XL) 30 MG 24 hr tablet Take 1 tablet (30 mg total) by mouth daily. 90 tablet 3     No current facility-administered medications for this visit.       Allergies   Allergen Reactions   ? Lisinopril Angioedema   ? Mold Other (See Comments)     Asthma attack.   ? Nickel Hives     Nickel jewelry.   ? Other Environmental Allergy Other (See Comments)     Year round seasonal allergies.   ? Propylene Glycol Itching and Rash   ? Red  Dye Rash     Any kind of red pills.   ? Atorvastatin Myalgia   ? Carvedilol Hives   ? Cephalosporins Hives   ? Clindamycin Hives   ? Dilantin Infatabs [Phenytoin] Hives   ? Levaquin [Levofloxacin] Myalgia   ? Lovastatin Myalgia   ? Meperidine Nausea And Vomiting   ? Metronidazole Hives   ? Penicillins Hives   ? Pravastatin Sodium Myalgia   ? Rosuvastatin Myalgia   ? Simvastatin Myalgia   ? Sulfa (Sulfonamide Antibiotics) Hives   ? Adhesive Tape-Silicones Rash     Had rash from a tape used in the dermatology office.          Lab Results   Lab Results   Component Value Date     10/21/2019    K 4.1 10/21/2019     10/21/2019    CO2 30 10/21/2019    BUN 17 10/21/2019    CREATININE 0.77 10/21/2019    CALCIUM 10.9 (H) 10/21/2019     Lab Results   Component Value Date    WBC 8.3 10/21/2019    WBC 9.0 09/15/2015    HGB 13.5 10/21/2019    HCT 39.5 10/21/2019    MCV 86 10/21/2019     10/21/2019     Lab Results   Component Value Date    CHOL 228 (H) 07/26/2019    TRIG 178 (H) 07/26/2019    HDL 52 07/26/2019    LDLCALC 140 (H) 07/26/2019    LDLDIRECT 188 (H) 02/29/2016     Lab Results   Component Value Date    INR 0.92 12/27/2013     Lab Results   Component Value Date    BNP 28 12/20/2012     Lab Results   Component Value Date    CKTOTAL 69 08/15/2018    CKTOTAL 62 10/02/2015    CKTOTAL 71 01/07/2010    TROPONINI <0.01 10/22/2019    TROPONINI <0.01 03/06/2017    TROPONINI <0.01 04/19/2015     Lab Results   Component Value Date    TSH 0.91 11/05/2018

## 2021-06-28 NOTE — PROGRESS NOTES
Progress Notes by Brigitte Cole MD at 10/22/2019  9:20 AM     Author: Brigitte Cole MD Service: -- Author Type: Physician    Filed: 10/22/2019 10:40 AM Encounter Date: 10/22/2019 Status: Signed    : Brigitte Cole MD (Physician)           Thank you, Dr. Baez, for asking the [facility] team to see Alvina Maynard in rapid access clinic.     Assessment/Recommendations   Assessment:    1.  Coronary artery disease: History of coronary artery disease with LAD PCI in 2012.  She has had recurrent chest pain since that time and angiogram last done in 2015 showed nonobstructive coronary artery disease.  Stress testing 2017- for inducible ischemia.  Currently complaining of recurrent chest pain over the past week with almost constant discomfort in the past few days.  Pain is reproducible palpation in the epigastric area but she also reports it comes on with walking.  Seems atypical nature however given her history we will proceed with Lexiscan nuclear stress testing.  2.  Hypertension: Poorly controlled today and has been mildly elevated on past visits.  3.  Dyslipidemia: Difficult to treat as she did not want to proceed with a PCSK9 inhibitor and reports intolerances to statin therapy.  She has not yet started her Zetia.    Plan:  1.  Increase nifedipine to 60 mg daily for improved blood pressure control  2.  Start Zetia 10 mg daily for improved control of her dyslipidemia  3.  Lexiscan nuclear stress test  Primary cardiologist is Dr. Rae     History of Present Illness    Ms. Alvina Maynard is a 64 y.o. female with history of coronary artery disease status post PCI of LAD in 2012, hypertension, dyslipidemia not statin therapy due to intolerances rapid access clinic due to chest pain.  Her last angiogram done for chest pain was in 2015 that showed mild to moderate nonobstructive coronary artery disease.  She had a Lexiscan nuclear stress test in 2017 that showed no  evidence for inducible ischemia.  She reports that a couple weeks ago she started feeling some increased shortness of breath.  She reports that her allergies have been acting up lately.  Over the past few days the breathing has been worse with associated chest tightness.  She also complains of almost constant epigastric discomfort that feels sharp and is tender to palpation.  The pain in the chest is worse with moving her arms in a certain way and also seems to come on when she walks.  She was instructed to start study however has not started it because she is concerned about intolerances and is currently being started on new medication due to an ear infection.    Lexiscan nuclear stress test 8/15/17    The pharmacologic nuclear stress test is negative for inducible myocardial ischemia or infarction.    The left ventricular ejection fraction is greater than 70%.    When compared to the images of 9/9/2015, there has been no significant change.    Echocardiogram 7/14/2017    When compared to the previous study dated 9/24/2015, no significant change.    The estimated left ventricular ejection fraction is 65%.    The left ventricular wall motion is normal.    Left atrial volume is mildly increased.    No hemodynamically significant valvular heart abnormalities.    Coronary angiogram 9/8/2015  Diagnostic procedure:Left Heart Catheterization , Left   Ventriculography, Coronary Angiogram      Conclusions      Procedure Summary   Arterial access obtained via right radial artery. Balanced   dominant coronary arterial system. Mild atherosclerosis   identified but no significant stenoses present. A previous   stent in the proximal LAD coronary artery exhibits no   restenosisdue to hyperplasia or CAD . Normal LVEDP without   significant gradient on aortic valve pullback. LV systolic   function is normal with no segmental wall motion   abnormalities.      Recommendations   Patient given specific instructions regarding care of    arteriotomy site, activity restrictions, signs and symptoms of   cardiac or vascular complications and to seek immediate   medical evaluation should they occur. Arterial sheath was   removed from the right radial artery by TR Band.      Signatures      Electronically signed by SHAWANDA HOOD MD       Physical Examination Review of Systems   Vitals:    10/22/19 0911   BP: 142/90   Pulse: 70   Resp: 16   SpO2: 98%     Body mass index is 38.71 kg/m .  Wt Readings from Last 3 Encounters:   10/22/19 215 lb 1 oz (97.6 kg)   10/21/19 216 lb 2 oz (98 kg)   07/26/19 215 lb 9.6 oz (97.8 kg)     General Appearance:   alert, no apparent distress   HEENT:  no scleral icterus; the mucous membranes are pink and moist                               Neck: No jvd   Chest: the spine is straight and the chest is symmetric   Lungs:   respirations unlabored; the lungs are clear to auscultation   Cardiovascular:   regular rhythm with normal first and second heart sounds and no murmurs or gallops;  there are no carotid bruits.   Abdomen:  no organomegaly, masses, bruits, or tenderness; bowel sounds are present   Extremities: no edema   Skin: no xanthelasma, dry    A 12 point comprehensive review of systems was negative except as noted in the current history.       Medical History  Surgical History Family History Social History   Past Medical History:   Diagnosis Date   ? Arthritis    ? Brain aneurysm     removed on 12/31/13   ? Cancer (H)     basel cell   ? Cerebral vascular accident (H) 12/31/2013   ? Colitis    ? Coronary artery disease    ? Family history of myocardial infarction    ? Fibrocystic breast    ? High cholesterol    ? Hyperlipidemia    ? Hypertension    ? Sleep apnea     c-pap   ? Sleep apnea, obstructive    ? Stroke (H)     Past Surgical History:   Procedure Laterality Date   ? CARDIAC CATHETERIZATION  9/28/15    mild to moderate coronary disease only without obstructive stenosis   ? CEREBRAL ANEURYSM REPAIR  12/31/2013     [facility] Aneurysm clipping, Dr Harvey   ? CORONARY STENT PLACEMENT  9/20/12    bare metal stent in the proximal LAD    ? HYSTERECTOMY  2002   ? OOPHORECTOMY Bilateral 2002   ? MT HEMORRHOIDECTOMY INTERNAL RUBBER BAND LIGATIONS  1975   ? MT NASAL/SINUS ENDOSCOPY,BX/RMV POLYP/DEBRID  1998    Description: Nasal Endoscopy Polypectomy;   ? MT REMOVAL OF OVARY/TUBE(S)       Salpingo-oophorectomy Right Side   ? MT TOTAL ABDOM HYSTERECTOMY  08/01/2002    endometriosis    Family History   Problem Relation Age of Onset   ? Breast cancer Maternal Aunt    ? Hypertension Father    ? Hypertension Mother    ? Heart disease Mother    ? Diabetes Mother    ? Stomach cancer Mother    ? Heart attack Mother 60   ? Heart disease Maternal Grandfather    ? JEZ disease Sister     Social History     Socioeconomic History   ? Marital status: Single     Spouse name: Not on file   ? Number of children: Not on file   ? Years of education: Not on file   ? Highest education level: Not on file   Occupational History   ? Not on file   Social Needs   ? Financial resource strain: Not on file   ? Food insecurity:     Worry: Not on file     Inability: Not on file   ? Transportation needs:     Medical: Not on file     Non-medical: Not on file   Tobacco Use   ? Smoking status: Never Smoker   ? Smokeless tobacco: Never Used   Substance and Sexual Activity   ? Alcohol use: Yes   ? Drug use: No   ? Sexual activity: Not on file   Lifestyle   ? Physical activity:     Days per week: Not on file     Minutes per session: Not on file   ? Stress: Not on file   Relationships   ? Social connections:     Talks on phone: Not on file     Gets together: Not on file     Attends [employer] service: Not on file     Active member of club or organization: Not on file     Attends meetings of clubs or organizations: Not on file     Relationship status: Not on file   ? Intimate partner violence:     Fear of current or ex partner: Not on file     Emotionally abused: Not on file      Physically abused: Not on file     Forced sexual activity: Not on file   Other Topics Concern   ? Not on file   Social History Narrative   ? Not on file          Medications  Allergies   Scheduled Meds:  Continuous Infusions:  PRN Meds:. Allergies   Allergen Reactions   ? Lisinopril Angioedema   ? Mold Other (See Comments)     Asthma attack.   ? Nickel Hives     Nickel jewelry.   ? Other Environmental Allergy Other (See Comments)     Year round seasonal allergies.   ? Propylene Glycol Itching and Rash   ? Red Dye Rash     Any kind of red pills.   ? Atorvastatin Myalgia   ? Carvedilol Hives   ? Cephalosporins Hives   ? Clindamycin Hives   ? Dilantin Infatabs [Phenytoin] Hives   ? Levaquin [Levofloxacin] Myalgia   ? Lovastatin Myalgia   ? Meperidine Nausea And Vomiting   ? Metronidazole Hives   ? Penicillins Hives   ? Pravastatin Sodium Myalgia   ? Rosuvastatin Myalgia   ? Simvastatin Myalgia   ? Sulfa (Sulfonamide Antibiotics) Hives   ? Adhesive Tape-Silicones Rash     Had rash from a tape used in the dermatology office.         Lab Results    Chemistry/lipid CBC Cardiac Enzymes/BNP/TSH/INR   Lab Results   Component Value Date    CHOL 228 (H) 07/26/2019    HDL 52 07/26/2019    LDLCALC 140 (H) 07/26/2019    TRIG 178 (H) 07/26/2019    CREATININE 0.77 10/21/2019    BUN 17 10/21/2019    K 4.1 10/21/2019     10/21/2019     10/21/2019    CO2 30 10/21/2019    Lab Results   Component Value Date    WBC 8.3 10/21/2019    HGB 13.5 10/21/2019    HCT 39.5 10/21/2019    MCV 86 10/21/2019     10/21/2019    Lab Results   Component Value Date    CKTOTAL 69 08/15/2018    TROPONINI <0.01 03/06/2017    BNP 28 12/20/2012    TSH 0.91 11/05/2018    INR 0.92 12/27/2013

## 2021-06-28 NOTE — PROGRESS NOTES
Progress Notes by Latonia Rae MD at 1/13/2020  3:30 PM     Author: Latonia Rae MD Service: -- Author Type: Physician    Filed: 1/14/2020 12:11 PM Encounter Date: 1/13/2020 Status: Signed    : Latonia Rae MD (Physician)                                       Thank you Dr. Baez for asking the [facility] team to participate in the care of your patient, Alvina Maynard.     Impression and Plan     1. Coronary artery disease.  Alvina has known coronary artery disease as outlined in detail in history of present illness below.  Most recently, she underwent a regadenoson nuclear perfusion study 25 October 2019 that revealed no evidence of ischemia.         Patient is not reporting any chest pain symptoms at this time (atypical shoulder/arm discomfort only).        2. Hypertension.    Blood pressure fairly reasonable in the office today.  In addition, readings from home have been favorable recently as well.     3. Dyslipidemia. Lipid profile 19 April 2018 revealed  mg/dL and HDL 48 mg/dL.  The that she has tried on and not tolerated the following statins: Atorvastatin, simvastatin, pravastatin, and rosuvastatin.  I discussed with Alvina the use of Repatha  (evolocumab) and and although we pursued a preauthorization, her insurance was not sufficient to assist in the inordinate cost of the therapy and ultimately she could not afford it.    Initiate ezetimibe (we had planned on this at time of last visit though patient did not fill the prescription).     Plan on follow-up in 1 year.         History of Present Illness    Once again I would like to thank you again for asking me to participate in the care of your patient, Alvina Maynard.  As you know, but to reiterate for my own records, Alvina Maynard is a 64 y.o. female with history of hypertension and coronary disease. She underwent coronary angiography 20 September 2012 and had successful stenting of the LAD (bare  metal).      She had recurrent chest discomfort symptoms and ultimately underwent repeat angiography 19 October 2012 at which time she had minimal in-stent restenosis, though had an ostial diagonal lesion of 80%, though this was unchanged from previous study. The vessel was diminutive/small. She otherwise had mild-moderate disease. Medical therapy was recommended and there was some thought that perhaps there may be a component of vasospasm.     Patient underwent repeat angiography 4 December 2013. This revealed no new significant obstructive coronary disease.     In September 2015, the patient had reported some intermittent discomfort in the chest and a regadenoson nuclear perfusion study was performed for further evaluation. The EKG portion of the test was felt positive for ischemia though nuclear imaging did not show any disparate perfusion defects. Nonetheless, given the EKG changes coronary angiography was pursued 28 September 2015 that revealed mild to moderate coronary disease only without obstructive stenosis.     Alvina had a regadenoson nuclear perfusion study 15 August 2017 that revealed no evidence of ischemia.  Repeat nuclear perfusion imaging study more recently on 25 October 2019 once again revealed no evidence of infarct or ischemia.  Ejection fraction 70%.     On interview today, Alvina reports some continued intermittent chest discomfort though this does seem positional and worse with movement of the arm and the like.  No new concerning symptoms.  Breathing is at times somewhat short, but unchanged from last visit.    Further review of systems is otherwise negative/noncontributory (medical record and 13 point review of systems reviewed as well and pertinent positives noted).         Cardiac Diagnostics      Echocardiogram 10 December 2019:  1. Normal left ventricular size and systolic performance with ejection fraction of 60 to 65%.  2. No significant valvular heart disease.  3. Normal right  ventricular size and systolic performance.  4. Mild left atrial enlargement.  Right atrium of normal dimension.     Echocardiogram 14 July 2017:  1. Normal left ventricular size and systolic performance with ejection fraction of 65%.  2. No significant valvular heart disease.  3. Normal right ventricular size and systolic performance.  4. Mild left atrial enlargement.  Right atrium of normal dimension.    Echocardiogram 22 September 2015:  1. Normal left ventricular size and systolic performance with ejection fraction of 60-65%.  2. No significant valvular heart disease.    Regadenoson nuclear perfusion study 25 October 2019:  1. No evidence of infarct or ischemia.  2. Normal left ventricular systolic performance with ejection fraction of 70%.    Regadenoson nuclear perfusion study 15 August 2017:  1. No evidence of infarct or ischemia.  2. Normal left ventricular systolic performance with ejection fraction of 70%.    Regadenoson nuclear perfusion study 9 September 2015 (pre-coronary angiogram that was performed 28 September 2015):  1. Stress electrocardiogram positive for inducible ischemia.  2. Lexiscan stress nuclear imaging was negative for inducible ischemia or infarct.  3. Left ventricular systolic performance was normal with ejection fraction of 70%.    Coronary angiography 28 September 2015:  1. Mild-moderate coronary artery disease without significant obstructive stenosis.  2. Prior stent to proximal LAD without evidence of restenosis.  3. Left ventriculography revealed well-preserved LV systolic performance without wall motion abnormality.    Coronary angiogram performed 4 December 2013:  1. Moderate mid RCA disease.    2. There was mild codominant left circumflex disease.    3. Moderate proximal and mid LAD stenosis.    4. Ultimately, no intervention performed. Medical therapy with optimization given, non-obstructive coronary disease was recommended.            Physical Examination       /82  "(Patient Site: Left Arm, Patient Position: Sitting, Cuff Size: Adult Large)   Pulse 72   Resp 14   Ht 5' 2.5\" (1.588 m)   Wt 218 lb (98.9 kg)   LMP 05/22/2002   BMI 39.24 kg/m          Wt Readings from Last 3 Encounters:   01/13/20 218 lb (98.9 kg)   12/23/19 215 lb 12.8 oz (97.9 kg)   12/10/19 218 lb (98.9 kg)     The patient is alert and oriented times three. Sclerae are anicteric. Mucosal membranes are moist. Jugular venous pressure is normal. No significant adenopathy/thyromegally appreciated. Lungs are clear with good expansion. On cardiovascular exam, the patient has a regular S1 and S2. Abdomen is soft and non-tender. Extremities reveal no clubbing, cyanosis, or edema.         Family History/Social History/Risk Factors   Patient does not smoke.  Family history reviewed, and family history includes Breast cancer in her maternal aunt; Diabetes in her mother; JEZ disease in her sister; Heart attack (age of onset: 60) in her mother; Heart disease in her maternal grandfather and mother; Hypertension in her father and mother; Stomach cancer in her mother.          Medications  Allergies   Current Outpatient Medications   Medication Sig Dispense Refill   ? aspirin 81 MG EC tablet Take 81 mg by mouth bedtime.      ? azelastine (ASTELIN) 137 mcg (0.1 %) nasal spray USE 2 SPRAYS IN EACH NOSTRIL TWICE DAILY AS NEEDED 90 mL 3   ? cetirizine (ZYRTEC) 10 MG tablet Take 1 tablet (10 mg total) by mouth daily. (Patient taking differently: Take 10 mg by mouth as needed.       ) 30 tablet 2   ? ezetimibe (ZETIA) 10 mg tablet Take 1 tablet (10 mg total) by mouth daily. 90 tablet 3   ? fluticasone propionate (FLONASE) 50 mcg/actuation nasal spray SHAKE LQ AND U 1 SPR IEN QD  0   ? hydroCHLOROthiazide (HYDRODIURIL) 25 MG tablet TAKE 2 TABLETS(50 MG) BY MOUTH DAILY 180 tablet 2   ? Lactobacillus rhamnosus GG (CULTURELLE) 10-15 Billion cell capsule Take 1 capsule by mouth daily.     ? lansoprazole (PREVACID) 30 MG capsule Take " 1 capsule (30 mg total) by mouth 2 (two) times a day. 180 capsule 3   ? losartan (COZAAR) 100 MG tablet Take 1 tablet (100 mg total) by mouth daily. 30 tablet 11   ? MAGNESIUM GLYCINATE ORAL Take 400 mg by mouth daily.     ? methylcellulose (CITRUCEL) Take 2 g by mouth. Use as directed.  Take in the PM     ? metoprolol succinate (TOPROL-XL) 100 MG 24 hr tablet TAKE 1 TABLET(100 MG) BY MOUTH TWICE DAILY 180 tablet 3   ? NIFEdipine (PROCARDIA XL) 30 MG 24 hr tablet Take 1 tablet (30 mg total) by mouth daily. 90 tablet 3   ? nitroglycerin (NITROSTAT) 0.4 MG SL tablet PLACE 1 TABLET UNDER THE TONGUE EVERY 5 MINUTES AS NEEDED FOR CHEST PAIN 1 Bottle 2   ? OMEGA-3/DHA/EPA/FISH OIL (FISH OIL-OMEGA-3 FATTY ACIDS) 300-1,000 mg capsule Take 2 g by mouth daily.     ? polyethylene glycol (MIRALAX) 17 gram packet Take 17 g by mouth daily.     ? potassium chloride (K-DUR,KLOR-CON) 20 MEQ tablet TAKE 2 TABLETS(40 MEQ) BY MOUTH TWICE DAILY 360 tablet 2   ? SINGULAIR 10 mg tablet TAKE 1 TABLET BY MOUTH EVERY DAY 90 tablet 3   ? blood glucose test strips Use 1 each As Directed 2 (two) times a day. 100 strip 3   ? generic lancets (FINGERSTIX LANCETS) 1 each by In Vitro route 2 (two) times a day. 100 each 3     No current facility-administered medications for this visit.       Allergies   Allergen Reactions   ? Lisinopril Angioedema   ? Mold Other (See Comments)     Asthma attack.   ? Nickel Hives     Nickel jewelry.   ? Other Environmental Allergy Other (See Comments)     Year round seasonal allergies.   ? Propylene Glycol Itching and Rash   ? Red Dye Rash     Any kind of red pills.   ? Atorvastatin Myalgia   ? Carvedilol Hives   ? Cephalosporins Hives   ? Clindamycin Hives   ? Dilantin Infatabs [Phenytoin] Hives   ? Levaquin [Levofloxacin] Myalgia   ? Lovastatin Myalgia   ? Meperidine Nausea And Vomiting   ? Metronidazole Hives   ? Penicillins Hives   ? Pravastatin Sodium Myalgia   ? Rosuvastatin Myalgia   ? Simvastatin Myalgia   ?  Sulfa (Sulfonamide Antibiotics) Hives   ? Adhesive Tape-Silicones Rash     Had rash from a tape used in the dermatology office.          Lab Results   Lab Results   Component Value Date     10/21/2019    K 4.1 10/21/2019     10/21/2019    CO2 30 10/21/2019    BUN 17 10/21/2019    CREATININE 0.77 10/21/2019    CALCIUM 10.9 (H) 10/21/2019     Lab Results   Component Value Date    WBC 8.3 10/21/2019    WBC 9.0 09/15/2015    HGB 13.5 10/21/2019    HCT 39.5 10/21/2019    MCV 86 10/21/2019     10/21/2019     Lab Results   Component Value Date    CHOL 228 (H) 07/26/2019    TRIG 178 (H) 07/26/2019    HDL 52 07/26/2019    LDLCALC 140 (H) 07/26/2019    LDLDIRECT 188 (H) 02/29/2016     Lab Results   Component Value Date    INR 0.92 12/27/2013     Lab Results   Component Value Date    BNP 28 11/20/2019     Lab Results   Component Value Date    CKTOTAL 69 08/15/2018    CKTOTAL 62 10/02/2015    CKTOTAL 71 01/07/2010    TROPONINI <0.01 10/22/2019    TROPONINI <0.01 03/06/2017    TROPONINI <0.01 04/19/2015     Lab Results   Component Value Date    TSH 0.91 11/05/2018

## 2021-06-30 NOTE — PROGRESS NOTES
Progress Notes by Jarrell Alvarez PA-C at 12/20/2020  1:30 PM     Author: Jarrell Alvarez PA-C Service: -- Author Type: Physician Assistant    Filed: 12/22/2020  1:33 PM Encounter Date: 12/20/2020 Status: Signed    : Jarrell Alvarez PA-C (Physician Assistant)       Chief Complaint   Patient presents with   ? Ear Drainage     started 12/20/20 right ear bleeding used qtip on outside of ear 845 and noticed dry blood on neck around 1030       HPI:  Alvina Maynard is a 65 y.o. female who presents today complaining of bloody otorrhea from the right ear since this morning at around 8:45 AM.  Patient recounts a past medical history for using a Q-tip to clean her outside ear canal.  She specifically states she did not stick a Q-tip farther into the middle ear and did not have pain.  Subsequently she noticed that there was bleeding out of the right ear and she was unable to stop the bleeding and it has continued to drain.  She is now presenting to the urgent care for evaluation of the drainage out of the right ear.  She has not had any similar symptoms or problems out of the nonaffected left ear.  Currently, she denies hearing loss, balance deficits fever chills night sweats fatigue or pain.     History obtained from the patient.    Problem List:  2020-05: Type 2 diabetes mellitus with other circulatory complication,  without long-term current use of insulin (H)  2019-07: S/P hysterectomy  2018-09: Obesity (BMI 35.0-39.9) with comorbidity (H)  2016-06: External hemorrhoid  2016-05: Allergic contact dermatitis  2015-09: Obstructive sleep apnea (adult) (pediatric)  2015-04: Posterior cervical adenopathy  2015-04: Low back pain  Vocal Cord Disorder  Benign Adenomatous Polyp Of The Large Intestine  Alopecia  Chest Pain  Myalgia And Myositis  Abnormal weight gain  Shortness of breath  Snoring (Symptom)  Limb Swelling  Acute Sinusitis  Pain In The Leg (Below The Knee)  Anxiety  Contact Dermatitis  Aneurysm Of The Right Middle  Cerebral Artery  Headache  Blurry Vision  Essential Hypertension  Ischemic Embolic Stroke  Vertigo  Groin (Inguinal) Pain Right Side  Essential Hypercholesterolemia  Coronary Artery Disease  Asthma  Numbness (Hypesthesia)  Feeling Tired Or Poorly  Esophageal Reflux  Statin intolerance      Past Medical History:   Diagnosis Date   ? Arthritis    ? Benign Adenomatous Polyp Of The Large Intestine    ? Brain aneurysm     removed on 12/31/13   ? Cancer (H)     basel cell   ? Cerebral vascular accident (H) 12/31/2013   ? Colitis    ? Coronary artery disease    ? External hemorrhoid 6/15/2016   ? Fibrocystic breast    ? Hyperlipidemia    ? Hypertension    ? Ischemic Embolic Stroke    ? Myalgia And Myositis    ? S/P hysterectomy 7/28/2019    Total hysterectomy with BSO benign disease, edometriosis?   ? Sleep apnea, obstructive     c-pap   ? Statin intolerance    ? Stroke (H)        Social History     Tobacco Use   ? Smoking status: Never Smoker   ? Smokeless tobacco: Never Used   Substance Use Topics   ? Alcohol use: Yes       Review of Systems    Vitals:    12/20/20 1329   BP: 173/88   Pulse: 74   Resp: 18   Temp: 98.4  F (36.9  C)   TempSrc: Oral   SpO2: 98%   Weight: 213 lb (96.6 kg)       Physical Exam    General: Patient is resting comfortably no acute distress is afebrile  HEENT: Head is normocephalic atraumatic   eyes are PERRL EOMI sclera anicteric   TMs on the right is inspected and is clear.  There is no overt effusion.  No fluid or noted perforation of the tympanic membrane.  EAC on the right is with a blood clot in the external auditory canal.  This is removed with direct visualization and a Q-tip.  Note is made on the inferior lateral aspect of the mid external auditory canal that has what appears to be a cystic sac that has ruptured some sebaceous material and then bleeding..  Use of 1% lidocaine with epinephrine and a ear wick was placed into the external auditory canal for 15 minutes for tamponade and  vasoconstriction effect with the epinephrine.  After an adequate amount of time this was gently removed and indeed the bleeding did stop.  I was able to more effectively remove any residual blood and visualize the tympanic membrane once again.  It appeared that the bleeding had stopped.  Patient was very comfortable during this exam and during the entire procedure.  TM on the left is clear, without effusion.  EAC on the left is with no cerumen  Throat is without pharyngeal wall erythema and no exudate  No cervical lymphadenopathy present  LUNGS: Clear to auscultation bilaterally  HEART: Regular rate and rhythm  Skin: Without rash non-diaphoretic    Clinical Decision Making:  Had a conversation with patient stating that my initial impression is that this was a sebaceous cyst that she may have ruptured with the Q-tip.  She then had subsequent bleeding.  Bleeding was brought under control and she should do very well with this at home.  She is given antibiotic drops and will have close follow-up with ENT for reevaluation of the ear and if there is further debridement that is needed in the external auditory canal.  My intention is that the ruptured sebaceous cyst in the external auditory canal is reevaluated since the patient is a type II diabetic and may be prone to infection.  Questions were answered to patient's satisfaction before discharge.    At the end of the encounter, I discussed results, diagnosis, medications. Discussed red flags for immediate return to clinic/ER, as well as indications for follow up if no improvement. Patient understood and agreed to plan. Patient was stable for discharge.    1. Abrasion of right ear canal, initial encounter  ofloxacin (FLOXIN) 0.3 % otic solution    Ambulatory referral to ENT   2. Dermoid cyst of ear, right  ofloxacin (FLOXIN) 0.3 % otic solution    Ambulatory referral to ENT   3. Type 2 diabetes mellitus with other circulatory complication, without long-term current use of  insulin (H)  ofloxacin (FLOXIN) 0.3 % otic solution    Ambulatory referral to ENT         Patient Instructions   Keep the ear clean dry and protected.  Do not place anything into the ear canal to include Q-tips.  Use of the topical antibiotic drops.  Close follow-up with ENT for reevaluation and treatment.  You may return to the emergency room or walk-in care if new symptoms or concerns arise in the interim.      Patient Education     Epidermoid Cyst (Sebaceous Cyst), Infected (Incision and Drainage)  You have an epidermoid cyst. This is a small, painless lump under your skin. An epidermoid cyst (often called a sebaceous cyst, epidermal cyst, or epidermal inclusion cyst) is a term most often used for 2 similar types of cysts:    Epidermoid cysts. These cysts form slowly under the skin. They can be found on most parts of the body. But they are most often found on areas with more hair such as the scalp, face, upper back, and genitals.    Pilar cysts. These are similar to epidermoid cysts. But they start from a different part of the hair follicle. They are more likely to be on the scalp.  Some general facts about these cysts:    A cyst is a sac filled with material that is often cheesy, fatty, oily, or stringy. The material inside them can be thick. Or it can be a thin liquid.    You can usually move the cyst slightly if you try.    The cysts can be smaller than a pea or as large as a few inches.    The cysts are usually not painful, unless they become inflamed or infected.    The area around the cyst may smell bad. If the cyst breaks open, the material inside it often smells bad too.  Your cyst became inflamed or infected and your healthcare provider wanted to drain it. Gauze packing may have been inserted into the cyst opening (cavity). This keeps the cyst open so it doesnt seal up before it has time to drain more. No matter how well it was cleaned out, no cleaning is perfect. The packing will need to be  removed.  Once the pus is drained, antibiotics may not be needed unless the infection has spread into the skin around the wound. The wound will take about 1 to 2 weeks to heal, depending on the size of the abscess.  Home care  The following will help you care for your wound at home:    The wound may drain for the first 2 days. Cover the opening with a clean dry bandage. If the dressing becomes soaked with blood or pus, change it.    If a gauze packing was placed inside the opening of the cyst, it will need to be removed. Your healthcare provider will usually do this after 2 days. If it falls out sooner, do not try to put it back inside the wound. Once the packing is removed, you should wash the area carefully in the shower once a day, until the skin opening has closed. This could take up to 5 days depending on the size of the cyst. It is good to direct the shower spray directly into the opening if this is not too painful.    If you were prescribed antibiotics, take them as directed until they are all used up.    You may use over-the-counter pain medicine to control pain, unless another medicine was given. If you have chronic liver or kidney disease or ever had a stomach ulcer or GI bleeding, talk with your provider before using these medicines.  Prevention  Once this infection has healed, use these prevention tips to avoid another infection:    Keep the cyst opening clean by bathing or showering daily.    Avoid tight-fitting clothing in the cyst area.    Watch for the signs of infection listed below so that treatment may be started early.  Follow-up care  Follow up with your healthcare provider, or as advised. If a gauze packing was put in your wound, it should be removed as instructed by your healthcare provider. Check your wound every day for the signs listed below.  When to seek medical advice  Call your healthcare provider right away if any of these occur:    Pus continues to come from the cyst 2 days after the  incision and drainage    Increasing redness around the wound.    Increasing local pain or swelling    Fever of 100.4 F (38 C) or higher, or as directed by your provider  Date Last Reviewed: 10/1/2016    9141-7719 [employer]. [address] [place] [address], [place] [address]. All rights reserved. This information is not intended as a substitute for professional medical care. Always follow your healthcare professional's instructions.

## 2021-06-30 NOTE — PROGRESS NOTES
Progress Notes by Latonia Rae MD at 5/4/2021  4:10 PM     Author: Latonia Rae MD Service: -- Author Type: Physician    Filed: 5/4/2021  4:25 PM Encounter Date: 5/4/2021 Status: Signed    : Latonia Rae MD (Physician)                                       Thank you Dr. Baez for asking the [facility] team to participate in the care of your patient, Alvina Maynard.     Impression and Plan     1. Coronary artery disease.  Alvina has known coronary artery disease as outlined in detail in history of present illness below.  Most recently, she underwent a regadenoson nuclear perfusion study 25 October 2019 that revealed no evidence of ischemia.         Patient is not reporting any chest pain symptoms at this time (atypical shoulder/arm discomfort only).        2. Hypertension.    Blood pressures have been fairly reasonable as of late on her home blood pressure monitoring device.  Blood pressure today fairly reasonable as well.    3. Dyslipidemia.  Lipid profile 22 December 2020 revealed  mg/dL and HDL 57 mg/dL.  The that she has tried on and not tolerated the following statins: Atorvastatin, simvastatin, pravastatin, and rosuvastatin.  I discussed with Alvina the use of Repatha  (evolocumab) and and although we pursued a preauthorization, her insurance was not sufficient to assist in the inordinate cost of the therapy and ultimately she could not afford it.      Continue ezetimibe (patient actually had filled the prescription but as of yet has not taken it.  Is on doing so now).    Plan on follow-up in 1 year.    25 minutes spent reviewing prior records (including documentation, laboratory studies, cardiac testing/imaging), interview with patient along with physical exam, planning, and subsequent documentation/crafting of note.           History of Present Illness    Once again I would like to thank you again for asking me to participate in the care of your  patient, Alvina Maynard.  As you know, but to reiterate for my own records, Alvina Maynard is a 65 y.o. female with history of hypertension and coronary disease. She underwent coronary angiography 20 September 2012 and had successful stenting of the LAD (bare metal).      She had recurrent chest discomfort symptoms and ultimately underwent repeat angiography 19 October 2012 at which time she had minimal in-stent restenosis, though had an ostial diagonal lesion of 80%, though this was unchanged from previous study. The vessel was diminutive/small. She otherwise had mild-moderate disease. Medical therapy was recommended and there was some thought that perhaps there may be a component of vasospasm.     Patient underwent repeat angiography 4 December 2013. This revealed no new significant obstructive coronary disease.     In September 2015, the patient had reported some intermittent discomfort in the chest and a regadenoson nuclear perfusion study was performed for further evaluation. The EKG portion of the test was felt positive for ischemia though nuclear imaging did not show any disparate perfusion defects. Nonetheless, given the EKG changes coronary angiography was pursued 28 September 2015 that revealed mild to moderate coronary disease only without obstructive stenosis.     Alvina had a regadenoson nuclear perfusion study 15 August 2017 that revealed no evidence of ischemia.  Repeat nuclear perfusion imaging study more recently on 25 October 2019 once again revealed no evidence of infarct or ischemia.  Ejection fraction 70%.     On interview today, Alvina states that she has been doing well from a cardiac standpoint.  She specifically denies chest pain or shortness of breath.  No palpitations or lightheadedness.  No fevers, chills, or other constitutional symptoms.  Further review of systems is otherwise negative/noncontributory (medical record and 13 point review of systems reviewed as well and pertinent positives  noted).         Cardiac Diagnostics      Echocardiogram 10 December 2019:  1. Normal left ventricular size and systolic performance with ejection fraction of 60 to 65%.  2. No significant valvular heart disease.  3. Normal right ventricular size and systolic performance.  4. Mild left atrial enlargement.  Right atrium of normal dimension.    Echocardiogram 14 July 2017:  1. Normal left ventricular size and systolic performance with ejection fraction of 65%.  2. No significant valvular heart disease.  3. Normal right ventricular size and systolic performance.  4. Mild left atrial enlargement.  Right atrium of normal dimension.    Echocardiogram 22 September 2015:  1. Normal left ventricular size and systolic performance with ejection fraction of 60-65%.  2. No significant valvular heart disease.    Regadenoson nuclear perfusion study 25 October 2019:  1. No evidence of infarct or ischemia.  2. Normal left ventricular systolic performance with ejection fraction of 70%.    Regadenoson nuclear perfusion study 15 August 2017:  1. No evidence of infarct or ischemia.  2. Normal left ventricular systolic performance with ejection fraction of 70%.    Regadenoson nuclear perfusion study 9 September 2015 (pre-coronary angiogram that was performed 28 September 2015):  1. Stress electrocardiogram positive for inducible ischemia.  2. Lexiscan stress nuclear imaging was negative for inducible ischemia or infarct.  3. Left ventricular systolic performance was normal with ejection fraction of 70%.    Coronary angiography 28 September 2015:  1. Mild-moderate coronary artery disease without significant obstructive stenosis.  2. Prior stent to proximal LAD without evidence of restenosis.  3. Left ventriculography revealed well-preserved LV systolic performance without wall motion abnormality.    Coronary angiogram performed 4 December 2013:  1. Moderate mid RCA disease.    2. There was mild codominant left circumflex disease.     3. Moderate proximal and mid LAD stenosis.    4. Ultimately, no intervention performed. Medical therapy with optimization given, non-obstructive coronary disease was recommended.           Physical Examination       /72 (Patient Site: Left Arm, Patient Position: Sitting, Cuff Size: Adult Regular)   Pulse (!) 59   Resp 18   Wt 198 lb (89.8 kg)   LMP 05/22/2002   SpO2 98%   BMI 36.21 kg/m          Wt Readings from Last 3 Encounters:   05/04/21 198 lb (89.8 kg)   04/16/21 199 lb (90.3 kg)   03/24/21 200 lb (90.7 kg)     The patient is alert and oriented times three. Sclerae are anicteric. Mucosal membranes are moist. Jugular venous pressure is normal. No significant adenopathy/thyromegally appreciated. Lungs are clear with good expansion. On cardiovascular exam, the patient has a regular S1 and S2. Abdomen is soft and non-tender. Extremities reveal no clubbing, cyanosis, or edema.         Family History/Social History/Risk Factors   Patient does not smoke.  Family history reviewed, and family history includes Breast cancer in her maternal aunt; Diabetes in her mother; JEZ disease in her sister; Heart attack (age of onset: 60) in her mother; Heart disease in her maternal grandfather and mother; Hypertension in her father and mother; Stomach cancer in her mother.          Medications  Allergies   Current Outpatient Medications   Medication Sig Dispense Refill   ? aspirin 81 MG EC tablet Take 81 mg by mouth bedtime.      ? azelastine (ASTELIN) 137 mcg (0.1 %) nasal spray USE 2 SPRAYS IN EACH NOSTRIL TWICE DAILY AS NEEDED 90 mL 3   ? cetirizine (ZYRTEC) 10 MG tablet Take 1 tablet (10 mg total) by mouth as needed.     ? cholecalciferol, vitamin D3, (VITAMIN D3) 50 mcg (2,000 unit) Tab Take 1 tablet (2,000 Units total) by mouth daily. 100 tablet 3   ? cimetidine (TAGAMET) 200 MG tablet Take 200 mg by mouth 2 (two) times a day.     ? fluticasone propionate (FLONASE) 50 mcg/actuation nasal spray SHAKE LQ AND U 1  SPR IEN QD  0   ? hydroCHLOROthiazide (HYDRODIURIL) 25 MG tablet TAKE 2 TABLETS(50 MG) BY MOUTH DAILY 180 tablet 0   ? losartan (COZAAR) 100 MG tablet Take 1 tablet (100 mg total) by mouth daily. 90 tablet 2   ? MAGNESIUM GLYCINATE ORAL Take 400 mg by mouth daily.     ? methylcellulose (CITRUCEL) Take 2 g by mouth. Use as directed.  Take in the PM     ? metoprolol succinate (TOPROL-XL) 100 MG 24 hr tablet TAKE 1 TABLET(100 MG) BY MOUTH TWICE DAILY 180 tablet 2   ? NIFEdipine (PROCARDIA XL) 30 MG 24 hr tablet TAKE 1 TABLET(30 MG) BY MOUTH DAILY 90 tablet 3   ? nitroglycerin (NITROSTAT) 0.4 MG SL tablet PLACE 1 TABLET UNDER THE TONGUE EVERY 5 MINUTES AS NEEDED FOR CHEST PAIN 1 Bottle 2   ? OMEGA-3/DHA/EPA/FISH OIL (FISH OIL-OMEGA-3 FATTY ACIDS) 300-1,000 mg capsule Take 2 g by mouth daily.     ? polyethylene glycol (MIRALAX) 17 gram packet Take 17 g by mouth daily.     ? potassium chloride (K-DUR,KLOR-CON) 20 MEQ tablet TAKE 2 TABLETS(40 MEQ) BY MOUTH TWICE DAILY 360 tablet 3   ? SINGULAIR 10 mg tablet TAKE 1 TABLET BY MOUTH EVERY DAY 90 tablet 3   ? biotin 10 mg Tab Take by mouth.     ? ezetimibe (ZETIA) 10 mg tablet Take 1 tablet (10 mg total) by mouth daily. 90 tablet 3   ? ezetimibe (ZETIA) 10 mg tablet Take 1 tablet (10 mg total) by mouth daily. 90 tablet 3   ? Lactobacillus rhamnosus GG (CULTURELLE) 10-15 Billion cell capsule Take 1 capsule by mouth daily.       No current facility-administered medications for this visit.       Allergies   Allergen Reactions   ? Lisinopril Angioedema   ? Mold Other (See Comments)     Asthma attack.   ? Nickel Hives     Nickel jewelry.   ? Other Environmental Allergy Other (See Comments)     Year round seasonal allergies.   ? Propylene Glycol Itching and Rash   ? Red Dye Rash     Any kind of red pills.   ? Atorvastatin Myalgia   ? Carvedilol Hives   ? Cephalosporins Hives   ? Clindamycin Hives   ? Dilantin Infatabs [Phenytoin] Hives   ? Levaquin [Levofloxacin] Myalgia   ?  Lovastatin Myalgia   ? Meperidine Nausea And Vomiting   ? Metronidazole Hives   ? Penicillins Hives   ? Pravastatin Sodium Myalgia   ? Rosuvastatin Myalgia   ? Simvastatin Myalgia   ? Sulfa (Sulfonamide Antibiotics) Hives   ? Adhesive Tape-Silicones Rash     Had rash from a tape used in the dermatology office.          Lab Results   Lab Results   Component Value Date     12/22/2020    K 4.0 12/22/2020     12/22/2020    CO2 27 12/22/2020    BUN 18 12/22/2020    CREATININE 0.94 12/22/2020    CALCIUM 10.5 12/22/2020     Lab Results   Component Value Date    WBC 7.2 05/01/2020    WBC 9.0 09/15/2015    HGB 13.7 05/01/2020    HCT 41.4 05/01/2020    MCV 88 05/01/2020     05/01/2020     Lab Results   Component Value Date    CHOL 239 (H) 12/22/2020    TRIG 115 12/22/2020    HDL 57 12/22/2020    LDLCALC 159 (H) 12/22/2020    LDLDIRECT 188 (H) 02/29/2016     Lab Results   Component Value Date    INR 0.94 05/01/2020     Lab Results   Component Value Date    BNP 28 11/20/2019     Lab Results   Component Value Date    CKTOTAL 69 08/15/2018    CKTOTAL 62 10/02/2015    CKTOTAL 71 01/07/2010    TROPONINI <0.01 10/22/2019    TROPONINI <0.01 03/06/2017    TROPONINI <0.01 04/19/2015     Lab Results   Component Value Date    TSH 1.37 10/01/2020

## 2021-07-13 ENCOUNTER — RECORDS - HEALTHEAST (OUTPATIENT)
Dept: ADMINISTRATIVE | Facility: CLINIC | Age: 66
End: 2021-07-13

## 2021-07-13 ENCOUNTER — TELEPHONE (OUTPATIENT)
Dept: FAMILY MEDICINE | Facility: CLINIC | Age: 66
End: 2021-07-13

## 2021-07-13 NOTE — TELEPHONE ENCOUNTER
Phone line will not accept blocked calls from virtual location.   Clinic staff to call patient.     Next available is next week with Dr Giordano

## 2021-07-13 NOTE — TELEPHONE ENCOUNTER
Reason for Call:  Other appointment    Detailed comments: Knee issues, A1C, feeling tired    Phone Number Patient can be reached at: Home number on file [phone] (home)    Best Time: patient would like an appointment this week only, patient declined being seen by a nurse practitioner. She will only see an MD. Dr Baez, if possible.    Can we leave a detailed message on this number? YES    Call taken on 7/13/2021 at 12:18 PM by Cass Caicedo

## 2021-07-13 NOTE — TELEPHONE ENCOUNTER
Spoke with patient and assisted her in scheduling an appointment with Dr. Deras next week.     Brina Casey

## 2021-07-21 ENCOUNTER — OFFICE VISIT (OUTPATIENT)
Dept: FAMILY MEDICINE | Facility: CLINIC | Age: 66
End: 2021-07-21
Payer: COMMERCIAL

## 2021-07-21 ENCOUNTER — RECORDS - HEALTHEAST (OUTPATIENT)
Dept: ADMINISTRATIVE | Facility: CLINIC | Age: 66
End: 2021-07-21

## 2021-07-21 ENCOUNTER — ANCILLARY PROCEDURE (OUTPATIENT)
Dept: GENERAL RADIOLOGY | Facility: CLINIC | Age: 66
End: 2021-07-21
Attending: FAMILY MEDICINE
Payer: COMMERCIAL

## 2021-07-21 VITALS
DIASTOLIC BLOOD PRESSURE: 74 MMHG | BODY MASS INDEX: 37.31 KG/M2 | SYSTOLIC BLOOD PRESSURE: 142 MMHG | WEIGHT: 204 LBS | HEART RATE: 66 BPM | OXYGEN SATURATION: 96 % | RESPIRATION RATE: 16 BRPM

## 2021-07-21 DIAGNOSIS — M25.561 ACUTE PAIN OF RIGHT KNEE: Primary | ICD-10-CM

## 2021-07-21 DIAGNOSIS — E61.1 IRON DEFICIENCY: ICD-10-CM

## 2021-07-21 DIAGNOSIS — E55.9 VITAMIN D DEFICIENCY: ICD-10-CM

## 2021-07-21 DIAGNOSIS — M25.561 ACUTE PAIN OF RIGHT KNEE: ICD-10-CM

## 2021-07-21 DIAGNOSIS — M17.11 PRIMARY OSTEOARTHRITIS OF RIGHT KNEE: ICD-10-CM

## 2021-07-21 DIAGNOSIS — R73.03 PREDIABETES: ICD-10-CM

## 2021-07-21 LAB
FERRITIN SERPL-MCNC: 85 NG/ML (ref 10–130)
HBA1C MFR BLD: 6.2 % (ref 0–5.6)

## 2021-07-21 PROCEDURE — 99214 OFFICE O/P EST MOD 30 MIN: CPT | Performed by: FAMILY MEDICINE

## 2021-07-21 PROCEDURE — 82306 VITAMIN D 25 HYDROXY: CPT | Performed by: FAMILY MEDICINE

## 2021-07-21 PROCEDURE — 82728 ASSAY OF FERRITIN: CPT | Performed by: FAMILY MEDICINE

## 2021-07-21 PROCEDURE — 36415 COLL VENOUS BLD VENIPUNCTURE: CPT | Performed by: FAMILY MEDICINE

## 2021-07-21 PROCEDURE — 83036 HEMOGLOBIN GLYCOSYLATED A1C: CPT | Performed by: FAMILY MEDICINE

## 2021-07-21 PROCEDURE — 73560 X-RAY EXAM OF KNEE 1 OR 2: CPT | Mod: TC | Performed by: RADIOLOGY

## 2021-07-21 RX ORDER — CETIRIZINE HYDROCHLORIDE 10 MG/1
10 TABLET ORAL
COMMUNITY
Start: 2020-02-25 | End: 2021-07-21

## 2021-07-21 RX ORDER — METHYLCELLULOSE 2 G/19G
2 POWDER, FOR SOLUTION ORAL DAILY PRN
COMMUNITY
End: 2021-10-27

## 2021-07-21 RX ORDER — NITROGLYCERIN 0.4 MG/1
TABLET SUBLINGUAL
COMMUNITY
Start: 2019-10-21 | End: 2021-10-27

## 2021-07-21 RX ORDER — AZELASTINE 1 MG/ML
SPRAY, METERED NASAL
COMMUNITY
Start: 2020-10-05 | End: 2021-08-04

## 2021-07-21 RX ORDER — BIOTIN 10 MG
TABLET ORAL
COMMUNITY
End: 2021-12-20

## 2021-07-21 RX ORDER — EZETIMIBE 10 MG/1
10 TABLET ORAL
COMMUNITY
Start: 2020-04-03 | End: 2021-07-21

## 2021-07-21 RX ORDER — METOPROLOL SUCCINATE 100 MG/1
100 TABLET, EXTENDED RELEASE ORAL 2 TIMES DAILY
Status: CANCELLED | OUTPATIENT
Start: 2021-07-21

## 2021-07-21 RX ORDER — LOSARTAN POTASSIUM 100 MG/1
100 TABLET ORAL
COMMUNITY
Start: 2021-02-14 | End: 2021-10-31

## 2021-07-21 RX ORDER — METOPROLOL SUCCINATE 100 MG/1
100 TABLET, EXTENDED RELEASE ORAL 2 TIMES DAILY
COMMUNITY
End: 2021-07-21

## 2021-07-21 RX ORDER — NIFEDIPINE 30 MG/1
60 TABLET, EXTENDED RELEASE ORAL
COMMUNITY
Start: 2021-04-19 | End: 2022-04-11

## 2021-07-21 RX ORDER — CLONIDINE HYDROCHLORIDE 0.1 MG/1
0.1 TABLET ORAL
COMMUNITY
End: 2021-07-21

## 2021-07-21 RX ORDER — POTASSIUM CHLORIDE 1500 MG/1
40 TABLET, EXTENDED RELEASE ORAL 2 TIMES DAILY
COMMUNITY
Start: 2021-05-30 | End: 2022-03-10

## 2021-07-21 RX ORDER — FLUTICASONE PROPIONATE 50 MCG
1 SPRAY, SUSPENSION (ML) NASAL DAILY
COMMUNITY
End: 2021-12-20

## 2021-07-21 RX ORDER — POLYETHYLENE GLYCOL 3350 17 G/17G
17 POWDER, FOR SOLUTION ORAL
COMMUNITY
End: 2022-08-03

## 2021-07-21 RX ORDER — HYDROCHLOROTHIAZIDE 25 MG/1
25 TABLET ORAL 2 TIMES DAILY
COMMUNITY
End: 2022-03-29

## 2021-07-21 NOTE — PATIENT INSTRUCTIONS
I would recommend an xray of your knee to further assess if there is progression of the previously noted arthritis versus a meniscus injury.  If there looks to be worsening arthritis I may recommend consideration of a steroid injection.  If there is not progression of the arthritis, physical therapy may be a more reasonable treatment.  Once we have a better idea of the underlying cause, I would be happy to prescribe gabapentin, to be used at bedtime to relieve the pain and hopefully allow you to sleep better.      I placed orders for your hemoglobin A1c, ferritin, and vitamin d.  If the ferritin is still low,  I  Would recommend use of ferrous gluconate 325 mg every other day (taken with 2oz of orange juice to help in digestion/absorption).      Consider visiting with Dr Waters at [facility], if Dr Dunaway retires.

## 2021-07-21 NOTE — PROGRESS NOTES
Assessment / Plan     Alvina was seen today for knee pain and fatigue.    Diagnoses and all orders for this visit:    Acute pain of right knee  -     XR Knee Standing AP Bilat & Lateral Right; Future  -     Orthopedic  Referral; Future    Vitamin D deficiency  -     Vitamin D Deficiency; Future  -     Vitamin D Deficiency    Iron deficiency  -     Ferritin; Future  -     Ferritin    Prediabetes  -     Hemoglobin A1c; Future  -     Hemoglobin A1c    Primary osteoarthritis of right knee  -     Orthopedic  Referral; Future      Patient Instructions   I would recommend an xray of your knee to further assess if there is progression of the previously noted arthritis versus a meniscus injury.  If there looks to be worsening arthritis I may recommend consideration of a steroid injection.  If there is not progression of the arthritis, physical therapy may be a more reasonable treatment.  Once we have a better idea of the underlying cause, I would be happy to prescribe gabapentin, to be used at bedtime to relieve the pain and hopefully allow you to sleep better.      I placed orders for your hemoglobin A1c, ferritin, and vitamin d.  If the ferritin is still low,  I  Would recommend use of ferrous gluconate 325 mg every other day (taken with 2oz of orange juice to help in digestion/absorption).      Consider visiting with Dr Waters at [facility], if Dr Dunaway retires.      Return in about 6 weeks (around 9/1/2021) for Follow up if knee pain is not improving .     36 minutes spent on the date of the encounter doing chart review, history and exam, documentation and further activities per the note.     Subjective   Alvina Maynard is a 65 year old year old female who presents with the following concerns:     Knee Pain  Patient presents with knee pain involving the right knee. Onset of the symptoms was 3 months ago. Inciting event: no known event, but symptoms worsenend after getting new foot orthotics.  .  Current symptoms include feelings of instability, particularly upon standing after sitting for a prolonged period.  . Pain is aggravated by standing and walking. Patient has had no prior knee problems. Evaluation to date: none. Treatment to date: OTC analgesics which are not very effective.  Pain is keeping patient from being as active as she would like to be.     Patient has history of low vitamin D and iron deficiency which had previously manifested with significant fatigue.  She has recently begun feeling more fatigued, particularly with activity and wonders where her vitamin D level and iron level are at.  She is currently taking over-the-counter vitamin D3 2000 units daily.  She is not currently supplementing with iron.      Patient Active Problem List   Diagnosis     Hemianopia     Allergic contact dermatitis     Alopecia     Asthma     Cerebral aneurysm, nonruptured     Cervical spondylosis without myelopathy     Coronary atherosclerosis     Esophageal reflux     Essential hypertension     Hypokalemia     Hypomagnesemia     Hypophosphatemia     Irritable bowel syndrome (IBS)     Morbid obesity (H)     Obstructive sleep apnea (adult) (pediatric)     Primary hypercholesterolemia     Type 2 diabetes mellitus with other circulatory complication, without long-term current use of insulin (H)     Past Surgical History:   Procedure Laterality Date     BRAIN SURGERY  2013    brain aneurism     C TOTAL ABDOM HYSTERECTOMY  08/01/2002    endometriosis     CARDIAC CATHETERIZATION  9/28/15    mild to moderate coronary disease only without obstructive stenosis     CEREBRAL ANEURYSM REPAIR  12/31/2013    [facility] Aneurysm clipping, Dr Harvey     CORONARY STENT PLACEMENT  9/20/12    bare metal stent in the proximal LAD      HEMORRHOID SURGERY  1975     HEMORRHOIDECTOMY INTERNAL LIGATION  1975     HYSTERECTOMY  2002     HYSTERECTOMY       NECK SURGERY  2001     OOPHORECTOMY       OVARY SURGERY  removed 1990     OK NASAL/SINUS  ENDOSCOPY,BX/RMV POLYP/DEBRID  1998    Description: Nasal Endoscopy Polypectomy;     RI REMOVAL OF OVARY/TUBE(S) Right      Salpingo-oophorectomy Right Side     REPAIR ARTERY CORONARY  2012    femerol artery torn during angiogram     SINUS SURGERY  1998    polyp remvd     STENT  2012    heart       Social History     Tobacco Use     Smoking status: Never Smoker     Smokeless tobacco: Never Used   Substance Use Topics     Alcohol use: Yes     Family History   Problem Relation Age of Onset     Cancer Mother      Diabetes Mother      Hypertension Mother      Thyroid Disease Mother      Hypertension Sister      Breast Cancer Maternal Aunt      Hypertension Father      Heart Disease Mother      Stomach Cancer Mother      Coronary Artery Disease Mother 60.00     Heart Disease Maternal Grandfather      GERD Sister          Current Outpatient Medications   Medication Sig Dispense Refill     Aspirin Buf,CaCarb-MgCarb-MgO, 81 MG TABS Take 81 mg by mouth       Biotin 10 MG TABS tablet        Cholecalciferol (VITAMIN D3 PO) Take 2,000 Units by mouth every morning        cimetidine (TAGAMET) 200 MG tablet Take 800 mg by mouth 2 times daily        Cyanocobalamin (VITAMIN B-12 PO)        Ferrous Sulfate (IRON PO)        fluticasone (FLONASE) 50 MCG/ACT nasal spray Spray 1 spray into both nostrils daily       hydrochlorothiazide (HYDRODIURIL) 25 MG tablet Take 25 mg by mouth 2 times daily       Lactobacillus Rhamnosus, GG, ( PROBIOTIC DIGESTIVE CARE) CAPS Take 1 capsule by mouth       losartan (COZAAR) 100 MG tablet Take 100 mg by mouth       MAGNESIUM GLUCONATE PO Take 400 mg by mouth daily       methylcellulose (CITRUCEL) powder Take 2 g by mouth       Montelukast Sodium (SINGULAIR PO) Take 10 mg by mouth At Bedtime       NIFEdipine ER OSMOTIC (PROCARDIA XL) 30 MG 24 hr tablet 60 mg        Omega-3 Fatty Acids (OMEGA-3 FISH OIL PO) Take 1,400 mg by mouth At Bedtime       polyethylene glycol (MIRALAX) 17 GM/Dose powder Take 17 g  by mouth       potassium chloride ER (KLOR-CON M) 20 MEQ CR tablet Take 40 mEq by mouth 2 times daily        azelastine (ASTELIN) 0.1 % nasal spray USE 2 SPRAYS IN EACH NOSTRIL TWICE DAILY AS NEEDED 90 mL 3     LANSOPRAZOLE PO Take 30 mg by mouth 2 times daily (Patient not taking: Reported on 7/21/2021)       mometasone (NASONEX) 50 MCG/ACT nasal spray Spray 2 sprays into both nostrils daily (Patient not taking: Reported on 7/21/2021)       nitroGLYcerin (NITROSTAT) 0.4 MG sublingual tablet PLACE 1 TABLET UNDER THE TONGUE EVERY 5 MINUTES AS NEEDED FOR CHEST PAIN (Patient not taking: Reported on 7/21/2021)       ondansetron (ZOFRAN-ODT) 4 MG ODT tab Place 1 tablet under the tongue every 6 hours as needed       SINGULAIR 10 MG tablet Take 1 tablet by mouth daily       Allergies   Allergen Reactions     Atorvastatin Cramps     Mevacor, and zocor also leg cramping     Carvedilol      hives     Cephalosporins Hives     Clindamycin Hives     Demerol Nausea and Vomiting     Demerol [Meperidine]      Hives     Dilantin [Phenytoin]      Hives     Metronidazole Hives     Penicillins Hives     Rosuvastatin Cramps     Leg cramping     Sulfa Drugs Hives       ROS:   Negative except as noted above in HPI.     Objective  BP (!) 142/74   Pulse 66   Resp 16   Wt 92.5 kg (204 lb)   SpO2 96%   Breastfeeding No   BMI 37.31 kg/m       General: Alert, no acute distress.   Affect: pleasant, cooperative.   Right knee : no redness nor swelling. Normal AROM.  Crepitus noted with PROM.  Tender along the medial joint line.  Negative Lachman's and Curtis's.  No ligamentous instability.  Strength symmetric at knees.     XR KNEE STANDING AP BILAT AND LATERAL RIGHT : pending     Armen Deras MD   Family medicine physician  [facility]

## 2021-07-22 ENCOUNTER — RECORDS - HEALTHEAST (OUTPATIENT)
Dept: FAMILY MEDICINE | Facility: CLINIC | Age: 66
End: 2021-07-22

## 2021-07-22 DIAGNOSIS — Z12.31 OTHER SCREENING MAMMOGRAM: ICD-10-CM

## 2021-07-22 LAB — DEPRECATED CALCIDIOL+CALCIFEROL SERPL-MC: 33 UG/L (ref 30–80)

## 2021-07-26 PROBLEM — M47.812 CERVICAL SPONDYLOSIS WITHOUT MYELOPATHY: Status: ACTIVE | Noted: 2021-03-25

## 2021-07-26 PROBLEM — L65.9 ALOPECIA: Status: ACTIVE | Noted: 2021-07-26

## 2021-07-26 PROBLEM — E78.00 PRIMARY HYPERCHOLESTEROLEMIA: Status: ACTIVE | Noted: 2021-07-26

## 2021-07-26 PROBLEM — E11.59 TYPE 2 DIABETES MELLITUS WITH OTHER CIRCULATORY COMPLICATION, WITHOUT LONG-TERM CURRENT USE OF INSULIN (H): Status: ACTIVE | Noted: 2020-05-01

## 2021-07-26 PROBLEM — K21.9 ESOPHAGEAL REFLUX: Status: ACTIVE | Noted: 2021-07-26

## 2021-07-26 PROBLEM — E66.01 MORBID OBESITY (H): Status: ACTIVE | Noted: 2018-09-29

## 2021-07-26 RX ORDER — ONDANSETRON 4 MG/1
1 TABLET, ORALLY DISINTEGRATING ORAL EVERY 6 HOURS PRN
COMMUNITY
Start: 2021-07-16 | End: 2021-10-27

## 2021-07-30 ENCOUNTER — TELEPHONE (OUTPATIENT)
Dept: OTOLARYNGOLOGY | Facility: CLINIC | Age: 66
End: 2021-07-30

## 2021-07-30 NOTE — TELEPHONE ENCOUNTER
Spoke to patient. She says she has a history of wax build up that causes her to have a plugged feeling and can cause dizziness. She would like to have a ear cleaning. She declines an audio. Appointment scheduled for next week with Dr. Bravo for an ear cleaning.    Marsha Palma RN  [facility]  [phone]

## 2021-07-30 NOTE — TELEPHONE ENCOUNTER
Was a Patient of Dr. Yeboah and has left ear pain with vertigo so bad makes her nauseous.  She wants to know if there is anyway to be seen before the middle of August when our first available appts are.    Please call Alvina and advise.    Thanks!

## 2021-08-03 NOTE — PROGRESS NOTES
CHIEF COMPLAINT:  Recheck      HISTORY OF PRESENT ILLNESS    Alvina was seen in follow up for ear cleaning/otitis externa/cyst/hematoma of RIGHT ear.  Patient is formally been seen by Dr. Yeboah for a otitis externa of the right ear canal.  More recently she has been experiencing episodic vertigo.  Nursing notes typically she notices when she gets into bed or turns over to the left-hand side.  The vertigo the dizziness is vertiginous or true spinning and last less than a minute.  Always triggered always triggered by head movements.  She has had physical therapy for this in the past.  She is also been complaining of left-sided ear pain.  She has feels like the head feels heavy on that side.  The pain is episodic and sharp in nature.  She does wear a splint at night for bruxism.    IMPRESSION  Cerumen impaction left.  Right ear shows resolving otitis externa vs ruptured canal cyst or hematoma.      RECOMMENDATION  Continue floxin drops. Follow up if bleeding persists.     Dusty Yeboah MD         Last signed by: Dusty Yeboah MD at 12/23/2020  8:27 AM            REVIEW OF SYSTEMS    Review of Systems: a 10-system review is reviewed at this encounter.  See scanned document.     Atorvastatin, Carvedilol, Cephalosporins, Clindamycin, Demerol, Demerol [meperidine], Dilantin [phenytoin], Metronidazole, Penicillins, Rosuvastatin, and Sulfa drugs     PHYSICAL EXAM:        HEAD: Normal appearance and symmetry:  No cutaneous lesions.      EARS:   Auricles normal    TMJ tender to palpation on left         NOSE:    Dorsum:   straight       ORAL CAVITY/OROPHARYNX:    Lips:  Normal.     NECK:  Trachea:  midline       NEURO:   Alert and Oriented    GAIT AND STATION:  normal     RESPIRATORY:   Symmetry and Respiratory effort    PSYCH:   normal mood and affect    SKIN:  warm and dry         IMPRESSION:   Encounter Diagnoses   Name Primary?     Ear pain, left Yes     Benign paroxysmal positional vertigo of left ear      TMJ  (temporomandibular joint syndrome)           RECOMMENDATIONS:       OT  referral   Conservative therapy   Return 6 months with audiogram

## 2021-08-04 ENCOUNTER — OFFICE VISIT (OUTPATIENT)
Dept: OTOLARYNGOLOGY | Facility: CLINIC | Age: 66
End: 2021-08-04
Payer: COMMERCIAL

## 2021-08-04 DIAGNOSIS — H81.12 BENIGN PAROXYSMAL POSITIONAL VERTIGO OF LEFT EAR: ICD-10-CM

## 2021-08-04 DIAGNOSIS — H92.02 EAR PAIN, LEFT: Primary | ICD-10-CM

## 2021-08-04 DIAGNOSIS — M26.609 TMJ (TEMPOROMANDIBULAR JOINT SYNDROME): ICD-10-CM

## 2021-08-04 PROCEDURE — 99214 OFFICE O/P EST MOD 30 MIN: CPT | Performed by: OTOLARYNGOLOGY

## 2021-08-04 NOTE — LETTER
8/4/2021         RE: Alvina Maynard  [address] [place] [address]        Dear Colleague,    Thank you for referring your patient, Alvina Maynard, to the [facility]. Please see a copy of my visit note below.    CHIEF COMPLAINT:  Recheck      HISTORY OF PRESENT ILLNESS    Alvina was seen in follow up for ear cleaning/otitis externa/cyst/hematoma of RIGHT ear.  Patient is formally been seen by Dr. Yeboah for a otitis externa of the right ear canal.  More recently she has been experiencing episodic vertigo.  Nursing notes typically she notices when she gets into bed or turns over to the left-hand side.  The vertigo the dizziness is vertiginous or true spinning and last less than a minute.  Always triggered always triggered by head movements.  She has had physical therapy for this in the past.  She is also been complaining of left-sided ear pain.  She has feels like the head feels heavy on that side.  The pain is episodic and sharp in nature.  She does wear a splint at night for bruxism.    IMPRESSION  Cerumen impaction left.  Right ear shows resolving otitis externa vs ruptured canal cyst or hematoma.      RECOMMENDATION  Continue floxin drops. Follow up if bleeding persists.     Dusty Yeboah MD         Last signed by: Dusty Yeboah MD at 12/23/2020  8:27 AM            REVIEW OF SYSTEMS    Review of Systems: a 10-system review is reviewed at this encounter.  See scanned document.     Atorvastatin, Carvedilol, Cephalosporins, Clindamycin, Demerol, Demerol [meperidine], Dilantin [phenytoin], Metronidazole, Penicillins, Rosuvastatin, and Sulfa drugs     PHYSICAL EXAM:        HEAD: Normal appearance and symmetry:  No cutaneous lesions.      EARS:   Auricles normal    TMJ tender to palpation on left         NOSE:    Dorsum:   straight       ORAL CAVITY/OROPHARYNX:    Lips:  Normal.     NECK:  Trachea:  midline       NEURO:   Alert and Oriented    GAIT AND STATION:  normal      RESPIRATORY:   Symmetry and Respiratory effort    PSYCH:   normal mood and affect    SKIN:  warm and dry         IMPRESSION:   Encounter Diagnoses   Name Primary?     Ear pain, left Yes     Benign paroxysmal positional vertigo of left ear      TMJ (temporomandibular joint syndrome)           RECOMMENDATIONS:       OT  referral   Conservative therapy   Return 6 months with audiogram      Again, thank you for allowing me to participate in the care of your patient.        Sincerely,        Tito Bravo MD

## 2021-08-04 NOTE — PATIENT INSTRUCTIONS
Patient Education     Benign Paroxysmal Positional Vertigo     Your health care provider may move your head in certain ways to treat your BPPV.   Benign paroxysmal positional vertigo (BPPV) is a problem with the inner ear. The inner ear contains the vestibular system. This system is what helps you keep your balance. BPPV causes a feeling of spinning. It is a common problem of the vestibular system.   Understanding the vestibular system  The vestibular system of the ear is made up of very tiny parts. They include the utricle, saccule, and semicircular canals. The utricle is a tiny organ that contains calcium crystals. In some people, the crystals can move into the semicircular canals. When this happens, the system no longer works as it should. This causes BPPV. Benign means it is not life threatening. Paroxysmal means it happens suddenly. Positional means that it happens when you move your head. Vertigo is a feeling of spinning.   What causes BPPV?  Causes include injury to your head or neck. Other problems with the vestibular system may cause BPPV. In many people, the cause of BPPV is not known.   Symptoms of BPPV  You may have repeated feelings of spinning (vertigo). The vertigo usually lasts less than 1 minute. Some movements, such as rolling over in bed, can bring on vertigo.   Diagnosing BPPV  Your primary healthcare provider may diagnose and treat your BPPV. Or you may see an ear, nose, and throat healthcare provider (otolaryngologist). In some cases, you may see a healthcare provider who focuses on the nervous system (neurologist).   The healthcare provider will ask about your symptoms and your medical history. He or she will examine you. You may have hearing and balance tests. As part of the exam, your healthcare provider may have you move your head and body in certain ways. If you have BPPV, the movements can bring on vertigo. Your provider will also look for abnormal movements of your eyes. You may have  other tests to check your vestibular or nervous systems.   Treatment for BPPV  Your healthcare provider may try to move the calcium crystals. This is done by having you move your head and neck in certain ways. This treatment is safe and often works well. You may also be told to do these movements at home. You may still have vertigo for a few weeks. Your healthcare provider will recheck your symptoms, usually in about a month. Special physical therapy may also be part of treatment. In rare cases, surgery may be needed for BPPV that does not go away. Talk with your healthcare provider about whether it is safe for you to drive.   When to call the healthcare provider  Call your healthcare provider right away if you have any of these:    Symptoms that do not go away with treatment    Symptoms that get worse    New symptoms  Mario last reviewed this educational content on 2/1/2020 2000-2021 [employer]. All rights reserved. This information is not intended as a substitute for professional medical care. Always follow your healthcare professional's instructions.         Patient Education     TMJ Syndrome  The temporomandibular joint (TMJ) is the joint that connects your lower jaw to your skull. You can feel it in front of your ears when you open and close your mouth. TMJ disorders cause chronic or recurrent pain and problems in the jaw joint and the muscles that control jaw movement. Symptoms of TMJ disorders are usually relieved with minor treatments. But symptoms may come back, especially in times of stress.   Causes  There is no widely agreed-on cause of TMJ disorders. They have been linked to injury, arthritis, tooth and jaw alignment, chronic fatigue syndrome, and fibromyalgia. A definite connection has not been shown to these, though.   Symptoms    Pain in the face, jaw, or neck    Pain with jaw movement or chewing    Locking or catching sensation of the jaw    Clicking, popping, or grinding sounds  with movement of the TMJ    Headache    Ear pain    Home care  Modest treatments are a good first step toward relieving symptoms. Try these methods.     Reduce stress on your jaw by not eating crunchy or hard-to-chew foods. Don t eat hard or sticky candies. Soft foods and liquids are easier on the jaw.    Protect your jaw while yawning. If you need to yawn, put your fist under your chin to prevent your mouth from opening too wide.    To help relieve pain, put hot or cold packs on the area that hurts. Try both hot and cold to find out which works best for you. To make a cold pack, put ice cubes in a plastic bag that seals at the top. Wrap the bag in a clean, thin towel or cloth. Never put ice or an ice pack directly on the skin. If you use hot packs (small towels soaked in hot water), be careful not to burn yourself.    You may take acetaminophen or ibuprofen for pain, unless you were given a different pain medicine. (Note: If you have chronic liver or kidney disease or have ever had a stomach ulcer or gastrointestinal bleeding, talk with your healthcare provider before using these medicines. Also talk to your provider if you are taking medicine to prevent blood clots.) Don t give aspirin to a child younger than age 19 unless directed by the child s provider. Taking aspirin can put a child at risk for Reye syndrome. This is a rare but very serious disorder that most often affects the brain and the liver.  Reducing stress  If stress seems to be contributing to your symptoms, try to find the sources of stress in your life. These aren t always obvious. Common stressors include:     Everyday hassles. These include things such as traffic jams, missed appointments, or car trouble.    Major life changes. These can be good, such as a new baby or job promotion. Or they can be bad, such as losing a job or losing a loved one.    Overload. This is the feeling that you have too many responsibilities and can't take care of  everything at once.    Helplessness. This is when you feel like your problems are more than you can solve.  When possible, try to make changes in your sources of stress. See if you can avoid hassles, limit the amount of change in your life at one time, and take breaks when you feel overloaded.   Many stressful situations can't be avoided. So learning how to manage stress is very important. To make everyday stress more manageable:     Getting regular exercise.    Eat nutritious, balanced meals.    Get enough rest.    Try relaxation and breathing exercises, visualization, biofeedback, or meditation.    Take some time out to clear your mind.  For more information, talk with your healthcare provider.  Follow-up care  Follow up with your healthcare provider, or as advised. More testing and other treatment may be needed. If changes to your lifestyle do not improve your symptoms, talk with your healthcare provider about other treatments. These include bite guards for help with teeth grinding, stress management methods, and more. If stress is an important factor and does not respond to the above lifestyle changes, talk with your healthcare provider about a referral for stress management.   If X-rays were done, they will be reviewed by a specialist. You will be told the results and if they affect your treatment.   Call 911  Call 911 if you have any of these:     Trouble breathing or swallowing    Wheezing    Confusion    Extreme drowsiness or trouble awakening    Fainting or loss of consciousness    Fast heart rate  When to seek medical advice  Call your healthcare provider right away if you have any of these:     Swollen or red face    Jaw or face pain that gets worse    Neck, mouth, tooth, or throat pain that gets worse    Fever of 100.4 F (38 C) or higher, or as directed by your healthcare provider  Mario last reviewed this educational content on 8/1/2020 2000-2021 [employer]. All rights reserved.  This information is not intended as a substitute for professional medical care. Always follow your healthcare professional's instructions.

## 2021-08-27 ENCOUNTER — TRANSFERRED RECORDS (OUTPATIENT)
Dept: HEALTH INFORMATION MANAGEMENT | Facility: CLINIC | Age: 66
End: 2021-08-27

## 2021-09-07 ENCOUNTER — HOSPITAL ENCOUNTER (OUTPATIENT)
Dept: OCCUPATIONAL THERAPY | Facility: REHABILITATION | Age: 66
End: 2021-09-07
Payer: COMMERCIAL

## 2021-09-07 DIAGNOSIS — R68.84 JAW PAIN: Primary | ICD-10-CM

## 2021-09-07 DIAGNOSIS — H81.11 BENIGN PAROXYSMAL POSITIONAL VERTIGO, RIGHT: ICD-10-CM

## 2021-09-07 DIAGNOSIS — H81.12 BENIGN PAROXYSMAL POSITIONAL VERTIGO, LEFT: ICD-10-CM

## 2021-09-07 DIAGNOSIS — Z78.9 DECREASED ACTIVITIES OF DAILY LIVING (ADL): ICD-10-CM

## 2021-09-07 DIAGNOSIS — R42 DIZZINESS: ICD-10-CM

## 2021-09-07 DIAGNOSIS — R26.81 UNSTEADINESS ON FEET: ICD-10-CM

## 2021-09-07 PROCEDURE — 97165 OT EVAL LOW COMPLEX 30 MIN: CPT | Mod: GO | Performed by: OCCUPATIONAL THERAPIST

## 2021-09-07 PROCEDURE — 95992 CANALITH REPOSITIONING PROC: CPT | Mod: GO | Performed by: OCCUPATIONAL THERAPIST

## 2021-09-14 ENCOUNTER — HOSPITAL ENCOUNTER (OUTPATIENT)
Dept: OCCUPATIONAL THERAPY | Facility: REHABILITATION | Age: 66
End: 2021-09-14
Payer: COMMERCIAL

## 2021-09-14 DIAGNOSIS — R26.81 UNSTEADINESS ON FEET: ICD-10-CM

## 2021-09-14 DIAGNOSIS — R42 DIZZINESS: ICD-10-CM

## 2021-09-14 DIAGNOSIS — Z78.9 DECREASED ACTIVITIES OF DAILY LIVING (ADL): ICD-10-CM

## 2021-09-14 DIAGNOSIS — H81.11 BENIGN PAROXYSMAL POSITIONAL VERTIGO, RIGHT: Primary | ICD-10-CM

## 2021-09-14 PROCEDURE — 97112 NEUROMUSCULAR REEDUCATION: CPT | Mod: GO | Performed by: OCCUPATIONAL THERAPIST

## 2021-09-14 PROCEDURE — 95992 CANALITH REPOSITIONING PROC: CPT | Mod: GO | Performed by: OCCUPATIONAL THERAPIST

## 2021-09-29 DIAGNOSIS — I25.84 CORONARY ATHEROSCLEROSIS DUE TO SEVERELY CALCIFIED CORONARY LESION: ICD-10-CM

## 2021-09-29 DIAGNOSIS — I10 ESSENTIAL HYPERTENSION: Primary | ICD-10-CM

## 2021-09-30 ENCOUNTER — HOSPITAL ENCOUNTER (OUTPATIENT)
Dept: OCCUPATIONAL THERAPY | Facility: REHABILITATION | Age: 66
End: 2021-09-30
Payer: COMMERCIAL

## 2021-09-30 DIAGNOSIS — R26.81 UNSTEADINESS ON FEET: ICD-10-CM

## 2021-09-30 DIAGNOSIS — Z78.9 DECREASED ACTIVITIES OF DAILY LIVING (ADL): ICD-10-CM

## 2021-09-30 DIAGNOSIS — R42 DIZZINESS: Primary | ICD-10-CM

## 2021-09-30 PROCEDURE — 97112 NEUROMUSCULAR REEDUCATION: CPT | Mod: GO | Performed by: OCCUPATIONAL THERAPIST

## 2021-10-01 RX ORDER — METOPROLOL SUCCINATE 100 MG/1
TABLET, EXTENDED RELEASE ORAL
Qty: 180 TABLET | Refills: 1 | Status: SHIPPED | OUTPATIENT
Start: 2021-10-01 | End: 2022-03-31

## 2021-10-01 NOTE — TELEPHONE ENCOUNTER
Please help schedule:  3-month follow-up appointment for annual wellness visit as well as laboratory testing and medication review.    Dr. Baez

## 2021-10-01 NOTE — TELEPHONE ENCOUNTER
"Routing refill request to provider for review/approval because:  Allergy/contraindication    Last Written Prescription Date:  9/22/20  Last Fill Quantity: 180,  # refills: 2   Last office visit provider:  5/4/21     Requested Prescriptions   Pending Prescriptions Disp Refills     metoprolol succinate ER (TOPROL-XL) 100 MG 24 hr tablet [Pharmacy Med Name: METOPROLOL ER SUCCINATE 100MG TABS] 180 tablet      Sig: TAKE 1 TABLET(100 MG) BY MOUTH TWICE DAILY       Beta-Blockers Protocol Failed - 9/29/2021  7:04 PM        Failed - Blood pressure under 140/90 in past 12 months     BP Readings from Last 3 Encounters:   07/21/21 (!) 142/74   05/04/21 134/72   04/16/21 138/80                 Failed - Medication is active on med list        Passed - Patient is age 6 or older        Passed - Recent (12 mo) or future (30 days) visit within the authorizing provider's specialty     Patient has had an office visit with the authorizing provider or a provider within the authorizing providers department within the previous 12 mos or has a future within next 30 days. See \"Patient Info\" tab in inbasket, or \"Choose Columns\" in Meds & Orders section of the refill encounter.                   patrick reinoso RN 10/01/21 1:58 AM  "

## 2021-10-03 ENCOUNTER — HEALTH MAINTENANCE LETTER (OUTPATIENT)
Age: 66
End: 2021-10-03

## 2021-10-13 ENCOUNTER — HOSPITAL ENCOUNTER (OUTPATIENT)
Dept: PHYSICAL THERAPY | Facility: REHABILITATION | Age: 66
End: 2021-10-13
Attending: OTOLARYNGOLOGY
Payer: COMMERCIAL

## 2021-10-13 DIAGNOSIS — R68.84 JAW PAIN: ICD-10-CM

## 2021-10-13 DIAGNOSIS — M54.2 CERVICAL PAIN: Primary | ICD-10-CM

## 2021-10-13 PROCEDURE — 97110 THERAPEUTIC EXERCISES: CPT | Mod: GP | Performed by: PHYSICAL THERAPIST

## 2021-10-13 PROCEDURE — 97161 PT EVAL LOW COMPLEX 20 MIN: CPT | Mod: GP | Performed by: PHYSICAL THERAPIST

## 2021-10-13 NOTE — PROGRESS NOTES
Date 10/13/21   Exercise    Chin tuck 10 seconds X 10 reps   Scapular retraction 10 seconds X 10 reps   Scalene stretch Hold 30 seconds

## 2021-10-15 ENCOUNTER — TRANSFERRED RECORDS (OUTPATIENT)
Dept: HEALTH INFORMATION MANAGEMENT | Facility: CLINIC | Age: 66
End: 2021-10-15

## 2021-10-18 ENCOUNTER — HOSPITAL ENCOUNTER (OUTPATIENT)
Dept: PHYSICAL THERAPY | Facility: REHABILITATION | Age: 66
End: 2021-10-18
Payer: COMMERCIAL

## 2021-10-18 DIAGNOSIS — R68.84 JAW PAIN: Primary | ICD-10-CM

## 2021-10-18 DIAGNOSIS — M54.2 CERVICAL PAIN: ICD-10-CM

## 2021-10-18 PROCEDURE — 97012 MECHANICAL TRACTION THERAPY: CPT | Mod: GP | Performed by: PHYSICAL THERAPIST

## 2021-10-18 PROCEDURE — 97110 THERAPEUTIC EXERCISES: CPT | Mod: GP | Performed by: PHYSICAL THERAPIST

## 2021-10-18 PROCEDURE — 97140 MANUAL THERAPY 1/> REGIONS: CPT | Mod: GP | Performed by: PHYSICAL THERAPIST

## 2021-10-18 NOTE — PROGRESS NOTES
Date 10/18/21 10/13/21   Exercise     Chin tuck X10, hold 10 seconds 10 seconds X 10 reps   Scapular retraction X 10 reps, hold 10 seconds 10 seconds X 10 reps   Scalene stretch Hold 20 seconds Hold 30 seconds   LS stretch Hold 30 seconds    UT stretch Hold 30 seconds                                         Assessment:  Pt returns for her first follow-up.  She was able to demonstrate good understanding of the ex/stretches from last visit without cues.  She felt the edu on the how to use the Saunder's cervical home traction was very helpful and understood it better after the edu.  She was able to set herself up in the clinic and feels she can follow through at home. She also found the manual therapy helpful today in loosening her neck and jaw muscles

## 2021-10-19 ENCOUNTER — HOSPITAL ENCOUNTER (OUTPATIENT)
Dept: OCCUPATIONAL THERAPY | Facility: REHABILITATION | Age: 66
End: 2021-10-19
Payer: COMMERCIAL

## 2021-10-19 DIAGNOSIS — R26.81 UNSTEADINESS ON FEET: ICD-10-CM

## 2021-10-19 DIAGNOSIS — R42 DIZZINESS: Primary | ICD-10-CM

## 2021-10-19 DIAGNOSIS — H81.12 BENIGN PAROXYSMAL POSITIONAL VERTIGO, LEFT: ICD-10-CM

## 2021-10-19 DIAGNOSIS — Z78.9 DECREASED ACTIVITIES OF DAILY LIVING (ADL): ICD-10-CM

## 2021-10-19 DIAGNOSIS — H81.11 BENIGN PAROXYSMAL POSITIONAL VERTIGO, RIGHT: ICD-10-CM

## 2021-10-19 PROCEDURE — 95992 CANALITH REPOSITIONING PROC: CPT | Mod: GO | Performed by: OCCUPATIONAL THERAPIST

## 2021-10-19 PROCEDURE — 97112 NEUROMUSCULAR REEDUCATION: CPT | Mod: GO | Performed by: OCCUPATIONAL THERAPIST

## 2021-10-25 ENCOUNTER — HOSPITAL ENCOUNTER (OUTPATIENT)
Dept: PHYSICAL THERAPY | Facility: REHABILITATION | Age: 66
End: 2021-10-25
Payer: COMMERCIAL

## 2021-10-25 DIAGNOSIS — R68.84 JAW PAIN: Primary | ICD-10-CM

## 2021-10-25 DIAGNOSIS — M54.2 CERVICAL PAIN: ICD-10-CM

## 2021-10-25 PROCEDURE — 97140 MANUAL THERAPY 1/> REGIONS: CPT | Mod: GP | Performed by: PHYSICAL THERAPIST

## 2021-10-25 PROCEDURE — 97110 THERAPEUTIC EXERCISES: CPT | Mod: GP | Performed by: PHYSICAL THERAPIST

## 2021-10-25 NOTE — PROGRESS NOTES
Date 10/25/21 10/18/21 10/13/21   Exercise      Chin tuck  X10, hold 10 seconds 10 seconds X 10 reps   Scapular retraction  X 10 reps, hold 10 seconds 10 seconds X 10 reps   Scalene stretch  Hold 20 seconds Hold 30 seconds   LS stretch review Hold 30 seconds    UT stretch review Hold 30 seconds                                                Assessment:  Pt returns and had some questions on technique from the stretches from last visit.  She felt she had a better understanding after review and could feel the mm stretch from origin to insertion with her UT on the left.  She has had more symptoms in the frontal region but really can't attribute them to anything in particular.  They started  When she started the PT and OT treatments.  She isn't sure if the traction has changed those symptoms either.  She did feel the manual therapy was helpful alleviating the sore/tight mm on the left again but didn't feel it changed anything in her frontal region of her forehead.

## 2021-10-26 ENCOUNTER — LAB (OUTPATIENT)
Dept: LAB | Facility: CLINIC | Age: 66
End: 2021-10-26
Payer: COMMERCIAL

## 2021-10-26 DIAGNOSIS — Z11.52 ENCOUNTER FOR PREOPERATIVE SCREENING LABORATORY TESTING FOR COVID-19 VIRUS: ICD-10-CM

## 2021-10-26 DIAGNOSIS — Z01.812 ENCOUNTER FOR PREOPERATIVE SCREENING LABORATORY TESTING FOR COVID-19 VIRUS: Primary | ICD-10-CM

## 2021-10-26 DIAGNOSIS — Z01.812 ENCOUNTER FOR PREOPERATIVE SCREENING LABORATORY TESTING FOR COVID-19 VIRUS: ICD-10-CM

## 2021-10-26 DIAGNOSIS — Z11.52 ENCOUNTER FOR PREOPERATIVE SCREENING LABORATORY TESTING FOR COVID-19 VIRUS: Primary | ICD-10-CM

## 2021-10-26 PROCEDURE — U0005 INFEC AGEN DETEC AMPLI PROBE: HCPCS

## 2021-10-26 PROCEDURE — U0003 INFECTIOUS AGENT DETECTION BY NUCLEIC ACID (DNA OR RNA); SEVERE ACUTE RESPIRATORY SYNDROME CORONAVIRUS 2 (SARS-COV-2) (CORONAVIRUS DISEASE [COVID-19]), AMPLIFIED PROBE TECHNIQUE, MAKING USE OF HIGH THROUGHPUT TECHNOLOGIES AS DESCRIBED BY CMS-2020-01-R: HCPCS

## 2021-10-27 ENCOUNTER — LAB (OUTPATIENT)
Dept: LAB | Facility: CLINIC | Age: 66
End: 2021-10-27

## 2021-10-27 ENCOUNTER — APPOINTMENT (OUTPATIENT)
Dept: FAMILY MEDICINE | Facility: CLINIC | Age: 66
End: 2021-10-27
Payer: COMMERCIAL

## 2021-10-27 ENCOUNTER — OFFICE VISIT (OUTPATIENT)
Dept: FAMILY MEDICINE | Facility: CLINIC | Age: 66
End: 2021-10-27
Payer: COMMERCIAL

## 2021-10-27 VITALS
BODY MASS INDEX: 38.24 KG/M2 | WEIGHT: 209.1 LBS | DIASTOLIC BLOOD PRESSURE: 80 MMHG | SYSTOLIC BLOOD PRESSURE: 134 MMHG | HEART RATE: 67 BPM | OXYGEN SATURATION: 96 %

## 2021-10-27 DIAGNOSIS — Z01.818 PRE-OPERATIVE EXAMINATION: ICD-10-CM

## 2021-10-27 DIAGNOSIS — R07.89 ATYPICAL CHEST PAIN: ICD-10-CM

## 2021-10-27 DIAGNOSIS — K58.1 IRRITABLE BOWEL SYNDROME WITH CONSTIPATION: ICD-10-CM

## 2021-10-27 DIAGNOSIS — G47.33 OBSTRUCTIVE SLEEP APNEA (ADULT) (PEDIATRIC): ICD-10-CM

## 2021-10-27 DIAGNOSIS — Z01.818 PRE-OPERATIVE EXAMINATION: Primary | ICD-10-CM

## 2021-10-27 DIAGNOSIS — E66.01 MORBID OBESITY (H): ICD-10-CM

## 2021-10-27 DIAGNOSIS — H81.10 BENIGN PAROXYSMAL POSITIONAL VERTIGO, UNSPECIFIED LATERALITY: ICD-10-CM

## 2021-10-27 DIAGNOSIS — E11.59 TYPE 2 DIABETES MELLITUS WITH OTHER CIRCULATORY COMPLICATION, WITHOUT LONG-TERM CURRENT USE OF INSULIN (H): ICD-10-CM

## 2021-10-27 DIAGNOSIS — I10 ESSENTIAL HYPERTENSION: ICD-10-CM

## 2021-10-27 DIAGNOSIS — I25.84 CORONARY ATHEROSCLEROSIS DUE TO SEVERELY CALCIFIED CORONARY LESION: ICD-10-CM

## 2021-10-27 LAB
ANION GAP SERPL CALCULATED.3IONS-SCNC: 11 MMOL/L (ref 5–18)
ATRIAL RATE - MUSE: 66 BPM
BUN SERPL-MCNC: 14 MG/DL (ref 8–22)
CALCIUM SERPL-MCNC: 11.2 MG/DL (ref 8.5–10.5)
CHLORIDE BLD-SCNC: 103 MMOL/L (ref 98–107)
CO2 SERPL-SCNC: 27 MMOL/L (ref 22–31)
CREAT SERPL-MCNC: 0.81 MG/DL (ref 0.6–1.1)
DIASTOLIC BLOOD PRESSURE - MUSE: NORMAL MMHG
ERYTHROCYTE [DISTWIDTH] IN BLOOD BY AUTOMATED COUNT: 13 % (ref 10–15)
GFR SERPL CREATININE-BSD FRML MDRD: 75 ML/MIN/1.73M2
GLUCOSE BLD-MCNC: 100 MG/DL (ref 70–125)
HCT VFR BLD AUTO: 42.4 % (ref 35–47)
HGB BLD-MCNC: 14.1 G/DL (ref 11.7–15.7)
INTERPRETATION ECG - MUSE: NORMAL
MCH RBC QN AUTO: 29 PG (ref 26.5–33)
MCHC RBC AUTO-ENTMCNC: 33.3 G/DL (ref 31.5–36.5)
MCV RBC AUTO: 87 FL (ref 78–100)
P AXIS - MUSE: 9 DEGREES
PLATELET # BLD AUTO: 331 10E3/UL (ref 150–450)
POTASSIUM BLD-SCNC: 4 MMOL/L (ref 3.5–5)
PR INTERVAL - MUSE: 140 MS
QRS DURATION - MUSE: 104 MS
QT - MUSE: 442 MS
QTC - MUSE: 463 MS
R AXIS - MUSE: 18 DEGREES
RBC # BLD AUTO: 4.87 10E6/UL (ref 3.8–5.2)
SARS-COV-2 RNA RESP QL NAA+PROBE: NEGATIVE
SODIUM SERPL-SCNC: 141 MMOL/L (ref 136–145)
SYSTOLIC BLOOD PRESSURE - MUSE: NORMAL MMHG
T AXIS - MUSE: 6 DEGREES
VENTRICULAR RATE- MUSE: 66 BPM
WBC # BLD AUTO: 8.5 10E3/UL (ref 4–11)

## 2021-10-27 PROCEDURE — 80048 BASIC METABOLIC PNL TOTAL CA: CPT | Performed by: FAMILY MEDICINE

## 2021-10-27 PROCEDURE — 93005 ELECTROCARDIOGRAM TRACING: CPT | Performed by: FAMILY MEDICINE

## 2021-10-27 PROCEDURE — 93010 ELECTROCARDIOGRAM REPORT: CPT | Performed by: INTERNAL MEDICINE

## 2021-10-27 PROCEDURE — 85027 COMPLETE CBC AUTOMATED: CPT | Performed by: FAMILY MEDICINE

## 2021-10-27 PROCEDURE — 36415 COLL VENOUS BLD VENIPUNCTURE: CPT | Performed by: FAMILY MEDICINE

## 2021-10-27 PROCEDURE — 99215 OFFICE O/P EST HI 40 MIN: CPT | Performed by: FAMILY MEDICINE

## 2021-10-27 RX ORDER — NITROGLYCERIN 0.4 MG/1
TABLET SUBLINGUAL
Qty: 25 TABLET | Refills: 0 | Status: SHIPPED | OUTPATIENT
Start: 2021-10-27 | End: 2022-06-12

## 2021-10-27 RX ORDER — METHYLCELLULOSE 2 G/19G
2 POWDER, FOR SOLUTION ORAL DAILY PRN
COMMUNITY
Start: 2021-10-27 | End: 2022-08-03

## 2021-10-27 RX ORDER — CIMETIDINE 800 MG
800 TABLET ORAL 2 TIMES DAILY
COMMUNITY
End: 2021-10-27

## 2021-10-27 ASSESSMENT — ASTHMA QUESTIONNAIRES
QUESTION_3 LAST FOUR WEEKS HOW OFTEN DID YOUR ASTHMA SYMPTOMS (WHEEZING, COUGHING, SHORTNESS OF BREATH, CHEST TIGHTNESS OR PAIN) WAKE YOU UP AT NIGHT OR EARLIER THAN USUAL IN THE MORNING: NOT AT ALL
ACT_TOTALSCORE: 21
QUESTION_5 LAST FOUR WEEKS HOW WOULD YOU RATE YOUR ASTHMA CONTROL: COMPLETELY CONTROLLED
QUESTION_4 LAST FOUR WEEKS HOW OFTEN HAVE YOU USED YOUR RESCUE INHALER OR NEBULIZER MEDICATION (SUCH AS ALBUTEROL): NOT AT ALL
QUESTION_1 LAST FOUR WEEKS HOW MUCH OF THE TIME DID YOUR ASTHMA KEEP YOU FROM GETTING AS MUCH DONE AT WORK, SCHOOL OR AT HOME: NONE OF THE TIME
ACUTE_EXACERBATION_TODAY: NO
QUESTION_2 LAST FOUR WEEKS HOW OFTEN HAVE YOU HAD SHORTNESS OF BREATH: MORE THAN ONCE A DAY

## 2021-10-27 NOTE — PROGRESS NOTES
[facility]  [address],   [place]  [facility] [address]  Phone: [phone]  Fax: [phone]  Primary Provider: Atilio Baez  Pre-op Performing Provider: ATILIO BAEZ      PREOPERATIVE EVALUATION:  Today's date: 10/27/2021    Alvina Maynard is a 66 year old female who presents for a preoperative evaluation.    Surgical Information:  Surgery/Procedure: Upper Endoscope   Surgery Location: [facility]  Surgeon: Dr. Graves  Surgery Date: 10/28/21  Time of Surgery: 7:30 am  Where patient plans to recover: At home with family  Fax number for surgical facility: [phone]; [phone]    Type of Anesthesia Anticipated: to be determined    Assessment & Plan     The proposed surgical procedure is considered LOW risk.    Pre-operative examination     - EKG 12-lead, tracing only, sinus rhythm with nonspecific ST changes.  No significant change from previous other than no longer having a incomplete right bundle branch block.  The patient can walk up a flight of stairs without shortness of breath.  The patient is okay to proceed with needed anesthesia and proposed procedure    Atypical chest pain  Question if this could be related to her dysfunctional esophageal sphincter not fully opening and causing some chest pressure    Coronary atherosclerosis due to severely calcified coronary lesion     - nitroGLYcerin (NITROSTAT) 0.4 MG sublingual tablet  Dispense: 25 tablet; Refill: 0  The patient has not had a need for her nitroglycerin tablets but needs a new bottle.  Denies any chest pain or anginal equivalent    Type 2 diabetes mellitus with other circulatory complication, without long-term current use of insulin (H)  Hemoglobin A1c July 2021 6.2  Blood glucose this morning 100    Obstructive sleep apnea (adult) (pediatric)  Patient uses CPAP regularly    Essential hypertension  Blood pressure is controlled    Benign paroxysmal positional vertigo,  unspecified laterality  The patient over the last year has had a problem with positional vertigo that is still giving her problems.  Requesting option for other treatment plans and suggested that she go to the [facility].  She was given written information on that.    Morbid obesity (H)  BMI 38.2    Irritable bowel syndrome with constipation     - methylcellulose (CITRUCEL) powder  The patient symptoms have been some better with the use of Citrucel and tries to avoid MiraLAX.         Risks and Recommendations:  The patient has the following additional risks and recommendations for perioperative complications:   - No identified additional risk factors other than previously addressed    Medication Instructions:  Hold meds in AM before procedure    RECOMMENDATION:  APPROVAL GIVEN to proceed with proposed procedure, without further diagnostic evaluation.    Review of the result(s) of each unique test - 3 or more unique tests ordered and reviewed  Independent interpretation of a test performed by another physician/other qualified health care professional (not separately reported) - EGD        44 minutes spent on the date of the encounter doing chart review, history and exam, documentation and further activities per the note        Subjective     HPI related to upcoming procedure:  Has had chest pressure with finding on EGD of esophageal sphincter that doesn't open fully    Preop Questions 10/27/2021   1. Have you ever had a heart attack or stroke? YES - brain aneurysm with stroke recovery complete   2. Have you ever had surgery on your heart or blood vessels, such as a stent placement, a coronary artery bypass, or surgery on an artery in your head, neck, heart, or legs? YES - 1 stent 12/2012   3. Do you have chest pain with activity? UNKNOWN - seem to be non-cardiac   4. Do you have a history of  heart failure? No   5. Do you currently have a cold, bronchitis or symptoms of other infection? No    6. Do you have a cough, shortness of breath, or wheezing? UNKNOWN - allergy related   7. Do you or anyone in your family have previous history of blood clots? No   8. Do you or does anyone in your family have a serious bleeding problem such as prolonged bleeding following surgeries or cuts? No   9. Have you ever had problems with anemia or been told to take iron pills? YES - but not now   10. Have you had any abnormal blood loss such as black, tarry or bloody stools, or abnormal vaginal bleeding? No   11. Have you ever had a blood transfusion? No   12. Are you willing to have a blood transfusion if it is medically needed before, during, or after your surgery? Yes   13. Have you or any of your relatives ever had problems with anesthesia? UNKNOWN - aware of none   14. Do you have sleep apnea, excessive snoring or daytime drowsiness? YES - using CPAP   14a. Do you have a CPAP machine? Yes   15. Do you have any artifical heart valves or other implanted medical devices like a pacemaker, defibrillator, or continuous glucose monitor? No   16. Do you have artificial joints? No   17. Are you allergic to latex? YES: not anaphylaxis       Health Care Directive:  Patient does not have a Health Care Directive or Living Will: Discussed advance care planning with patient; however, patient declined at this time.    Preoperative Review of :   reviewed - no record of controlled substances prescribed.      Review of Systems  CONSTITUTIONAL: NEGATIVE for fever, chills, change in weight  ENT/MOUTH: NEGATIVE for ear, mouth and throat problems  RESP: NEGATIVE for significant cough or SOB  CV: NEGATIVE for chest pain, palpitations or peripheral edema  NEURO: NEGATIVE for weakness, dizziness or paresthesias and vertigo    Patient Active Problem List    Diagnosis Date Noted     Alopecia 07/26/2021     Priority: Medium     Formatting of this note might be different from the original.  Created by Conversion    Replacement Utility  updated for latest IMO load       Esophageal reflux 07/26/2021     Priority: Medium     Formatting of this note might be different from the original.  Created by Conversion       Primary hypercholesterolemia 07/26/2021     Priority: Medium     Formatting of this note might be different from the original.  Created by Conversion       Cervical spondylosis without myelopathy 03/25/2021     Priority: Medium     Type 2 diabetes mellitus with other circulatory complication, without long-term current use of insulin (H) 05/01/2020     Priority: Medium     Morbid obesity (H) 09/29/2018     Priority: Medium     Allergic contact dermatitis 05/17/2016     Priority: Medium     Obstructive sleep apnea (adult) (pediatric) 09/16/2015     Priority: Medium     Last Assessment & Plan:   Formatting of this note might be different from the original.  Using CPAP       Hemianopia 05/15/2014     Priority: Medium     Hypophosphatemia 01/02/2014     Priority: Medium     Coronary atherosclerosis 01/01/2014     Priority: Medium     Formatting of this note might be different from the original.  Created by Conversion    Replacement Utility updated for latest IMO load       Hypokalemia 01/01/2014     Priority: Medium     Hypomagnesemia 01/01/2014     Priority: Medium     Irritable bowel syndrome (IBS) 01/01/2014     Priority: Medium     Asthma 12/31/2013     Priority: Medium     Cerebral aneurysm, nonruptured 12/31/2013     Priority: Medium     Formatting of this note might be different from the original.  Created by Conversion       Essential hypertension 12/31/2013     Priority: Medium     Formatting of this note might be different from the original.  Created by Conversion    Replacement Utility updated for latest IMO load        Past Medical History:   Diagnosis Date     Arthritis      Arthritis      Basal cell cancer 2007     Benign neoplasm of colon      Brain aneurysm     removed on 12/31/13     Cancer (H)     basel cell     Cerebral  embolism with cerebral infarction (H)      Cerebral vascular accident (H) 12/31/2013     Colitis      Coronary artery disease      External hemorrhoid 6/15/2016     Fibrocystic breast      High blood pressure      History of heart artery stent 9/2012     Hyperlipidemia      Hypertension      Hypertension      Myalgia and myositis, unspecified      S/P hysterectomy 7/28/2019    Total hysterectomy with BSO benign disease, edometriosis?     Sleep apnea, obstructive     c-pap     Statin intolerance      Stroke (H)      Type 2 diabetes mellitus without complications (H)     borderline? blood sugars are fine/nosx no meds     Past Surgical History:   Procedure Laterality Date     BRAIN SURGERY  2013    brain aneurism     C TOTAL ABDOM HYSTERECTOMY  08/01/2002    endometriosis     CARDIAC CATHETERIZATION  9/28/15    mild to moderate coronary disease only without obstructive stenosis     CEREBRAL ANEURYSM REPAIR  12/31/2013    [facility] Aneurysm clipping, Dr Harvey     CORONARY STENT PLACEMENT  9/20/12    bare metal stent in the proximal LAD      HEMORRHOID SURGERY  1975     HEMORRHOIDECTOMY INTERNAL LIGATION  1975     HYSTERECTOMY  2002     HYSTERECTOMY       NECK SURGERY  2001     OOPHORECTOMY       OVARY SURGERY  removed 1990     [facility] NASAL/SINUS ENDOSCOPY,BX/[facility] POLYP/DEBRID  1998    Description: Nasal Endoscopy Polypectomy;     [place] REMOVAL OF OVARY/TUBE(S) Right      Salpingo-oophorectomy Right Side     REPAIR ARTERY CORONARY  2012    femerol artery torn during angiogram     SINUS SURGERY  1998    polyp remvd     STENT  2012    heart     Current Outpatient Medications   Medication Sig Dispense Refill     Aspirin Buf,CaCarb-MgCarb-MgO, 81 MG TABS Take 81 mg by mouth       azelastine (ASTELIN) 0.1 % nasal spray USE 2 SPRAYS IN EACH NOSTRIL TWICE DAILY AS NEEDED 90 mL 3     Biotin 10 MG TABS tablet        Cholecalciferol (VITAMIN D3 PO) Take 2,000 Units by mouth every morning        cimetidine (TAGAMET) 200 MG tablet Take 800 mg  by mouth 2 times daily        Ferrous Sulfate (IRON PO)        fluticasone (FLONASE) 50 MCG/ACT nasal spray Spray 1 spray into both nostrils daily       hydrochlorothiazide (HYDRODIURIL) 25 MG tablet Take 25 mg by mouth 2 times daily       losartan (COZAAR) 100 MG tablet Take 100 mg by mouth       MAGNESIUM GLUCONATE PO Take 400 mg by mouth daily       metoprolol succinate ER (TOPROL-XL) 100 MG 24 hr tablet TAKE 1 TABLET(100 MG) BY MOUTH TWICE DAILY 180 tablet 1     mometasone (NASONEX) 50 MCG/ACT nasal spray Spray 2 sprays into both nostrils daily        Montelukast Sodium (SINGULAIR PO) Take 10 mg by mouth At Bedtime       NIFEdipine ER OSMOTIC (PROCARDIA XL) 30 MG 24 hr tablet 60 mg        nitroGLYcerin (NITROSTAT) 0.4 MG sublingual tablet PLACE 1 TABLET UNDER THE TONGUE EVERY 5 MINUTES AS NEEDED FOR CHEST PAIN       Omega-3 Fatty Acids (OMEGA-3 FISH OIL PO) Take 1,400 mg by mouth At Bedtime       potassium chloride ER (KLOR-CON M) 20 MEQ CR tablet Take 40 mEq by mouth 2 times daily        SINGULAIR 10 MG tablet Take 1 tablet by mouth daily       Cyanocobalamin (VITAMIN B-12 PO)  (Patient not taking: Reported on 10/27/2021)       Lactobacillus Rhamnosus, GG, ( PROBIOTIC DIGESTIVE CARE) CAPS Take 1 capsule by mouth (Patient not taking: Reported on 10/27/2021)       LANSOPRAZOLE PO Take 30 mg by mouth 2 times daily (Patient not taking: Reported on 7/21/2021)       methylcellulose (CITRUCEL) powder Take 2 g by mouth (Patient not taking: Reported on 10/27/2021)       ondansetron (ZOFRAN-ODT) 4 MG ODT tab Place 1 tablet under the tongue every 6 hours as needed (Patient not taking: Reported on 10/27/2021)       polyethylene glycol (MIRALAX) 17 GM/Dose powder Take 17 g by mouth (Patient not taking: Reported on 10/27/2021)         Allergies   Allergen Reactions     Atorvastatin Cramps     Mevacor, and zocor also leg cramping     Carvedilol      hives     Cephalosporins Hives     Clindamycin Hives     Demerol Nausea and  Vomiting     Dilantin [Phenytoin]      Hives     Lisinopril      Trouble breathing     Meperidine      Hives  Other reaction(s): hives and vomiting     Metronidazole Hives     Nickel      Other reaction(s): Rash, itching     Penicillins Hives     Rosuvastatin Cramps     Leg cramping     Sulfa Drugs Hives        Social History     Tobacco Use     Smoking status: Never Smoker     Smokeless tobacco: Never Used   Substance Use Topics     Alcohol use: Yes     Family History   Problem Relation Age of Onset     Cancer Mother      Diabetes Mother      Hypertension Mother      Thyroid Disease Mother      Hypertension Sister      Breast Cancer Maternal Aunt      Hypertension Father      Heart Disease Mother      Stomach Cancer Mother      Coronary Artery Disease Mother 60.00     Heart Disease Maternal Grandfather      GERD Sister      History   Drug Use No         Objective         Physical Exam  GENERAL APPEARANCE: healthy, alert and no distress  HENT: ear canals and TM's normal and nose and mouth without ulcers or lesions  RESP: lungs clear to auscultation - no rales, rhonchi or wheezes  CV: regular rate and rhythm, normal S1 S2, no S3 or S4 and no murmur, click or rub   ABDOMEN: soft, nontender, no HSM or masses and bowel sounds normal  NEURO: Normal strength and tone, sensory exam grossly normal, mentation intact and speech normal    Recent Labs   Lab Test 10/27/21  0958 07/21/21  1142 12/22/20  1723 05/01/20  1107 05/01/20  1107   HGB 14.1  --   --   --  13.7     --   --   --  340   INR  --   --   --   --  0.94     --  140   < > 140   POTASSIUM 4.0  --  4.0   < > 3.7   CR 0.81  --  0.94   < > 0.78   A1C  --  6.2* 6.1*   < > 6.2*    < > = values in this interval not displayed.        Diagnostics:  Recent Results (from the past 48 hour(s))   Asymptomatic COVID-19 Virus (Coronavirus) by PCR Oropharynx    Collection Time: 10/26/21 10:14 AM    Specimen: Oropharynx; Swab   Result Value Ref Range    SARS CoV2 PCR  Negative Negative   CBC with platelets    Collection Time: 10/27/21  9:58 AM   Result Value Ref Range    WBC Count 8.5 4.0 - 11.0 10e3/uL    RBC Count 4.87 3.80 - 5.20 10e6/uL    Hemoglobin 14.1 11.7 - 15.7 g/dL    Hematocrit 42.4 35.0 - 47.0 %    MCV 87 78 - 100 fL    MCH 29.0 26.5 - 33.0 pg    MCHC 33.3 31.5 - 36.5 g/dL    RDW 13.0 10.0 - 15.0 %    Platelet Count 331 150 - 450 10e3/uL   Basic metabolic panel    Collection Time: 10/27/21  9:58 AM   Result Value Ref Range    Sodium 141 136 - 145 mmol/L    Potassium 4.0 3.5 - 5.0 mmol/L    Chloride 103 98 - 107 mmol/L    Carbon Dioxide (CO2) 27 22 - 31 mmol/L    Anion Gap 11 5 - 18 mmol/L    Urea Nitrogen 14 8 - 22 mg/dL    Creatinine 0.81 0.60 - 1.10 mg/dL    Calcium 11.2 (H) 8.5 - 10.5 mg/dL    Glucose 100 70 - 125 mg/dL    GFR Estimate 75 >60 mL/min/1.73m2   EKG 12-lead, tracing only    Collection Time: 10/27/21 10:09 AM   Result Value Ref Range    Systolic Blood Pressure  mmHg    Diastolic Blood Pressure  mmHg    Ventricular Rate 66 BPM    Atrial Rate 66 BPM    ID Interval 140 ms    QRS Duration 104 ms     ms    QTc 463 ms    P Axis 9 degrees    R AXIS 18 degrees    T Axis 6 degrees    Interpretation ECG       Sinus rhythm  Nonspecific ST abnormality  Abnormal ECG  When compared with ECG of 21-OCT-2019 12:00,  Incomplete right bundle branch block is no longer Present  Confirmed by ROLANDA COX, DAYO LOC:WW ([id number]) on 10/27/2021 11:45:23 AM        EKG: appears normal, NSR, see above,     Revised Cardiac Risk Index (RCRI):  The patient has the following serious cardiovascular risks for perioperative complications:   - Cerebrovascular Disease (TIA or CVA) = 1 point       RCRI Interpretation: 1 point: Class II (low risk - 0.9% complication rate)           Signed Electronically by: Atilio Baez MD  Copy of this evaluation report is provided to requesting physician.

## 2021-10-27 NOTE — PATIENT INSTRUCTIONS

## 2021-10-28 ASSESSMENT — ASTHMA QUESTIONNAIRES: ACT_TOTALSCORE: 21

## 2021-10-30 DIAGNOSIS — I10 ESSENTIAL HYPERTENSION: Primary | ICD-10-CM

## 2021-10-31 RX ORDER — LOSARTAN POTASSIUM 100 MG/1
TABLET ORAL
Qty: 90 TABLET | Refills: 3 | Status: SHIPPED | OUTPATIENT
Start: 2021-10-31 | End: 2022-08-24

## 2021-11-01 NOTE — ADDENDUM NOTE
Encounter addended by: Taina Stearns, PT on: 11/1/2021 9:14 AM   Actions taken: Episode resolved, Clinical Note Signed

## 2021-11-01 NOTE — TELEPHONE ENCOUNTER
"Last Written Prescription Date:  2/14/2021  Last Fill Quantity: 90,  # refills: 2   Last office visit provider:  10/27/2021         Requested Prescriptions   Pending Prescriptions Disp Refills     losartan (COZAAR) 100 MG tablet [Pharmacy Med Name: LOSARTAN 100MG TABLETS] 90 tablet      Sig: TAKE 1 TABLET(100 MG) BY MOUTH DAILY       Angiotensin-II Receptors Passed - 10/30/2021 10:08 AM        Passed - Last blood pressure under 140/90 in past 12 months     BP Readings from Last 3 Encounters:   10/27/21 134/80   07/21/21 (!) 142/74   05/04/21 134/72                 Passed - Recent (12 mo) or future (30 days) visit within the authorizing provider's specialty     Patient has had an office visit with the authorizing provider or a provider within the authorizing providers department within the previous 12 mos or has a future within next 30 days. See \"Patient Info\" tab in inbasket, or \"Choose Columns\" in Meds & Orders section of the refill encounter.              Passed - Medication is active on med list        Passed - Patient is age 18 or older        Passed - No active pregnancy on record        Passed - Normal serum creatinine on file in past 12 months     Recent Labs   Lab Test 10/27/21  0958   CR 0.81       Ok to refill medication if creatinine is low          Passed - Normal serum potassium on file in past 12 months     Recent Labs   Lab Test 10/27/21  0958   POTASSIUM 4.0                    Passed - No positive pregnancy test in past 12 months             Erin Yoder RN 10/31/21 8:55 PM  "

## 2021-11-01 NOTE — PROGRESS NOTES
Outpatient Physical Therapy Discharge Note     Patient: Alvina Maynard  : 1955    Beginning/End Dates of Reporting Period:  10/13/21 to 10/25/21    Referring Provider: Dr. Baez    Therapy Diagnosis: TMJ Pain     Client Self Report: my last visit flared up the vertigo so I was not able to follow- through with many of the TMJ exercises.  I am getting more of a frontal type headache and not sure what is causing it-the vertigo ex or the neck ex. I still have it and feel unbalanced.  The left ear still feels full like it needs to pop    Objective Measurements:  Objective Measure: pain today 3/10                   Goals:  Goal Identifier     Goal Description     Target Date     Date Met      Progress (detail required for progress note):       Goal Identifier     Goal Description     Target Date     Date Met      Progress (detail required for progress note):       Goal Identifier     Goal Description     Target Date     Date Met      Progress (detail required for progress note):       Goal Identifier     Goal Description     Target Date     Date Met      Progress (detail required for progress note):       Goal Identifier     Goal Description     Target Date     Date Met      Progress (detail required for progress note):       Goal Identifier     Goal Description     Target Date     Date Met      Progress (detail required for progress note):       Goal Identifier     Goal Description     Target Date     Date Met      Progress (detail required for progress note):       Goal Identifier     Goal Description     Target Date     Date Met      Progress (detail required for progress note):         Plan:  Discharge from therapy.    Discharge:    Reason for Discharge: got a referral to the [facility] for [facility].     Equipment Issued:     Discharge Plan: Patient to continue home program.

## 2021-11-15 ENCOUNTER — OFFICE VISIT (OUTPATIENT)
Dept: FAMILY MEDICINE | Facility: CLINIC | Age: 66
End: 2021-11-15
Payer: COMMERCIAL

## 2021-11-15 ENCOUNTER — NURSE TRIAGE (OUTPATIENT)
Dept: NURSING | Facility: CLINIC | Age: 66
End: 2021-11-15

## 2021-11-15 VITALS
RESPIRATION RATE: 16 BRPM | DIASTOLIC BLOOD PRESSURE: 93 MMHG | SYSTOLIC BLOOD PRESSURE: 162 MMHG | TEMPERATURE: 98.2 F | HEART RATE: 62 BPM | OXYGEN SATURATION: 98 %

## 2021-11-15 DIAGNOSIS — K22.89 ESOPHAGOGASTRIC JUNCTION OUTFLOW OBSTRUCTION: ICD-10-CM

## 2021-11-15 DIAGNOSIS — J30.89 SEASONAL ALLERGIC RHINITIS DUE TO OTHER ALLERGIC TRIGGER: ICD-10-CM

## 2021-11-15 DIAGNOSIS — R07.89 ATYPICAL CHEST PAIN: Primary | ICD-10-CM

## 2021-11-15 DIAGNOSIS — K21.9 GASTROESOPHAGEAL REFLUX DISEASE WITHOUT ESOPHAGITIS: ICD-10-CM

## 2021-11-15 DIAGNOSIS — K31.89 ESOPHAGOGASTRIC JUNCTION OUTFLOW OBSTRUCTION: ICD-10-CM

## 2021-11-15 PROBLEM — K63.5 POLYP OF COLON: Status: ACTIVE | Noted: 2020-07-27

## 2021-11-15 PROBLEM — D12.2 BENIGN NEOPLASM OF ASCENDING COLON: Status: ACTIVE | Noted: 2020-07-29

## 2021-11-15 PROBLEM — K31.7 POLYP OF DUODENUM: Status: ACTIVE | Noted: 2020-07-27

## 2021-11-15 PROBLEM — K57.30 DIVERTICULAR DISEASE OF LARGE INTESTINE: Status: ACTIVE | Noted: 2020-07-27

## 2021-11-15 PROCEDURE — 99214 OFFICE O/P EST MOD 30 MIN: CPT | Performed by: PHYSICIAN ASSISTANT

## 2021-11-15 RX ORDER — FAMOTIDINE 20 MG/1
20 TABLET, FILM COATED ORAL 2 TIMES DAILY
Qty: 60 TABLET | Refills: 0 | Status: SHIPPED | OUTPATIENT
Start: 2021-11-15 | End: 2021-12-15

## 2021-11-15 RX ORDER — HYDROCHLOROTHIAZIDE 25 MG/1
2 TABLET ORAL EVERY 24 HOURS
COMMUNITY
Start: 2020-07-27 | End: 2021-12-20

## 2021-11-15 RX ORDER — ONDANSETRON 4 MG/1
TABLET, ORALLY DISINTEGRATING ORAL
COMMUNITY
Start: 2021-07-16 | End: 2021-12-20

## 2021-11-15 RX ORDER — FLUTICASONE PROPIONATE 50 MCG
1 SPRAY, SUSPENSION (ML) NASAL DAILY
Qty: 11 ML | Refills: 0 | Status: SHIPPED | OUTPATIENT
Start: 2021-11-15 | End: 2022-01-23

## 2021-11-15 RX ORDER — FAMOTIDINE 40 MG/5ML
20 POWDER, FOR SUSPENSION ORAL 2 TIMES DAILY
Qty: 150 ML | Refills: 0 | Status: SHIPPED | OUTPATIENT
Start: 2021-11-15 | End: 2021-12-15

## 2021-11-15 RX ORDER — CETIRIZINE HYDROCHLORIDE 10 MG/1
10 TABLET ORAL DAILY
Qty: 30 TABLET | Refills: 0 | Status: SHIPPED | OUTPATIENT
Start: 2021-11-15 | End: 2021-12-20

## 2021-11-15 RX ORDER — CIMETIDINE 800 MG
800 TABLET ORAL
COMMUNITY
Start: 2021-08-30 | End: 2022-08-03

## 2021-11-15 NOTE — TELEPHONE ENCOUNTER
"No inhaler    Asthma    Esophagus feels like zora    Going to walk in clinic at opening time    Airam Love RN  [facility] Nurse Advisor    Reason for Disposition    MILD difficulty breathing (e.g., minimal/no SOB at rest, SOB with walking, pulse < 100) of new onset or worse than normal    Additional Information    Negative: Breathing stopped and hasn't returned    Negative: Choking on something    Negative: SEVERE difficulty breathing (e.g., struggling for each breath, speaks in single words, pulse > 120)    Negative: Bluish (or gray) lips or face    Negative: Difficult to awaken or acting confused (e.g., disoriented, slurred speech)    Negative: Passed out (i.e., fainted, collapsed and was not responding)    Negative: Wheezing started suddenly after medicine, an allergic food, or bee sting    Negative: Stridor    Negative: Slow, shallow and weak breathing    Negative: Sounds like a life-threatening emergency to the triager    Negative: Chest pain    Negative: Wheezing (high pitched whistling sound) and previous asthma attacks or use of asthma medicines    Negative: Difficulty breathing and only present when coughing    Negative: Difficulty breathing and only from stuffy or runny nose    Negative: Difficulty breathing and within 14 days of COVID-19 Exposure    Negative: MODERATE difficulty breathing (e.g., speaks in phrases, SOB even at rest, pulse 100-120) of new onset or worse than normal    Negative: Wheezing can be heard across the room    Negative: Drooling or spitting out saliva (because can't swallow)    Negative: Any history of prior \"blood clot\" in leg or lungs    Negative: Illness requiring prolonged bedrest in past month (e.g., immobilization, long hospital stay)    Negative: Hip or leg fracture (broken bone) in past month (or had cast on leg or ankle in past month)    Negative: Major surgery in the past month    Negative: Long-distance travel in past month (e.g., car, bus, train, " "plane; with trip lasting 6 or more hours)    Negative: Extra heart beats OR irregular heart beating   (i.e., \"palpitations\")    Negative: Fever > 103 F (39.4 C)    Negative: Fever > 101 F (38.3 C) and over 60 years of age    Negative: Fever > 100.0 F (37.8 C) and bedridden (e.g., nursing home patient, stroke, chronic illness, recovering from surgery)    Negative: Fever > 100.0 F (37.8 C) and diabetes mellitus or weak immune system (e.g., HIV positive, cancer chemo, splenectomy, organ transplant, chronic steroids)    Negative: Periods where breathing stops and then resumes normally and bedridden (e.g., nursing home patient, CVA)    Negative: Pregnant or postpartum (from 0 to 6 weeks after delivery)    Negative: Patient sounds very sick or weak to the triager    Protocols used: BREATHING DIFFICULTY-A-OH      "

## 2021-11-15 NOTE — PROGRESS NOTES
Chief Complaint   Patient presents with     Chest tightness     have been going on for weeks, worse the last 2 days, throat feels like it's tightness up       ASSESSMENT/PLAN:  Alvina was seen today for chest tightness.    Diagnoses and all orders for this visit:    Atypical chest pain  -     famotidine (PEPCID) 20 MG tablet; Take 1 tablet (20 mg) by mouth 2 times daily    Gastroesophageal reflux disease without esophagitis  -     famotidine (PEPCID) 20 MG tablet; Take 1 tablet (20 mg) by mouth 2 times daily  -     famotidine (PEPCID) 40 MG/5ML suspension; Take 2.5 mLs (20 mg) by mouth 2 times daily    Seasonal allergic rhinitis due to other allergic trigger  -     cetirizine (ZYRTEC) 10 MG tablet; Take 1 tablet (10 mg) by mouth daily  -     fluticasone (FLONASE) 50 MCG/ACT nasal spray; Spray 1 spray into both nostrils daily    Esophagogastric junction outflow obstruction    Other orders  -     Cancel: EKG 12-lead, tracing only    Patient story is not concerning for cardiac etiology.  Symptoms are not worse with exertion.   Lung exam was within normal limits and no wheeze was heard.  I am not concerned for asthma or pulmonary cause.  Patient's had ongoing issues with EGJ outflow obstruction and recently had an esophagogastroduodenoscopy with esophageal balloon distention study.  She has ongoing chest pain which was one of the reasons for the study and this improved her symptoms for about 3 days but then they returned.  Once that returned then the perceived tightening of the throat became present for the last few days.  This leads me to believe that it is likely regurgitation or post procedural inflammation or irritation.  She was concerned about allergies contributing to this.  I do not want to treat patient with oral steroids if I do not need to given the biopsies showed likely chemical mucosal injury of the stomach.  Start treatment with Flonase for postnasal drainage and allergies along with Zyrtec.  Start  treatment with Pepcid to reduce any acid that may be regurgitating and causing the symptoms.  If she is not improving over the course the next week or her symptoms worsen follow-up with PCP or return to clinic    Jerardo Tuttle PA-C  40 minutes spent on the date of the encounter doing chart review, history and exam, documentation and further activities per the note    SUBJECTIVE:  Alvina is a 66 year old female who presents to urgent care with 2 days of feeling like her throat is tightening.  She feels like she needs to stretch out her neck in order to get a good breath.  She also has chest pain just distal to the sternum in the midline.  She is had this for quite some time and recently had an EGD endoscopy, results and diagnostics, which improved her symptoms for a few days but then they returned and then she developed the chest tightness.  She denies any shortness of breath, or cough but thinks she may be having some wheeze.  No fever or chills.  Throat is a little bit sore.  Has some nasal congestion and postnasal drainage.  Does have a history of allergies and she has been around a cat recently.  She has had something like this a few years ago and was treated with Zyrtec and Flonase which improved her symptoms.  Her chest pain does not have an exertional component to it    ROS: Pertinent ROS neg other than the symptoms noted above in the HPI.     OBJECTIVE:  BP (!) 162/93   Pulse 62   Temp 98.2  F (36.8  C) (Oral)   Resp 16   SpO2 98%    GENERAL: healthy, alert and no distress  EYES: Eyes grossly normal to inspection, PERRL and conjunctivae and sclerae normal  HENT: ear canals and TM's normal, nose and mouth without ulcers or lesions, patent oropharynx without any erythema  NECK: no adenopathy, nontender, no masses or swelling  RESP: lungs clear to auscultation - no rales, rhonchi or wheezes  CV: regular rate and rhythm, normal S1 S2, no S3 or S4, no murmur, click or rub  MSK: Sternal  tenderness    DIAGNOSTICS  Results from EGD endoscopy 10/28  Findings:  Esophagus - normal mucosa. The EndoFlip manometry catheter was   positioned across the GEJ in the usual manner and inflated to 30,40,50   and 60 cc. At 60 ccs the balloon diameter was 5.2 mm, and the DI was   0.6. The EndoFlip balloon was withdrawn and the EsoFlip achalasia   balloon was then positioned across the GEJ in the usual manner and   inflated to 60 ccs (30 mm). This balloon was held in place for 60   seconds, then deflated and withdrawn. There was a small laceration in   the distal esophagus, just proximal to the GEJ, but no laceration across   the GEJ.  Stomach - no HH. Numerous fundic gland polyps; the largest one was 1 cm   in diameter and located in the proximal stomach; this was biopsied.   Random biopsies were also taken for H. pylori.  Duodenum - Scattered erosions; no ulcerations; not biopsied.    Impressions/Post-Op Diagnosis:  EGJ outflow tract obstruction; confirmed with EndoFlip manometry, and   dilated using the 30 mm EsoFlip balloon  Gastric polyps; biopsied  Biopsied for H. pylori  Erosive duodenitis    A) STOMACH, BIOPSY:   1. Non-erosive reactive gastropathy (see comment)      a. Sampling: Antrum and body      b. Distribution: Antrum   2. Negative for inflammation, atrophy and Helicobacter     B) STOMACH, BODY, POLYP, BIOPSY:   1. Fundic gland polyp   2. Negative for dysplasia and malignancy   3. Defer to endoscopist regarding size and excision adequacy   4. Negative for gastritis and Helicobacter organisms  A) The likely etiology is an ongoing non-inflammatory type mucosal injury due to a chemical type of injury; this may be due to ingestion of non-steroidal anti-inflammatory drugs, aspirin (via prostaglandin-mediated injury), excess alcohol, corticosteroids, or bile/alkaline reflux, the latter usually in the setting of a gastroenteric anastomosis.    No results found for any visits on 11/15/21.     Current Outpatient  Medications   Medication     Aspirin Buf,CaCarb-MgCarb-MgO, 81 MG TABS     azelastine (ASTELIN) 0.1 % nasal spray     Biotin 10 MG TABS tablet     Cholecalciferol (VITAMIN D3 PO)     cimetidine (TAGAMET) 200 MG tablet     Ferrous Sulfate (IRON PO)     fluticasone (FLONASE) 50 MCG/ACT nasal spray     hydrochlorothiazide (HYDRODIURIL) 25 MG tablet     losartan (COZAAR) 100 MG tablet     MAGNESIUM GLUCONATE PO     methylcellulose (CITRUCEL) powder     metoprolol succinate ER (TOPROL-XL) 100 MG 24 hr tablet     mometasone (NASONEX) 50 MCG/ACT nasal spray     Montelukast Sodium (SINGULAIR PO)     NIFEdipine ER OSMOTIC (PROCARDIA XL) 30 MG 24 hr tablet     nitroGLYcerin (NITROSTAT) 0.4 MG sublingual tablet     Omega-3 Fatty Acids (OMEGA-3 FISH OIL PO)     polyethylene glycol (MIRALAX) 17 GM/Dose powder     potassium chloride ER (KLOR-CON M) 20 MEQ CR tablet     SINGULAIR 10 MG tablet     No current facility-administered medications for this visit.      Patient Active Problem List   Diagnosis     Hemianopia     Allergic contact dermatitis     Alopecia     Asthma     Cerebral aneurysm, nonruptured     Cervical spondylosis without myelopathy     Coronary atherosclerosis     Esophageal reflux     Essential hypertension     Hypokalemia     Hypomagnesemia     Hypophosphatemia     Irritable bowel syndrome (IBS)     Morbid obesity (H)     Obstructive sleep apnea (adult) (pediatric)     Primary hypercholesterolemia     Type 2 diabetes mellitus with other circulatory complication, without long-term current use of insulin (H)     Atypical chest pain     Benign neoplasm of ascending colon     Chronic abdominal pain     Polyp of duodenum     Polyp of colon     Diverticular disease of large intestine      Past Medical History:   Diagnosis Date     Arthritis      Arthritis      Basal cell cancer 2007     Benign neoplasm of colon      Brain aneurysm     removed on 12/31/13     Cancer (H)     basel cell     Cerebral embolism with cerebral  infarction (H)      Cerebral vascular accident (H) 12/31/2013     Colitis      Coronary artery disease      External hemorrhoid 6/15/2016     Fibrocystic breast      High blood pressure      History of heart artery stent 9/2012     Hyperlipidemia      Hypertension      Hypertension      Myalgia and myositis, unspecified      S/P hysterectomy 7/28/2019    Total hysterectomy with BSO benign disease, edometriosis?     Sleep apnea, obstructive     c-pap     Statin intolerance      Stroke (H)      Type 2 diabetes mellitus without complications (H)     borderline? blood sugars are fine/nosx no meds     Past Surgical History:   Procedure Laterality Date     BRAIN SURGERY  2013    brain aneurism     C TOTAL ABDOM HYSTERECTOMY  08/01/2002    endometriosis     CARDIAC CATHETERIZATION  9/28/15    mild to moderate coronary disease only without obstructive stenosis     CEREBRAL ANEURYSM REPAIR  12/31/2013    [facility] Aneurysm clipping, Dr Harvey     CORONARY STENT PLACEMENT  9/20/12    bare metal stent in the proximal LAD      HEMORRHOID SURGERY  1975     HEMORRHOIDECTOMY INTERNAL LIGATION  1975     HYSTERECTOMY  2002     HYSTERECTOMY       NECK SURGERY  2001     OOPHORECTOMY       OVARY SURGERY  removed 1990     MA NASAL/SINUS ENDOSCOPY,BX/RMV POLYP/DEBRID  1998    Description: Nasal Endoscopy Polypectomy;     MA REMOVAL OF OVARY/TUBE(S) Right      Salpingo-oophorectomy Right Side     REPAIR ARTERY CORONARY  2012    femerol artery torn during angiogram     SINUS SURGERY  1998    polyp remvd     STENT  2012    heart     Family History   Problem Relation Age of Onset     Cancer Mother      Diabetes Mother      Hypertension Mother      Thyroid Disease Mother      Hypertension Sister      Breast Cancer Maternal Aunt      Hypertension Father      Heart Disease Mother      Stomach Cancer Mother      Coronary Artery Disease Mother 60.00     Heart Disease Maternal Grandfather      GERD Sister      Social History     Tobacco Use      Smoking status: Never Smoker     Smokeless tobacco: Never Used   Substance Use Topics     Alcohol use: Yes              The plan of care was discussed with the patient. They understand and agree with the course of treatment prescribed. A printed summary was given including instructions and medications.  The use of Dragon/[employer] dictation services may have been used to construct the content in this note; any grammatical or spelling errors are non-intentional. Please contact the author of this note directly if you are in need of any clarification.

## 2021-11-24 ENCOUNTER — TRANSFERRED RECORDS (OUTPATIENT)
Dept: HEALTH INFORMATION MANAGEMENT | Facility: CLINIC | Age: 66
End: 2021-11-24
Payer: COMMERCIAL

## 2021-11-28 DIAGNOSIS — J30.89 SEASONAL ALLERGIC RHINITIS DUE TO OTHER ALLERGIC TRIGGER: Primary | ICD-10-CM

## 2021-11-29 ENCOUNTER — TELEPHONE (OUTPATIENT)
Dept: OTOLARYNGOLOGY | Facility: CLINIC | Age: 66
End: 2021-11-29
Payer: COMMERCIAL

## 2021-11-29 NOTE — TELEPHONE ENCOUNTER
Calling patient back about a voice mail that she left.  She mentioned a new order for physical therapy.    [facility]      Keyanna Earl RN  [facility]  ENT  [address] [address]  [email or web address]   Office:[phone]  Employed by [facility]

## 2021-11-30 NOTE — TELEPHONE ENCOUNTER
Singulair 10mg    Medication: Singulair  Last Date Filled: 9/5/21  Last appointment addressing medication:  10/27/21 pre op with Dr Baez  Last B/P:  BP Readings from Last 3 Encounters:   11/15/21 (!) 162/93   10/27/21 134/80   07/21/21 (!) 142/74     Last labs pertaining to refill:    Takes for allergy control to prevent asthma flare up.  Today is the last day of insurance. Medicare starts tomorrow and pt doesn't think it will be covered. Request for 3 month supply.

## 2021-12-06 NOTE — ADDENDUM NOTE
Encounter addended by: Chayo De La Rosa, OT on: 12/6/2021 10:00 AM   Actions taken: Episode resolved, Flowsheet accepted, Clinical Note Signed

## 2021-12-06 NOTE — PROGRESS NOTES
[facility]    Outpatient Occupational Therapy Discharge Note  Patient: Alvina Maynard  : 1955    Beginning/End Dates of Reporting Period:  21 to 10-19-21    Referring Provider: Dr. Tito Bravo    Therapy Diagnosis: dizziness, unsteadiness on feet, nausea    Client Self Report: Patient reports that her dizziness is slightly improved.    Goals:     Goal Identifier     Goal Description Patient will be able to turn head for conversation without dizziness   Target Date 21   Date Met      Progress (detail required for progress note): no change     Goal Identifier     Goal Description Patient will be able to bend to put shoes on without dizziness   Target Date 21   Date Met      Progress (detail required for progress note): no change     Goal Identifier     Goal Description Patient will be able to look up into cupboard without dizziness   Target Date 21   Date Met      Progress (detail required for progress note): no change     Plan:  Discharge from therapy.

## 2021-12-20 ENCOUNTER — OFFICE VISIT (OUTPATIENT)
Dept: FAMILY MEDICINE | Facility: CLINIC | Age: 66
End: 2021-12-20
Payer: COMMERCIAL

## 2021-12-20 VITALS
DIASTOLIC BLOOD PRESSURE: 74 MMHG | HEART RATE: 75 BPM | HEIGHT: 62 IN | WEIGHT: 213 LBS | SYSTOLIC BLOOD PRESSURE: 124 MMHG | OXYGEN SATURATION: 97 % | BODY MASS INDEX: 39.2 KG/M2

## 2021-12-20 DIAGNOSIS — H81.12 BENIGN PAROXYSMAL POSITIONAL VERTIGO OF LEFT EAR: ICD-10-CM

## 2021-12-20 DIAGNOSIS — J30.89 SEASONAL ALLERGIC RHINITIS DUE TO OTHER ALLERGIC TRIGGER: ICD-10-CM

## 2021-12-20 DIAGNOSIS — R41.3 MEMORY LOSS: ICD-10-CM

## 2021-12-20 DIAGNOSIS — I10 ESSENTIAL HYPERTENSION: ICD-10-CM

## 2021-12-20 DIAGNOSIS — Z23 NEED FOR INFLUENZA VACCINATION: ICD-10-CM

## 2021-12-20 DIAGNOSIS — Z00.00 ENCOUNTER FOR MEDICARE ANNUAL WELLNESS EXAM: Primary | ICD-10-CM

## 2021-12-20 DIAGNOSIS — M65.341 TRIGGER RING FINGER OF RIGHT HAND: ICD-10-CM

## 2021-12-20 DIAGNOSIS — E11.59 TYPE 2 DIABETES MELLITUS WITH OTHER CIRCULATORY COMPLICATION, WITHOUT LONG-TERM CURRENT USE OF INSULIN (H): ICD-10-CM

## 2021-12-20 DIAGNOSIS — E78.00 PURE HYPERCHOLESTEROLEMIA: ICD-10-CM

## 2021-12-20 LAB
CHOLEST SERPL-MCNC: 257 MG/DL
CREAT UR-MCNC: 19 MG/DL
FASTING STATUS PATIENT QL REPORTED: ABNORMAL
HBA1C MFR BLD: 6.3 % (ref 0–5.6)
HDLC SERPL-MCNC: 62 MG/DL
LDLC SERPL CALC-MCNC: 176 MG/DL
MICROALBUMIN UR-MCNC: <0.5 MG/DL (ref 0–1.99)
MICROALBUMIN/CREAT UR: NORMAL MG/G{CREAT}
TRIGL SERPL-MCNC: 94 MG/DL
TSH SERPL DL<=0.005 MIU/L-ACNC: 1.4 UIU/ML (ref 0.3–5)
VIT B12 SERPL-MCNC: 496 PG/ML (ref 213–816)

## 2021-12-20 PROCEDURE — 82607 VITAMIN B-12: CPT | Performed by: FAMILY MEDICINE

## 2021-12-20 PROCEDURE — 90662 IIV NO PRSV INCREASED AG IM: CPT | Performed by: FAMILY MEDICINE

## 2021-12-20 PROCEDURE — 0004A COVID-19,PF,PFIZER (12+ YRS): CPT | Performed by: FAMILY MEDICINE

## 2021-12-20 PROCEDURE — 36415 COLL VENOUS BLD VENIPUNCTURE: CPT | Performed by: FAMILY MEDICINE

## 2021-12-20 PROCEDURE — 84443 ASSAY THYROID STIM HORMONE: CPT | Performed by: FAMILY MEDICINE

## 2021-12-20 PROCEDURE — 82043 UR ALBUMIN QUANTITATIVE: CPT | Performed by: FAMILY MEDICINE

## 2021-12-20 PROCEDURE — 83036 HEMOGLOBIN GLYCOSYLATED A1C: CPT | Performed by: FAMILY MEDICINE

## 2021-12-20 PROCEDURE — 90471 IMMUNIZATION ADMIN: CPT | Performed by: FAMILY MEDICINE

## 2021-12-20 PROCEDURE — 99397 PER PM REEVAL EST PAT 65+ YR: CPT | Mod: 25 | Performed by: FAMILY MEDICINE

## 2021-12-20 PROCEDURE — 80061 LIPID PANEL: CPT | Performed by: FAMILY MEDICINE

## 2021-12-20 PROCEDURE — 91300 COVID-19,PF,PFIZER (12+ YRS): CPT | Performed by: FAMILY MEDICINE

## 2021-12-20 PROCEDURE — 99214 OFFICE O/P EST MOD 30 MIN: CPT | Mod: 25 | Performed by: FAMILY MEDICINE

## 2021-12-20 RX ORDER — CETIRIZINE HYDROCHLORIDE 10 MG/1
10 TABLET ORAL DAILY
Qty: 30 TABLET | Refills: 0 | Status: SHIPPED | OUTPATIENT
Start: 2021-12-20 | End: 2022-08-03

## 2021-12-20 ASSESSMENT — ACTIVITIES OF DAILY LIVING (ADL): CURRENT_FUNCTION: NO ASSISTANCE NEEDED

## 2021-12-20 ASSESSMENT — MIFFLIN-ST. JEOR: SCORE: 1454.41

## 2021-12-20 NOTE — PATIENT INSTRUCTIONS
Patient Education   Personalized Prevention Plan  You are due for the preventive services outlined below.  Your care team is available to assist you in scheduling these services.  If you have already completed any of these items, please share that information with your care team to update in your medical record.  Health Maintenance Due   Topic Date Due     Diabetic Foot Exam  Never done     ANNUAL REVIEW OF HM ORDERS  Never done     Asthma Action Plan - yearly  Never done     Eye Exam  03/20/2020     Pneumococcal Vaccine (2 of 2 - PPSV23) 06/14/2021     Flu Vaccine (1) 09/01/2021     COVID-19 Vaccine (3 - Booster for Pfizer series) 09/30/2021     Cholesterol Lab  12/22/2021     Kidney Microalbumin Urine Test  12/22/2021     FALL RISK ASSESSMENT  12/22/2021      [facility] at [phone] for finger problem  
04-Feb-2020 19:25

## 2021-12-20 NOTE — PROGRESS NOTES
"SUBJECTIVE:   Alvina Maynard is a 66 year old female who presents for Preventive Visit.      Patient has been advised of split billing requirements and indicates understanding: Yes   Are you in the first 12 months of your Medicare coverage?  Yes,  Visual Acuity:  Right Eye: Pt unable to cooperate   Left Eye: Pt unable to cooperate  Both Eyes: Pt unable to cooperate    Imm/Inj    Musculoskeletal Problem    Healthy Habits:     In general, how would you rate your overall health?  Fair    Frequency of exercise:  None    Do you usually eat at least 4 servings of fruit and vegetables a day, include whole grains    & fiber and avoid regularly eating high fat or \"junk\" foods?  No    Taking medications regularly:  Yes    Ability to successfully perform activities of daily living:  No assistance needed    Home Safety:  No safety concerns identified    Hearing Impairment:  No hearing concerns    In the past 6 months, have you been bothered by leaking of urine? Yes    In general, how would you rate your overall mental or emotional health?  Good      PHQ-2 Total Score: 0    Additional concerns today:  Yes    Do you feel safe in your environment? Yes    Have you ever done Advance Care Planning? (For example, a Health Directive, POLST, or a discussion with a medical provider or your loved ones about your wishes): No, advance care planning information given to patient to review.  Patient plans to discuss their wishes with loved ones or provider.       Fall risk  Fallen 2 or more times in the past year?: No  Any fall with injury in the past year?: No  click delete button to remove this line now  Cognitive Screening   1) Repeat 3 items (Leader, Season, Table)    2) Clock draw: ABNORMAL  Numbers and hands incorrect  3) 3 item recall: Recalls NO objects   Results: 0 items recalled: PROBABLE COGNITIVE IMPAIRMENT, needs additional evaluation    Mini-CogTM Copyright KRISTEN Hernandez. Licensed by the author for use in [facility]; " reprinted with permission (soob@[email or web address]). All rights reserved.      Do you have sleep apnea, excessive snoring or daytime drowsiness?: yes    Reviewed and updated as needed this visit by clinical staff  Tobacco  Allergies  Meds             Reviewed and updated as needed this visit by Provider               Social History     Tobacco Use     Smoking status: Never Smoker     Smokeless tobacco: Never Used   Substance Use Topics     Alcohol use: Yes     If you drink alcohol do you typically have >3 drinks per day or >7 drinks per week? No    No flowsheet data found.No flowsheet data found.        PROBLEMS TO ADD ON...  Problems with trigger finger right ring finger and vertigo  Current providers sharing in care for this patient include:     Patient Care Team:  Atilio Baez MD as PCP - General (Family Medicine)  Atilio Baez MD as Assigned PCP  Latonia Rae MD as Assigned Heart and Vascular Provider  Tito Bravo MD as Assigned Surgical Provider    The following health maintenance items are reviewed in Epic and correct as of today:  Health Maintenance Due   Topic Date Due     DIABETIC FOOT EXAM  Never done     ANNUAL REVIEW OF HM ORDERS  Never done     ASTHMA ACTION PLAN  Never done     ADVANCE CARE PLANNING  Never done     EYE EXAM  03/20/2020     Pneumococcal Vaccine: 65+ Years (2 of 2 - PPSV23) 06/14/2021     INFLUENZA VACCINE (1) 09/01/2021     COVID-19 Vaccine (3 - Booster for Pfizer series) 09/30/2021     MEDICARE ANNUAL WELLNESS VISIT  12/22/2021     LIPID  12/22/2021     MICROALBUMIN  12/22/2021     FALL RISK ASSESSMENT  12/22/2021     Lab work is in process  Labs reviewed in EPIC  BP Readings from Last 3 Encounters:   12/20/21 124/74   11/15/21 (!) 162/93   10/27/21 134/80    Wt Readings from Last 3 Encounters:   12/20/21 96.6 kg (213 lb)   10/27/21 94.8 kg (209 lb 1.6 oz)   07/21/21 92.5 kg (204 lb)                  Patient Active Problem List   Diagnosis     Hemianopia     Allergic  contact dermatitis     Alopecia     Asthma     Cerebral aneurysm, nonruptured     Cervical spondylosis without myelopathy     Coronary atherosclerosis     Esophageal reflux     Essential hypertension     Hypokalemia     Hypomagnesemia     Hypophosphatemia     Irritable bowel syndrome (IBS)     Morbid obesity (H)     Obstructive sleep apnea (adult) (pediatric)     Primary hypercholesterolemia     Type 2 diabetes mellitus with other circulatory complication, without long-term current use of insulin (H)     Atypical chest pain     Benign neoplasm of ascending colon     Chronic abdominal pain     Polyp of duodenum     Polyp of colon     Diverticular disease of large intestine     Past Surgical History:   Procedure Laterality Date     BRAIN SURGERY  2013    brain aneurism     C TOTAL ABDOM HYSTERECTOMY  08/01/2002    endometriosis     CARDIAC CATHETERIZATION  9/28/15    mild to moderate coronary disease only without obstructive stenosis     CEREBRAL ANEURYSM REPAIR  12/31/2013    [facility] Aneurysm clipping, Dr Harvey     CORONARY STENT PLACEMENT  9/20/12    bare metal stent in the proximal LAD      HEMORRHOID SURGERY  1975     HEMORRHOIDECTOMY INTERNAL LIGATION  1975     HYSTERECTOMY  2002     HYSTERECTOMY       NECK SURGERY  2001     OOPHORECTOMY       OVARY SURGERY  removed 1990     AR NASAL/SINUS ENDOSCOPY,BX/RMV POLYP/DEBRID  1998    Description: Nasal Endoscopy Polypectomy;     AR REMOVAL OF OVARY/TUBE(S) Right      Salpingo-oophorectomy Right Side     REPAIR ARTERY CORONARY  2012    femerol artery torn during angiogram     SINUS SURGERY  1998    polyp remvd     STENT  2012    heart       Social History     Tobacco Use     Smoking status: Never Smoker     Smokeless tobacco: Never Used   Substance Use Topics     Alcohol use: Yes     Family History   Problem Relation Age of Onset     Cancer Mother      Diabetes Mother      Hypertension Mother      Thyroid Disease Mother      Hypertension Sister      Breast Cancer  Maternal Aunt      Hypertension Father      Heart Disease Mother      Stomach Cancer Mother      Coronary Artery Disease Mother 60.00     Heart Disease Maternal Grandfather      GERD Sister          Current Outpatient Medications   Medication Sig Dispense Refill     Aspirin Buf,CaCarb-MgCarb-MgO, 81 MG TABS Take 81 mg by mouth       azelastine (ASTELIN) 0.1 % nasal spray USE 2 SPRAYS IN EACH NOSTRIL TWICE DAILY AS NEEDED 90 mL 3     cetirizine (ZYRTEC) 10 MG tablet Take 1 tablet (10 mg) by mouth daily 30 tablet 0     Cholecalciferol (VITAMIN D3 PO) Take 2,000 Units by mouth every morning        cimetidine (TAGAMET) 800 MG tablet Take 800 mg by mouth        Ferrous Sulfate (IRON PO)        fluticasone (FLONASE) 50 MCG/ACT nasal spray Spray 1 spray into both nostrils daily 11 mL 0     hydrochlorothiazide (HYDRODIURIL) 25 MG tablet Take 25 mg by mouth 2 times daily       losartan (COZAAR) 100 MG tablet TAKE 1 TABLET(100 MG) BY MOUTH DAILY 90 tablet 3     MAGNESIUM GLUCONATE PO Take 400 mg by mouth daily       methylcellulose (CITRUCEL) powder Take 2 g by mouth daily as needed       metoprolol succinate ER (TOPROL-XL) 100 MG 24 hr tablet TAKE 1 TABLET(100 MG) BY MOUTH TWICE DAILY 180 tablet 1     mometasone (NASONEX) 50 MCG/ACT nasal spray Spray 2 sprays into both nostrils daily        Montelukast Sodium (SINGULAIR PO) Take 10 mg by mouth At Bedtime       NIFEdipine ER OSMOTIC (PROCARDIA XL) 30 MG 24 hr tablet 60 mg        nitroGLYcerin (NITROSTAT) 0.4 MG sublingual tablet PLACE 1 TABLET UNDER THE TONGUE EVERY 5 MINUTES AS NEEDED FOR CHEST PAIN 25 tablet 0     Omega-3 Fatty Acids (OMEGA-3 FISH OIL PO) Take 1,400 mg by mouth At Bedtime       polyethylene glycol (MIRALAX) 17 GM/Dose powder Take 17 g by mouth       potassium chloride ER (KLOR-CON M) 20 MEQ CR tablet Take 40 mEq by mouth 2 times daily        SINGULAIR 10 MG tablet TAKE 1 TABLET BY MOUTH EVERY DAY 90 tablet 0     Allergies   Allergen Reactions     Propylene  "Glycol Itching and Rash     Red Dye Rash     Any kind of red pills.     Atorvastatin Cramps     Mevacor, and zocor also leg cramping     Carvedilol      hives     Cephalosporins Hives     Clindamycin Hives     Demerol Nausea and Vomiting     Dilantin [Phenytoin]      Hives     Lisinopril      Trouble breathing     Meperidine      Hives  Other reaction(s): hives and vomiting     Metronidazole Hives     Nickel      Other reaction(s): Rash, itching     Penicillins Hives     Rosuvastatin Cramps     Leg cramping     Sulfa Drugs Hives     Latex Rash       Any new diagnosis of family breast, ovarian, or bowel cancer? No    FHS-7: No flowsheet data found.    Mammogram Screening: Recommended mammography every 1-2 years with patient discussion and risk factor consideration  Pertinent mammograms are reviewed under the imaging tab.    Review of Systems  CONSTITUTIONAL: NEGATIVE for fever, chills, change in weight  INTEGUMENTARY/SKIN: NEGATIVE for worrisome rashes, moles or lesions  EYES: NEGATIVE for vision changes or irritation  ENT/MOUTH: NEGATIVE for ear, mouth and throat problems  RESP: NEGATIVE for significant cough or SOB  BREAST: NEGATIVE for masses, tenderness or discharge  CV: NEGATIVE for chest pain, palpitations or peripheral edema  GI: NEGATIVE for nausea, abdominal pain, heartburn, or change in bowel habits  : NEGATIVE for frequency, dysuria, or hematuria  MUSCULOSKELETAL:pain right hand ring finger and flexor tendon and paresthesias  NEURO: NEGATIVE for weakness, dizziness or paresthesias  NEURO: vertigo  ENDOCRINE: NEGATIVE for temperature intolerance, skin/hair changes  HEME: NEGATIVE for bleeding problems  PSYCHIATRIC: NEGATIVE for changes in mood or affect    OBJECTIVE:   /74 (BP Location: Left arm, Patient Position: Sitting, Cuff Size: Adult Regular)   Pulse 75   Ht 1.575 m (5' 2\")   Wt 96.6 kg (213 lb)   SpO2 97%   Breastfeeding No   BMI 38.96 kg/m   Estimated body mass index is 38.96 kg/m  as " "calculated from the following:    Height as of this encounter: 1.575 m (5' 2\").    Weight as of this encounter: 96.6 kg (213 lb).  Physical Exam  GENERAL: healthy, alert and no distress  EYES: Eyes grossly normal to inspection, PERRL and conjunctivae and sclerae normal  HENT: ear canals and TM's normal, nose and mouth without ulcers or lesions  NECK: no adenopathy, no asymmetry, masses, or scars and thyroid normal to palpation  RESP: lungs clear to auscultation - no rales, rhonchi or wheezes  CV: regular rate and rhythm, normal S1 S2, no S3 or S4, no murmur, click or rub, no peripheral edema and peripheral pulses strong neuro  ABDOMEN: soft, nontender, no hepatosplenomegaly, no masses and bowel sounds normal  MS: no gross musculoskeletal defects noted, no edema  MS: tender along flexor tendon right hand without triggering at this time  SKIN: no suspicious lesions or rashes  NEURO: Normal strength and tone, mentation intact and speech normal  NEURO: Normal strength and tone, sensory exam grossly normal and rapid alternating movements normal, monofilament 5.07 used and sensation on the dorsal plantar surface of both feet is normal with no deficits.  Skin on her lower legs and feet is normal with no compromise 2+ bilaterally at the ankle and foot  PSYCH: mentation appears normal, affect normal/bright    Diagnostic Test Results:  Labs reviewed in Epic  Results for orders placed or performed in visit on 12/20/21   Lipid panel reflex to direct LDL Fasting     Status: Abnormal   Result Value Ref Range    Cholesterol 257 (H) <=199 mg/dL    Triglycerides 94 <=149 mg/dL    Direct Measure HDL 62 >=50 mg/dL    LDL Cholesterol Calculated 176 (H) <=129 mg/dL    Patient Fasting > 8hrs? Unknown    Albumin Random Urine Quantitative with Creat Ratio     Status: None   Result Value Ref Range    Microalbumin Urine mg/dL <0.50 0.00 - 1.99 mg/dL    Creatinine Urine mg/dL 19 mg/dL    Microalbumin Urine mg/g Cr      Narrative    " Microalbumin, Random Urine   <2.0 mg/dL . . . . . . . . Normal   3.0-30.0 mg/dL . . . . . . Microalbuminuria   >30.0 mg/dL . . . . . .  . Clinical Proteinuria     Microalbumin/Creatinine Ratio, Random Urine   <20 mg/g . . . . .. . . . Normal    mg/g . . . . . . . Microalbuminuria   >300 mg/g . . . . . . . . Clinical Proteinuria   Hemoglobin A1c     Status: Abnormal   Result Value Ref Range    Hemoglobin A1C 6.3 (H) 0.0 - 5.6 %   Vitamin B12     Status: Normal   Result Value Ref Range    Vitamin B12 496 213 - 816 pg/mL   TSH     Status: Normal   Result Value Ref Range    TSH 1.40 0.30 - 5.00 uIU/mL       ASSESSMENT / PLAN:   Alvina was seen today for medicare visit, imm/inj and musculoskeletal problem.    Diagnoses and all orders for this visit:    Encounter for Medicare annual wellness exam  -     REVIEW OF HEALTH MAINTENANCE PROTOCOL ORDERS  Future needs include a asthma action plan  Eye exam  Pneumococcal vaccine  These things were all discussed but not at that time today to address those    Seasonal allergic rhinitis due to other allergic trigger  -     cetirizine (ZYRTEC) 10 MG tablet; Take 1 tablet (10 mg) by mouth daily  Singulair 10 mg daily    Type 2 diabetes mellitus with other circulatory complication, without long-term current use of insulin (H)  -     Albumin Random Urine Quantitative with Creat Ratio undetectable  ,  -     Hemoglobin A1c, 6.3 which is at goal      Essential hypertension  The patient's blood pressure is well controlled  Treated with hydrochlorothiazide 50 mg daily  Losartan 100 mg daily  , Toprol- mg twice daily  Nifedipine XL 60 mg daily  Potassium chloride 20 mEq takes 40 mEq twice daily  Currently  Pure hypercholesterolemia  -     Lipid panel reflex to direct LDL Fasting, 257/94/62/176    Unfortunately the patient has had statin intolerance with for 5 different statins    Benign paroxysmal positional vertigo of left ear  The patient will continue to follow-up at the balance  "and dizzy center.  She has been getting physical therapy therapy which has been helpful but her symptoms are not completely resolved.    Need for influenza vaccination  -     INFLUENZA, QUAD, HIGH DOSE, PF, 65YR + (FLUZONE HD)    Memory loss  The patient failed her mini cog and that previous evaluations there has been some discussion about memory problems.  We did not have time to fully evaluate that today.  She did have a dedicated appointment in the future for this problem    -     Vitamin B12 normal at 496    -     TSH, normal at 1.40      Trigger ring finger of right hand  The patient has been to [facility] in the past for trigger finger in the middle finger which was treated successfully with a steroid injection and now she is having trouble with the ring finger.  I gave her the phone number that she could call back to [facility] to make another appointment as she has been seen within the last few months for the same problem just a different finger.    Other orders  -COVID-19 vaccination        Patient has been advised of split billing requirements and indicates understanding: Yes  COUNSELING:  Reviewed preventive health counseling, as reflected in patient instructions       Regular exercise       Healthy diet/nutrition       Fall risk prevention       Immunizations    Vaccinated for: Influenza             The 10-year ASCVD risk score (Peconic DC Jr., et al., 2013) is: 17.2%    Values used to calculate the score:      Age: 66 years      Sex: Female      Is Non- : No      Diabetic: Yes      Tobacco smoker: No      Systolic Blood Pressure: 124 mmHg      Is BP treated: Yes      HDL Cholesterol: 62 mg/dL      Total Cholesterol: 257 mg/dL    Estimated body mass index is 38.96 kg/m  as calculated from the following:    Height as of this encounter: 1.575 m (5' 2\").    Weight as of this encounter: 96.6 kg (213 lb).    Weight management plan: Discussed healthy diet and exercise guidelines    She " reports that she has never smoked. She has never used smokeless tobacco.      Appropriate preventive services were discussed with this patient, including applicable screening as appropriate for cardiovascular disease, diabetes, osteopenia/osteoporosis, and glaucoma.  As appropriate for age/gender, discussed screening for colorectal cancer, prostate cancer, breast cancer, and cervical cancer. Checklist reviewing preventive services available has been given to the patient.    Reviewed patients plan of care and provided an AVS. The Basic Care Plan (routine screening as documented in Health Maintenance) for Alvina meets the Care Plan requirement. This Care Plan has been established and reviewed with the Patient.    Test results by letter      Atilio Baez MD  [facility]    Identified Health Risks:

## 2021-12-20 NOTE — LETTER
December 21, 2021      Alvina BUCKLEY Caesar  [address] [place] [address]    Dear MsYvonneCaesar,    I am writing to inform you of your test results.    Alvina,  It was nice to see you in the clinic this last week.  Your test results are back and I would like to go over those results.    The tests included a vitamin B12 level which was normal at 496.    The TSH looks at your thyroid function which is normal at 1.40.    The urine test was to check your kidney function which was normal.  The microalbumin level was very low which is what we like to see.    Your lipids are moderately elevated which they have been before and unfortunately you have not been able to tolerate any statin medications.  There is another medication that is not a statin which we could try.  It is less likely to cause trouble with muscle cramps or muscle pain.  If you are open to trying that medicine I be happy to prescribe it.  The medicine is called Zetia and you take it once a day.    The A1c is the test that looks at your blood sugar control over the last 3 months and the value of 6.3 is good.  I think we can continue to manage your diabetes with diet, exercise and weight loss.    If you are having trouble getting back into [facility] please let me know.    Follow-up with the Center for balance and dizziness in regard to your vertigo.    I would like to see you back in the clinic in 6 months for a follow-up visit in regard to your chronic conditions.    Resulted Orders   Lipid panel reflex to direct LDL Fasting   Result Value Ref Range    Cholesterol 257 (H) <=199 mg/dL    Triglycerides 94 <=149 mg/dL    Direct Measure HDL 62 >=50 mg/dL      Comment:      HDL Cholesterol Reference Range:     0-2 years:   No reference ranges established for patients under 2 years old  at [facility] for lipid analytes.    2-8 years:  Greater than 45 mg/dL     18 years and older:   Female: Greater than or equal to 50 mg/dL   Male:   Greater  than or equal to 40 mg/dL    LDL Cholesterol Calculated 176 (H) <=129 mg/dL    Patient Fasting > 8hrs? Unknown    Albumin Random Urine Quantitative with Creat Ratio   Result Value Ref Range    Microalbumin Urine mg/dL <0.50 0.00 - 1.99 mg/dL    Creatinine Urine mg/dL 19 mg/dL    Microalbumin Urine mg/g Cr        Comment:      Unable to calculate:  Urine creatinine or microalbumin value below detectable level    Narrative    Microalbumin, Random Urine   <2.0 mg/dL . . . . . . . . Normal   3.0-30.0 mg/dL . . . . . . Microalbuminuria   >30.0 mg/dL . . . . . .  . Clinical Proteinuria     Microalbumin/Creatinine Ratio, Random Urine   <20 mg/g . . . . .. . . . Normal    mg/g . . . . . . . Microalbuminuria   >300 mg/g . . . . . . . . Clinical Proteinuria   Hemoglobin A1c   Result Value Ref Range    Hemoglobin A1C 6.3 (H) 0.0 - 5.6 %      Comment:      Normal <5.7%   Prediabetes 5.7-6.4%    Diabetes 6.5% or higher     Note: Adopted from ADA consensus guidelines.   Vitamin B12   Result Value Ref Range    Vitamin B12 496 213 - 816 pg/mL   TSH   Result Value Ref Range    TSH 1.40 0.30 - 5.00 uIU/mL   If you have any questions or concerns, please call the clinic at the number listed above.   Sincerely,      Atilio Baez MD

## 2021-12-23 ENCOUNTER — TRANSFERRED RECORDS (OUTPATIENT)
Dept: HEALTH INFORMATION MANAGEMENT | Facility: CLINIC | Age: 66
End: 2021-12-23
Payer: COMMERCIAL

## 2022-01-13 ENCOUNTER — VIRTUAL VISIT (OUTPATIENT)
Dept: FAMILY MEDICINE | Facility: CLINIC | Age: 67
End: 2022-01-13
Payer: COMMERCIAL

## 2022-01-13 DIAGNOSIS — R05.9 COUGH: ICD-10-CM

## 2022-01-13 DIAGNOSIS — J02.9 SORE THROAT: ICD-10-CM

## 2022-01-13 DIAGNOSIS — J01.90 ACUTE SINUSITIS, RECURRENCE NOT SPECIFIED, UNSPECIFIED LOCATION: Primary | ICD-10-CM

## 2022-01-13 DIAGNOSIS — R09.82 POST-NASAL DRAINAGE: ICD-10-CM

## 2022-01-13 DIAGNOSIS — R09.81 NASAL CONGESTION: ICD-10-CM

## 2022-01-13 PROCEDURE — 99213 OFFICE O/P EST LOW 20 MIN: CPT | Mod: TEL | Performed by: FAMILY MEDICINE

## 2022-01-13 RX ORDER — AZITHROMYCIN 250 MG/1
TABLET, FILM COATED ORAL
Qty: 6 TABLET | Refills: 0 | Status: SHIPPED | OUTPATIENT
Start: 2022-01-13 | End: 2022-01-18

## 2022-01-13 NOTE — PROGRESS NOTES
Alvina is a 66 year old who is being evaluated via a billable telephone visit.      What phone number would you like to be contacted at? [phone]  How would you like to obtain your AVS? MyChart    Assessment & Plan     (J01.90) Acute sinusitis, recurrence not specified, unspecified location  (primary encounter diagnosis)  Comment: 66 year old female has sinus congestion and discharge for several weeks and getting worse. Will treated as sinusitis with abx  Plan: azithromycin (ZITHROMAX) 250 MG tablet        Rest and push fluid. Consider covid test and pt declined .     (J02.9) Sore throat  Comment: the drainage trigger the sore throat  Plan: drink fluid    (R09.81) Nasal congestion  Comment: continue nose spray       (R09.82) Post-nasal drainage  Comment: thick green discharge   Plan: z pack     (R05.9) Cough  Comment: Post-nasal drainage trigger the cough  Plan: cough drop prn   [id number]}          Return if symptoms worsen or fail to improve, for Follow up.    Joanne Arreaga MD  [facility]   Alvina is a 66 year old who presents for the following health issues     HPI   Pt has nose congestion, cough and post nasal drainage for several weeks. Her nose drainage is getting worse as thick green and trigger the throat tickling and cough. She has seasonal allergies and takes  otc medication and nose spray with some help.   Denies fever, sob, wheezing, cp. Denies sick contact. She finished covid vaccine.     Review of Systems   Constitutional, HEENT, cardiovascular, pulmonary, gi and gu systems are negative, except as otherwise noted.      Objective           Vitals:  No vitals were obtained today due to virtual visit.    Physical Exam   healthy, alert and no distress  PSYCH: Alert and oriented times 3; coherent speech, normal   rate and volume, able to articulate logical thoughts, able   to abstract reason, no tangential thoughts, no hallucinations   or delusions  Her affect is  normal  RESP: No cough, no audible wheezing, able to talk in full sentences  Remainder of exam unable to be completed due to telephone visits           Phone call duration: 11 minutes    Total time preparing to see this patient, phone  time, and coordinating care time = 20 minutes.

## 2022-01-13 NOTE — PATIENT INSTRUCTIONS
Patient Education     Understanding Acute Rhinosinusitis  Acute rhinosinusitis is when the lining of the inside of the nose and the sinuses becomes irritated and swollen. It is also called sinusitis, or a sinus infection.  Sinuses are air-filled spaces in the skull behind the face. They are kept moist and clean by a lining of mucosa. Things such as pollen, smoke, and chemical fumes can irritate the mucosa. It can then swell up. As a response to irritation, the mucosa makes more mucus and other fluids. Tiny hairlike cilia cover the mucosa. Cilia help carry mucus toward the opening of the sinus. Too much mucus may cause the cilia to stop working. This blocks the sinus opening. A buildup of fluid in the sinuses then causes pain and pressure. It can also cause bacteria to grow in the sinuses.    What causes acute rhinosinusitis?  A sinus infection is most often caused by a virus. You are more likely to get one after having a cold or the flu. In some cases, a sinus infection can be caused by bacteria.  You are at higher risk for a sinus infection if you:    Are older in age    Have structural problems with your sinuses    Smoke or are exposed to secondhand smoke    Are exposed to changes in pressure, such as from flying a lot or deep sea diving    Have asthma or allergies    Have a weak immune system    Have dental disease     Symptoms of acute rhinosinusitis  Symptoms of acute rhinosinusitis often last around 7 to 10 days. If you have a bacterial infection, they may last longer. They may also get better but then worsen. You may have:    Face pain or pressure under the eyes and around the nose    Headache    Fluid draining in the back of the throat (postnasal drip)    Congestion    Drainage that is thick and colored (often green), instead of clear    Cough    Problems with your sense of smell    Ear pain or hearing problems    Fever    Tooth pain    Fatigue  Diagnosing acute rhinosinusitis  Your healthcare provider will  ask about your symptoms and past health. He or she will look at your ears, nose, throat, and sinuses. Imaging tests, such as X-rays, are often not needed.  It can be hard to figure out if a sinus infection is caused by a virus or bacterium. A bacterial infection tends to last longer. Symptoms may also get better but then worsen. Your healthcare provider may take a sample of mucus from your nose to check for bacteria.  Treating acute rhinosinusitis  Most sinus infections will go away within 10 days. Your body will fight off the virus. If your symptoms seem to get better but then worsen, you may have a bacterial infection instead. Your healthcare provider will then give you antibiotics. Take this medicine until it is gone, even if you feel better.  To help ease your symptoms, your healthcare provider may advise:    Over-the-counter pain relievers. Medicines such as acetaminophen or ibuprofen can ease sinus pain. They may also lower a fever.    Nasal washes. Washing your nasal passages with salt water may ease pain and pressure. It can rinse out mucous and other irritants from your sinuses. Your healthcare provider can show you how to do it.    Nasal steroid spray. This prescription medicine can reduce inflammation in your sinuses.    Other medicines. Decongestants, antihistamines, and other nasal sprays may give short-term relief. They may help with congestion. Talk with your healthcare provider before taking these medicines.     Preventing acute rhinosinusitis  You can help prevent a sinus infection with these steps:    Wash your hands well and often.    Stay away from people who have a cold or upper respiratory infection.    Don't smoke. And stay away from secondhand smoke.    Use a humidifier at home.    Make sure you are up-to-date on your vaccines, such as the flu shot.     When to call your healthcare provider  Call your healthcare provider right away if you have any of these:    Fever of 100.4 F (38 C) or  higher, or as directed by your healthcare provider    Pain that gets worse    Symptoms that don t get better, or get worse    New symptoms  Mario last reviewed this educational content on 6/1/2019 2000-2021 [employer]. All rights reserved. This information is not intended as a substitute for professional medical care. Always follow your healthcare professional's instructions.

## 2022-01-20 DIAGNOSIS — J30.89 SEASONAL ALLERGIC RHINITIS DUE TO OTHER ALLERGIC TRIGGER: ICD-10-CM

## 2022-01-23 RX ORDER — FLUTICASONE PROPIONATE 50 MCG
SPRAY, SUSPENSION (ML) NASAL
Qty: 48 G | Refills: 3 | Status: SHIPPED | OUTPATIENT
Start: 2022-01-23 | End: 2023-02-20

## 2022-01-23 NOTE — TELEPHONE ENCOUNTER
"Last Written Prescription Date:  11/15/21  Last Fill Quantity: 11 ml,  # refills: 0   Last office visit provider:  12/20/21     Requested Prescriptions   Pending Prescriptions Disp Refills     fluticasone (FLONASE) 50 MCG/ACT nasal spray [Pharmacy Med Name: FLUTICASONE 50MCG NASAL SP (120) RX] 16 g      Sig: SHAKE LIQUID AND USE 1 SPRAY IN EACH NOSTRIL DAILY       Nasal Allergy Protocol Passed - 1/20/2022 11:20 AM        Passed - Patient is age 12 or older        Passed - Recent (12 mo) or future (30 days) visit within the authorizing provider's specialty     Patient has had an office visit with the authorizing provider or a provider within the authorizing providers department within the previous 12 mos or has a future within next 30 days. See \"Patient Info\" tab in inbasket, or \"Choose Columns\" in Meds & Orders section of the refill encounter.              Passed - Medication is active on med list             Sonido Vera RN 01/23/22 11:54 AM  "

## 2022-02-07 ENCOUNTER — TRANSFERRED RECORDS (OUTPATIENT)
Dept: HEALTH INFORMATION MANAGEMENT | Facility: CLINIC | Age: 67
End: 2022-02-07
Payer: COMMERCIAL

## 2022-02-09 ENCOUNTER — OFFICE VISIT (OUTPATIENT)
Dept: OTOLARYNGOLOGY | Facility: CLINIC | Age: 67
End: 2022-02-09
Payer: COMMERCIAL

## 2022-02-09 ENCOUNTER — OFFICE VISIT (OUTPATIENT)
Dept: AUDIOLOGY | Facility: CLINIC | Age: 67
End: 2022-02-09
Payer: COMMERCIAL

## 2022-02-09 DIAGNOSIS — M26.609 TMJ (TEMPOROMANDIBULAR JOINT SYNDROME): Primary | ICD-10-CM

## 2022-02-09 DIAGNOSIS — H90.42 SENSORINEURAL HEARING LOSS (SNHL) OF LEFT EAR WITH UNRESTRICTED HEARING OF RIGHT EAR: ICD-10-CM

## 2022-02-09 DIAGNOSIS — H90.42 SENSORINEURAL HEARING LOSS (SNHL) OF LEFT EAR WITH UNRESTRICTED HEARING OF RIGHT EAR: Primary | ICD-10-CM

## 2022-02-09 DIAGNOSIS — H93.A3 PULSATILE TINNITUS OF BOTH EARS: ICD-10-CM

## 2022-02-09 DIAGNOSIS — R42 DIZZINESS: ICD-10-CM

## 2022-02-09 PROCEDURE — 99214 OFFICE O/P EST MOD 30 MIN: CPT | Performed by: OTOLARYNGOLOGY

## 2022-02-09 PROCEDURE — 92557 COMPREHENSIVE HEARING TEST: CPT | Performed by: AUDIOLOGIST

## 2022-02-09 PROCEDURE — 99207 PR NO CHARGE LOS: CPT | Performed by: AUDIOLOGIST

## 2022-02-09 PROCEDURE — 92567 TYMPANOMETRY: CPT | Performed by: AUDIOLOGIST

## 2022-02-09 NOTE — PROGRESS NOTES
AUDIOLOGY REPORT    SUMMARY: Audiology visit completed. See audiogram for results.     Alvina reports that when she wears certain types of headphones all she is able to hear is her heartbeat or the crinkling of the cord on her clothing. Masked bone conduction was not performed for the right ear since the left headphone was crinkling.     RECOMMENDATIONS: Follow-up with ENT.    Dominique Pierre, CHEPE-A  [place] Licensed Audiologist #[id number]

## 2022-02-09 NOTE — LETTER
"    2/9/2022         RE: Alvina Maynard  [address] [place] [address]        Dear Colleague,    Thank you for referring your patient, Alvina Maynard, to the [facility]. Please see a copy of my visit note below.    CHIEF COMPLAINT:  Recheck      HISTORY OF PRESENT ILLNESS    Alvina was seen in follow up for audiogram review.  She is currently being seen at [facility] for bppv   She is experiencing a sense of dizziness/ prevertigo attack when going form lying to sitting, or when she goes to lay down to the left.     Her ears are very sensitive when getting her hearing tests (pulsatile tinnitus); also, they  \"collapse\" when driving with the window up (like a suction, they hurt and the hearing goes down.  Very sensitive to loud sounds.            REVIEW OF SYSTEMS    Review of Systems: a 10-system review is reviewed at this encounter.  See scanned document.     Propylene glycol, Red dye, Atorvastatin, Carvedilol, Cephalosporins, Clindamycin, Demerol, Dilantin [phenytoin], Lisinopril, Meperidine, Metronidazole, Nickel, Penicillins, Rosuvastatin, Sulfa drugs, and Latex     PHYSICAL EXAM:        HEAD: Normal appearance and symmetry:  No cutaneous lesions.      EARS:   Auricles normal    TMJ: tender to palpation LEFT       NOSE:    Dorsum:   straight       ORAL CAVITY/OROPHARYNX:    Lips:  Normal.     NECK:  Trachea:  midline       NEURO:   Alert and Oriented    GAIT AND STATION:  normal     RESPIRATORY:   Symmetry and Respiratory effort    PSYCH:   normal mood and affect    SKIN:  warm and dry     Audiogram:  HF assymmetric SNHL    IMPRESSION:   Encounter Diagnosis   Name Primary?     TMJ (temporomandibular joint syndrome) Yes              RECOMMENDATIONS:    PT referral for TMJ arthralgia      Again, thank you for allowing me to participate in the care of your patient.        Sincerely,        Tito Bravo MD    "

## 2022-02-09 NOTE — PROGRESS NOTES
"CHIEF COMPLAINT:  Recheck      HISTORY OF PRESENT ILLNESS    Alvina was seen in follow up for audiogram review.  She is currently being seen at [facility] for bppv   She is experiencing a sense of dizziness/ prevertigo attack when going form lying to sitting, or when she goes to lay down to the left.     Her ears are very sensitive when getting her hearing tests (pulsatile tinnitus); also, they  \"collapse\" when driving with the window up (like a suction, they hurt and the hearing goes down.  Very sensitive to loud sounds.            REVIEW OF SYSTEMS    Review of Systems: a 10-system review is reviewed at this encounter.  See scanned document.     Propylene glycol, Red dye, Atorvastatin, Carvedilol, Cephalosporins, Clindamycin, Demerol, Dilantin [phenytoin], Lisinopril, Meperidine, Metronidazole, Nickel, Penicillins, Rosuvastatin, Sulfa drugs, and Latex     PHYSICAL EXAM:        HEAD: Normal appearance and symmetry:  No cutaneous lesions.      EARS:   Auricles normal    TMJ: tender to palpation LEFT       NOSE:    Dorsum:   straight       ORAL CAVITY/OROPHARYNX:    Lips:  Normal.     NECK:  Trachea:  midline       NEURO:   Alert and Oriented    GAIT AND STATION:  normal     RESPIRATORY:   Symmetry and Respiratory effort    PSYCH:   normal mood and affect    SKIN:  warm and dry     Audiogram:  HF assymmetric SNHL    IMPRESSION:   Encounter Diagnosis   Name Primary?     TMJ (temporomandibular joint syndrome) Yes              RECOMMENDATIONS:    PT referral for TMJ arthralgia  "

## 2022-02-14 ENCOUNTER — ANCILLARY PROCEDURE (OUTPATIENT)
Dept: GENERAL RADIOLOGY | Facility: CLINIC | Age: 67
End: 2022-02-14
Attending: PHYSICIAN ASSISTANT
Payer: COMMERCIAL

## 2022-02-14 ENCOUNTER — OFFICE VISIT (OUTPATIENT)
Dept: FAMILY MEDICINE | Facility: CLINIC | Age: 67
End: 2022-02-14
Payer: COMMERCIAL

## 2022-02-14 ENCOUNTER — TELEPHONE (OUTPATIENT)
Dept: FAMILY MEDICINE | Facility: CLINIC | Age: 67
End: 2022-02-14

## 2022-02-14 VITALS
OXYGEN SATURATION: 98 % | SYSTOLIC BLOOD PRESSURE: 128 MMHG | HEART RATE: 59 BPM | BODY MASS INDEX: 37.73 KG/M2 | WEIGHT: 205 LBS | DIASTOLIC BLOOD PRESSURE: 88 MMHG | HEIGHT: 62 IN

## 2022-02-14 DIAGNOSIS — M25.561 ACUTE PAIN OF RIGHT KNEE: ICD-10-CM

## 2022-02-14 DIAGNOSIS — M25.561 ACUTE PAIN OF RIGHT KNEE: Primary | ICD-10-CM

## 2022-02-14 PROCEDURE — 73562 X-RAY EXAM OF KNEE 3: CPT | Mod: TC | Performed by: RADIOLOGY

## 2022-02-14 PROCEDURE — 99213 OFFICE O/P EST LOW 20 MIN: CPT | Performed by: PHYSICIAN ASSISTANT

## 2022-02-14 RX ORDER — MULTIVIT-MIN/IRON/FOLIC ACID/K 18-600-40
CAPSULE ORAL
COMMUNITY
End: 2023-12-07

## 2022-02-14 ASSESSMENT — ENCOUNTER SYMPTOMS
RESPIRATORY NEGATIVE: 1
CARDIOVASCULAR NEGATIVE: 1
CONSTITUTIONAL NEGATIVE: 1

## 2022-02-14 ASSESSMENT — MIFFLIN-ST. JEOR: SCORE: 1418.12

## 2022-02-14 NOTE — PROGRESS NOTES
Progress Note  2/14/22     Chief Complaint   Patient presents with     Musculoskeletal Problem     right leg pain, twisted while doing PT , inn leg from knee up into thigh          HPI    Alvina Maynard is a pleasant  66 year old year old female  who present to the clinic today for reevaluation on right knee pain.  She started developing right knee pain approximately 2 weeks ago.  She was doing some exercises for her underlying vertigo.  When she was doing these exercises she noticed some pain to her right knee.  Her pain is in the medial aspect of her knee and the posterior aspect.  Knee pain is better in the morning and gets worse throughout the day.  Walking makes the pain worse.  The pain is better at sitting.  No other known injury that she can think of.  He does have underlying osteoarthritis to her knee.  She does not feel like the knee is popping or clicking.  No sensation of giving out.  The pain does radiate to the lower leg and in the posterior thigh at times.  She is not noticed any redness or swelling or warmth to the knee.  She has been doing ice, heat, and topical Biofreeze.  The last 4 days she feels like the pain has slowly improved.    ROS    Review of Systems   Constitutional: Negative.    HENT: Negative.    Respiratory: Negative.    Cardiovascular: Negative.    Musculoskeletal:        Right knee pain.  Medial and posterior aspect.  No swelling, redness, or warmth noted.  No injury.            Patient Active Problem List   Diagnosis     Hemianopia     Allergic contact dermatitis     Alopecia     Asthma     Cerebral aneurysm, nonruptured     Cervical spondylosis without myelopathy     Coronary atherosclerosis     Esophageal reflux     Essential hypertension     Hypokalemia     Hypomagnesemia     Hypophosphatemia     Irritable bowel syndrome (IBS)     Morbid obesity (H)     Obstructive sleep apnea (adult) (pediatric)     Primary hypercholesterolemia     Type 2 diabetes mellitus with other  circulatory complication, without long-term current use of insulin (H)     Atypical chest pain     Benign neoplasm of ascending colon     Chronic abdominal pain     Polyp of duodenum     Polyp of colon     Diverticular disease of large intestine        Past Medical History:   Diagnosis Date     Arthritis      Arthritis      Basal cell cancer 2007     Benign neoplasm of colon      Brain aneurysm     removed on 12/31/13     Cancer (H)     basel cell     Cerebral embolism with cerebral infarction (H)      Cerebral vascular accident (H) 12/31/2013     Colitis      Coronary artery disease      External hemorrhoid 6/15/2016     Fibrocystic breast      High blood pressure      History of heart artery stent 9/2012     Hyperlipidemia      Hypertension      Hypertension      Myalgia and myositis, unspecified      S/P hysterectomy 7/28/2019    Total hysterectomy with BSO benign disease, edometriosis?     Sleep apnea, obstructive     c-pap     Statin intolerance      Stroke (H)      Type 2 diabetes mellitus without complications (H)     borderline? blood sugars are fine/nosx no meds          Social History     Socioeconomic History     Marital status: Single     Spouse name: Not on file     Number of children: Not on file     Years of education: Not on file     Highest education level: Not on file   Occupational History     Not on file   Tobacco Use     Smoking status: Never Smoker     Smokeless tobacco: Never Used   Substance and Sexual Activity     Alcohol use: Yes     Drug use: No     Sexual activity: Not on file   Other Topics Concern     Not on file   Social History Narrative     Not on file     Social Determinants of Health     Financial Resource Strain: Not on file   Food Insecurity: Not on file   Transportation Needs: Not on file   Physical Activity: Not on file   Stress: Not on file   Social Connections: Not on file   Intimate Partner Violence: Not on file   Housing Stability: Not on file           Allergies   Allergen  "Reactions     Propylene Glycol Itching and Rash     Red Dye Rash     Any kind of red pills.     Atorvastatin Cramps     Mevacor, and zocor also leg cramping     Carvedilol      hives     Cephalosporins Hives     Clindamycin Hives     Demerol Nausea and Vomiting     Dilantin [Phenytoin]      Hives     Lisinopril      Trouble breathing     Meperidine      Hives  Other reaction(s): hives and vomiting     Metronidazole Hives     Nickel      Other reaction(s): Rash, itching     Penicillins Hives     Rosuvastatin Cramps     Leg cramping     Sulfa Drugs Hives     Latex Rash          Current Outpatient Medications   Medication     Ascorbic Acid (VITAMIN C) 500 MG CAPS     Aspirin Buf,CaCarb-MgCarb-MgO, 81 MG TABS     azelastine (ASTELIN) 0.1 % nasal spray     cetirizine (ZYRTEC) 10 MG tablet     Cholecalciferol (VITAMIN D3 PO)     cimetidine (TAGAMET) 800 MG tablet     Ferrous Sulfate (IRON PO)     fluticasone (FLONASE) 50 MCG/ACT nasal spray     hydrochlorothiazide (HYDRODIURIL) 25 MG tablet     losartan (COZAAR) 100 MG tablet     MAGNESIUM GLUCONATE PO     methylcellulose (CITRUCEL) powder     metoprolol succinate ER (TOPROL-XL) 100 MG 24 hr tablet     mometasone (NASONEX) 50 MCG/ACT nasal spray     NIFEdipine ER OSMOTIC (PROCARDIA XL) 30 MG 24 hr tablet     nitroGLYcerin (NITROSTAT) 0.4 MG sublingual tablet     Omega-3 Fatty Acids (OMEGA-3 FISH OIL PO)     polyethylene glycol (MIRALAX) 17 GM/Dose powder     potassium chloride ER (KLOR-CON M) 20 MEQ CR tablet     SINGULAIR 10 MG tablet     No current facility-administered medications for this visit.            /88   Pulse 59   Ht 1.575 m (5' 2\")   Wt 93 kg (205 lb)   SpO2 98%   Breastfeeding No   BMI 37.49 kg/m         Physical Exam:     Physical Exam  Vitals and nursing note reviewed.   Constitutional:       General: She is not in acute distress.     Appearance: Normal appearance. She is not ill-appearing, toxic-appearing or diaphoretic.   HENT:      Head: " Normocephalic and atraumatic.   Eyes:      Conjunctiva/sclera: Conjunctivae normal.   Cardiovascular:      Rate and Rhythm: Normal rate.      Heart sounds: No murmur heard.  No friction rub. No gallop.    Pulmonary:      Effort: Pulmonary effort is normal. No respiratory distress.      Breath sounds: No wheezing, rhonchi or rales.   Musculoskeletal:      Cervical back: Neck supple.      Comments: Right knee: No erythema, swelling, or warmth throughout the right knee.  Mild tenderness to the medial aspect of the knee along with the posterior aspect.  Negative varus and valgus test.  Negative bounce home test.  No laxity noted on exam.  Negative anterior and posterior drawer test.  CMS to the right lower extremity intact.  No weakness to the lower leg.   Psychiatric:         Behavior: Behavior is cooperative.              Assessment/Plan:       #1 right knee pain acute-been having right knee pain for about 2 weeks.  The pain is worse in the afternoon better in the morning.  The pain is also aggravated by walking.  Its better at rest.  No known injury.  She was doing some stretches that was given to her by physical therapy for her vertigo which then aggravated her knee.  She has been doing topical Biofreeze ice and heat.  She has not been doing Tylenol and ibuprofen.  Today on physical exam I do not appreciate any redness, swelling, and/or warmth.  He does various and valgus test.  Negative bounce home test.  No instability of the knee noted on exam.  She is having some palpable tenderness to the medial aspect of the knee and posterior aspect.  We will start with doing an x-ray to rule out any acute findings today.  She states that the last 4 days it has somewhat improved.  Denies any numbness and tingling in her toes.  Did encourage to continue ice, heat, and/or Biofreeze.  Will get an x-ray and move forward based on this.  We did discuss physical therapy, ultrasound of the posterior knee, and or MRI.  So encourage  some Tylenol to see if this helps.  She is to monitor symptoms and call if her symptoms worsen.  She agreed this plan.  Questions were answered      Ebenezer Ortiz PA-C

## 2022-02-14 NOTE — TELEPHONE ENCOUNTER
Left message to call back for: PT  Information to relay to patient: please adv per [facility] results of pt Xray

## 2022-02-14 NOTE — TELEPHONE ENCOUNTER
----- Message from Ebenezer Ortiz PA-C sent at 2/14/2022  4:06 PM CST -----  Please call and let her know that her Xray of her knee did not show any fractures. It did show evidence  of arthritis. Possibly a small loose bodies with some joint swelling. With this I think we should have her see ortho for evaluation.    Order has been placed to [facility].

## 2022-02-15 ENCOUNTER — TRANSFERRED RECORDS (OUTPATIENT)
Dept: HEALTH INFORMATION MANAGEMENT | Facility: CLINIC | Age: 67
End: 2022-02-15
Payer: COMMERCIAL

## 2022-02-16 ENCOUNTER — TRANSFERRED RECORDS (OUTPATIENT)
Dept: HEALTH INFORMATION MANAGEMENT | Facility: CLINIC | Age: 67
End: 2022-02-16

## 2022-02-16 ENCOUNTER — HOSPITAL ENCOUNTER (OUTPATIENT)
Dept: CT IMAGING | Facility: CLINIC | Age: 67
Discharge: HOME | End: 2022-02-16
Attending: OTOLARYNGOLOGY | Admitting: OTOLARYNGOLOGY
Payer: COMMERCIAL

## 2022-02-16 DIAGNOSIS — R42 DIZZINESS: ICD-10-CM

## 2022-02-16 DIAGNOSIS — H93.A3 PULSATILE TINNITUS OF BOTH EARS: ICD-10-CM

## 2022-02-16 PROCEDURE — 70480 CT ORBIT/EAR/FOSSA W/O DYE: CPT

## 2022-03-05 DIAGNOSIS — J30.89 SEASONAL ALLERGIC RHINITIS DUE TO OTHER ALLERGIC TRIGGER: ICD-10-CM

## 2022-03-07 ENCOUNTER — TELEPHONE (OUTPATIENT)
Dept: FAMILY MEDICINE | Facility: CLINIC | Age: 67
End: 2022-03-07
Payer: COMMERCIAL

## 2022-03-07 NOTE — TELEPHONE ENCOUNTER
"Last Written Prescription Date:  11/30/2021  Last Fill Quantity: 90,  # refills: 0   Last office visit provider:  2/14/2022     Requested Prescriptions   Pending Prescriptions Disp Refills     SINGULAIR 10 MG tablet [Pharmacy Med Name: SINGULAIR 10MG TABLETS] 90 tablet 0     Sig: TAKE 1 TABLET BY MOUTH EVERY DAY       Leukotriene Inhibitors Protocol Passed - 3/5/2022 10:56 AM        Passed - Patient is age 12 or older     If patient is under 16, ok to refill using age based dosing.           Passed - Asthma control assessment score within normal limits in last 6 months     Please review ACT score.           Passed - Medication is active on med list        Passed - Recent (6 mo) or future (30 days) visit within the authorizing provider's specialty     Patient had office visit in the last 6 months or has a visit in the next 30 days with authorizing provider or within the authorizing provider's specialty.  See \"Patient Info\" tab in inbasket, or \"Choose Columns\" in Meds & Orders section of the refill encounter.                 Rand Freeman RN 03/07/22 12:57 PM  "

## 2022-03-07 NOTE — TELEPHONE ENCOUNTER
Prior Authorization Request  Who s requesting:  covermymeds   Pharmacy Name and Location: [facility]   Medication Name: singulair 10mg tabs  Insurance Plan:   Insurance Member ID Number:  [id number]  CoverMyMeds Key: [id number]   Informed patient that prior authorizations can take up to 10 business days for response:   NICK  Okay to leave a detailed message: NICK

## 2022-03-11 ENCOUNTER — TELEPHONE (OUTPATIENT)
Dept: AUDIOLOGY | Facility: CLINIC | Age: 67
End: 2022-03-11
Payer: COMMERCIAL

## 2022-03-11 NOTE — TELEPHONE ENCOUNTER
Spoke with patient to remind them of upcoming ABR appointment. Provided them with the time, date, location of appointment. Patient was given call back number if they had any questions prior to appointment.    -Emy Carlos Audiology Assistant

## 2022-03-14 ENCOUNTER — OFFICE VISIT (OUTPATIENT)
Dept: AUDIOLOGY | Facility: CLINIC | Age: 67
End: 2022-03-14
Attending: OTOLARYNGOLOGY
Payer: COMMERCIAL

## 2022-03-14 DIAGNOSIS — H90.42 SENSORINEURAL HEARING LOSS (SNHL) OF LEFT EAR WITH UNRESTRICTED HEARING OF RIGHT EAR: ICD-10-CM

## 2022-03-14 PROCEDURE — 92653 AEP NEURODIAGNOSTIC I&R: CPT | Performed by: AUDIOLOGIST

## 2022-03-14 PROCEDURE — 92567 TYMPANOMETRY: CPT | Performed by: AUDIOLOGIST

## 2022-03-14 PROCEDURE — 99207 PR NO CHARGE LOS: CPT | Performed by: AUDIOLOGIST

## 2022-03-14 NOTE — TELEPHONE ENCOUNTER
Central Prior Authorization Team   Phone: [phone]    PA Initiation    Medication: Singulair 10MG tablets  Insurance Company: [employer] - Phone [phone] Fax [phone]  Pharmacy Filling the Rx: [id number] - [place], [place] - [address] & [facility]  Filling Pharmacy Phone: [phone]  Filling Pharmacy Fax:    Start Date: 3/14/2022

## 2022-03-14 NOTE — PROGRESS NOTES
"Audiology Report:    Referring Provider:  Tito Bravo MD     SUBJECTIVE: Alvina Maynard, 66 year old female, was seen on 3/14/2022 for a neurodiagnostic Auditory Brainstem Response (ABR) evaluation. Alvina's hearing was last assessed on 2/9/22 and results revealed normal hearing sensitivity in the right ear and normal through 3000 Hz sloping to moderate sensorineural hearing loss in the left ear. Alvina reports that the left ear constantly feels plugged; therefore, she frequently pulls up on the pinna to unplug her ear so that she may hear better. Alvina reports that she is sensitive to loud sounds and mentions that loud noises cause left otalgia. Alvina denies tinnitus. She has been doing therapy exercises for vertigo for the past few weeks. Alvina reports that she has tried a hearing aid or listening device in the left ear and reported that she did not perceive benefit as she felt that her ears were \"collapsing\" from having something inside her ear. She reports that she does not have anxiety. She is not interested in pursuing hearing aids.      OBJECTIVE: Otoscopy revealed clear canals bilaterally. Tympanograms showed normal middle ear function bilaterally.     Neurodiagnostic protocol completed on the [employer] Eclipse EP-25 system. Absolute wave, interwave, and interaural latency differences assessed. Interaural interpeak (I-III or I-V) latency differences exceeding 0.40 ms or absent wave III and/or wave V, should be interpreted as evidence of possible retrocochlear pathology. Click stimuli at 90 dBnHL completed at various rates (21.1, 11.1, and 71.1/sec).    Interaural latencies (I-III and I-V) do not differ by more than 0.40 ms; therefore, are within normal limits for the 21.1 and 11.1/sec conditions. In the 71.1/sec condition, interaural latencies could not be assessed in the left ear due to absent wave I, which is likely due to the moderate high-frequency sensory hearing loss in the left ear. Waves III and V " are present in the left ear which do not suggest the presence of retrocochlear pathology.      ASSESSMENT: Interaural latencies do not differ by more than 0.40 ms; therefore, are within normal limits. Although wave I is absent in the left ear of the 71.1/sec condition, waves III and V are present. Today's results do not suggest the presence of possible retrocochlear pathology.     PLAN: Dr. Bravo will contact Alvina over the phone and will discuss today's results and the next step in her plan of care. Alvina INA Maynard will follow up with audiology and ENT as recommended by ENT or should concerns with hearing arise.    Dominique Farrell, CCC-A  Clinical Audiologist   [place] #[id number]     CC: Tito Bravo MD

## 2022-03-14 NOTE — Clinical Note
Hi Dr. Bravo,  I saw Alvina for a neurodiagnostic ABR and found no evidence of retrocochlear pathology. Wave I is absent in one of the conditions in one ear, but I think it is due to her moderate high-frequency hearing loss. Alvina denies tinnitus; however, your note indicates that she had pulsatile tinnitus when you saw her. I told the patient that you would call her with today's results and the next steps of care. She is also requesting if you could discuss the results of her recent CT scan with her. She stated that she would like a paper copy of her results to keep with her other documents. She requests that you call her as she is not sure how to access her [employer] account at this time.     Please let me know if you have any questions.     Kindly,  Dominique Farrell, CCC-A  Clinical Audiologist

## 2022-03-15 ENCOUNTER — TELEPHONE (OUTPATIENT)
Dept: OTOLARYNGOLOGY | Facility: CLINIC | Age: 67
End: 2022-03-15
Payer: COMMERCIAL

## 2022-03-15 NOTE — TELEPHONE ENCOUNTER
Talked with Alvina about her ABR results.  Per Dr. Bravo her ABR results are normal.  I also mentioned that per Dr. Bravo he will contact her through My Chart about her CT results.  Alvina said that she has a new computer and her My Chart is not working at the time but she'll try to get it figured out.  I also mentioned that she has a follow up appointment with Dr. Bravo.  I gave her the time and date.  She expressed understanding.    [facility]      Keyanna Earl RN  [facility]  ENT  [address] [place] [address]  [email or web address]   Office:[phone]  Employed by [employer]

## 2022-03-15 NOTE — TELEPHONE ENCOUNTER
Called patient and left VM to call back.    [facility]      Keyanna Earl RN  [facility]  ENT  [address] [address]  [email or web address]   Office:[phone]  Employed by [employer] [facility] [employer]

## 2022-03-17 NOTE — TELEPHONE ENCOUNTER
Prior Authorization Approval    Authorization Effective Date: 2/8/2022  Authorization Expiration Date: 3/9/2023  Medication: Singulair 10MG tablets  Approved Dose/Quantity:    Reference #:     Insurance Company: [employer] - Phone [phone] Fax [phone]  Expected CoPay:       CoPay Card Available:      Foundation Assistance Needed:    Which Pharmacy is filling the prescription (Not needed for infusion/clinic administered): [id number] - [place], [place] - [address] & [facility]  Pharmacy Notified: Yes  Patient Notified: Yes

## 2022-04-05 NOTE — PROGRESS NOTES
CHIEF COMPLAINT:  Recheck      HISTORY OF PRESENT ILLNESS    Alvina was seen in follow up for recheck.  Previously referred to PT for TMJ related symptoms. No change in hearing.  Continue to feel of balance (head is heavy on the left side).   PT did help with TMJ symptoms.   No vertigo.  No recent falls.            REVIEW OF SYSTEMS    Review of Systems: a 10-system review is reviewed at this encounter.  See scanned document.     Propylene glycol, Red dye, Atorvastatin, Carvedilol, Cephalosporins, Clindamycin, Demerol, Dilantin [phenytoin], Lisinopril, Meperidine, Metronidazole, Nickel, Penicillins, Rosuvastatin, Sulfa drugs, and Latex     PHYSICAL EXAM:        HEAD: Normal appearance and symmetry:  No cutaneous lesions.      EARS:   Auricles normal    Both TMs normal intact    TMJ:  Left TMJ is tender to palpation     NOSE:    Dorsum:   straight       ORAL CAVITY/OROPHARYNX:    Lips:  Normal.     NECK:  Trachea:  midline       NEURO:   Alert and Oriented    GAIT AND STATION:  normal     RESPIRATORY:   Symmetry and Respiratory effort    PSYCH:   normal mood and affect    SKIN:  warm and dry         IMPRESSION:   Encounter Diagnoses   Name Primary?     Dizziness Yes     Ear fullness, left      Left ear pain           RECOMMENDATIONS:    MRI brain because of ongoing dizziness  Return visit 6 months with repeat hearing test  Conservative measures for TMJ

## 2022-04-06 ENCOUNTER — OFFICE VISIT (OUTPATIENT)
Dept: OTOLARYNGOLOGY | Facility: CLINIC | Age: 67
End: 2022-04-06
Payer: COMMERCIAL

## 2022-04-06 DIAGNOSIS — H92.02 LEFT EAR PAIN: ICD-10-CM

## 2022-04-06 DIAGNOSIS — H93.8X2 EAR FULLNESS, LEFT: ICD-10-CM

## 2022-04-06 DIAGNOSIS — R42 DIZZINESS: Primary | ICD-10-CM

## 2022-04-06 PROCEDURE — 99214 OFFICE O/P EST MOD 30 MIN: CPT | Performed by: OTOLARYNGOLOGY

## 2022-04-06 NOTE — PATIENT INSTRUCTIONS
Patient Education     TMJ Syndrome  The temporomandibular joint (TMJ) is the joint that connects your lower jaw to your skull. You can feel it in front of your ears when you open and close your mouth. TMJ disorders cause chronic or recurrent pain and problems in the jaw joint and the muscles that control jaw movement. Symptoms of TMJ disorders are usually relieved with minor treatments. But symptoms may come back, especially in times of stress.   Causes  There is no widely agreed-on cause of TMJ disorders. They have been linked to injury, arthritis, tooth and jaw alignment, chronic fatigue syndrome, and fibromyalgia. A definite connection has not been shown to these, though.   Symptoms    Pain in the face, jaw, or neck    Pain with jaw movement or chewing    Locking or catching sensation of the jaw    Clicking, popping, or grinding sounds with movement of the TMJ    Headache    Ear pain    Home care  Modest treatments are a good first step toward relieving symptoms. Try these methods.     Reduce stress on your jaw by not eating crunchy or hard-to-chew foods. Don t eat hard or sticky candies. Soft foods and liquids are easier on the jaw.    Protect your jaw while yawning. If you need to yawn, put your fist under your chin to prevent your mouth from opening too wide.    To help relieve pain, put hot or cold packs on the area that hurts. Try both hot and cold to find out which works best for you. To make a cold pack, put ice cubes in a plastic bag that seals at the top. Wrap the bag in a clean, thin towel or cloth. Never put ice or an ice pack directly on the skin. If you use hot packs (small towels soaked in hot water), be careful not to burn yourself.    You may take acetaminophen or ibuprofen for pain, unless you were given a different pain medicine. (Note: If you have chronic liver or kidney disease or have ever had a stomach ulcer or gastrointestinal bleeding, talk with your healthcare provider before using these  medicines. Also talk to your provider if you are taking medicine to prevent blood clots.) Don t give aspirin to a child younger than age 19 unless directed by the child s provider. Taking aspirin can put a child at risk for Reye syndrome. This is a rare but very serious disorder that most often affects the brain and the liver.  Reducing stress  If stress seems to be contributing to your symptoms, try to find the sources of stress in your life. These aren t always obvious. Common stressors include:     Everyday hassles. These include things such as traffic jams, missed appointments, or car trouble.    Major life changes. These can be good, such as a new baby or job promotion. Or they can be bad, such as losing a job or losing a loved one.    Overload. This is the feeling that you have too many responsibilities and can't take care of everything at once.    Helplessness. This is when you feel like your problems are more than you can solve.  When possible, try to make changes in your sources of stress. See if you can avoid hassles, limit the amount of change in your life at one time, and take breaks when you feel overloaded.   Many stressful situations can't be avoided. So learning how to manage stress is very important. To make everyday stress more manageable:     Getting regular exercise.    Eat nutritious, balanced meals.    Get enough rest.    Try relaxation and breathing exercises, visualization, biofeedback, or meditation.    Take some time out to clear your mind.  For more information, talk with your healthcare provider.  Follow-up care  Follow up with your healthcare provider, or as advised. More testing and other treatment may be needed. If changes to your lifestyle do not improve your symptoms, talk with your healthcare provider about other treatments. These include bite guards for help with teeth grinding, stress management methods, and more. If stress is an important factor and does not respond to the above  lifestyle changes, talk with your healthcare provider about a referral for stress management.   If X-rays were done, they will be reviewed by a specialist. You will be told the results and if they affect your treatment.   Call 911  Call 911 if you have any of these:     Trouble breathing or swallowing    Wheezing    Confusion    Extreme drowsiness or trouble awakening    Fainting or loss of consciousness    Fast heart rate  When to seek medical advice  Call your healthcare provider right away if you have any of these:     Swollen or red face    Jaw or face pain that gets worse    Neck, mouth, tooth, or throat pain that gets worse    Fever of 100.4 F (38 C) or higher, or as directed by your healthcare provider  [employer] last reviewed this educational content on 8/1/2020 2000-2021 [employer]. All rights reserved. This information is not intended as a substitute for professional medical care. Always follow your healthcare professional's instructions.

## 2022-04-06 NOTE — LETTER
4/6/2022         RE: Alvina Maynard  [address] [place] [address]        Dear Colleague,    Thank you for referring your patient, Alvina Maynard, to the [facility]. Please see a copy of my visit note below.    CHIEF COMPLAINT:  Recheck      HISTORY OF PRESENT ILLNESS    Alvina was seen in follow up for recheck.  Previously referred to PT for TMJ related symptoms. No change in hearing.  Continue to feel of balance (head is heavy on the left side).   PT did help with TMJ symptoms.   No vertigo.  No recent falls.            REVIEW OF SYSTEMS    Review of Systems: a 10-system review is reviewed at this encounter.  See scanned document.     Propylene glycol, Red dye, Atorvastatin, Carvedilol, Cephalosporins, Clindamycin, Demerol, Dilantin [phenytoin], Lisinopril, Meperidine, Metronidazole, Nickel, Penicillins, Rosuvastatin, Sulfa drugs, and Latex     PHYSICAL EXAM:        HEAD: Normal appearance and symmetry:  No cutaneous lesions.      EARS:   Auricles normal    Both TMs normal intact    TMJ:  Left TMJ is tender to palpation     NOSE:    Dorsum:   straight       ORAL CAVITY/OROPHARYNX:    Lips:  Normal.     NECK:  Trachea:  midline       NEURO:   Alert and Oriented    GAIT AND STATION:  normal     RESPIRATORY:   Symmetry and Respiratory effort    PSYCH:   normal mood and affect    SKIN:  warm and dry         IMPRESSION:   Encounter Diagnoses   Name Primary?     Dizziness Yes     Ear fullness, left      Left ear pain           RECOMMENDATIONS:    MRI brain because of ongoing dizziness  Return visit 6 months with repeat hearing test  Conservative measures for TMJ      Again, thank you for allowing me to participate in the care of your patient.        Sincerely,        Tito Bravo MD

## 2022-05-03 ENCOUNTER — HOSPITAL ENCOUNTER (OUTPATIENT)
Dept: MRI IMAGING | Facility: CLINIC | Age: 67
Discharge: HOME | End: 2022-05-03
Attending: OTOLARYNGOLOGY | Admitting: OTOLARYNGOLOGY
Payer: COMMERCIAL

## 2022-05-03 DIAGNOSIS — H92.02 LEFT EAR PAIN: ICD-10-CM

## 2022-05-03 DIAGNOSIS — H93.8X2 EAR FULLNESS, LEFT: ICD-10-CM

## 2022-05-03 DIAGNOSIS — R42 DIZZINESS: ICD-10-CM

## 2022-05-03 PROCEDURE — 70553 MRI BRAIN STEM W/O & W/DYE: CPT

## 2022-05-03 PROCEDURE — 255N000002 HC RX 255 OP 636: Performed by: OTOLARYNGOLOGY

## 2022-05-03 PROCEDURE — A9585 GADOBUTROL INJECTION: HCPCS | Performed by: OTOLARYNGOLOGY

## 2022-05-03 RX ORDER — GADOBUTROL 604.72 MG/ML
9 INJECTION INTRAVENOUS ONCE
Status: COMPLETED | OUTPATIENT
Start: 2022-05-03 | End: 2022-05-03

## 2022-05-03 RX ADMIN — GADOBUTROL 9 ML: 604.72 INJECTION INTRAVENOUS at 12:36

## 2022-06-17 NOTE — PROGRESS NOTES
ASSESSMENT/PLAN:       1. Coronary artery disease due to calcified coronary lesion    - nitroglycerin (NITROSTAT) 0.4 MG SL tablet; PLACE 1 TABLET UNDER THE TONGUE EVERY 5 MINUTES AS NEEDED FOR CHEST PAIN  Dispense: 1 Bottle; Refill: 2  - Ambulatory referral to [facility]  EKG shows no acute changes on my review today  The chest x-ray on my review reveals mildly enlarged heart with mildly enlarged vasculature is superiorly.  No fluid in the lower lung fields or at the costal angles.  No signs for anemia    2. Exertional chest pain    - XR Chest 2 Views  - Electrocardiogram Perform and Read  - HM1(CBC and Differential)  - Comprehensive Metabolic Panel  - HM1 (CBC with Diff)  - Ambulatory referral to [facility]  I told her that she should use her nitroglycerin tablets when she is having her exertional chest pain or even if it is nonexertional.  She has not used the nitroglycerin since she was in the hospital a number of years ago.  We talked about potential side effects.  She is on a calcium channel blocker and her blood pressure is borderline high but will continue with the calcium channel blocker as well as the beta-blocker.  I like to have the patient seen in the rapid access clinic with cardiology.  We talked about doing a nuclear medicine perfusion stress test but I think it is better to have her seen his quick as possible in cardiology and have them decide how they want to address her new symptoms of chest pain.  When seen in the clinic she was not having any chest pain other than the chest wall pain that I could reproduce.    3. Acute pain of right knee    - XR Knee Right 1 or 2 VWS, the knee x-ray shows some medial compartment narrowing as well as an osteophyte is present in that area which likely is the cause of her knee pain and at a later time I be happy to see her back to do a corticosteroid injection    4. JAY (dyspnea on exertion)    - Comprehensive Metabolic Panel    5. Iron  deficiency    - Iron and Transferrin Iron Binding Capacity, results pending    6. Need for vaccination    - Influenza High Dose, Seasonal 65+ yrs    7. Otalgia, left    Patient was reassured in regard to the left ear pain    Call patient with test results    Addenda: 10/22/2019  Patient was called with her test results.  Reviewed the EKG and the chest x-ray which had been reviewed with the patient yesterday.  The radiologist read the chest x-ray as negative I felt like there is some mild cardiomegaly and some cephalization of the blood vessels.  EKG no changes.  The patient's CBC, metabolic panel, and iron studies were unremarkable.  She continues to have some chest pressure which is worse with exertion and some shortness of breath which is not much different than its been over the last few weeks.  She has an appointment with cardiology this morning and I appreciate their assistance and making sure that her symptoms are not related to her coronary artery disease or early heart failure.  Patient agrees with this plan.    Atilio Baez MD      PROGRESS NOTE   10/21/2019    SUBJECTIVE:  Alvina Maynard is a 64 y.o. female  who presents for   Chief Complaint   Patient presents with     Follow-up     sob x 2 weeks , knee pain, flu shot        1. Coronary artery disease due to calcified coronary lesion  The patient has a history of coronary artery disease requiring a stent in 2012.  This apparently was a single vessel.  In 2015 the patient was having some chest pain again and had a angiogram done which showed mild disease 50% or less lesions multiple vessels but was treated medically.  In 2017 again having some chest pain and a nuclear medicine pharmacologic stress test was done which was negative.  The patient has had significant peripheral edema when amlodipine was started.  She is on a beta-blocker and has nitroglycerin but has not been using the nitroglycerin tablets because she is not really sure when she should use  it.  She is taking nifedipine 30 mg daily  She has had problems with myalgias related to statins    2. Exertional chest pain  Last 2 weeks the patient has had new onset exertional chest pain which is substernal does not seem to radiate and she is noticed that if she slows down her gait and ambulation the chest pain goes away.  She gets chest pain even with going up a flight of stairs.  Associated with it is some shortness of breath.  The discomfort tends to go away with rest completely and she would say over the last few days it seems like the chest pain may be getting a bit worse.  She has not had any diaphoresis nausea or vomiting associated with it.  She does not have any nocturnal symptoms.  She denies any PND orthopnea but has had increased edema in her ankles.    3. Acute pain of right knee  The past 3 months the patient has noticed increasing pain in her right knee especially with going up stairs with a sense of pain and like it could give out.  She has not actually fallen.  Many years ago she had a steroid injection which was quite helpful.  She does not feel like the knee locks up.    4. JAY (dyspnea on exertion)  Related to going for a walk on level ground and also with going up a flight of stairs  This time of the year she has some seasonal allergies and will notice some shortness of breath related to her allergies she has had some scratchy eyes and scratchy throat but not much for sneezing or nasal congestion.    5. Iron deficiency  Her symptoms could be related to anemia.  She has had a history of anemia with iron deficiency and is wondering  6. Need for vaccination  Requesting flu vaccine    7. Otalgia, left  In August I saw her with left-sided ear pain and she actually saw ENT.  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        azelastine (ASTELIN) 137 mcg (0.1 %) nasal spray USE 2 SPRAYS IN EACH NOSTRIL TWICE DAILY AS NEEDED 90 mL 3     blood glucose test strips Use 1 each As Directed 2 (two) times a day. 100 strip 3     cholecalciferol, vitamin D3, 1,000 unit tablet Take 1,000 Units by mouth daily.       fluticasone propionate (FLONASE) 50 mcg/actuation nasal spray SHAKE LQ AND U 1 SPR IEN QD  0     generic lancets (FINGERSTIX LANCETS) 1 each by In Vitro route 2 (two) times a day. 100 each 3     hydroCHLOROthiazide (HYDRODIURIL) 25 MG tablet TAKE 2 TABLETS(50 MG) BY MOUTH DAILY 180 tablet 2     Lactobacillus rhamnosus GG (CULTURELLE) 10-15 Billion cell capsule Take 1 capsule by mouth daily.       lansoprazole (PREVACID) 30 MG capsule Take 1 capsule (30 mg total) by mouth 2 (two) times a day. 180 capsule 3     MAGNESIUM GLYCINATE ORAL Take 400 mg by mouth daily.       methylcellulose (CITRUCEL) Take 2 g by mouth. Use as directed.  Take in the PM       metoprolol succinate (TOPROL-XL) 100 MG 24 hr tablet TAKE 1 TABLET(100 MG) BY MOUTH TWICE DAILY 180 tablet 3     NIFEdipine (PROCARDIA XL) 30 MG 24 hr tablet Take 1 tablet (30 mg total) by mouth daily. 30 tablet 11     OMEGA-3/DHA/EPA/FISH OIL (FISH OIL-OMEGA-3 FATTY ACIDS) 300-1,000 mg capsule Take 2  g by mouth daily.       polyethylene glycol (MIRALAX) 17 gram packet Take 17 g by mouth daily.       potassium chloride (K-DUR,KLOR-CON) 20 MEQ tablet TAKE 2 TABLETS(40 MEQ) BY MOUTH TWICE DAILY 360 tablet 2     SINGULAIR 10 mg tablet TAKE 1 TABLET BY MOUTH ONCE DAILY. 90 tablet 1     cetirizine (ZYRTEC) 10 MG tablet Take 1 tablet (10 mg total) by mouth daily. 30 tablet 2     ezetimibe (ZETIA) 10 mg tablet Take 1 tablet (10 mg total) by mouth daily. 30 tablet 11     losartan (COZAAR) 100 MG tablet Take 1 tablet (100 mg total) by mouth daily. 30 tablet 11     nitroglycerin (NITROSTAT) 0.4 MG SL tablet PLACE 1 TABLET UNDER THE TONGUE EVERY 5 MINUTES AS NEEDED FOR CHEST PAIN 1 Bottle 2     No current facility-administered medications for this visit.        Social History     Tobacco Use   Smoking Status Never Smoker   Smokeless Tobacco Never Used           OBJECTIVE:        Recent Results (from the past 240 hour(s))   Electrocardiogram Perform and Read   Result Value Ref Range    SYSTOLIC BLOOD PRESSURE      DIASTOLIC BLOOD PRESSURE      VENTRICULAR RATE 62 BPM    ATRIAL RATE 62 BPM    P-R INTERVAL 176 ms    QRS DURATION 108 ms    Q-T INTERVAL 450 ms    QTC CALCULATION (BEZET) 456 ms    P Axis 46 degrees    R AXIS 4 degrees    T AXIS 1 degrees    MUSE DIAGNOSIS       Normal sinus rhythm  Incomplete right bundle branch block  Nonspecific ST abnormality  Abnormal ECG  When compared with ECG of 17-MAY-2019 15:33,  No significant change was found     HM1 (CBC with Diff)   Result Value Ref Range    WBC 8.3 4.0 - 11.0 thou/uL    RBC 4.58 3.80 - 5.40 mill/uL    Hemoglobin 13.5 12.0 - 16.0 g/dL    Hematocrit 39.5 35.0 - 47.0 %    MCV 86 80 - 100 fL    MCH 29.5 27.0 - 34.0 pg    MCHC 34.3 32.0 - 36.0 g/dL    RDW 12.3 11.0 - 14.5 %    Platelets 333 140 - 440 thou/uL    MPV 7.8 7.0 - 10.0 fL    Neutrophils % 54 50 - 70 %    Lymphocytes % 34 20 - 40 %    Monocytes % 8 2 - 10 %    Eosinophils % 4 0 - 6 %    Basophils % 1 0 - 2 %     "Neutrophils Absolute 4.4 2.0 - 7.7 thou/uL    Lymphocytes Absolute 2.8 0.8 - 4.4 thou/uL    Monocytes Absolute 0.7 0.0 - 0.9 thou/uL    Eosinophils Absolute 0.3 0.0 - 0.4 thou/uL    Basophils Absolute 0.1 0.0 - 0.2 thou/uL     ROS:  Patient has not noticed any significant fluctuation in her weight  Appetite is been good  No cough  No change in bowel habits  No fever  Vitals:    10/21/19 1104   BP: 134/88   Patient Position: Sitting   Cuff Size: Adult Large   Pulse: 68   SpO2: 98%   Weight: 216 lb 2 oz (98 kg)   Height: 5' 2.5\" (1.588 m)     Weight: 216 lb 2 oz (98 kg)          Physical Exam:  GENERAL APPEARANCE: 64-year-old female note that she seems to have some puffiness around his which has been chronic and noted by myself before., NAD, well hydrated, well nourished  SKIN:  Normal skin turgor, no lesions/rashes   HEENT: moist mucous membranes, no rhinorrhea, oropharynx is normal in both ear canals and tympanic membranes look normal  NECK: Normal without adenopathy or masses, no jugular venous distention and no carotid bruits  CV: RRR, no M/G/R , no murmur or gallop  LUNGS:  no rales or rhonchi with symmetric breath sounds  Note that the patient does have some tenderness over the distal sternum and also the junction between the fifth and sixth rib and the sternum more on the left side than the right side but is difficult for her to assess if that reproduces the pain that she is feeling when she is walking or exercising.  ABDOMEN: S&NT, no masses or enlarged organs   EXTREMITY: Trace ankle edema bilateral and full ROM of all joints  NEURO: no focal findings    " Pounds Preamble Statement (Weight Entered In Details Tab): Reported Weight in pounds:

## 2022-06-21 ENCOUNTER — TRANSFERRED RECORDS (OUTPATIENT)
Dept: HEALTH INFORMATION MANAGEMENT | Facility: CLINIC | Age: 67
End: 2022-06-21

## 2022-06-28 ENCOUNTER — TRANSFERRED RECORDS (OUTPATIENT)
Dept: HEALTH INFORMATION MANAGEMENT | Facility: CLINIC | Age: 67
End: 2022-06-28

## 2022-07-06 ENCOUNTER — TRANSFERRED RECORDS (OUTPATIENT)
Dept: HEALTH INFORMATION MANAGEMENT | Facility: CLINIC | Age: 67
End: 2022-07-06

## 2022-07-10 ENCOUNTER — HEALTH MAINTENANCE LETTER (OUTPATIENT)
Age: 67
End: 2022-07-10

## 2022-08-03 ENCOUNTER — OFFICE VISIT (OUTPATIENT)
Dept: FAMILY MEDICINE | Facility: CLINIC | Age: 67
End: 2022-08-03
Payer: COMMERCIAL

## 2022-08-03 VITALS
SYSTOLIC BLOOD PRESSURE: 138 MMHG | BODY MASS INDEX: 35.33 KG/M2 | HEIGHT: 62 IN | HEART RATE: 67 BPM | RESPIRATION RATE: 18 BRPM | DIASTOLIC BLOOD PRESSURE: 86 MMHG | WEIGHT: 192 LBS | OXYGEN SATURATION: 99 %

## 2022-08-03 DIAGNOSIS — E83.52 SERUM CALCIUM ELEVATED: ICD-10-CM

## 2022-08-03 DIAGNOSIS — E78.00 PURE HYPERCHOLESTEROLEMIA: ICD-10-CM

## 2022-08-03 DIAGNOSIS — E11.59 TYPE 2 DIABETES MELLITUS WITH OTHER CIRCULATORY COMPLICATION, WITHOUT LONG-TERM CURRENT USE OF INSULIN (H): Primary | ICD-10-CM

## 2022-08-03 DIAGNOSIS — E66.01 MORBID OBESITY (H): ICD-10-CM

## 2022-08-03 LAB — HBA1C MFR BLD: 6.2 % (ref 0–5.6)

## 2022-08-03 PROCEDURE — 83036 HEMOGLOBIN GLYCOSYLATED A1C: CPT | Performed by: NURSE PRACTITIONER

## 2022-08-03 PROCEDURE — 99214 OFFICE O/P EST MOD 30 MIN: CPT | Performed by: NURSE PRACTITIONER

## 2022-08-03 PROCEDURE — 36415 COLL VENOUS BLD VENIPUNCTURE: CPT | Performed by: NURSE PRACTITIONER

## 2022-08-03 PROCEDURE — 80053 COMPREHEN METABOLIC PANEL: CPT | Performed by: NURSE PRACTITIONER

## 2022-08-03 RX ORDER — NICOTINE POLACRILEX 2 MG
LOZENGE BUCCAL EVERY 24 HOURS
COMMUNITY
Start: 2022-06-21

## 2022-08-03 RX ORDER — ASCORBIC ACID 500 MG
TABLET ORAL EVERY 24 HOURS
COMMUNITY
Start: 2022-06-21 | End: 2022-11-28

## 2022-08-03 ASSESSMENT — ENCOUNTER SYMPTOMS
FREQUENCY: 0
ABDOMINAL PAIN: 0
CONSTIPATION: 0
DYSURIA: 0
HEMATOCHEZIA: 0
COUGH: 0
NAUSEA: 0
FEVER: 0
JOINT SWELLING: 1
NERVOUS/ANXIOUS: 0
HEMATURIA: 0
WEAKNESS: 0
ARTHRALGIAS: 1
CHILLS: 0
HEARTBURN: 0
DIARRHEA: 0
HEADACHES: 0
SORE THROAT: 0
EYE PAIN: 0
PARESTHESIAS: 0

## 2022-08-03 ASSESSMENT — ASTHMA QUESTIONNAIRES
QUESTION_3 LAST FOUR WEEKS HOW OFTEN DID YOUR ASTHMA SYMPTOMS (WHEEZING, COUGHING, SHORTNESS OF BREATH, CHEST TIGHTNESS OR PAIN) WAKE YOU UP AT NIGHT OR EARLIER THAN USUAL IN THE MORNING: NOT AT ALL
QUESTION_5 LAST FOUR WEEKS HOW WOULD YOU RATE YOUR ASTHMA CONTROL: WELL CONTROLLED
QUESTION_1 LAST FOUR WEEKS HOW MUCH OF THE TIME DID YOUR ASTHMA KEEP YOU FROM GETTING AS MUCH DONE AT WORK, SCHOOL OR AT HOME: A LITTLE OF THE TIME
ACT_TOTALSCORE: 22
ACT_TOTALSCORE: 22
QUESTION_4 LAST FOUR WEEKS HOW OFTEN HAVE YOU USED YOUR RESCUE INHALER OR NEBULIZER MEDICATION (SUCH AS ALBUTEROL): NOT AT ALL
QUESTION_2 LAST FOUR WEEKS HOW OFTEN HAVE YOU HAD SHORTNESS OF BREATH: ONCE OR TWICE A WEEK

## 2022-08-03 ASSESSMENT — ACTIVITIES OF DAILY LIVING (ADL): CURRENT_FUNCTION: NO ASSISTANCE NEEDED

## 2022-08-03 ASSESSMENT — PAIN SCALES - GENERAL: PAINLEVEL: NO PAIN (0)

## 2022-08-03 NOTE — LETTER
August 6, 2022      Alvina Maynard  [address] [place] [address]        Dear ,    We are writing to inform you of your test results.      Resulted Orders   HEMOGLOBIN A1C   Result Value Ref Range    Hemoglobin A1C 6.2 (H) 0.0 - 5.6 %      Comment:      Normal <5.7%   Prediabetes 5.7-6.4%    Diabetes 6.5% or higher     Note: Adopted from ADA consensus guidelines.   Comprehensive metabolic panel (BMP + Alb, Alk Phos, ALT, AST, Total. Bili, TP)   Result Value Ref Range    Sodium 136 136 - 145 mmol/L    Potassium 4.0 3.4 - 5.3 mmol/L    Creatinine 0.69 0.51 - 0.95 mg/dL    Urea Nitrogen 15.7 8.0 - 23.0 mg/dL    Chloride 99 98 - 107 mmol/L    Carbon Dioxide (CO2) 28 22 - 29 mmol/L    Anion Gap 9 7 - 15 mmol/L    Glucose 88 70 - 99 mg/dL    Calcium 10.9 (H) 8.8 - 10.2 mg/dL    Protein Total 7.4 6.4 - 8.3 g/dL    Albumin 4.7 3.5 - 5.2 g/dL    Bilirubin Total 0.2 <=1.2 mg/dL    Alkaline Phosphatase 91 35 - 104 U/L    AST 25 10 - 35 U/L    ALT 19 10 - 35 U/L    GFR Estimate >90 >60 mL/min/1.73m2      Comment:      Effective December 21, 2021 eGFRcr in adults is calculated using the 2021 CKD-EPI creatinine equation which includes age and gender (Donya et al., NEJM, DOI: 10.1056/VMDHfk0783793)       If you have any questions or concerns, please call the clinic at the number listed above.       Sincerely,      Jong Claudio NP

## 2022-08-03 NOTE — LETTER
My Asthma Action Plan    Name: Alvina Maynard   YOB: 1955  Date: 8/3/2022   My doctor: Jong Claudio NP   My clinic: [facility] [place]        My Rescue Medicine:   Albuterol inhaler (Proair/Ventolin/Proventil HFA)  2-4 puffs EVERY 4 HOURS as needed. Use a spacer if recommended by your provider.   My Asthma Severity:   Intermittent / Exercise Induced  Know your asthma triggers: pollens, mold and seasonal allergies             GREEN ZONE   Good Control    I feel good    No cough or wheeze    Can work, sleep and play without asthma symptoms       Take your asthma control medicine every day.     1. If exercise triggers your asthma, take your rescue medication    15 minutes before exercise or sports, and    During exercise if you have asthma symptoms  2. Spacer to use with inhaler: If you have a spacer, make sure to use it with your inhaler             YELLOW ZONE Getting Worse  I have ANY of these:    I do not feel good    Cough or wheeze    Chest feels tight    Wake up at night   1. Keep taking your Green Zone medications  2. Start taking your rescue medicine:    every 20 minutes for up to 1 hour. Then every 4 hours for 24-48 hours.  3. If you stay in the Yellow Zone for more than 12-24 hours, contact your doctor.  4. If you do not return to the Green Zone in 12-24 hours or you get worse, start taking your oral steroid medicine if prescribed by your provider.           RED ZONE Medical Alert - Get Help  I have ANY of these:    I feel awful    Medicine is not helping    Breathing getting harder    Trouble walking or talking    Nose opens wide to breathe       1. Take your rescue medicine NOW  2. If your provider has prescribed an oral steroid medicine, start taking it NOW  3. Call your doctor NOW  4. If you are still in the Red Zone after 20 minutes and you have not reached your doctor:    Take your rescue medicine again and    Call 911 or go to the emergency room right  away    See your regular doctor within 2 weeks of an Emergency Room or Urgent Care visit for follow-up treatment.          Annual Reminders:  Meet with Asthma Educator,  Flu Shot in the Fall, consider Pneumonia Vaccination for patients with asthma (aged 19 and older).    Pharmacy: [id number] - [place], [place] - [address] AT [address] [facility] [address]    Electronically signed by Jong Claudio NP   Date: 08/03/22                    Asthma Triggers  How To Control Things That Make Your Asthma Worse    Triggers are things that make your asthma worse.  Look at the list below to help you find your triggers and   what you can do about them. You can help prevent asthma flare-ups by staying away from your triggers.      Trigger                                                          What you can do   Cigarette Smoke  Tobacco smoke can make asthma worse. Do not allow smoking in your home, car or around you.  Be sure no one smokes at a child s day care or school.  If you smoke, ask your health care provider for ways to help you quit.  Ask family members to quit too.  Ask your health care provider for a referral to Quit Plan to help you quit smoking, or call 5-310-605-PLAN.     Colds, Flu, Bronchitis  These are common triggers of asthma. Wash your hands often.  Don t touch your eyes, nose or mouth.  Get a flu shot every year.     Dust Mites  These are tiny bugs that live in cloth or carpet. They are too small to see. Wash sheets and blankets in hot water every week.   Encase pillows and mattress in dust mite proof covers.  Avoid having carpet if you can. If you have carpet, vacuum weekly.   Use a dust mask and HEPA vacuum.   Pollen and Outdoor Mold  Some people are allergic to trees, grass, or weed pollen, or molds. Try to keep your windows closed.  Limit time out doors when pollen count is high.   Ask you health care provider about taking medicine during allergy season.     Animal Dander  Some people  are allergic to skin flakes, urine or saliva from pets with fur or feathers. Keep pets with fur or feathers out of your home.    If you can t keep the pet outdoors, then keep the pet out of your bedroom.  Keep the bedroom door closed.  Keep pets off cloth furniture and away from stuffed toys.     Mice, Rats, and Cockroaches  Some people are allergic to the waste from these pests.   Cover food and garbage.  Clean up spills and food crumbs.  Store grease in the refrigerator.   Keep food out of the bedroom.   Indoor Mold  This can be a trigger if your home has high moisture. Fix leaking faucets, pipes, or other sources of water.   Clean moldy surfaces.  Dehumidify basement if it is damp and smelly.   Smoke, Strong Odors, and Sprays  These can reduce air quality. Stay away from strong odors and sprays, such as perfume, powder, hair spray, paints, smoke incense, paint, cleaning products, candles and new carpet.   Exercise or Sports  Some people with asthma have this trigger. Be active!  Ask your doctor about taking medicine before sports or exercise to prevent symptoms.    Warm up for 5-10 minutes before and after sports or exercise.     Other Triggers of Asthma  Cold air:  Cover your nose and mouth with a scarf.  Sometimes laughing or crying can be a trigger.  Some medicines and food can trigger asthma.

## 2022-08-03 NOTE — PROGRESS NOTES
"Assessment & Plan     ICD-10-CM    1. Type 2 diabetes mellitus with other circulatory complication, without long-term current use of insulin (H)  E11.59 HEMOGLOBIN A1C     Comprehensive metabolic panel (BMP + Alb, Alk Phos, ALT, AST, Total. Bili, TP)     HEMOGLOBIN A1C     Comprehensive metabolic panel (BMP + Alb, Alk Phos, ALT, AST, Total. Bili, TP)   2. Pure hypercholesterolemia  E78.00 Comprehensive metabolic panel (BMP + Alb, Alk Phos, ALT, AST, Total. Bili, TP)     Comprehensive metabolic panel (BMP + Alb, Alk Phos, ALT, AST, Total. Bili, TP)   3. Morbid obesity (H)  E66.01      Patient has her prescription for Zetia.  Encouraged starting.  Just about due for follow-up with cardiology and contact information was given to the patient to set this up.  Question that if she would be a good candidate for Repatha now that insurance has changed.  Continue working on exercise and weight loss.  Due for follow-up with [facility] and she was encouraged to set up an appointment with them.    Reviewed cardiology note x1, family medicine note x1, A1c x2, metabolic panel x2, lipids x2    Subjective     HPI     htn  Ok control.  No chest pain, palpitations, worsening lower extremity swelling.    hld  Intolerant of statins. Got rx for zetia but hasn't started.  Cardiology did look into possible Repatha but this was cost prohibitive.  Patient has new insurance now so she may be a good candidate.    Obesity  Stay somewhat active.  Currently lives in a duplex with a sister.    Type 2 diabetes  Lab Results   Component Value Date    A1C 6.2 08/03/2022    A1C 6.3 12/20/2021    A1C 6.2 07/21/2021    A1C 6.1 12/22/2020    A1C 6.2 05/01/2020     No polydipsia or polyuria.  Treated with lifestyle management.    Review of Systems - negative except for what's listed in the HPI      Objective    /86   Pulse 67   Resp 18   Ht 1.575 m (5' 2\")   Wt 87.1 kg (192 lb)   SpO2 99%   BMI 35.12 kg/m    Physical Exam   General appearance " - alert, well appearing, and in no distress  Mental status - alert, oriented to person, place, and time  Chest - clear to auscultation, no wheezes, rales or rhonchi, symmetric air entry  Heart - normal rate and regular rhythm, S1 and S2 normal, no murmurs noted  Abdomen - soft, nontender, nondistended, no masses or organomegaly  Neurological - alert, oriented, normal speech, no focal findings or movement disorder noted, neck supple without rigidity, cranial nerves II through XII intact, motor and sensory grossly normal bilaterally, normal muscle tone, no tremors, strength 5/5  Extremities - peripheral pulses normal, no peripheral edema  Skin - normal coloration and turgor.    Jong Claudio, CNP    This note has been dictated using voice recognition software. Any grammatical or context distortions are unintentional and inherent to the software.

## 2022-08-03 NOTE — LETTER
August 9, 2022      Alvina Maynard  [address] [place] [address]        Dear ,    We are writing to inform you of your test results.    Blood sugars are holding steady showing adequate control.  Kidneys and liver look good.  Your calcium has been mildly persistently elevated.  At your leisure, there is one blood test that I would like to do called a parathyroid hormone level.  Sometimes if someone's parathyroid is hyperactive it can cause elevated calcium levels and I would just like to make sure that this is not occurring.  I did go ahead and place this and you can do that with a lab only appointment.  Let me know if you have any questions.    Resulted Orders   HEMOGLOBIN A1C   Result Value Ref Range    Hemoglobin A1C 6.2 (H) 0.0 - 5.6 %      Comment:      Normal <5.7%   Prediabetes 5.7-6.4%    Diabetes 6.5% or higher     Note: Adopted from ADA consensus guidelines.   Comprehensive metabolic panel (BMP + Alb, Alk Phos, ALT, AST, Total. Bili, TP)   Result Value Ref Range    Sodium 136 136 - 145 mmol/L    Potassium 4.0 3.4 - 5.3 mmol/L    Creatinine 0.69 0.51 - 0.95 mg/dL    Urea Nitrogen 15.7 8.0 - 23.0 mg/dL    Chloride 99 98 - 107 mmol/L    Carbon Dioxide (CO2) 28 22 - 29 mmol/L    Anion Gap 9 7 - 15 mmol/L    Glucose 88 70 - 99 mg/dL    Calcium 10.9 (H) 8.8 - 10.2 mg/dL    Protein Total 7.4 6.4 - 8.3 g/dL    Albumin 4.7 3.5 - 5.2 g/dL    Bilirubin Total 0.2 <=1.2 mg/dL    Alkaline Phosphatase 91 35 - 104 U/L    AST 25 10 - 35 U/L    ALT 19 10 - 35 U/L    GFR Estimate >90 >60 mL/min/1.73m2      Comment:      Effective December 21, 2021 eGFRcr in adults is calculated using the 2021 CKD-EPI creatinine equation which includes age and gender (Donya et al., NEJM, DOI: 10.1056/ELMIya6270721)       If you have any questions or concerns, please call the clinic at the number listed above.       Sincerely,      Jong Claudio NP

## 2022-08-03 NOTE — PATIENT INSTRUCTIONS
Updating blood work to keep an eye on your metabolic panel and blood sugars since its been several months since these were last checked.    Don't forget to follow-up with your ophthalmologist at [facility].    It looks like you are due to follow-up with your cardiologist.  The number to schedule that is [phone].  May be with your insurance coverage changes that medication Repatha would be more affordable to bring down the cholesterol.  Consider starting the Zetia that you received last year to see if your body can tolerate that okay.

## 2022-08-04 LAB
ALBUMIN SERPL BCG-MCNC: 4.7 G/DL (ref 3.5–5.2)
ALP SERPL-CCNC: 91 U/L (ref 35–104)
ALT SERPL W P-5'-P-CCNC: 19 U/L (ref 10–35)
ANION GAP SERPL CALCULATED.3IONS-SCNC: 9 MMOL/L (ref 7–15)
AST SERPL W P-5'-P-CCNC: 25 U/L (ref 10–35)
BILIRUB SERPL-MCNC: 0.2 MG/DL
BUN SERPL-MCNC: 15.7 MG/DL (ref 8–23)
CALCIUM SERPL-MCNC: 10.9 MG/DL (ref 8.8–10.2)
CHLORIDE SERPL-SCNC: 99 MMOL/L (ref 98–107)
CREAT SERPL-MCNC: 0.69 MG/DL (ref 0.51–0.95)
DEPRECATED HCO3 PLAS-SCNC: 28 MMOL/L (ref 22–29)
GFR SERPL CREATININE-BSD FRML MDRD: >90 ML/MIN/1.73M2
GLUCOSE SERPL-MCNC: 88 MG/DL (ref 70–99)
POTASSIUM SERPL-SCNC: 4 MMOL/L (ref 3.4–5.3)
PROT SERPL-MCNC: 7.4 G/DL (ref 6.4–8.3)
SODIUM SERPL-SCNC: 136 MMOL/L (ref 136–145)

## 2022-08-10 ENCOUNTER — MYC MEDICAL ADVICE (OUTPATIENT)
Dept: FAMILY MEDICINE | Facility: CLINIC | Age: 67
End: 2022-08-10

## 2022-08-10 RX ORDER — CETIRIZINE HYDROCHLORIDE 10 MG/1
10 TABLET ORAL DAILY
COMMUNITY
Start: 2022-08-10 | End: 2023-08-09

## 2022-08-10 NOTE — TELEPHONE ENCOUNTER
Updated Cetirizine 10 MG prescription per patient request.    Cass Durand RN on 8/10/2022 at 1:40 PM

## 2022-08-15 ENCOUNTER — LAB (OUTPATIENT)
Dept: LAB | Facility: CLINIC | Age: 67
End: 2022-08-15
Payer: COMMERCIAL

## 2022-08-15 DIAGNOSIS — E83.52 SERUM CALCIUM ELEVATED: ICD-10-CM

## 2022-08-15 LAB — PTH-INTACT SERPL-MCNC: 50 PG/ML (ref 15–65)

## 2022-08-15 PROCEDURE — 36415 COLL VENOUS BLD VENIPUNCTURE: CPT

## 2022-08-15 PROCEDURE — 83970 ASSAY OF PARATHORMONE: CPT

## 2022-08-17 ENCOUNTER — TRANSFERRED RECORDS (OUTPATIENT)
Dept: HEALTH INFORMATION MANAGEMENT | Facility: CLINIC | Age: 67
End: 2022-08-17

## 2022-08-23 DIAGNOSIS — I10 ESSENTIAL HYPERTENSION: ICD-10-CM

## 2022-08-24 NOTE — TELEPHONE ENCOUNTER
"Routing refill request to provider for review/approval because:  Early refill requested.    Last Written Prescription Date:  10/31/21  Last Fill Quantity: 90,  # refills: 3   Last office visit provider:  8/3/22     Requested Prescriptions   Pending Prescriptions Disp Refills     losartan (COZAAR) 100 MG tablet [Pharmacy Med Name: LOSARTAN 100MG TABLETS] 90 tablet 3     Sig: TAKE 1 TABLET(100 MG) BY MOUTH DAILY       Angiotensin-II Receptors Passed - 8/24/2022  2:27 PM        Passed - Last blood pressure under 140/90 in past 12 months     BP Readings from Last 3 Encounters:   08/03/22 138/86   02/14/22 128/88   12/20/21 124/74                 Passed - Recent (12 mo) or future (30 days) visit within the authorizing provider's specialty     Patient has had an office visit with the authorizing provider or a provider within the authorizing providers department within the previous 12 mos or has a future within next 30 days. See \"Patient Info\" tab in inbasket, or \"Choose Columns\" in Meds & Orders section of the refill encounter.              Passed - Medication is active on med list        Passed - Patient is age 18 or older        Passed - No active pregnancy on record        Passed - Normal serum creatinine on file in past 12 months     Recent Labs   Lab Test 08/03/22  1623   CR 0.69       Ok to refill medication if creatinine is low          Passed - Normal serum potassium on file in past 12 months     Recent Labs   Lab Test 08/03/22  1623   POTASSIUM 4.0                    Passed - No positive pregnancy test in past 12 months             Sonido Vera RN 08/24/22 2:27 PM  "

## 2022-08-25 RX ORDER — LOSARTAN POTASSIUM 100 MG/1
100 TABLET ORAL DAILY
Qty: 90 TABLET | Refills: 3 | Status: SHIPPED | OUTPATIENT
Start: 2022-08-25 | End: 2023-08-09

## 2022-09-06 ENCOUNTER — TRANSFERRED RECORDS (OUTPATIENT)
Dept: HEALTH INFORMATION MANAGEMENT | Facility: CLINIC | Age: 67
End: 2022-09-06

## 2022-09-10 ENCOUNTER — HEALTH MAINTENANCE LETTER (OUTPATIENT)
Age: 67
End: 2022-09-10

## 2022-09-12 ENCOUNTER — OFFICE VISIT (OUTPATIENT)
Dept: OTOLARYNGOLOGY | Facility: CLINIC | Age: 67
End: 2022-09-12

## 2022-09-12 ENCOUNTER — OFFICE VISIT (OUTPATIENT)
Dept: AUDIOLOGY | Facility: CLINIC | Age: 67
End: 2022-09-12
Payer: COMMERCIAL

## 2022-09-12 DIAGNOSIS — R42 DIZZINESS: ICD-10-CM

## 2022-09-12 DIAGNOSIS — H90.42 SENSORINEURAL HEARING LOSS (SNHL) OF LEFT EAR WITH UNRESTRICTED HEARING OF RIGHT EAR: Primary | ICD-10-CM

## 2022-09-12 DIAGNOSIS — H92.02 EAR PAIN, LEFT: ICD-10-CM

## 2022-09-12 DIAGNOSIS — H93.8X2 EAR FULLNESS, LEFT: Primary | ICD-10-CM

## 2022-09-12 DIAGNOSIS — J30.2 SEASONAL ALLERGIC RHINITIS, UNSPECIFIED TRIGGER: ICD-10-CM

## 2022-09-12 DIAGNOSIS — H90.42 SENSORINEURAL HEARING LOSS (SNHL) OF LEFT EAR WITH UNRESTRICTED HEARING OF RIGHT EAR: ICD-10-CM

## 2022-09-12 PROCEDURE — 92550 TYMPANOMETRY & REFLEX THRESH: CPT | Mod: 52 | Performed by: AUDIOLOGIST

## 2022-09-12 PROCEDURE — 92557 COMPREHENSIVE HEARING TEST: CPT | Performed by: AUDIOLOGIST

## 2022-09-12 PROCEDURE — 99214 OFFICE O/P EST MOD 30 MIN: CPT | Performed by: OTOLARYNGOLOGY

## 2022-09-12 RX ORDER — AMITRIPTYLINE HYDROCHLORIDE 10 MG/1
TABLET ORAL EVERY 24 HOURS
COMMUNITY
Start: 2022-09-06 | End: 2023-03-03

## 2022-09-12 RX ORDER — ASCORBIC ACID 500 MG
TABLET ORAL EVERY 24 HOURS
COMMUNITY
Start: 2022-06-21 | End: 2022-11-28

## 2022-09-12 RX ORDER — IRON 18 MG
TABLET ORAL EVERY 24 HOURS
COMMUNITY
Start: 2022-09-06

## 2022-09-12 NOTE — LETTER
"    9/12/2022         RE: Alvina Maynard  [address] [place] [address]        Dear Colleague,    Thank you for referring your patient, Alvina Maynard, to the [facility]. Please see a copy of my visit note below.    CHIEF COMPLAINT:  Patient presents with:  Ent Problem: Hearing test follow up, hears heartbeat, left ear pain/pressure, vertigo starting to act up         HISTORY OF PRESENT ILLNESS    Alvina was seen in follow up for audiogram review.  He reports recent onset of vertigo along with left sided ear pressure and pain.  No vertigo.  She has exercises she does at home which have helped in the past but she feels a heaviness to her head.  She is wondering if it could be allergy related.  Her allergies have been bad for the past few weeks.  She has astelin and flonase nasal spray she takes regularly.  She also has zyrtec for flare ups.       AUDIOLOGY NOTE:    Last audio 2/9/22 showed normal hearing right and normal hearing 250-2000 Hz sloping to moderate SNHL in the left.   Pt reports her left ear started, \"acting up\" about a week and a half ago. Started feeling plugged and now somewhat painful. She is worried about her vertigo returning so she has been doing her exercises. Denies hearing loss, states she can only hear her heartbeat when  undergoing a hearing test. She denied otorrhea, tinnitus, loud noise exposure, and hx of ear surgery.    RESULTS: Otoscopy: Minimal debris in both canals. Tymps: Normal eardrum mobility. Reflexes: Too much artifact to accurately interpret.    Right: normal hearing sensitivity.   Left: Normal hearing 250-2000 Hz sloping to mild to moderate SNHL from 5812-7464 Hz.  Word rec: Excellent bilat.   Hearing essentially stable since 2/9/22 audio. 20 dB decrease at 2000 Hz in the left.    REC: Follow up with ENT as scheduled.        MRI Brain 5/2022      1.  Normal MRI of the internal auditory canals and labyrinthine structures.  2.  No acute " intracranial abnormality. No finding for acute infarct, intracranial hemorrhage, or abnormally enhancing mass.  3.  Chronic left PCA distribution infarct.  4.  Prior right MCA aneurysm clipping.     REVIEW OF SYSTEMS    Review of Systems: a 10-system review is reviewed at this encounter.  See scanned document.     Propylene glycol, Red dye, Adhesive tape, Atorvastatin, Carvedilol, Cephalosporins, Clindamycin, Demerol, Dilantin [phenytoin], Lisinopril, Meperidine, Metronidazole, Nickel, Penicillins, Rosuvastatin, Sulfa drugs, and Latex     PHYSICAL EXAM:        HEAD: Normal appearance and symmetry:  No cutaneous lesions.      EARS:   Auricles normal         NOSE:    Dorsum:   straight       ORAL CAVITY/OROPHARYNX:    Lips:  Normal.     NECK:  Trachea:  midline       NEURO:   Alert and Oriented    GAIT AND STATION:  normal     RESPIRATORY:   Symmetry and Respiratory effort    PSYCH:   normal mood and affect    SKIN:  warm and dry         IMPRESSION:   Encounter Diagnoses   Name Primary?     Ear fullness, left Yes     Ear pain, left      Sensorineural hearing loss (SNHL) of left ear with unrestricted hearing of right ear               RECOMMENDATIONS:    Referral to vestibular therapy.  Return annually for hearing test  Request record from [facility] in [place]      Again, thank you for allowing me to participate in the care of your patient.        Sincerely,        Tito Bravo MD

## 2022-09-12 NOTE — PROGRESS NOTES
AUDIOLOGY REPORT    SUMMARY: Audiology visit completed. See audiogram for results.      RECOMMENDATIONS: Follow-up with ENT.    Dominique De La Garza, CCC-A  [place] Licensed Audiologist #[id number]

## 2022-09-12 NOTE — PROGRESS NOTES
"CHIEF COMPLAINT:  Patient presents with:  Ent Problem: Hearing test follow up, hears heartbeat, left ear pain/pressure, vertigo starting to act up         HISTORY OF PRESENT ILLNESS    Alvina was seen in follow up for audiogram review.  He reports recent onset of vertigo along with left sided ear pressure and pain.  No vertigo.  She has exercises she does at home which have helped in the past but she feels a heaviness to her head.  She is wondering if it could be allergy related.  Her allergies have been bad for the past few weeks.  She has astelin and flonase nasal spray she takes regularly.  She also has zyrtec for flare ups.       AUDIOLOGY NOTE:    Last audio 2/9/22 showed normal hearing right and normal hearing 250-2000 Hz sloping to moderate SNHL in the left.   Pt reports her left ear started, \"acting up\" about a week and a half ago. Started feeling plugged and now somewhat painful. She is worried about her vertigo returning so she has been doing her exercises. Denies hearing loss, states she can only hear her heartbeat when  undergoing a hearing test. She denied otorrhea, tinnitus, loud noise exposure, and hx of ear surgery.    RESULTS: Otoscopy: Minimal debris in both canals. Tymps: Normal eardrum mobility. Reflexes: Too much artifact to accurately interpret.    Right: normal hearing sensitivity.   Left: Normal hearing 250-2000 Hz sloping to mild to moderate SNHL from 4349-7127 Hz.  Word rec: Excellent bilat.   Hearing essentially stable since 2/9/22 audio. 20 dB decrease at 2000 Hz in the left.    REC: Follow up with ENT as scheduled.        MRI Brain 5/2022      1.  Normal MRI of the internal auditory canals and labyrinthine structures.  2.  No acute intracranial abnormality. No finding for acute infarct, intracranial hemorrhage, or abnormally enhancing mass.  3.  Chronic left PCA distribution infarct.  4.  Prior right MCA aneurysm clipping.     REVIEW OF SYSTEMS    Review of Systems: a 10-system review is " reviewed at this encounter.  See scanned document.     Propylene glycol, Red dye, Adhesive tape, Atorvastatin, Carvedilol, Cephalosporins, Clindamycin, Demerol, Dilantin [phenytoin], Lisinopril, Meperidine, Metronidazole, Nickel, Penicillins, Rosuvastatin, Sulfa drugs, and Latex     PHYSICAL EXAM:        HEAD: Normal appearance and symmetry:  No cutaneous lesions.      EARS:   Auricles normal         NOSE:    Dorsum:   straight       ORAL CAVITY/OROPHARYNX:    Lips:  Normal.     NECK:  Trachea:  midline       NEURO:   Alert and Oriented    GAIT AND STATION:  normal     RESPIRATORY:   Symmetry and Respiratory effort    PSYCH:   normal mood and affect    SKIN:  warm and dry         IMPRESSION:   Encounter Diagnoses   Name Primary?     Ear fullness, left Yes     Ear pain, left      Sensorineural hearing loss (SNHL) of left ear with unrestricted hearing of right ear               RECOMMENDATIONS:    Referral to vestibular therapy.  Return annually for hearing test  Request record from [facility] in [place]

## 2022-09-13 DIAGNOSIS — E87.6 HYPOKALEMIA: ICD-10-CM

## 2022-09-13 RX ORDER — POTASSIUM CHLORIDE 1500 MG/1
TABLET, EXTENDED RELEASE ORAL
Qty: 360 TABLET | Refills: 1 | Status: SHIPPED | OUTPATIENT
Start: 2022-09-13 | End: 2023-03-20

## 2022-09-13 NOTE — TELEPHONE ENCOUNTER
Refill Request  Medication name: Pending Prescriptions:                       Disp   Refills    potassium chloride ER (KLOR-CON M) 20 MEQ*360 ta*1          Requested Pharmacy: [facility]

## 2022-09-14 NOTE — TELEPHONE ENCOUNTER
"Last Written Prescription Date:  3/10/22  Last Fill Quantity: 260,  # refills: 1   Last office visit provider:  8/3/22     Requested Prescriptions   Pending Prescriptions Disp Refills     potassium chloride ER (KLOR-CON M) 20 MEQ CR tablet 360 tablet 1       Potassium Supplements Protocol Passed - 9/13/2022 12:56 PM        Passed - Recent (12 mo) or future (30 days) visit within the authorizing provider's department     Patient has had an office visit with the authorizing provider or a provider within the authorizing providers department within the previous 12 mos or has a future within next 30 days. See \"Patient Info\" tab in inbasket, or \"Choose Columns\" in Meds & Orders section of the refill encounter.              Passed - Medication is active on med list        Passed - Patient is age 18 or older        Passed - Normal serum potassium in past 12 months     Recent Labs   Lab Test 08/03/22  1623   POTASSIUM 4.0                         Chelsie Joseph RN 09/13/22 9:19 PM  "

## 2022-09-21 ENCOUNTER — TRANSFERRED RECORDS (OUTPATIENT)
Dept: HEALTH INFORMATION MANAGEMENT | Facility: CLINIC | Age: 67
End: 2022-09-21

## 2022-11-17 ENCOUNTER — IMMUNIZATION (OUTPATIENT)
Dept: FAMILY MEDICINE | Facility: CLINIC | Age: 67
End: 2022-11-17
Payer: COMMERCIAL

## 2022-11-17 PROCEDURE — 90662 IIV NO PRSV INCREASED AG IM: CPT

## 2022-11-17 PROCEDURE — G0008 ADMIN INFLUENZA VIRUS VAC: HCPCS

## 2022-11-28 ENCOUNTER — TELEPHONE (OUTPATIENT)
Dept: CARDIOLOGY | Facility: CLINIC | Age: 67
End: 2022-11-28

## 2022-11-28 ENCOUNTER — OFFICE VISIT (OUTPATIENT)
Dept: CARDIOLOGY | Facility: CLINIC | Age: 67
End: 2022-11-28
Payer: COMMERCIAL

## 2022-11-28 VITALS
DIASTOLIC BLOOD PRESSURE: 80 MMHG | BODY MASS INDEX: 35.48 KG/M2 | RESPIRATION RATE: 16 BRPM | HEART RATE: 60 BPM | OXYGEN SATURATION: 97 % | WEIGHT: 194 LBS | SYSTOLIC BLOOD PRESSURE: 132 MMHG

## 2022-11-28 DIAGNOSIS — E78.5 DYSLIPIDEMIA: Primary | ICD-10-CM

## 2022-11-28 DIAGNOSIS — I25.10 CORONARY ARTERY DISEASE INVOLVING NATIVE CORONARY ARTERY WITHOUT ANGINA PECTORIS, UNSPECIFIED WHETHER NATIVE OR TRANSPLANTED HEART: ICD-10-CM

## 2022-11-28 DIAGNOSIS — I10 HYPERTENSION, UNSPECIFIED TYPE: ICD-10-CM

## 2022-11-28 PROCEDURE — 99214 OFFICE O/P EST MOD 30 MIN: CPT | Performed by: INTERNAL MEDICINE

## 2022-11-28 RX ORDER — EZETIMIBE 10 MG/1
10 TABLET ORAL DAILY
Qty: 90 TABLET | Refills: 3 | Status: SHIPPED | OUTPATIENT
Start: 2022-11-28 | End: 2024-06-25

## 2022-11-28 RX ORDER — EZETIMIBE 10 MG/1
10 TABLET ORAL DAILY
COMMUNITY
End: 2022-11-28

## 2022-11-28 NOTE — TELEPHONE ENCOUNTER
Hi,  Please see request for Repatha once again.  Thank you,  Liz  -----------------------------------------    Per OV 11/28/2022:  3. Dyslipidemia.  Lipid profile 20 December 2021 revealed  mg/dL and HDL 62 mg/dL.  Alvina has tried though not tolerated the following statins: Atorvastatin, simvastatin, pravastatin, and rosuvastatin.       I previously had discussed with Alvina the use of Repatha  (evolocumab) and although we pursued a preauthorization, her insurance was not sufficient to assist in the inordinate cost of the therapy and ultimately she could not afford it.  More recently, over, she has had a change in her insurance coverage and will once again seek a preauthorization for Repatha  (evolocumab).    Continue ezetimibe for now

## 2022-11-28 NOTE — TELEPHONE ENCOUNTER
Central Prior Authorization Team   Phone: [phone]    PA Initiation    Medication: Repatha SureClick 140MG/ML auto-injectors    Insurance Company: [employer] - Phone [phone] Fax [phone]  Pharmacy Filling the Rx: [facility] - [place], [place] - [address]  Filling Pharmacy Phone: [phone]  Filling Pharmacy Fax:    Start Date: 11/28/2022

## 2022-11-28 NOTE — TELEPHONE ENCOUNTER
Called patient with update and sent Rx to her preferred pharmacy. My direct number provided to patient to call with questions or concerns. Patient verbalized understanding and is thankful for the call.  ----------------------  Brandee Penny 26 minutes ago (3:44 PM)     Lucrecia Monet is covered under her plan.  Test claim shows her copay is $30 for 28 days supply.  It looks like a Rx will just need to be sent to a pharmacy. She should be able to fill at the  mail order pharmacy if she chooses.  Thank you

## 2022-11-28 NOTE — PROGRESS NOTES
[facility]   [address], [place] [address]   [email or web address]   OFFICE: [phone]          Thank you Jong Amado for asking the [facility] team to participate in the care of your patient, Alvina Maynard.     Impression and Plan     1. Coronary artery disease.  Alvina has known coronary artery disease as outlined in detail in history of present illness below.  Most recently, she underwent a regadenoson nuclear perfusion study 25 October 2019 that revealed no evidence of ischemia.         Patient is not reporting any anginal type chest pain symptoms at this time.        2. Hypertension.    Blood pressure is fairly reasonable in the office today.  In addition, blood pressures have been fairly reasonable as of late on her home blood pressure monitoring device.       3. Dyslipidemia.  Lipid profile 20 December 2021 revealed  mg/dL and HDL 62 mg/dL.  Alvina has tried though not tolerated the following statins: Atorvastatin, simvastatin, pravastatin, and rosuvastatin.      I previously had discussed with Alvina the use of Repatha  (evolocumab) and although we pursued a preauthorization, her insurance was not sufficient to assist in the inordinate cost of the therapy and ultimately she could not afford it.  More recently, over, she has had a change in her insurance coverage and will once again seek a preauthorization for Repatha  (evolocumab).    Continue ezetimibe for now.     Tentatively plan on follow-up in 1 year.      35 minutes spent reviewing prior records (including documentation, laboratory studies, cardiac testing/imaging), interview with patient along with physical exam, planning, and subsequent documentation/crafting of note).           History of Present Illness    Once again I would like to thank you again for asking me to participate in the care of your patient, Alvina Maynard.  As you know, but to reiterate for my own  records, Alvina Maynard is a 67 year old female with history of hypertension and coronary disease. She underwent coronary angiography 20 September 2012 and had successful stenting of the LAD (bare metal).      She had recurrent chest discomfort symptoms and ultimately underwent repeat angiography 19 October 2012 at which time she had minimal in-stent restenosis, though had an ostial diagonal lesion of 80%, though this was unchanged from previous study. The vessel was diminutive/small. She otherwise had mild-moderate disease. Medical therapy was recommended and there was some thought that perhaps there may be a component of vasospasm.     Alvina underwent repeat angiography 4 December 2013. This revealed no new significant obstructive coronary disease.     In September 2015, the patient had reported some intermittent discomfort in the chest and a regadenoson nuclear perfusion study was performed for further evaluation. The EKG portion of the test was felt positive for ischemia though nuclear imaging did not show any disparate perfusion defects. Nonetheless, given the EKG changes coronary angiography was pursued 28 September 2015 that revealed mild to moderate coronary disease only without obstructive stenosis.     Alvina had a regadenoson nuclear perfusion study 15 August 2017 that revealed no evidence of ischemia.  Repeat nuclear perfusion imaging study more recently on 25 October 2019 once again revealed no evidence of infarct or ischemia.  Ejection fraction 70%.     On interview today, Alvina states that she has been doing well from a cardiac standpoint.  She denies symptoms concerning for angina.  Breathing is been comfortable.  No palpitations or lightheadedness.    Further review of systems is otherwise negative/noncontributory (medical record and 13 point review of systems reviewed as well and pertinent positives noted).         Cardiac Diagnostics      Echocardiogram 10 December 2019:  1. Normal left ventricular  size and systolic performance with ejection fraction of 60 to 65%.  2. No significant valvular heart disease.  3. Normal right ventricular size and systolic performance.  4. Mild left atrial enlargement.  Right atrium of normal dimension.    Echocardiogram 14 July 2017:  1. Normal left ventricular size and systolic performance with ejection fraction of 65%.  2. No significant valvular heart disease.  3. Normal right ventricular size and systolic performance.  4. Mild left atrial enlargement.  Right atrium of normal dimension.    Echocardiogram 22 September 2015:  1. Normal left ventricular size and systolic performance with ejection fraction of 60-65%.  2. No significant valvular heart disease.    Regadenoson nuclear perfusion study 25 October 2019:  1. No evidence of infarct or ischemia.  2. Normal left ventricular systolic performance with ejection fraction of 70%.    3. Regadenoson nuclear perfusion study 15 August 2017:  4. No evidence of infarct or ischemia.  5. Normal left ventricular systolic performance with ejection fraction of 70%.    Regadenoson nuclear perfusion study 9 September 2015 (pre-coronary angiogram that was performed 28 September 2015):  1. Stress electrocardiogram positive for inducible ischemia.  2. Lexiscan stress nuclear imaging was negative for inducible ischemia or infarct.  3. Left ventricular systolic performance was normal with ejection fraction of 70%.    Coronary angiography 28 September 2015:  1. Mild-moderate coronary artery disease without significant obstructive stenosis.  2. Prior stent to proximal LAD without evidence of restenosis.  3. Left ventriculography revealed well-preserved LV systolic performance without wall motion abnormality.    Coronary angiogram performed 4 December 2013:  1. Moderate mid RCA disease.    2. There was mild codominant left circumflex disease.    3. Moderate proximal and mid LAD stenosis.    4. Ultimately, no intervention performed. Medical therapy  with optimization given, non-obstructive coronary disease was recommended.              Physical Examination       /80 (BP Location: Left arm, Patient Position: Sitting, Cuff Size: Adult Large)   Pulse 60   Resp 16   Wt 88 kg (194 lb)   SpO2 97%   BMI 35.48 kg/m          Wt Readings from Last 3 Encounters:   11/28/22 88 kg (194 lb)   08/03/22 87.1 kg (192 lb)   02/14/22 93 kg (205 lb)       The patient is alert and oriented times three. Sclerae are anicteric. Mucosal membranes are moist. Jugular venous pressure is normal. No significant adenopathy/thyromegally appreciated. Lungs are clear with good expansion. On cardiovascular exam, the patient has a regular S1 and S2. Abdomen is soft and non-tender. Extremities reveal no clubbing, cyanosis, or edema.         Medications  Allergies   Current Outpatient Medications   Medication Sig Dispense Refill     Ascorbic Acid (VITAMIN C) 500 MG CAPS        Aspirin Buf,CaCarb-MgCarb-MgO, 81 MG TABS Take 81 mg by mouth       azelastine (ASTELIN) 0.1 % nasal spray USE 2 SPRAYS IN EACH NOSTRIL TWICE DAILY AS NEEDED 90 mL 3     cetirizine (ZYRTEC) 10 MG tablet Take 1 tablet (10 mg) by mouth daily       Cholecalciferol (VITAMIN D3 PO) Take 2,000 Units by mouth every morning        Cyanocobalamin (B-12) 5000 MCG SUBL Take by mouth every 24 hours       ezetimibe (ZETIA) 10 MG tablet Take 1 tablet (10 mg) by mouth daily Has not started to take it 90 tablet 3     Ferrous Sulfate (IRON PO)        Ferrous Sulfate (IRON) 90 (18 Fe) MG TABS Take by mouth every 24 hours       fluticasone (FLONASE) 50 MCG/ACT nasal spray SHAKE LIQUID AND USE 1 SPRAY IN EACH NOSTRIL DAILY 48 g 3     hydrochlorothiazide (HYDRODIURIL) 25 MG tablet TAKE 2 TABLETS(50 MG) BY MOUTH DAILY 180 tablet 0     losartan (COZAAR) 100 MG tablet Take 1 tablet (100 mg) by mouth daily 90 tablet 3     MAGNESIUM GLUCONATE PO Take 400 mg by mouth daily       metoprolol succinate ER (TOPROL-XL) 100 MG 24 hr tablet TAKE 1  TABLET(100 MG) BY MOUTH TWICE DAILY 180 tablet 2     NIFEdipine ER (ADALAT CC) 60 MG 24 hr tablet Take by mouth every 24 hours       nitroGLYcerin (NITROSTAT) 0.4 MG sublingual tablet PLACE 1 TABLET UNDER THE TONGUE EVERY 5 MINUTES AS NEEDED FOR CHEST PAINS 25 tablet 1     Omega-3 Fatty Acids (OMEGA-3 FISH OIL PO) Take 1,400 mg by mouth At Bedtime       potassium chloride ER (KLOR-CON M) 20 MEQ CR tablet TAKE 2 TABLETS(40 MEQ) BY MOUTH TWICE DAILY 360 tablet 1     SINGULAIR 10 MG tablet Take 1 tablet (10 mg) by mouth daily 90 tablet 1     amitriptyline (ELAVIL) 10 MG tablet Take by mouth every 24 hours (Patient not taking: Reported on 9/12/2022)       mometasone (NASONEX) 50 MCG/ACT nasal spray Spray 2 sprays into both nostrils daily  (Patient not taking: Reported on 11/28/2022)       NIFEdipine ER (ADALAT CC) 60 MG 24 hr tablet Take by mouth every 24 hours (Patient not taking: Reported on 9/12/2022)       nitroGLYcerin (NITROSTAT) 0.4 MG sublingual tablet For chest pain place 1 tablet under the tongue every 5 minutes for 3 doses. If symptoms persist 5 minutes after 1st dose call 911. (Patient not taking: Reported on 11/28/2022) 25 tablet 1       Allergies   Allergen Reactions     Propylene Glycol Itching and Rash     Red Dye Rash     Any kind of red pills.     Adhesive Tape Itching     Atorvastatin Cramps     Mevacor, and zocor also leg cramping  Other reaction(s): leg cramps     Carvedilol      hives     Cephalosporins Hives     Clindamycin Hives     Demerol Nausea and Vomiting     Dilantin [Phenytoin]      Hives     Lisinopril      Trouble breathing     Meperidine Nausea and Vomiting     Hives  Other reaction(s): hives and vomiting     Metronidazole Hives     Nickel      Other reaction(s): Rash, itching     Penicillins Hives     Rosuvastatin Cramps     Leg cramping     Sulfa Drugs Hives     Latex Rash          Lab Results    Chemistry/lipid CBC Cardiac Enzymes/BNP/TSH/INR   Recent Labs   Lab Test 12/20/21  1022    CHOL 257*   HDL 62   *   TRIG 94     Recent Labs   Lab Test 12/20/21  1022 12/22/20  1723 07/26/19  1034   * 159* 140*     Recent Labs   Lab Test 08/03/22  1623      POTASSIUM 4.0   CHLORIDE 99   CO2 28   GLC 88   BUN 15.7   CR 0.69   GFRESTIMATED >90   BELA 10.9*     Recent Labs   Lab Test 08/03/22  1623 10/27/21  0958 12/22/20  1723   CR 0.69 0.81 0.94     Recent Labs   Lab Test 08/03/22  1623 12/20/21  1022 07/21/21  1142   A1C 6.2* 6.3* 6.2*          Recent Labs   Lab Test 10/27/21  0958   WBC 8.5   HGB 14.1   HCT 42.4   MCV 87        Recent Labs   Lab Test 10/27/21  0958 05/01/20  1107 10/21/19  1209   HGB 14.1 13.7 13.5    Recent Labs   Lab Test 10/22/19  1002   TROPONINI <0.01     Recent Labs   Lab Test 11/20/19  1527   BNP 28     Recent Labs   Lab Test 12/20/21  1022   TSH 1.40     Recent Labs   Lab Test 05/01/20  1107   INR 0.94        Medical History  Surgical History Family History Social History   Past Medical History:   Diagnosis Date     Arthritis      Arthritis      Basal cell cancer 2007     Benign neoplasm of colon      Brain aneurysm     removed on 12/31/13     Cancer (H)     basel cell     Cerebral embolism with cerebral infarction (H)      Cerebral vascular accident (H) 12/31/2013     Colitis      Coronary artery disease      External hemorrhoid 6/15/2016     Fibrocystic breast      High blood pressure      History of heart artery stent 9/2012     Hyperlipidemia      Hypertension      Hypertension      Myalgia and myositis, unspecified      S/P hysterectomy 7/28/2019    Total hysterectomy with BSO benign disease, edometriosis?     Sleep apnea, obstructive     c-pap     Statin intolerance      Stroke (H)      Type 2 diabetes mellitus without complications (H)     borderline? blood sugars are fine/nosx no meds     Past Surgical History:   Procedure Laterality Date     BRAIN SURGERY  2013    brain aneurism     CARDIAC CATHETERIZATION  9/28/15    mild to moderate coronary  disease only without obstructive stenosis     CEREBRAL ANEURYSM REPAIR  12/31/2013    [facility] Aneurysm clipping, Dr Harvey     CORONARY STENT PLACEMENT  9/20/12    bare metal stent in the proximal LAD      HEMORRHOID SURGERY  1975     HEMORRHOIDECTOMY INTERNAL LIGATION  1975     HYSTERECTOMY  2002     HYSTERECTOMY       NECK SURGERY  2001     OOPHORECTOMY       OVARY SURGERY  removed 1990     [facility] NASAL/SINUS ENDOSCOPY,BX/RMV POLYP/DEBRID  1998    Description: Nasal Endoscopy Polypectomy;     [facility] REMOVAL OF OVARY/TUBE(S) Right      Salpingo-oophorectomy Right Side     REPAIR ARTERY CORONARY  2012    femerol artery torn during angiogram     SINUS SURGERY  1998    polyp remvd     STENT  2012    heart     ZZC TOTAL ABDOM HYSTERECTOMY  08/01/2002    endometriosis     Family History   Problem Relation Age of Onset     Cancer Mother      Diabetes Mother      Hypertension Mother      Thyroid Disease Mother      Heart Disease Mother      Stomach Cancer Mother      Coronary Artery Disease Mother 60     Hypertension Father      Heart Disease Sister      GERD Sister      CABG Sister      Colon Cancer Brother      Heart Disease Maternal Grandfather      Breast Cancer Maternal Aunt         Social History     Socioeconomic History     Marital status: Single     Spouse name: Not on file     Number of children: Not on file     Years of education: Not on file     Highest education level: Not on file   Occupational History     Not on file   Tobacco Use     Smoking status: Never     Smokeless tobacco: Never   Substance and Sexual Activity     Alcohol use: Yes     Drug use: No     Sexual activity: Not on file   Other Topics Concern     Not on file   Social History Narrative     Not on file     Social Determinants of Health     Financial Resource Strain: Not on file   Food Insecurity: Not on file   Transportation Needs: Not on file   Physical Activity: Not on file   Stress: Not on file   Social Connections: Not on file   Intimate Partner  Violence: Not on file   Housing Stability: Not on file

## 2022-11-28 NOTE — TELEPHONE ENCOUNTER
Prior Authorization Not Needed per Insurance    Medication: Repatha SureClick 140MG/ML auto-injectors    Insurance Company: [employer] - Phone [phone] Fax [phone]  Expected CoPay:      Pharmacy Filling the Rx: [facility] - [place], [place] - [address]   Pharmacy Notified: No  Patient Notified: No    Repatha is covered under patient's plan.

## 2022-11-28 NOTE — LETTER
11/28/2022    Jong Claudio, NP  [address] [place] [address]    RE: Alvina BUCKLEY Caesar       Dear Colleague,     I had the pleasure of seeing Alvina Maynard in the [facility].         [facility] [address], [place] [address]   [email or web address]   OFFICE: [phone]          Thank you Jong Amado for asking the [facility] team to participate in the care of your patient, Alvina Maynard.     Impression and Plan     1. Coronary artery disease.  Alvina has known coronary artery disease as outlined in detail in history of present illness below.  Most recently, she underwent a regadenoson nuclear perfusion study 25 October 2019 that revealed no evidence of ischemia.         Patient is not reporting any anginal type chest pain symptoms at this time.        2. Hypertension.    Blood pressure is fairly reasonable in the office today.  In addition, blood pressures have been fairly reasonable as of late on her home blood pressure monitoring device.       3. Dyslipidemia.  Lipid profile 20 December 2021 revealed  mg/dL and HDL 62 mg/dL.  Alvina has tried though not tolerated the following statins: Atorvastatin, simvastatin, pravastatin, and rosuvastatin.      I previously had discussed with Alvina the use of Repatha  (evolocumab) and although we pursued a preauthorization, her insurance was not sufficient to assist in the inordinate cost of the therapy and ultimately she could not afford it.  More recently, over, she has had a change in her insurance coverage and will once again seek a preauthorization for Repatha  (evolocumab).    Continue ezetimibe for now.     Tentatively plan on follow-up in 1 year.      35 minutes spent reviewing prior records (including documentation, laboratory studies, cardiac testing/imaging), interview with patient along with physical exam, planning, and subsequent documentation/crafting  of note).           History of Present Illness    Once again I would like to thank you again for asking me to participate in the care of your patient, Alvina Maynard.  As you know, but to reiterate for my own records, Alvina Maynard is a 67 year old female with history of hypertension and coronary disease. She underwent coronary angiography 20 September 2012 and had successful stenting of the LAD (bare metal).      She had recurrent chest discomfort symptoms and ultimately underwent repeat angiography 19 October 2012 at which time she had minimal in-stent restenosis, though had an ostial diagonal lesion of 80%, though this was unchanged from previous study. The vessel was diminutive/small. She otherwise had mild-moderate disease. Medical therapy was recommended and there was some thought that perhaps there may be a component of vasospasm.     Alvina underwent repeat angiography 4 December 2013. This revealed no new significant obstructive coronary disease.     In September 2015, the patient had reported some intermittent discomfort in the chest and a regadenoson nuclear perfusion study was performed for further evaluation. The EKG portion of the test was felt positive for ischemia though nuclear imaging did not show any disparate perfusion defects. Nonetheless, given the EKG changes coronary angiography was pursued 28 September 2015 that revealed mild to moderate coronary disease only without obstructive stenosis.     Alvina had a regadenoson nuclear perfusion study 15 August 2017 that revealed no evidence of ischemia.  Repeat nuclear perfusion imaging study more recently on 25 October 2019 once again revealed no evidence of infarct or ischemia.  Ejection fraction 70%.     On interview today, Alvina states that she has been doing well from a cardiac standpoint.  She denies symptoms concerning for angina.  Breathing is been comfortable.  No palpitations or lightheadedness.    Further review of systems is otherwise  negative/noncontributory (medical record and 13 point review of systems reviewed as well and pertinent positives noted).         Cardiac Diagnostics      Echocardiogram 10 December 2019:  1. Normal left ventricular size and systolic performance with ejection fraction of 60 to 65%.  2. No significant valvular heart disease.  3. Normal right ventricular size and systolic performance.  4. Mild left atrial enlargement.  Right atrium of normal dimension.    Echocardiogram 14 July 2017:  1. Normal left ventricular size and systolic performance with ejection fraction of 65%.  2. No significant valvular heart disease.  3. Normal right ventricular size and systolic performance.  4. Mild left atrial enlargement.  Right atrium of normal dimension.    Echocardiogram 22 September 2015:  1. Normal left ventricular size and systolic performance with ejection fraction of 60-65%.  2. No significant valvular heart disease.    Regadenoson nuclear perfusion study 25 October 2019:  1. No evidence of infarct or ischemia.  2. Normal left ventricular systolic performance with ejection fraction of 70%.    3. Regadenoson nuclear perfusion study 15 August 2017:  4. No evidence of infarct or ischemia.  5. Normal left ventricular systolic performance with ejection fraction of 70%.    Regadenoson nuclear perfusion study 9 September 2015 (pre-coronary angiogram that was performed 28 September 2015):  1. Stress electrocardiogram positive for inducible ischemia.  2. Lexiscan stress nuclear imaging was negative for inducible ischemia or infarct.  3. Left ventricular systolic performance was normal with ejection fraction of 70%.    Coronary angiography 28 September 2015:  1. Mild-moderate coronary artery disease without significant obstructive stenosis.  2. Prior stent to proximal LAD without evidence of restenosis.  3. Left ventriculography revealed well-preserved LV systolic performance without wall motion abnormality.    Coronary angiogram  performed 4 December 2013:  1. Moderate mid RCA disease.    2. There was mild codominant left circumflex disease.    3. Moderate proximal and mid LAD stenosis.    4. Ultimately, no intervention performed. Medical therapy with optimization given, non-obstructive coronary disease was recommended.              Physical Examination       /80 (BP Location: Left arm, Patient Position: Sitting, Cuff Size: Adult Large)   Pulse 60   Resp 16   Wt 88 kg (194 lb)   SpO2 97%   BMI 35.48 kg/m          Wt Readings from Last 3 Encounters:   11/28/22 88 kg (194 lb)   08/03/22 87.1 kg (192 lb)   02/14/22 93 kg (205 lb)       The patient is alert and oriented times three. Sclerae are anicteric. Mucosal membranes are moist. Jugular venous pressure is normal. No significant adenopathy/thyromegally appreciated. Lungs are clear with good expansion. On cardiovascular exam, the patient has a regular S1 and S2. Abdomen is soft and non-tender. Extremities reveal no clubbing, cyanosis, or edema.         Medications  Allergies   Current Outpatient Medications   Medication Sig Dispense Refill     Ascorbic Acid (VITAMIN C) 500 MG CAPS        Aspirin Buf,CaCarb-MgCarb-MgO, 81 MG TABS Take 81 mg by mouth       azelastine (ASTELIN) 0.1 % nasal spray USE 2 SPRAYS IN EACH NOSTRIL TWICE DAILY AS NEEDED 90 mL 3     cetirizine (ZYRTEC) 10 MG tablet Take 1 tablet (10 mg) by mouth daily       Cholecalciferol (VITAMIN D3 PO) Take 2,000 Units by mouth every morning        Cyanocobalamin (B-12) 5000 MCG SUBL Take by mouth every 24 hours       ezetimibe (ZETIA) 10 MG tablet Take 1 tablet (10 mg) by mouth daily Has not started to take it 90 tablet 3     Ferrous Sulfate (IRON PO)        Ferrous Sulfate (IRON) 90 (18 Fe) MG TABS Take by mouth every 24 hours       fluticasone (FLONASE) 50 MCG/ACT nasal spray SHAKE LIQUID AND USE 1 SPRAY IN EACH NOSTRIL DAILY 48 g 3     hydrochlorothiazide (HYDRODIURIL) 25 MG tablet TAKE 2 TABLETS(50 MG) BY MOUTH DAILY  180 tablet 0     losartan (COZAAR) 100 MG tablet Take 1 tablet (100 mg) by mouth daily 90 tablet 3     MAGNESIUM GLUCONATE PO Take 400 mg by mouth daily       metoprolol succinate ER (TOPROL-XL) 100 MG 24 hr tablet TAKE 1 TABLET(100 MG) BY MOUTH TWICE DAILY 180 tablet 2     NIFEdipine ER (ADALAT CC) 60 MG 24 hr tablet Take by mouth every 24 hours       nitroGLYcerin (NITROSTAT) 0.4 MG sublingual tablet PLACE 1 TABLET UNDER THE TONGUE EVERY 5 MINUTES AS NEEDED FOR CHEST PAINS 25 tablet 1     Omega-3 Fatty Acids (OMEGA-3 FISH OIL PO) Take 1,400 mg by mouth At Bedtime       potassium chloride ER (KLOR-CON M) 20 MEQ CR tablet TAKE 2 TABLETS(40 MEQ) BY MOUTH TWICE DAILY 360 tablet 1     SINGULAIR 10 MG tablet Take 1 tablet (10 mg) by mouth daily 90 tablet 1     amitriptyline (ELAVIL) 10 MG tablet Take by mouth every 24 hours (Patient not taking: Reported on 9/12/2022)       mometasone (NASONEX) 50 MCG/ACT nasal spray Spray 2 sprays into both nostrils daily  (Patient not taking: Reported on 11/28/2022)       NIFEdipine ER (ADALAT CC) 60 MG 24 hr tablet Take by mouth every 24 hours (Patient not taking: Reported on 9/12/2022)       nitroGLYcerin (NITROSTAT) 0.4 MG sublingual tablet For chest pain place 1 tablet under the tongue every 5 minutes for 3 doses. If symptoms persist 5 minutes after 1st dose call 911. (Patient not taking: Reported on 11/28/2022) 25 tablet 1       Allergies   Allergen Reactions     Propylene Glycol Itching and Rash     Red Dye Rash     Any kind of red pills.     Adhesive Tape Itching     Atorvastatin Cramps     Mevacor, and zocor also leg cramping  Other reaction(s): leg cramps     Carvedilol      hives     Cephalosporins Hives     Clindamycin Hives     Demerol Nausea and Vomiting     Dilantin [Phenytoin]      Hives     Lisinopril      Trouble breathing     Meperidine Nausea and Vomiting     Hives  Other reaction(s): hives and vomiting     Metronidazole Hives     Nickel      Other reaction(s): Rash,  itching     Penicillins Hives     Rosuvastatin Cramps     Leg cramping     Sulfa Drugs Hives     Latex Rash          Lab Results    Chemistry/lipid CBC Cardiac Enzymes/BNP/TSH/INR   Recent Labs   Lab Test 12/20/21  1022   CHOL 257*   HDL 62   *   TRIG 94     Recent Labs   Lab Test 12/20/21  1022 12/22/20  1723 07/26/19  1034   * 159* 140*     Recent Labs   Lab Test 08/03/22  1623      POTASSIUM 4.0   CHLORIDE 99   CO2 28   GLC 88   BUN 15.7   CR 0.69   GFRESTIMATED >90   BELA 10.9*     Recent Labs   Lab Test 08/03/22  1623 10/27/21  0958 12/22/20  1723   CR 0.69 0.81 0.94     Recent Labs   Lab Test 08/03/22  1623 12/20/21  1022 07/21/21  1142   A1C 6.2* 6.3* 6.2*          Recent Labs   Lab Test 10/27/21  0958   WBC 8.5   HGB 14.1   HCT 42.4   MCV 87        Recent Labs   Lab Test 10/27/21  0958 05/01/20  1107 10/21/19  1209   HGB 14.1 13.7 13.5    Recent Labs   Lab Test 10/22/19  1002   TROPONINI <0.01     Recent Labs   Lab Test 11/20/19  1527   BNP 28     Recent Labs   Lab Test 12/20/21  1022   TSH 1.40     Recent Labs   Lab Test 05/01/20  1107   INR 0.94        Medical History  Surgical History Family History Social History   Past Medical History:   Diagnosis Date     Arthritis      Arthritis      Basal cell cancer 2007     Benign neoplasm of colon      Brain aneurysm     removed on 12/31/13     Cancer (H)     basel cell     Cerebral embolism with cerebral infarction (H)      Cerebral vascular accident (H) 12/31/2013     Colitis      Coronary artery disease      External hemorrhoid 6/15/2016     Fibrocystic breast      High blood pressure      History of heart artery stent 9/2012     Hyperlipidemia      Hypertension      Hypertension      Myalgia and myositis, unspecified      S/P hysterectomy 7/28/2019    Total hysterectomy with BSO benign disease, edometriosis?     Sleep apnea, obstructive     c-pap     Statin intolerance      Stroke (H)      Type 2 diabetes mellitus without  complications (H)     borderline? blood sugars are fine/nosx no meds     Past Surgical History:   Procedure Laterality Date     BRAIN SURGERY  2013    brain aneurism     CARDIAC CATHETERIZATION  9/28/15    mild to moderate coronary disease only without obstructive stenosis     CEREBRAL ANEURYSM REPAIR  12/31/2013    [facility] Aneurysm clipping, Dr Harvey     CORONARY STENT PLACEMENT  9/20/12    bare metal stent in the proximal LAD      HEMORRHOID SURGERY  1975     HEMORRHOIDECTOMY INTERNAL LIGATION  1975     HYSTERECTOMY  2002     HYSTERECTOMY       NECK SURGERY  2001     OOPHORECTOMY       OVARY SURGERY  removed 1990     NE NASAL/SINUS ENDOSCOPY,BX/RMV POLYP/DEBRID  1998    Description: Nasal Endoscopy Polypectomy;     NE REMOVAL OF OVARY/TUBE(S) Right      Salpingo-oophorectomy Right Side     REPAIR ARTERY CORONARY  2012    femerol artery torn during angiogram     SINUS SURGERY  1998    polyp remvd     STENT  2012    heart     ZZC TOTAL ABDOM HYSTERECTOMY  08/01/2002    endometriosis     Family History   Problem Relation Age of Onset     Cancer Mother      Diabetes Mother      Hypertension Mother      Thyroid Disease Mother      Heart Disease Mother      Stomach Cancer Mother      Coronary Artery Disease Mother 60     Hypertension Father      Heart Disease Sister      GERD Sister      CABG Sister      Colon Cancer Brother      Heart Disease Maternal Grandfather      Breast Cancer Maternal Aunt         Social History     Socioeconomic History     Marital status: Single     Spouse name: Not on file     Number of children: Not on file     Years of education: Not on file     Highest education level: Not on file   Occupational History     Not on file   Tobacco Use     Smoking status: Never     Smokeless tobacco: Never   Substance and Sexual Activity     Alcohol use: Yes     Drug use: No     Sexual activity: Not on file   Other Topics Concern     Not on file   Social History Narrative     Not on file     Social  Determinants of Health     Financial Resource Strain: Not on file   Food Insecurity: Not on file   Transportation Needs: Not on file   Physical Activity: Not on file   Stress: Not on file   Social Connections: Not on file   Intimate Partner Violence: Not on file   Housing Stability: Not on file                      Thank you for allowing me to participate in the care of your patient.      Sincerely,     Latonia Rae MD     [facility]  cc:   Latonia Rae MD  [address] [phone]

## 2022-12-14 ENCOUNTER — MYC MEDICAL ADVICE (OUTPATIENT)
Dept: FAMILY MEDICINE | Facility: CLINIC | Age: 67
End: 2022-12-14

## 2022-12-14 DIAGNOSIS — I10 ESSENTIAL HYPERTENSION: Primary | ICD-10-CM

## 2023-01-21 ENCOUNTER — HEALTH MAINTENANCE LETTER (OUTPATIENT)
Age: 68
End: 2023-01-21

## 2023-01-31 ENCOUNTER — OFFICE VISIT (OUTPATIENT)
Dept: ALLERGY | Facility: CLINIC | Age: 68
End: 2023-01-31
Attending: OTOLARYNGOLOGY
Payer: COMMERCIAL

## 2023-01-31 VITALS — OXYGEN SATURATION: 97 % | BODY MASS INDEX: 35.48 KG/M2 | HEART RATE: 58 BPM | HEIGHT: 62 IN | RESPIRATION RATE: 16 BRPM

## 2023-01-31 DIAGNOSIS — R09.81 NASAL CONGESTION: ICD-10-CM

## 2023-01-31 PROCEDURE — 95004 PERQ TESTS W/ALRGNC XTRCS: CPT | Performed by: ALLERGY & IMMUNOLOGY

## 2023-01-31 PROCEDURE — 86003 ALLG SPEC IGE CRUDE XTRC EA: CPT | Performed by: ALLERGY & IMMUNOLOGY

## 2023-01-31 PROCEDURE — 99203 OFFICE O/P NEW LOW 30 MIN: CPT | Mod: 25 | Performed by: ALLERGY & IMMUNOLOGY

## 2023-01-31 PROCEDURE — 36415 COLL VENOUS BLD VENIPUNCTURE: CPT | Performed by: ALLERGY & IMMUNOLOGY

## 2023-01-31 PROCEDURE — 82785 ASSAY OF IGE: CPT | Performed by: ALLERGY & IMMUNOLOGY

## 2023-01-31 NOTE — LETTER
1/31/2023         RE: Alvina Maynard  [address] [place] [address]        Dear Colleague,    Thank you for referring your patient, Alvina Maynard, to the [facility]. Please see a copy of my visit note below.          Subjective   Alvina is a 67 year old, presenting for the following health issues:  Allergy Consult (Environmental testing)      HPI     Chief complaint: Allergies    History of present illness: This is a pleasant 67-year-old woman I was asked to see for evaluation by Dr. Bravo in regards to allergies.  Patient states she has a longstanding history of allergies and was on allergy shots more than 10 years ago.  She states she had what sounds like delayed testing for her allergy shots.  She states that since that time she has had difficulty with allergies.  She states recently she is been having some trouble with her ears with a lot of pressure and feeling like there is fluid in the ears.  She feels very congested in her nose.  She does take Singulair currently and she states she takes this for asthma.  She was told she had allergen induced asthma and if she takes Singulair daily this controls her symptoms.  She has not had to be seen for asthma in 2 years.  Previously she did have some emergency room visits she states her asthma.  She has no current inhaler for asthma and denies any current cough, wheeze or shortness of breath.  She does currently use Astelin nasal spray as well as particular nasal spray on a daily basis but no antihistamines.  Amitriptyline is listed in her record but she states she has not yet started this medication.  She has not been allergy tested in many years.    Past medical history: History of a cerebrovascular accident, sleep apnea currently using a CPAP machine, irritable bowel syndrome, reflux, diabetes, hyperlipidemia, hypertension, history of coronary artery disease currently on a beta-blocker, cerebral aneurysm,  "arthritis    Social history: She lives in a duplex that is connected to her sister, she states that her sister does have a cat and the doors open between their homes    Family history:    Review of Systems   Constitutional, HEENT, cardiovascular, pulmonary, GI, , musculoskeletal, neuro, skin, endocrine and psych systems are negative, except as otherwise noted.     Objective    Pulse 58   Resp 16   Ht 1.575 m (5' 2\")   SpO2 97%   BMI 35.48 kg/m    Body mass index is 35.48 kg/m .  Physical Exam   Gen: Pleasant female not in acute distress  HEENT: Eyes no erythema of the bulbar or palpebral conjunctiva, no edema. Ears: TMs well visualized, no effusions. Nose: No congestion, mucosa normal. Mouth: Throat clear, no lip or tongue edema.   Cardiac: Regular rate and rhythm, no murmurs, rubs or gallops  Respiratory: Clear to auscultation bilaterally, no adventitious breath sounds  Lymph: No visible supraclavicular or cervical lymphadenopathy  Skin: No rashes or lesions  Psych: Alert and oriented times 3      At today s visit the patient/parent and I engaged in an informed consent discussion about allergy testing.  We discussed skin testing, blood testing,  and the alternative of not undergoing any testing. The patient has a preference for skin testing. We then discussed the risks and benefits of skin testing.  The patient understands skin testing risks can include, but are not limited to, urticaria, angioedema, shortness of breath, and severe anaphylaxis.  The benefits include, but are not limited, to evaluation for allergens causing symptoms.  After answering the patients/parents questions they have agreed to proceed with skin testing.        30 percutaneous test were placed environmental skin test panel.  Very small responses to both dust mites that I do not think are clinically significant but there was no flare.  Please see scanned photograph.    Impression report and plan:  1.  Nasal congestion    Allergy testing " today was negative.  Patient previously had delayed testing which I stated to her is not a validated test.  I will check specific IgE but if negative, I think that she does not have allergies and can stop her allergy medication.  I will follow-up with the patient once testing returns.               Again, thank you for allowing me to participate in the care of your patient.        Sincerely,        Airam MARLEY MD

## 2023-01-31 NOTE — PROGRESS NOTES
Shelby Tinsley is a 67 year old, presenting for the following health issues:  Allergy Consult (Environmental testing)      HPI     Chief complaint: Allergies    History of present illness: This is a pleasant 67-year-old woman I was asked to see for evaluation by Dr. Bravo in regards to allergies.  Patient states she has a longstanding history of allergies and was on allergy shots more than 10 years ago.  She states she had what sounds like delayed testing for her allergy shots.  She states that since that time she has had difficulty with allergies.  She states recently she is been having some trouble with her ears with a lot of pressure and feeling like there is fluid in the ears.  She feels very congested in her nose.  She does take Singulair currently and she states she takes this for asthma.  She was told she had allergen induced asthma and if she takes Singulair daily this controls her symptoms.  She has not had to be seen for asthma in 2 years.  Previously she did have some emergency room visits she states her asthma.  She has no current inhaler for asthma and denies any current cough, wheeze or shortness of breath.  She does currently use Astelin nasal spray as well as particular nasal spray on a daily basis but no antihistamines.  Amitriptyline is listed in her record but she states she has not yet started this medication.  She has not been allergy tested in many years.    Past medical history: History of a cerebrovascular accident, sleep apnea currently using a CPAP machine, irritable bowel syndrome, reflux, diabetes, hyperlipidemia, hypertension, history of coronary artery disease currently on a beta-blocker, cerebral aneurysm, arthritis    Social history: She lives in a duplex that is connected to her sister, she states that her sister does have a cat and the doors open between their homes    Family history:    Review of Systems   Constitutional, HEENT, cardiovascular, pulmonary, GI, ,  "musculoskeletal, neuro, skin, endocrine and psych systems are negative, except as otherwise noted.      Objective    Pulse 58   Resp 16   Ht 1.575 m (5' 2\")   SpO2 97%   BMI 35.48 kg/m    Body mass index is 35.48 kg/m .  Physical Exam   Gen: Pleasant female not in acute distress  HEENT: Eyes no erythema of the bulbar or palpebral conjunctiva, no edema. Ears: TMs well visualized, no effusions. Nose: No congestion, mucosa normal. Mouth: Throat clear, no lip or tongue edema.   Cardiac: Regular rate and rhythm, no murmurs, rubs or gallops  Respiratory: Clear to auscultation bilaterally, no adventitious breath sounds  Lymph: No visible supraclavicular or cervical lymphadenopathy  Skin: No rashes or lesions  Psych: Alert and oriented times 3      At today s visit the patient/parent and I engaged in an informed consent discussion about allergy testing.  We discussed skin testing, blood testing,  and the alternative of not undergoing any testing. The patient has a preference for skin testing. We then discussed the risks and benefits of skin testing.  The patient understands skin testing risks can include, but are not limited to, urticaria, angioedema, shortness of breath, and severe anaphylaxis.  The benefits include, but are not limited, to evaluation for allergens causing symptoms.  After answering the patients/parents questions they have agreed to proceed with skin testing.        30 percutaneous test were placed environmental skin test panel.  Very small responses to both dust mites that I do not think are clinically significant but there was no flare.  Please see scanned photograph.    Impression report and plan:  1.  Nasal congestion    Allergy testing today was negative.  Patient previously had delayed testing which I stated to her is not a validated test.  I will check specific IgE but if negative, I think that she does not have allergies and can stop her allergy medication.  I will follow-up with the patient " once testing returns.

## 2023-02-05 LAB

## 2023-02-15 DIAGNOSIS — I25.84 CORONARY ATHEROSCLEROSIS DUE TO SEVERELY CALCIFIED CORONARY LESION: ICD-10-CM

## 2023-02-15 DIAGNOSIS — I10 ESSENTIAL HYPERTENSION: ICD-10-CM

## 2023-02-15 NOTE — TELEPHONE ENCOUNTER
"Refill Request      Last Written Prescription Date: 3/31/22  Last Fill Quantity: 180,  # refills: 2   Last office visit provider:  8/3/22     Requested Prescriptions   Pending Prescriptions Disp Refills     metoprolol succinate ER (TOPROL XL) 100 MG 24 hr tablet 180 tablet 2     Sig: Take 1 tablet (100 mg) by mouth 2 times daily       Beta-Blockers Protocol Passed - 2/15/2023  7:53 AM        Passed - Blood pressure under 140/90 in past 12 months     BP Readings from Last 3 Encounters:   11/28/22 132/80   08/03/22 138/86   02/14/22 128/88                 Passed - Patient is age 6 or older        Passed - Recent (12 mo) or future (30 days) visit within the authorizing provider's specialty     Patient has had an office visit with the authorizing provider or a provider within the authorizing providers department within the previous 12 mos or has a future within next 30 days. See \"Patient Info\" tab in inbasket, or \"Choose Columns\" in Meds & Orders section of the refill encounter.              Passed - Medication is active on med list             Lili Alaniz RN 02/16/23 11:23 PM              Medication name: Pending Prescriptions:                       Disp   Refills    metoprolol succinate ER (TOPROL XL) 100 M*180 ta*2            Sig: Take 1 tablet (100 mg) by mouth 2 times daily    Requested Pharmacy: [facility]"

## 2023-02-16 RX ORDER — METOPROLOL SUCCINATE 100 MG/1
100 TABLET, EXTENDED RELEASE ORAL 2 TIMES DAILY
Qty: 180 TABLET | Refills: 1 | Status: SHIPPED | OUTPATIENT
Start: 2023-02-16 | End: 2023-08-09

## 2023-02-20 DIAGNOSIS — J30.89 SEASONAL ALLERGIC RHINITIS DUE TO OTHER ALLERGIC TRIGGER: ICD-10-CM

## 2023-02-21 RX ORDER — FLUTICASONE PROPIONATE 50 MCG
1 SPRAY, SUSPENSION (ML) NASAL DAILY
Qty: 48 G | Refills: 1 | Status: SHIPPED | OUTPATIENT
Start: 2023-02-21 | End: 2024-03-05

## 2023-02-22 NOTE — TELEPHONE ENCOUNTER
"Last Written Prescription Date:  1/23/22  Last Fill Quantity: 48,  # refills: 3   Last office visit provider:  8/3/22     Requested Prescriptions   Pending Prescriptions Disp Refills     fluticasone (FLONASE) 50 MCG/ACT nasal spray 48 g 3     Sig: Spray 1 spray into both nostrils daily       Nasal Allergy Protocol Passed - 2/20/2023  3:39 PM        Passed - Patient is age 12 or older        Passed - Recent (12 mo) or future (30 days) visit within the authorizing provider's specialty     Patient has had an office visit with the authorizing provider or a provider within the authorizing providers department within the previous 12 mos or has a future within next 30 days. See \"Patient Info\" tab in inbasket, or \"Choose Columns\" in Meds & Orders section of the refill encounter.              Passed - Medication is active on med list             Tonya Robb RN 02/21/23 9:35 PM  "

## 2023-03-01 ENCOUNTER — LAB (OUTPATIENT)
Dept: LAB | Facility: CLINIC | Age: 68
End: 2023-03-01
Payer: COMMERCIAL

## 2023-03-01 DIAGNOSIS — Z20.822 SUSPECTED COVID-19 VIRUS INFECTION: ICD-10-CM

## 2023-03-01 LAB — SARS-COV-2 RNA RESP QL NAA+PROBE: NEGATIVE

## 2023-03-01 PROCEDURE — U0005 INFEC AGEN DETEC AMPLI PROBE: HCPCS

## 2023-03-01 PROCEDURE — U0003 INFECTIOUS AGENT DETECTION BY NUCLEIC ACID (DNA OR RNA); SEVERE ACUTE RESPIRATORY SYNDROME CORONAVIRUS 2 (SARS-COV-2) (CORONAVIRUS DISEASE [COVID-19]), AMPLIFIED PROBE TECHNIQUE, MAKING USE OF HIGH THROUGHPUT TECHNOLOGIES AS DESCRIBED BY CMS-2020-01-R: HCPCS

## 2023-03-03 ENCOUNTER — VIRTUAL VISIT (OUTPATIENT)
Dept: FAMILY MEDICINE | Facility: CLINIC | Age: 68
End: 2023-03-03
Payer: COMMERCIAL

## 2023-03-03 ENCOUNTER — MYC MEDICAL ADVICE (OUTPATIENT)
Dept: FAMILY MEDICINE | Facility: CLINIC | Age: 68
End: 2023-03-03
Payer: COMMERCIAL

## 2023-03-03 DIAGNOSIS — U07.1 COVID-19 VIRUS INFECTION: Primary | ICD-10-CM

## 2023-03-03 PROCEDURE — 99213 OFFICE O/P EST LOW 20 MIN: CPT | Mod: 93 | Performed by: PHYSICIAN ASSISTANT

## 2023-03-03 NOTE — TELEPHONE ENCOUNTER
"RN COVID TREATMENT VISIT  03/03/2  Patient symptoms:  Patient messaged  In:    \"My sister who lives in the same household tested positive for COVID on Monday. I was not feeling well so was tested at the [place] office. The test was negative.  I retested myself twice on Thursday and they both showed positive.  Today I coughed up phlegm which was both yellow and green.  Not sure if I have a sinus infection or this could be part of COVID.  Please advise if I should make an office appointment or how I should handle this.  So far I have not had diarrhea or vomiting.  I have been running a fever from 100.7 - 101.2.\"     Called to discuss with patient.She has no sob or wheezing. She was talking in sentences.She has some chest pressure but not pain. She states she didn't notice it until I asked. She is coughing and thinks the pressure is from this and has sinus issues going on with congestion and runny nose. She is coughing and she did cough up yellow/ green phlegm. She does have a headache.She is on medications that require provider visit. Based on her symptoms, did advise she schedule a virtual appointment with a provider or present to urgent care. If she has chest pain she should go to the ED. She will call and get a virtual appointment scheduled.    Current Outpatient Medications   Medication Sig Dispense Refill     amitriptyline (ELAVIL) 10 MG tablet Take by mouth every 24 hours (Patient not taking: Reported on 9/12/2022)       Ascorbic Acid (VITAMIN C) 500 MG CAPS        Aspirin Buf,CaCarb-MgCarb-MgO, 81 MG TABS Take 81 mg by mouth       azelastine (ASTELIN) 0.1 % nasal spray USE 2 SPRAYS IN EACH NOSTRIL TWICE DAILY AS NEEDED 90 mL 3     cetirizine (ZYRTEC) 10 MG tablet Take 1 tablet (10 mg) by mouth daily       Cholecalciferol (VITAMIN D3 PO) Take 2,000 Units by mouth every morning        Cyanocobalamin (B-12) 5000 MCG SUBL Take by mouth every 24 hours       ezetimibe (ZETIA) 10 MG tablet Take 1 tablet (10 mg) by " mouth daily Has not started to take it 90 tablet 3     Ferrous Sulfate (IRON) 90 (18 Fe) MG TABS Take by mouth every 24 hours       fluticasone (FLONASE) 50 MCG/ACT nasal spray Spray 1 spray into both nostrils daily 48 g 1     hydrochlorothiazide (HYDRODIURIL) 25 MG tablet TAKE 2 TABLETS(50 MG) BY MOUTH DAILY 180 tablet 3     losartan (COZAAR) 100 MG tablet Take 1 tablet (100 mg) by mouth daily 90 tablet 3     MAGNESIUM GLUCONATE PO Take 400 mg by mouth daily       metoprolol succinate ER (TOPROL XL) 100 MG 24 hr tablet Take 1 tablet (100 mg) by mouth 2 times daily 180 tablet 1     mometasone (NASONEX) 50 MCG/ACT nasal spray Spray 2 sprays into both nostrils daily  (Patient not taking: Reported on 11/28/2022)       NIFEdipine ER (ADALAT CC) 60 MG 24 hr tablet Take 1 tablet (60 mg) by mouth every 24 hours 90 tablet 1     nitroGLYcerin (NITROSTAT) 0.4 MG sublingual tablet For chest pain place 1 tablet under the tongue every 5 minutes for 3 doses. If symptoms persist 5 minutes after 1st dose call 911. (Patient not taking: Reported on 11/28/2022) 25 tablet 1     nitroGLYcerin (NITROSTAT) 0.4 MG sublingual tablet PLACE 1 TABLET UNDER THE TONGUE EVERY 5 MINUTES AS NEEDED FOR CHEST PAINS 25 tablet 1     Omega-3 Fatty Acids (OMEGA-3 FISH OIL PO) Take 1,400 mg by mouth At Bedtime       potassium chloride ER (KLOR-CON M) 20 MEQ CR tablet TAKE 2 TABLETS(40 MEQ) BY MOUTH TWICE DAILY 360 tablet 1     SINGULAIR 10 MG tablet Take 1 tablet (10 mg) by mouth daily 90 tablet 1       Medications from List 1 of the standing order (on medications that exclude the use of Paxlovid) that patient is taking: encorafenib (Braftovi) and NONE. Is patient taking [place] Wort? No  Is patient taking [place] Wort or any meds from List 1? No.   Medications from List 2 of the standing order (on meds that provider needs to adjust) that patient is taking: nifedipine (Adalat, Procardia), explained a provider visit is necessary to discuss medication  adjustments while taking Paxlovid. Is patient on any of the meds from List 2? Yes. Patient will be scheduled or transferred to a  at the end of this call.   Cass Durand RN

## 2023-03-03 NOTE — PROGRESS NOTES
Alvina is a 67 year old who is being evaluated via a billable telephone visit.      What phone number would you like to be contacted at? [phone]  How would you like to obtain your AVS? Vivienne    Distant Location (provider location):  On-site    Assessment and Plan:     (U07.1) COVID-19 virus infection  (primary encounter diagnosis)  Comment: mild symptoms, vaccinated w/one booster, does not want paxlovid due to drug interactions and possible side effects, would like molnupiravir  I discussed both medications and the fact that paxlovid is superior over molnupiravir for prevention of severe symptoms, she understands and would still like molnupiravir  Plan: molnupiravir (LAGEVRIO) 200 MG capsule  Cont symptomatic cares  Discussed when to be seen promptly and self isolation       Breana Reyes PA-C          Subjective   Alvina is a 67 year old  presenting for the following health issues:  Cough, Fever, and Throat Problem      HPI     Alvina is seeing me virtually for covid today  Her symptoms started 3 days ago  She has congestion, nausea, sore throat and cough  She tested for covid at home two days ago, both positive  She is vaccinated w/one booster  She denies sob, chest pain, leg swelling     COVID-19 Symptom Review  How many days ago did these symptoms start? 3 days ago    Are any of the following symptoms significant for you?    New or worsening difficulty breathing? No    Worsening cough? Yes, I am coughing up mucus.    Fever or chills? Yes, the highest temperature was 101.3 F    Headache: YES    Sore throat: YES    Chest pain: No    Diarrhea: No    Body aches? YES    What treatments has patient tried? Acetaminophen   Does patient live in a nursing home, group home, or shelter? No  Does patient have a way to get food/medications during quarantined? Yes, I have a friend or family member who can help me.      Review of Systems   See above      Objective           Vitals:  No vitals were obtained today  due to virtual visit.    Physical Exam   No exam, phone visit         Phone call duration: 16 minutes

## 2023-03-03 NOTE — PATIENT INSTRUCTIONS
Please self-isolate according to CDC guidelines:    https://www.cdc.gov/coronavirus/2019-ncov/your-health/isolation.html    Get plenty of fluids and rest    Take tylenol as needed for pain up to 1000mg three times daily, do not exceed 3000mg in 24 hour period

## 2023-03-19 DIAGNOSIS — E87.6 HYPOKALEMIA: ICD-10-CM

## 2023-03-20 RX ORDER — POTASSIUM CHLORIDE 1500 MG/1
TABLET, EXTENDED RELEASE ORAL
Qty: 360 TABLET | Refills: 1 | Status: SHIPPED | OUTPATIENT
Start: 2023-03-20 | End: 2023-03-24

## 2023-03-20 NOTE — TELEPHONE ENCOUNTER
"Last Written Prescription Date:  9/13/2022  Last Fill Quantity: 360,  # refills: 1   Last office visit provider:  8/3/2022     Requested Prescriptions   Pending Prescriptions Disp Refills     potassium chloride ER (KLOR-CON M) 20 MEQ CR tablet [Pharmacy Med Name: POTASSIUM CL 20MEQ ER TABLETS] 360 tablet 1     Sig: TAKE 2 TABLETS BY MOUTH TWICE DAILY       Potassium Supplements Protocol Passed - 3/19/2023 10:29 AM        Passed - Recent (12 mo) or future (30 days) visit within the authorizing provider's department     Patient has had an office visit with the authorizing provider or a provider within the authorizing providers department within the previous 12 mos or has a future within next 30 days. See \"Patient Info\" tab in inbasket, or \"Choose Columns\" in Meds & Orders section of the refill encounter.              Passed - Medication is active on med list        Passed - Patient is age 18 or older        Passed - Normal serum potassium in past 12 months     Recent Labs   Lab Test 08/03/22  1623   POTASSIUM 4.0                         Chelsie Metzger RN 03/20/23 12:22 AM  "

## 2023-03-21 ENCOUNTER — NURSE TRIAGE (OUTPATIENT)
Dept: FAMILY MEDICINE | Facility: CLINIC | Age: 68
End: 2023-03-21
Payer: COMMERCIAL

## 2023-03-21 NOTE — TELEPHONE ENCOUNTER
Nurse Triage SBAR    Is this a 2nd Level Triage? YES, LICENSED PRACTITIONER REVIEW IS REQUIRED    Situation: Patient called to schedule an appointment with primary for neck pain and trouble breathing.    Background: Patient has a history of allergies. She has had these present symptoms for 2 months. She was seen by allergist on 1/31/23 where she stopped all her allergy medications prior to this appointment. She had testing done and allergist said no allergies. 1 week later she felt her throat closing and had breathing issues. She has had to tilt her chin upward to help breathe. She then got covid-19 on 3/3/23 and her symptoms worsen. She has a sore throat, hoarseness, left cheek and ear pain and feels like something is in her throat on the left side.     Assessment:   Sore throat  Trouble breathing and feels like she has to tilt her chin up to help with this  Hoarseness  Left side cheek pain  Left side ear pain  Chest pressure feeling when breathing     Protocol Recommended Disposition:   Go to ED Now (Or PCP Triage)  Patient declined disposition.Did review that she should go to ED for these symptoms.Appointment scheduled with Jong Claudio 3/2823.  Recommendation:   Routed to provider    Does the patient meet one of the following criteria for ADS visit consideration? 16+ years old, with an [facility] PCP     TIP  Providers, please consider if this condition is appropriate for management at one of our Acute and Diagnostic Services sites.     If patient is a good candidate, please use dotphrase <dot>triageresponse and select Refer to ADS to document.    Reason for Disposition    [1] Difficulty breathing AND [2] not severe    Additional Information    Negative: [1] Drooling or spitting out saliva (because can't swallow) AND [2] normal breathing    Negative: Unable to open mouth completely    Negative: [1] Diagnosed strep throat AND [2] taking antibiotic AND [3] symptoms continue    Negative: Throat culture results, call  "about    Negative: Productive cough is main symptom    Negative: Non-productive cough is main symptom    Negative: Hoarseness is main symptom    Negative: Runny nose is main symptom    Negative: Uvula swelling is main symptom    Negative: SEVERE difficulty breathing (e.g., struggling for each breath, speaks in single words, stridor)    Negative: Sounds like a life-threatening emergency to the triager    Negative: Sounds like a life-threatening emergency to the triager    Negative: SEVERE difficulty breathing (e.g., struggling for each breath, speaks in single words, pulse > 120)    Negative: Breathing stopped and hasn't returned    Negative: Choking on something    Negative: Bluish (or gray) lips or face    Negative: Difficult to awaken or acting confused (e.g., disoriented, slurred speech)    Negative: Passed out (i.e., fainted, collapsed and was not responding)    Negative: Wheezing started suddenly after medicine, an allergic food, or bee sting    Negative: Stridor    Negative: Slow, shallow and weak breathing    Negative: MODERATE difficulty breathing (e.g., speaks in phrases, SOB even at rest, pulse 100-120) of new-onset or worse than normal    Negative: Oxygen level (e.g., pulse oximetry) 90 percent or lower    Negative: Wheezing can be heard across the room    Negative: Drooling or spitting out saliva (because can't swallow)    Negative: Any history of prior \"blood clot\" in leg or lungs    Negative: Illness requiring prolonged bedrest in past month (e.g., immobilization, long hospital stay)    Negative: Hip or leg fracture (broken bone) in past month (or had cast on leg or ankle in past month)    Negative: Major surgery in the past month    Negative: Long-distance travel in past month (e.g., car, bus, train, plane; with trip lasting 6 or more hours)    Negative: Cancer treatment in past six months (or has cancer now)    Negative: Extra heart beats OR irregular heart beating (i.e., \"palpitations\")    Answer " "Assessment - Initial Assessment Questions  1. RESPIRATORY STATUS: \"Describe your breathing?\" (e.g., wheezing, shortness of breath, unable to speak, severe coughing)     Trouble breathing at times because she feels like something is in her throat   Left side of throat is sore  Pressure in cheek area  Left side ear hurts  Feels like something is in the throat area on left side     2. ONSET: \"When did this breathing problem begin?\"       Started to get retested for allergies end of January   Went off her allergies meds a week before appointment, when she went into the testing, nothing showed up so she doesn't need medications.  A week later she was having trouble breathing and felt like things were tightening up.   3. PATTERN \"Does the difficult breathing come and go, or has it been constant since it started?\"         Comes and goes   This has been going on for a few months     4. SEVERITY: \"How bad is your breathing?\" (e.g., mild, moderate, severe)     - MILD: No SOB at rest, mild SOB with walking, speaks normally in sentences, can lie down, no retractions, pulse < 100.     - MODERATE: SOB at rest, SOB with minimal exertion and prefers to sit, cannot lie down flat, speaks in phrases, mild retractions, audible wheezing, pulse 100-120.     - SEVERE: Very SOB at rest, speaks in single words, struggling to breathe, sitting hunched forward, retractions, pulse > 120         5. RECURRENT SYMPTOM: \"Have you had difficulty breathing before?\" If Yes, ask: \"When was the last time?\" and \"What happened that time?\"       Yes, covid end of January   6. CARDIAC HISTORY: \"Do you have any history of heart disease?\" (e.g., heart attack, angina, bypass surgery, angioplasty)       One stent   7. LUNG HISTORY: \"Do you have any history of lung disease?\"  (e.g., pulmonary embolus, asthma, emphysema)      no  8. CAUSE: \"What do you think is causing the breathing problem?\"       Allergies   9. OTHER SYMPTOMS: \"Do you have any other symptoms? " "(e.g., dizziness, runny nose, cough, chest pain, fever)      Sore throat, cheek pain, left ear pain, left tonsil sore    10. O2 SATURATION MONITOR:  \"Do you use an oxygen saturation monitor (pulse oximeter) at home?\" If Yes, \"What is your reading (oxygen level) today?\" \"What is your usual oxygen saturation reading?\" (e.g., 95%)            11. PREGNANCY: \"Is there any chance you are pregnant?\" \"When was your last menstrual period?\"        n/a  12. TRAVEL: \"Have you traveled out of the country in the last month?\" (e.g., travel history, exposures)  n/a    Protocols used: SORE THROAT-A-AH, BREATHING DIFFICULTY-A-OH      "

## 2023-03-21 NOTE — TELEPHONE ENCOUNTER
Chief Complaint   Patient presents with     Neck Pain     Breathing      Left voicemail for patient to return call to clinic RN.   Cass Durand RN on 3/21/2023 at 7:37 AM

## 2023-03-23 ENCOUNTER — MYC MEDICAL ADVICE (OUTPATIENT)
Dept: FAMILY MEDICINE | Facility: CLINIC | Age: 68
End: 2023-03-23
Payer: COMMERCIAL

## 2023-03-23 DIAGNOSIS — J01.00 ACUTE NON-RECURRENT MAXILLARY SINUSITIS: ICD-10-CM

## 2023-03-23 DIAGNOSIS — E87.6 HYPOKALEMIA: ICD-10-CM

## 2023-03-24 ENCOUNTER — TELEPHONE (OUTPATIENT)
Dept: FAMILY MEDICINE | Facility: CLINIC | Age: 68
End: 2023-03-24
Payer: COMMERCIAL

## 2023-03-24 ENCOUNTER — MYC MEDICAL ADVICE (OUTPATIENT)
Dept: FAMILY MEDICINE | Facility: CLINIC | Age: 68
End: 2023-03-24
Payer: COMMERCIAL

## 2023-03-24 RX ORDER — POTASSIUM CHLORIDE 1500 MG/1
TABLET, EXTENDED RELEASE ORAL
Qty: 360 TABLET | Refills: 1 | Status: SHIPPED | OUTPATIENT
Start: 2023-03-24 | End: 2023-08-09

## 2023-03-24 RX ORDER — AZELASTINE 1 MG/ML
SPRAY, METERED NASAL
Qty: 90 ML | Refills: 3 | Status: SHIPPED | OUTPATIENT
Start: 2023-03-24

## 2023-03-24 NOTE — TELEPHONE ENCOUNTER
Prior Authorization Request  Who s requesting:  patient  Pharmacy Name and Location: [facility]  Medication Name: Singulaizzy REENA  Insurance Plan: [employer]  Insurance Member ID Number:  [id number]  CoverMyMeds Key:   Informed patient that prior authorizations can take up to 10 business days for response:     Okay to leave a detailed message:         PA renewal for Singulair, brand name.

## 2023-03-28 ENCOUNTER — OFFICE VISIT (OUTPATIENT)
Dept: FAMILY MEDICINE | Facility: CLINIC | Age: 68
End: 2023-03-28
Payer: COMMERCIAL

## 2023-03-28 VITALS
SYSTOLIC BLOOD PRESSURE: 133 MMHG | RESPIRATION RATE: 16 BRPM | BODY MASS INDEX: 37.41 KG/M2 | HEIGHT: 62 IN | TEMPERATURE: 98.4 F | WEIGHT: 203.3 LBS | DIASTOLIC BLOOD PRESSURE: 76 MMHG | OXYGEN SATURATION: 95 % | HEART RATE: 65 BPM

## 2023-03-28 DIAGNOSIS — J02.9 SORE THROAT: ICD-10-CM

## 2023-03-28 DIAGNOSIS — R09.A2 GLOBUS SENSATION: Primary | ICD-10-CM

## 2023-03-28 DIAGNOSIS — M54.2 NECK PAIN: ICD-10-CM

## 2023-03-28 LAB — DEPRECATED S PYO AG THROAT QL EIA: NEGATIVE

## 2023-03-28 PROCEDURE — 87651 STREP A DNA AMP PROBE: CPT | Performed by: NURSE PRACTITIONER

## 2023-03-28 PROCEDURE — 99214 OFFICE O/P EST MOD 30 MIN: CPT | Performed by: NURSE PRACTITIONER

## 2023-03-28 RX ORDER — LANSOPRAZOLE 30 MG/1
30 CAPSULE, DELAYED RELEASE ORAL DAILY
Qty: 30 CAPSULE | Refills: 0 | Status: SHIPPED | OUTPATIENT
Start: 2023-03-28 | End: 2023-05-02

## 2023-03-28 ASSESSMENT — PAIN SCALES - GENERAL: PAINLEVEL: NO PAIN (0)

## 2023-03-28 ASSESSMENT — ASTHMA QUESTIONNAIRES
QUESTION_4 LAST FOUR WEEKS HOW OFTEN HAVE YOU USED YOUR RESCUE INHALER OR NEBULIZER MEDICATION (SUCH AS ALBUTEROL): NOT AT ALL
QUESTION_2 LAST FOUR WEEKS HOW OFTEN HAVE YOU HAD SHORTNESS OF BREATH: ONCE A DAY
QUESTION_3 LAST FOUR WEEKS HOW OFTEN DID YOUR ASTHMA SYMPTOMS (WHEEZING, COUGHING, SHORTNESS OF BREATH, CHEST TIGHTNESS OR PAIN) WAKE YOU UP AT NIGHT OR EARLIER THAN USUAL IN THE MORNING: FOUR OR MORE NIGHTS A WEEK
ACT_TOTALSCORE: 14
QUESTION_1 LAST FOUR WEEKS HOW MUCH OF THE TIME DID YOUR ASTHMA KEEP YOU FROM GETTING AS MUCH DONE AT WORK, SCHOOL OR AT HOME: SOME OF THE TIME
QUESTION_5 LAST FOUR WEEKS HOW WOULD YOU RATE YOUR ASTHMA CONTROL: SOMEWHAT CONTROLLED
ACT_TOTALSCORE: 14

## 2023-03-28 NOTE — TELEPHONE ENCOUNTER
Central Prior Authorization Team   Phone: [phone]      PA Initiation    Medication: Singulair REENA  Insurance Company: [employer] - Phone [phone] Fax [phone]  Pharmacy Filling the Rx: [id number] - [place], [place] - [address] AT [facility]  Filling Pharmacy Phone: [phone]  Filling Pharmacy Fax:    Start Date: 3/28/2023    Started PA on CMM and a response of Prior Authorization Not Required.  Called and spoke with Catie at insurance, she states there is already an approval on file.  The patient had completed her own PA on 3/24/2023, which insurance approved and faxed the letter to [phone].  NEDA quintanilla from 02/24/2023-03/24/2024

## 2023-03-28 NOTE — PATIENT INSTRUCTIONS
Lets keep chipping away at this throat sensation you have been experiencing    I sent the lansoprazole to your pharmacy that you can get started.  You have used this medication before in the past and seemed to tolerated okay.  We will know within 2-3 weeks if this is helping or not.  You can also get your amitriptyline started that was prescribed by gastroenterology.    If this fails to help, I did order an ultrasound to assess the neck area that you are having the discomfort to make sure nothing is pressing on the trachea.    The number for radiology scheduling is [phone].    If this comes back normal, then I would redirect you to the ENT Dr. Bravo who you saw for the hearing evaluation.  That contact number is [phone]

## 2023-03-28 NOTE — PROGRESS NOTES
"Assessment & Plan     ICD-10-CM    1. Globus sensation  R09.89 LANsoprazole (PREVACID) 30 MG DR capsule     US Head Neck Soft Tissue      2. Neck pain  M54.2 US Head Neck Soft Tissue      3. Sore throat  J02.9 Streptococcus A Rapid Screen w/Reflex to PCR - Clinic Collect     Group A Streptococcus PCR Throat Swab        Question if throat sensation could be some of her reflux issues coming back.  Try lansoprazole again since she has used this before in the past and tolerated and she has an extensive allergy list.  Rule out strep, but I do have a low suspicion.  COVID likely not impacting this as symptoms started months ago.  If normal, can get ultrasound just to make sure there is nothing pressing the trachea but I cannot palpate anything today.  If globus sensation still persisting, follow-up with ENT    Reviewed ENT note x1, GI note x1, upper endoscopy x1, ED note x1, COVID 19 swab x1    Subjective     HPI     Sore throat/congestion  Underwent allergy testing earlier this year which was pan negative.  She was told that she could stop her allergy medications.  Patient then started to develop more nasal congestion, cough, and ear fullness.  She restarted Singulair and Astelin nasal spray.  She also developed a worsening sore throat and presented to the emergency department and had COVID testing which was positive.  She was prescribed molnupiravir but didn't start it.    Still dealing with this chronic globus sensation. Not getting better. Feels left sided more than right. Last EGD done 2021 for gastric polyps. Did receive rx for amitryptyline for epigastric pressure.           Review of Systems - negative except for what's listed in the HPI      Objective    /76 (BP Location: Left arm, Patient Position: Sitting, Cuff Size: Adult Regular)   Pulse 65   Temp 98.4  F (36.9  C) (Oral)   Resp 16   Ht 1.575 m (5' 2\")   Wt 92.2 kg (203 lb 4.8 oz)   SpO2 95%   BMI 37.18 kg/m    Physical Exam   General appearance " - alert, well appearing, and in no distress  Mental status - alert, oriented to person, place, and time  Mouth - mucous membranes moist. No oral lesions.  Neck - no significant adenopathy  Lymphatics - no palpable lymphadenopathy  Chest - clear to auscultation, no wheezes, rales or rhonchi, symmetric air entry  Heart - normal rate and regular rhythm, S1 and S2 normal, no murmurs noted  Abdomen - soft, nontender, nondistended, no masses or organomegaly    Jong Claudio, CNP    This note has been dictated using voice recognition software. Any grammatical or context distortions are unintentional and inherent to the software.

## 2023-03-29 LAB — GROUP A STREP BY PCR: NOT DETECTED

## 2023-04-13 ENCOUNTER — HOSPITAL ENCOUNTER (OUTPATIENT)
Dept: ULTRASOUND IMAGING | Facility: CLINIC | Age: 68
Discharge: HOME | End: 2023-04-13
Attending: NURSE PRACTITIONER | Admitting: NURSE PRACTITIONER
Payer: COMMERCIAL

## 2023-04-13 DIAGNOSIS — R09.A2 GLOBUS SENSATION: ICD-10-CM

## 2023-04-13 DIAGNOSIS — M54.2 NECK PAIN: ICD-10-CM

## 2023-04-13 PROCEDURE — 76536 US EXAM OF HEAD AND NECK: CPT

## 2023-04-19 DIAGNOSIS — J30.89 SEASONAL ALLERGIC RHINITIS DUE TO OTHER ALLERGIC TRIGGER: ICD-10-CM

## 2023-04-20 NOTE — TELEPHONE ENCOUNTER
"Routing refill request to provider for review/approval because:  Asthma score    Last Written Prescription Date:  9/8/22  Last Fill Quantity: 90,  # refills: 1   Last office visit provider:  3/28/23     Requested Prescriptions   Pending Prescriptions Disp Refills     SINGULAIR 10 MG tablet [Pharmacy Med Name: SINGULAIR 10MG TABLETS] 90 tablet 1     Sig: TAKE 1 TABLET(10 MG) BY MOUTH DAILY       Leukotriene Inhibitors Protocol Failed - 4/20/2023  2:17 PM        Failed - Asthma control assessment score within normal limits in last 6 months     Please review ACT score.           Passed - Patient is age 12 or older     If patient is under 16, ok to refill using age based dosing.           Passed - Medication is active on med list        Passed - Recent (6 mo) or future (30 days) visit within the authorizing provider's specialty     Patient had office visit in the last 6 months or has a visit in the next 30 days with authorizing provider or within the authorizing provider's specialty.  See \"Patient Info\" tab in inbasket, or \"Choose Columns\" in Meds & Orders section of the refill encounter.                 Eugenia Corado RN 04/20/23 2:18 PM  "

## 2023-04-21 ENCOUNTER — MYC MEDICAL ADVICE (OUTPATIENT)
Dept: FAMILY MEDICINE | Facility: CLINIC | Age: 68
End: 2023-04-21
Payer: COMMERCIAL

## 2023-04-21 DIAGNOSIS — J30.89 SEASONAL ALLERGIC RHINITIS DUE TO OTHER ALLERGIC TRIGGER: ICD-10-CM

## 2023-04-21 NOTE — TELEPHONE ENCOUNTER
Duplicate request  Already ordered today by González at 1022.    RUSTAM QUINTANA RN on 4/21/2023 at 3:51 PM

## 2023-04-26 DIAGNOSIS — E87.6 HYPOKALEMIA: ICD-10-CM

## 2023-04-27 ENCOUNTER — MYC MEDICAL ADVICE (OUTPATIENT)
Dept: FAMILY MEDICINE | Facility: CLINIC | Age: 68
End: 2023-04-27
Payer: COMMERCIAL

## 2023-04-27 ENCOUNTER — TRANSFERRED RECORDS (OUTPATIENT)
Dept: HEALTH INFORMATION MANAGEMENT | Facility: CLINIC | Age: 68
End: 2023-04-27
Payer: COMMERCIAL

## 2023-04-27 RX ORDER — POTASSIUM CHLORIDE 1500 MG/1
TABLET, EXTENDED RELEASE ORAL
Qty: 360 TABLET | Refills: 1 | OUTPATIENT
Start: 2023-04-27

## 2023-04-28 NOTE — TELEPHONE ENCOUNTER
Held x 45 minutes with pharmacy. Call ended. Will try again later to confirm problem with potassium rx.

## 2023-04-28 NOTE — TELEPHONE ENCOUNTER
Spoke with pharmacist. 3/24/23 got canceled on their end.   Phoned in today. Hopefully this wont happen again next month. Pt notified.

## 2023-04-30 ENCOUNTER — HEALTH MAINTENANCE LETTER (OUTPATIENT)
Age: 68
End: 2023-04-30

## 2023-04-30 DIAGNOSIS — R09.A2 GLOBUS SENSATION: ICD-10-CM

## 2023-05-01 NOTE — TELEPHONE ENCOUNTER
"Routing refill request to provider for review/approval because:  Drug interaction warning    Last Written Prescription Date:  3/28/2023  Last Fill Quantity: 30,  # refills: 0   Last office visit provider:  3/28/2023     Requested Prescriptions   Pending Prescriptions Disp Refills     LANsoprazole (PREVACID) 30 MG DR capsule [Pharmacy Med Name: LANSOPRAZOLE 30MG DR CAPSULES] 30 capsule 0     Sig: TAKE 1 CAPSULE(30 MG) BY MOUTH DAILY       PPI Protocol Passed - 4/30/2023  3:10 PM        Passed - Not on Clopidogrel (unless Pantoprazole ordered)        Passed - No diagnosis of osteoporosis on record        Passed - Recent (12 mo) or future (30 days) visit within the authorizing provider's specialty     Patient has had an office visit with the authorizing provider or a provider within the authorizing providers department within the previous 12 mos or has a future within next 30 days. See \"Patient Info\" tab in inbasket, or \"Choose Columns\" in Meds & Orders section of the refill encounter.              Passed - Medication is active on med list        Passed - Patient is age 18 or older        Passed - No active pregnacy on record        Passed - No positive pregnancy test in past 12 months             Roberta Dolan RN 04/30/23 11:20 PM  "

## 2023-05-02 RX ORDER — LANSOPRAZOLE 30 MG/1
CAPSULE, DELAYED RELEASE ORAL
Qty: 90 CAPSULE | Refills: 3 | Status: SHIPPED | OUTPATIENT
Start: 2023-05-02 | End: 2024-03-06

## 2023-05-23 ENCOUNTER — PATIENT OUTREACH (OUTPATIENT)
Dept: CARE COORDINATION | Facility: CLINIC | Age: 68
End: 2023-05-23
Payer: COMMERCIAL

## 2023-06-17 DIAGNOSIS — I10 ESSENTIAL HYPERTENSION: ICD-10-CM

## 2023-06-18 NOTE — TELEPHONE ENCOUNTER
"Last Written Prescription Date:  12/15/22  Last Fill Quantity: 90,  # refills: 1   Last office visit provider:  3/28/23     Requested Prescriptions   Pending Prescriptions Disp Refills     NIFEdipine ER (ADALAT CC) 60 MG 24 hr tablet [Pharmacy Med Name: NIFEDIPINE 60MG ER (CC) TABLETS] 90 tablet 1     Sig: TAKE 1 TABLET(60 MG) BY MOUTH EVERY 24 HOURS       Calcium Channel Blockers Protocol  Passed - 6/17/2023  9:45 AM        Passed - Blood pressure under 140/90 in past 12 months     BP Readings from Last 3 Encounters:   03/28/23 133/76   11/28/22 132/80   08/03/22 138/86                 Passed - Recent (12 mo) or future (30 days) visit within the authorizing provider's specialty     Patient has had an office visit with the authorizing provider or a provider within the authorizing providers department within the previous 12 mos or has a future within next 30 days. See \"Patient Info\" tab in inbasket, or \"Choose Columns\" in Meds & Orders section of the refill encounter.              Passed - Medication is active on med list        Passed - Patient is age 18 or older        Passed - No active pregnancy on record        Passed - Normal serum creatinine on file in past 12 months     Recent Labs   Lab Test 08/03/22  1623   CR 0.69       Ok to refill medication if creatinine is low          Passed - No positive pregnancy test in past 12 months             Tonya Robb, RN 06/17/23 7:39 PM  "

## 2023-07-13 ENCOUNTER — TELEPHONE (OUTPATIENT)
Dept: PHARMACY | Facility: OTHER | Age: 68
End: 2023-07-13
Payer: COMMERCIAL

## 2023-07-13 NOTE — TELEPHONE ENCOUNTER
Contacted patient today regarding recruitment for an annual MTM visit to review medications. This appointment would be covered under patients insurance plan and no charge.   Patient opted out for this year, aware we will likely contact again next year.     Amanda Griffith, PharmD, [facility]  Medication Therapy Management Pharmacist

## 2023-07-14 ENCOUNTER — TELEPHONE (OUTPATIENT)
Dept: FAMILY MEDICINE | Facility: CLINIC | Age: 68
End: 2023-07-14
Payer: COMMERCIAL

## 2023-07-14 NOTE — TELEPHONE ENCOUNTER
Pt scheduled to see Jong on 7/26/23. Pt states she will still keep her AWV on 8/9/23.     Samira Stone CMA.

## 2023-07-14 NOTE — TELEPHONE ENCOUNTER
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 Declined appt with partner.     Patient is aware she may not get a call until 7/18.     Could we [facility] this [facility] to you [facility] or would you prefer to receive a phone call?:   Patient would prefer a phone call   Okay to leave a detailed message?: Yes at Home number on file [phone] (home)    Call taken on 7/14/2023 at 10:38 AM by Lorri Danielle

## 2023-07-23 ENCOUNTER — HEALTH MAINTENANCE LETTER (OUTPATIENT)
Age: 68
End: 2023-07-23

## 2023-07-26 ENCOUNTER — OFFICE VISIT (OUTPATIENT)
Dept: FAMILY MEDICINE | Facility: CLINIC | Age: 68
End: 2023-07-26
Payer: COMMERCIAL

## 2023-07-26 VITALS
SYSTOLIC BLOOD PRESSURE: 122 MMHG | HEART RATE: 58 BPM | RESPIRATION RATE: 16 BRPM | BODY MASS INDEX: 38.09 KG/M2 | WEIGHT: 207 LBS | HEIGHT: 62 IN | TEMPERATURE: 97.8 F | DIASTOLIC BLOOD PRESSURE: 82 MMHG | OXYGEN SATURATION: 95 %

## 2023-07-26 DIAGNOSIS — R10.31 RIGHT INGUINAL PAIN: Primary | ICD-10-CM

## 2023-07-26 PROCEDURE — 99213 OFFICE O/P EST LOW 20 MIN: CPT | Performed by: NURSE PRACTITIONER

## 2023-07-26 RX ORDER — ASPIRIN 81 MG/1
81 TABLET ORAL DAILY
COMMUNITY

## 2023-07-26 ASSESSMENT — ASTHMA QUESTIONNAIRES: ACT_TOTALSCORE: 23

## 2023-07-26 ASSESSMENT — PAIN SCALES - GENERAL: PAINLEVEL: MODERATE PAIN (4)

## 2023-07-26 NOTE — PROGRESS NOTES
"Assessment & Plan     ICD-10-CM    1. Right inguinal pain  R10.31 Physical Therapy Referral          No red flags on exam.  Discomfort sounds consistent with what she had and received benefit from physical therapy.  Referral placed.  If failing to improve let me know and we can do an orthopedic work-up.  No signs or symptoms of aneurysm or major neuro-vascular compromise.    Reviewed physical therapy note x2, family medicine note x3    Subjective     HPI     Right groin pain  Has had this years before.  Saw physical therapy.  Pain feels similar.  No trauma.  No obvious bulging.  Denies any neurological deficits down the leg/thigh.  No pulsating.  Limb is not cool to the touch.  Does have history of traumatic femoral tear.      Review of Systems - negative except for what's listed in the HPI      Objective    /82 (BP Location: Left arm, Patient Position: Sitting, Cuff Size: Adult Large)   Pulse 58   Temp 97.8  F (36.6  C) (Oral)   Resp 16   Ht 1.575 m (5' 2\")   Wt 93.9 kg (207 lb)   SpO2 95%   BMI 37.86 kg/m    Physical Exam   General appearance - alert, well appearing, and in no distress  Mental status - alert, oriented to person, place, and time  Abdomen - soft, nontender, nondistended, no masses or organomegaly.  No bulging masses.  Neurological -grossly intact musculoskeletal - no joint tenderness, deformity or swelling.  Slow step up onto the exam table.  Mild discomfort with palpation along anterior groin  Extremities - peripheral pulses normal, no peripheral edema.  Right lower extremity warm to touch.  No bruit auscultated over right femoral artery  Skin - normal coloration and turgor.    Jong Claudio, CNP    This note has been dictated using voice recognition software. Any grammatical or context distortions are unintentional and inherent to the software.                "

## 2023-07-26 NOTE — PATIENT INSTRUCTIONS
Miley Pradhan is who you saw previously. She is now in [place].    Scheduling number is in your paperwork.      If this doesn't improve, we'll want to look into possible arthritic causes. Just keep me updated.

## 2023-08-09 ENCOUNTER — OFFICE VISIT (OUTPATIENT)
Dept: FAMILY MEDICINE | Facility: CLINIC | Age: 68
End: 2023-08-09
Payer: COMMERCIAL

## 2023-08-09 VITALS
SYSTOLIC BLOOD PRESSURE: 120 MMHG | OXYGEN SATURATION: 95 % | WEIGHT: 208.6 LBS | DIASTOLIC BLOOD PRESSURE: 84 MMHG | TEMPERATURE: 98.2 F | HEART RATE: 60 BPM | RESPIRATION RATE: 16 BRPM | BODY MASS INDEX: 38.39 KG/M2 | HEIGHT: 62 IN

## 2023-08-09 DIAGNOSIS — Z12.31 VISIT FOR SCREENING MAMMOGRAM: ICD-10-CM

## 2023-08-09 DIAGNOSIS — E78.00 PRIMARY HYPERCHOLESTEROLEMIA: ICD-10-CM

## 2023-08-09 DIAGNOSIS — I25.84 CORONARY ATHEROSCLEROSIS DUE TO SEVERELY CALCIFIED CORONARY LESION: ICD-10-CM

## 2023-08-09 DIAGNOSIS — J30.89 SEASONAL ALLERGIC RHINITIS DUE TO OTHER ALLERGIC TRIGGER: ICD-10-CM

## 2023-08-09 DIAGNOSIS — Z00.00 ENCOUNTER FOR MEDICARE ANNUAL WELLNESS EXAM: Primary | ICD-10-CM

## 2023-08-09 DIAGNOSIS — I10 ESSENTIAL HYPERTENSION: ICD-10-CM

## 2023-08-09 DIAGNOSIS — E87.6 HYPOKALEMIA: ICD-10-CM

## 2023-08-09 DIAGNOSIS — M54.2 NECK PAIN: ICD-10-CM

## 2023-08-09 DIAGNOSIS — E66.01 MORBID OBESITY (H): ICD-10-CM

## 2023-08-09 DIAGNOSIS — E11.59 TYPE 2 DIABETES MELLITUS WITH OTHER CIRCULATORY COMPLICATION, WITHOUT LONG-TERM CURRENT USE OF INSULIN (H): ICD-10-CM

## 2023-08-09 LAB
ALBUMIN SERPL BCG-MCNC: 5 G/DL (ref 3.5–5.2)
ALP SERPL-CCNC: 85 U/L (ref 35–104)
ALT SERPL W P-5'-P-CCNC: 28 U/L (ref 0–50)
ANION GAP SERPL CALCULATED.3IONS-SCNC: 12 MMOL/L (ref 7–15)
AST SERPL W P-5'-P-CCNC: 29 U/L (ref 0–45)
BILIRUB SERPL-MCNC: 0.5 MG/DL
BUN SERPL-MCNC: 17.1 MG/DL (ref 8–23)
CALCIUM SERPL-MCNC: 10.8 MG/DL (ref 8.8–10.2)
CHLORIDE SERPL-SCNC: 102 MMOL/L (ref 98–107)
CHOLEST SERPL-MCNC: 270 MG/DL
CREAT SERPL-MCNC: 0.74 MG/DL (ref 0.51–0.95)
CREAT UR-MCNC: 16.9 MG/DL
DEPRECATED HCO3 PLAS-SCNC: 26 MMOL/L (ref 22–29)
GFR SERPL CREATININE-BSD FRML MDRD: 88 ML/MIN/1.73M2
GLUCOSE SERPL-MCNC: 113 MG/DL (ref 70–99)
HBA1C MFR BLD: 6.3 % (ref 0–5.6)
HDLC SERPL-MCNC: 57 MG/DL
LDLC SERPL CALC-MCNC: 186 MG/DL
MICROALBUMIN UR-MCNC: <12 MG/L
MICROALBUMIN/CREAT UR: NORMAL MG/G{CREAT}
NONHDLC SERPL-MCNC: 213 MG/DL
POTASSIUM SERPL-SCNC: 4.2 MMOL/L (ref 3.4–5.3)
PROT SERPL-MCNC: 7.7 G/DL (ref 6.4–8.3)
SODIUM SERPL-SCNC: 140 MMOL/L (ref 136–145)
TRIGL SERPL-MCNC: 133 MG/DL

## 2023-08-09 PROCEDURE — 36415 COLL VENOUS BLD VENIPUNCTURE: CPT | Performed by: NURSE PRACTITIONER

## 2023-08-09 PROCEDURE — 82570 ASSAY OF URINE CREATININE: CPT | Performed by: NURSE PRACTITIONER

## 2023-08-09 PROCEDURE — 82043 UR ALBUMIN QUANTITATIVE: CPT | Performed by: NURSE PRACTITIONER

## 2023-08-09 PROCEDURE — 83036 HEMOGLOBIN GLYCOSYLATED A1C: CPT | Performed by: NURSE PRACTITIONER

## 2023-08-09 PROCEDURE — G0438 PPPS, INITIAL VISIT: HCPCS | Performed by: NURSE PRACTITIONER

## 2023-08-09 PROCEDURE — 99214 OFFICE O/P EST MOD 30 MIN: CPT | Mod: 25 | Performed by: NURSE PRACTITIONER

## 2023-08-09 PROCEDURE — 80053 COMPREHEN METABOLIC PANEL: CPT | Performed by: NURSE PRACTITIONER

## 2023-08-09 PROCEDURE — 80061 LIPID PANEL: CPT | Performed by: NURSE PRACTITIONER

## 2023-08-09 PROCEDURE — G0009 ADMIN PNEUMOCOCCAL VACCINE: HCPCS | Performed by: NURSE PRACTITIONER

## 2023-08-09 PROCEDURE — 90677 PCV20 VACCINE IM: CPT | Performed by: NURSE PRACTITIONER

## 2023-08-09 RX ORDER — MONTELUKAST SODIUM 10 MG/1
10 TABLET ORAL DAILY
Qty: 90 TABLET | Refills: 3 | Status: SHIPPED | OUTPATIENT
Start: 2023-08-09 | End: 2024-08-28

## 2023-08-09 RX ORDER — LOSARTAN POTASSIUM 100 MG/1
100 TABLET ORAL DAILY
Qty: 90 TABLET | Refills: 3 | Status: SHIPPED | OUTPATIENT
Start: 2023-08-09 | End: 2024-05-07

## 2023-08-09 RX ORDER — METOPROLOL SUCCINATE 100 MG/1
100 TABLET, EXTENDED RELEASE ORAL 2 TIMES DAILY
Qty: 180 TABLET | Refills: 3 | Status: SHIPPED | OUTPATIENT
Start: 2023-08-09 | End: 2024-09-05

## 2023-08-09 RX ORDER — POTASSIUM CHLORIDE 1500 MG/1
TABLET, EXTENDED RELEASE ORAL
Qty: 360 TABLET | Refills: 1 | Status: SHIPPED | OUTPATIENT
Start: 2023-08-09 | End: 2024-01-29

## 2023-08-09 RX ORDER — AMITRIPTYLINE HYDROCHLORIDE 10 MG/1
10 TABLET ORAL AT BEDTIME
COMMUNITY
Start: 2023-04-26 | End: 2024-06-25 | Stop reason: DRUGHIGH

## 2023-08-09 RX ORDER — POTASSIUM CHLORIDE 1500 MG/1
2 TABLET, EXTENDED RELEASE ORAL
COMMUNITY
Start: 2023-04-28 | End: 2023-08-09

## 2023-08-09 ASSESSMENT — ENCOUNTER SYMPTOMS
WEAKNESS: 0
PARESTHESIAS: 0
HEMATOCHEZIA: 0
EYE PAIN: 0
SORE THROAT: 1
FREQUENCY: 0
BREAST MASS: 0
FEVER: 0
HEADACHES: 0
DIZZINESS: 0
NAUSEA: 1
SHORTNESS OF BREATH: 0
NERVOUS/ANXIOUS: 0
DYSURIA: 0
CONSTIPATION: 0
MYALGIAS: 1
HEMATURIA: 0
COUGH: 0
PALPITATIONS: 0
CHILLS: 0
JOINT SWELLING: 1
ABDOMINAL PAIN: 0
HEARTBURN: 0
ARTHRALGIAS: 0
DIARRHEA: 0

## 2023-08-09 ASSESSMENT — ACTIVITIES OF DAILY LIVING (ADL): CURRENT_FUNCTION: NO ASSISTANCE NEEDED

## 2023-08-09 ASSESSMENT — ASTHMA QUESTIONNAIRES: ACT_TOTALSCORE: 25

## 2023-08-09 ASSESSMENT — PAIN SCALES - GENERAL: PAINLEVEL: NO PAIN (0)

## 2023-08-09 NOTE — PATIENT INSTRUCTIONS
"Call your old eye clinic to see who they would recommend for updating your yearly eye exam.    Pneumonia shot given today.      Colonoscopy will be due next year.  We will remind you when this comes.    Updating medication monitoring lab work.    If the neck pain persists/worsens and you would like to try some of the medications we talked about, just let me know    Patient Education     Personalized Prevention Plan  You are due for the preventive services outlined below.  Your care team is available to assist you in scheduling these services.  If you have already completed any of these items, please share that information with your care team to update in your medical record.  Health Maintenance Due   Topic Date Due    Asthma Action Plan - yearly  Never done    Pneumococcal Vaccine (3 - PPSV23 if available, else PCV20) 09/10/2019    COVID-19 Vaccine (4 - Pfizer series) 02/14/2022    Annual Wellness Visit  12/20/2022    Cholesterol Lab  12/20/2022    Kidney Microalbumin Urine Test  12/20/2022    ANNUAL REVIEW OF HM ORDERS  12/20/2022    A1C Lab  02/03/2023    Mammogram  05/15/2023    Basic Metabolic Panel  08/03/2023    Eye Exam  08/11/2023     Learning About Being Physically Active  What is physical activity?     Being physically active means doing any kind of activity that gets your body moving.  The types of physical activity that can help you get fit and stay healthy include:  Aerobic or \"cardio\" activities. These make your heart beat faster and make you breathe harder, such as brisk walking, riding a bike, or running. They strengthen your heart and lungs and build up your endurance.  Strength training activities. These make your muscles work against, or \"resist,\" something. Examples include lifting weights or doing push-ups. These activities help tone and strengthen your muscles and bones.  Stretches. These let you move your joints and muscles through their full range of motion. Stretching helps you be more " flexible.  Reaching a balance between these three types of physical activity is important because each one contributes to your overall fitness.  What are the benefits of being active?  Being active is one of the best things you can do for your health. It helps you to:  Feel stronger and have more energy to do all the things you like to do.  Focus better at school or work.  Feel, think, and sleep better.  Reach and stay at a healthy weight.  Lose fat and build lean muscle.  Lower your risk for serious health problems, including diabetes, heart attack, high blood pressure, and some cancers.  Keep your heart, lungs, bones, muscles, and joints strong and healthy.  How can you make being active part of your life?  Start slowly. Make it your long-term goal to get at least 30 minutes of exercise on most days of the week. Walking is a good choice. You also may want to do other activities, such as running, swimming, cycling, or playing tennis or team sports.  Pick activities that you like--ones that make your heart beat faster, your muscles stronger, and your muscles and joints more flexible. If you find more than one thing you like doing, do them all. You don't have to do the same thing every day.  Get your heart pumping every day. Any activity that makes your heart beat faster and keeps it at that rate for a while counts.  Here are some great ways to get your heart beating faster:  Go for a brisk walk, run, or bike ride.  Go for a hike or swim.  Go in-line skating.  Play a game of touch football, basketball, or soccer.  Ride a bike.  Play tennis or racquetball.  Climb stairs.  Even some household chores can be aerobic--just do them at a faster pace. Vacuuming, raking or mowing the lawn, sweeping the garage, and washing and waxing the car all can help get your heart rate up.  Strengthen your muscles during the week. You don't have to lift heavy weights or grow big, bulky muscles to get stronger. Doing a few simple  "activities that make your muscles work against, or \"resist,\" something can help you get stronger.  For example, you can:  Do push-ups or sit-ups, which use your own body weight as resistance.  Lift weights or dumbbells or use stretch bands at home or in a gym or community center.  Stretch your muscles often. Stretching will help you as you become more active. It can help you stay flexible, loosen tight muscles, and avoid injury. It can also help improve your balance and posture and can be a great way to relax.  Be sure to stretch the muscles you'll be using when you work out. It's best to warm your muscles slightly before you stretch them. Walk or do some other light aerobic activity for a few minutes, and then start stretching.  When you stretch your muscles:  Do it slowly. Stretching is not about going fast or making sudden movements.  Don't push or bounce during a stretch.  Hold each stretch for at least 15 to 30 seconds, if you can. You should feel a stretch in the muscle, but not pain.  Breathe out as you do the stretch. Then breathe in as you hold the stretch. Don't hold your breath.  If you're worried about how more activity might affect your health, have a checkup before you start. Follow any special advice your doctor gives you for getting a smart start.  Where can you learn more?  Go to https://www.MediSens.net/patiented  Enter W332 in the search box to learn more about \"Learning About Being Physically Active.\"  Current as of: October 10, 2022               Content Version: 13.7    9700-4821 [employer].   Care instructions adapted under license by your healthcare professional. If you have questions about a medical condition or this instruction, always ask your healthcare professional. [employer] disclaims any warranty or liability for your use of this information.      Learning About Dietary Guidelines  What are the Dietary Guidelines for Americans?     Dietary Guidelines for " Americans provide tips for eating well and staying healthy. This helps reduce the risk for long-term (chronic) diseases.  These guidelines recommend that you:  Eat and drink the right amount for you. The U.[place] government's food guide is called MyPlate. It can help you make your own well-balanced eating plan.  Try to balance your eating with your activity. This helps you stay at a healthy weight.  Drink alcohol in moderation, if at all.  Limit foods high in salt, saturated fat, trans fat, and added sugar.  These guidelines are from the U.S. Department of Agriculture and the U.S. Department of Health and Human Services. They are updated every 5 years.  What is MyPlate?  MyPlate is the [place] government's food guide. It can help you make your own well-balanced eating plan. A balanced eating plan means that you eat enough, but not too much, and that your food gives you the nutrients you need to stay healthy.  MyPlate focuses on eating plenty of whole grains, fruits, and vegetables, and on limiting fat and sugar. It is available online at www.ChooseMyPlate.gov.  How can you get started?  If you're trying to eat healthier, you can slowly change your eating habits over time. You don't have to make big changes all at once. Start by adding one or two healthy foods to your meals each day.  Grains  Choose whole-grain breads and cereals and whole-wheat pasta and whole-grain crackers.  Vegetables  Eat a variety of vegetables every day. They have lots of nutrients and are part of a heart-healthy diet.  Fruits  Eat a variety of fruits every day. Fruits contain lots of nutrients. Choose fresh fruit instead of fruit juice.  Protein foods  Choose fish and lean poultry more often. Eat red meat and fried meats less often. Dried beans, tofu, and nuts are also good sources of protein.  Dairy  Choose low-fat or fat-free products from this food group. If you have problems digesting milk, try eating cheese or yogurt instead.  Fats and  "oils  Limit fats and oils if you're trying to cut calories. Choose healthy fats when you cook. These include canola oil and olive oil.  Where can you learn more?  Go to https://www.[employer].net/patiented  Enter D676 in the search box to learn more about \"Learning About Dietary Guidelines.\"  Current as of: March 1, 2023               Content Version: 13.7    2848-6138 [employer].   Care instructions adapted under license by your healthcare professional. If you have questions about a medical condition or this instruction, always ask your healthcare professional. [employer] disclaims any warranty or liability for your use of this information.      Bladder Training: Care Instructions  Your Care Instructions     Bladder training is used to treat urge incontinence and stress incontinence. Urge incontinence means that the need to urinate comes on so fast that you can't get to a toilet in time. Stress incontinence means that you leak urine because of pressure on your bladder. For example, it may happen when you laugh, cough, or lift something heavy.  Bladder training can increase how long you can wait before you have to urinate. It can also help your bladder hold more urine. And it can give you better control over the urge to urinate.  It is important to remember that bladder training takes a few weeks to a few months to make a difference. You may not see results right away, but don't give up.  Follow-up care is a key part of your treatment and safety. Be sure to make and go to all appointments, and call your doctor if you are having problems. It's also a good idea to know your test results and keep a list of the medicines you take.  How can you care for yourself at home?  Work with your doctor to come up with a bladder training program that is right for you. You may use one or more of the following methods.  Delayed urination  In the beginning, try to keep from urinating for 5 minutes after " "you first feel the need to go.  While you wait, take deep, slow breaths to relax. Kegel exercises can also help you delay the need to go to the bathroom.  After some practice, when you can easily wait 5 minutes to urinate, try to wait 10 minutes before you urinate.  Slowly increase the waiting period until you are able to control when you have to urinate.  Scheduled urination  Empty your bladder when you first wake up in the morning.  Schedule times throughout the day when you will urinate.  Start by going to the bathroom every hour, even if you don't need to go.  Slowly increase the time between trips to the bathroom.  When you have found a schedule that works well for you, keep doing it.  If you wake up during the night and have to urinate, do it. Apply your schedule to waking hours only.  Kegel exercises  These tighten and strengthen pelvic muscles, which can help you control the flow of urine. (If doing these exercises causes pain, stop doing them and talk with your doctor.) To do Kegel exercises:  Squeeze your muscles as if you were trying not to pass gas. Or squeeze your muscles as if you were stopping the flow of urine. Your belly, legs, and buttocks shouldn't move.  Hold the squeeze for 3 seconds, then relax for 5 to 10 seconds.  Start with 3 seconds, then add 1 second each week until you are able to squeeze for 10 seconds.  Repeat the exercise 10 times a session. Do 3 to 8 sessions a day.  When should you call for help?  Watch closely for changes in your health, and be sure to contact your doctor if:    Your incontinence is getting worse.     You do not get better as expected.   Where can you learn more?  Go to https://www.healthe-SENS.net/patiented  Enter V684 in the search box to learn more about \"Bladder Training: Care Instructions.\"  Current as of: March 1, 2023               Content Version: 13.7    4339-7912 [employer].   Care instructions adapted under license by your healthcare " professional. If you have questions about a medical condition or this instruction, always ask your healthcare professional. [employer] disclaims any warranty or liability for your use of this information.

## 2023-08-09 NOTE — LETTER
August 11, 2023      Alvina Maynard  [address] [place] [address]        Dear ,    Cholesterol levels remain quite elevated.  I know cardiology talked about possibly doing the Repatha injections to lower cholesterol.  You should give serious consideration to this because elevated cholesterol will increase your heart attack and stroke risk.     Resulted Orders   Lipid panel reflex to direct LDL Non-fasting   Result Value Ref Range    Cholesterol 270 (H) <200 mg/dL    Triglycerides 133 <150 mg/dL    Direct Measure HDL 57 >=50 mg/dL    LDL Cholesterol Calculated 186 (H) <=100 mg/dL    Non HDL Cholesterol 213 (H) <130 mg/dL    Narrative    Cholesterol  Desirable:  <200 mg/dL    Triglycerides  Normal:  Less than 150 mg/dL  Borderline High:  150-199 mg/dL  High:  200-499 mg/dL  Very High:  Greater than or equal to 500 mg/dL    Direct Measure HDL  Female:  Greater than or equal to 50 mg/dL   Male:  Greater than or equal to 40 mg/dL    LDL Cholesterol  Desirable:  <100mg/dL  Above Desirable:  100-129 mg/dL   Borderline High:  130-159 mg/dL   High:  160-189 mg/dL   Very High:  >= 190 mg/dL    Non HDL Cholesterol  Desirable:  130 mg/dL  Above Desirable:  130-159 mg/dL  Borderline High:  160-189 mg/dL  High:  190-219 mg/dL  Very High:  Greater than or equal to 220 mg/dL   Albumin Random Urine Quantitative with Creat Ratio   Result Value Ref Range    Creatinine Urine mg/dL 16.9 mg/dL      Comment:      The reference ranges have not been established in urine creatinine. The results should be integrated into the clinical context for interpretation.    Albumin Urine mg/L <12.0 mg/L      Comment:      The reference ranges have not been established in urine albumin. The results should be integrated into the clinical context for interpretation.    Albumin Urine mg/g Cr        Comment:      Unable to calculate, urine albumin and/or urine creatinine is outside detectable limits.  Microalbuminuria is defined as an  albumin:creatinine ratio of 17 to 299 for males and 25 to 299 for females. A ratio of albumin:creatinine of 300 or higher is indicative of overt proteinuria.  Due to biologic variability, positive results should be confirmed by a second, first-morning random or 24-hour timed urine specimen. If there is discrepancy, a third specimen is recommended. When 2 out of 3 results are in the microalbuminuria range, this is evidence for incipient nephropathy and warrants increased efforts at glucose control, blood pressure control, and institution of therapy with an angiotensin-converting-enzyme (ACE) inhibitor (if the patient can tolerate it).     HEMOGLOBIN A1C   Result Value Ref Range    Hemoglobin A1C 6.3 (H) 0.0 - 5.6 %      Comment:      Normal <5.7%   Prediabetes 5.7-6.4%    Diabetes 6.5% or higher     Note: Adopted from ADA consensus guidelines.   Comprehensive metabolic panel (BMP + Alb, Alk Phos, ALT, AST, Total. Bili, TP)   Result Value Ref Range    Sodium 140 136 - 145 mmol/L    Potassium 4.2 3.4 - 5.3 mmol/L    Chloride 102 98 - 107 mmol/L    Carbon Dioxide (CO2) 26 22 - 29 mmol/L    Anion Gap 12 7 - 15 mmol/L    Urea Nitrogen 17.1 8.0 - 23.0 mg/dL    Creatinine 0.74 0.51 - 0.95 mg/dL    Calcium 10.8 (H) 8.8 - 10.2 mg/dL    Glucose 113 (H) 70 - 99 mg/dL    Alkaline Phosphatase 85 35 - 104 U/L    AST 29 0 - 45 U/L      Comment:      Reference intervals for this test were updated on 6/12/2023 to more accurately reflect our healthy population. There may be differences in the flagging of prior results with similar values performed with this method. Interpretation of those prior results can be made in the context of the updated reference intervals.    ALT 28 0 - 50 U/L      Comment:      Reference intervals for this test were updated on 6/12/2023 to more accurately reflect our healthy population. There may be differences in the flagging of prior results with similar values performed with this method. Interpretation of  those prior results can be made in the context of the updated reference intervals.      Protein Total 7.7 6.4 - 8.3 g/dL    Albumin 5.0 3.5 - 5.2 g/dL    Bilirubin Total 0.5 <=1.2 mg/dL    GFR Estimate 88 >60 mL/min/1.73m2       If you have any questions or concerns, please call the clinic at the number listed above.       Sincerely,      Jong Claudio, NP

## 2023-08-09 NOTE — PROGRESS NOTES
"SUBJECTIVE:   Alvina is a 67 year old who presents for Preventive Visit.      8/9/2023    10:10 AM   Additional Questions   Roomed by Clarisa Chen CMA     Doing okay.  Will be getting set up with PT.  Did hurt her neck yesterday.  Was doing heavy lifting.  Noted neck pain with some numbness radiating down the right arm into fingers 4 and 5.  Slowly improving.  Used topical Biofreeze.    Are you in the first 12 months of your Medicare coverage?  No    Healthy Habits:     In general, how would you rate your overall health?  Good    Frequency of exercise:  None    Do you usually eat at least 4 servings of fruit and vegetables a day, include whole grains    & fiber and avoid regularly eating high fat or \"junk\" foods?  No    Taking medications regularly:  Yes    Medication side effects:  Other    Ability to successfully perform activities of daily living:  No assistance needed    Home Safety:  No safety concerns identified    Hearing Impairment:  No hearing concerns    In the past 6 months, have you been bothered by leaking of urine? Yes    In general, how would you rate your overall mental or emotional health?  Good    Additional concerns today:  Yes        Have you ever done Advance Care Planning? (For example, a Health Directive, POLST, or a discussion with a medical provider or your loved ones about your wishes): No, advance care planning information given to patient to review.  Advanced care planning was discussed at today's visit.       Fall risk  Fallen 2 or more times in the past year?: No  Any fall with injury in the past year?: No    Cognitive Screening   1) Repeat 3 items (Leader, Season, Table)    2) Clock draw: NORMAL  3) 3 item recall: Recalls 3 objects  Results: 3 items recalled: COGNITIVE IMPAIRMENT LESS LIKELY    Mini-CogTM Copyright S Mary. Licensed by the author for use in [facility] [employer]; reprinted with permission (robert@.[facility]). All rights reserved.    Reviewed and updated as needed this " visit by clinical staff   Tobacco  Allergies  Meds              Reviewed and updated as needed this visit by Provider                 Social History     Tobacco Use    Smoking status: Never    Smokeless tobacco: Never   Substance Use Topics    Alcohol use: Yes           8/9/2023    10:18 AM   Alcohol Use   Prescreen: >3 drinks/day or >7 drinks/week? Not Applicable     Do you have a current opioid prescription? No  Do you use any other controlled substances or medications that are not prescribed by a provider? None      Current providers sharing in care for this patient include:   Patient Care Team:  Jong Claudio NP as PCP - General  Tito Bravo MD as Assigned Surgical Provider  Jong Claudio NP as Assigned PCP  Latonia Rae MD as Assigned Heart and Vascular Provider  Airam Rosario MD as Assigned Allergy Provider  Kayce Mitchell, PharmD as MTM Luis (Pharmacist)  No Ref-Primary, Physician    The following health maintenance items are reviewed in Epic and correct as of today:  Health Maintenance   Topic Date Due    ASTHMA ACTION PLAN  Never done    COVID-19 Vaccine (4 - Pfizer series) 02/14/2022    MEDICARE ANNUAL WELLNESS VISIT  12/20/2022    LIPID  12/20/2022    MICROALBUMIN  12/20/2022    ANNUAL REVIEW OF HM ORDERS  12/20/2022    MAMMO SCREENING  05/15/2023    BMP  08/03/2023    EYE EXAM  08/11/2023    INFLUENZA VACCINE (1) 09/01/2023    A1C  02/09/2024    ASTHMA CONTROL TEST  02/09/2024    DIABETIC FOOT EXAM  08/09/2024    FALL RISK ASSESSMENT  08/09/2024    COLORECTAL CANCER SCREENING  08/27/2024    ADVANCE CARE PLANNING  08/09/2028    DTAP/TDAP/TD IMMUNIZATION (3 - Td or Tdap) 09/27/2028    DEXA  01/18/2036    HEPATITIS C SCREENING  Completed    PHQ-2 (once per calendar year)  Completed    Pneumococcal Vaccine: 65+ Years  Completed    ZOSTER IMMUNIZATION  Completed    IPV IMMUNIZATION  Aged Out    MENINGITIS IMMUNIZATION  Aged Out     Lab work is in process      FHS-7:        12/20/2021     9:18 AM 8/3/2022     3:27 PM 8/9/2023    10:26 AM   Breast CA Risk Assessment (FHS-7)   Did any of your first-degree relatives have breast or ovarian cancer? Unknown No No   Did any of your relatives have bilateral breast cancer? Unknown Yes Unknown   Did any man in your family have breast cancer? No Unknown No   Did any woman in your family have breast and ovarian cancer? Yes Yes No   Did any woman in your family have breast cancer before age 50 y? Yes  Yes   Do you have 2 or more relatives with breast and/or ovarian cancer? Unknown Unknown No   Do you have 2 or more relatives with breast and/or bowel cancer? Unknown Unknown No       Mammogram Screening: Recommended mammography every 1-2 years with patient discussion and risk factor consideration  Pertinent mammograms are reviewed under the imaging tab.    Review of Systems   Constitutional:  Negative for chills and fever.   HENT:  Positive for ear pain and sore throat. Negative for congestion and hearing loss.    Eyes:  Positive for visual disturbance. Negative for pain.   Respiratory:  Negative for cough and shortness of breath.    Cardiovascular:  Negative for chest pain, palpitations and peripheral edema.   Gastrointestinal:  Positive for nausea. Negative for abdominal pain, constipation, diarrhea, heartburn and hematochezia.   Breasts:  Negative for tenderness, breast mass and discharge.   Genitourinary:  Negative for dysuria, frequency, genital sores, hematuria, pelvic pain, urgency, vaginal bleeding and vaginal discharge.   Musculoskeletal:  Positive for joint swelling and myalgias. Negative for arthralgias.   Skin:  Negative for rash.   Neurological:  Negative for dizziness, weakness, headaches and paresthesias.   Psychiatric/Behavioral:  Negative for mood changes. The patient is not nervous/anxious.          OBJECTIVE:   /84 (BP Location: Left arm, Patient Position: Sitting, Cuff Size: Adult Large)   Pulse 60   Temp 98.2  F (36.8  C)  "(Oral)   Resp 16   Ht 1.575 m (5' 2\")   Wt 94.6 kg (208 lb 9.6 oz)   SpO2 95%   BMI 38.15 kg/m   Estimated body mass index is 38.15 kg/m  as calculated from the following:    Height as of this encounter: 1.575 m (5' 2\").    Weight as of this encounter: 94.6 kg (208 lb 9.6 oz).  Physical Exam  GENERAL: healthy, alert and no distress  EYES: Eyes grossly normal to inspection, PERRL and conjunctivae and sclerae normal  HENT: ear canals and TM's normal, nose and mouth without ulcers or lesions  NECK: no adenopathy, no asymmetry, masses, or scars and thyroid normal to palpation  RESP: lungs clear to auscultation - no rales, rhonchi or wheezes  CV: regular rate and rhythm, normal S1 S2, no S3 or S4, no murmur, click or rub, no peripheral edema and peripheral pulses strong  ABDOMEN: soft, nontender, no hepatosplenomegaly, no masses and bowel sounds normal  MS: no gross musculoskeletal defects noted, no edema. Left foot bunsion  SKIN: no suspicious lesions or rashes  NEURO: Normal strength and tone, mentation intact and speech normal. Normal sensation with monofilament probing on the planter surface of the feet.    PSYCH: mentation appears normal, affect normal/bright    Diagnostic Test Results:  Labs reviewed in Epic    ASSESSMENT / PLAN:       ICD-10-CM    1. Encounter for Medicare annual wellness exam  Z00.00       2. Type 2 diabetes mellitus with other circulatory complication, without long-term current use of insulin (H)  E11.59 Albumin Random Urine Quantitative with Creat Ratio     HEMOGLOBIN A1C     Comprehensive metabolic panel (BMP + Alb, Alk Phos, ALT, AST, Total. Bili, TP)     Albumin Random Urine Quantitative with Creat Ratio     HEMOGLOBIN A1C     Comprehensive metabolic panel (BMP + Alb, Alk Phos, ALT, AST, Total. Bili, TP)      3. Visit for screening mammogram  Z12.31 MA SCREENING DIGITAL BILAT - Future  (s+30)      4. Essential hypertension  I10 Comprehensive metabolic panel (BMP + Alb, Alk Phos, ALT, " "AST, Total. Bili, TP)     NIFEdipine ER (ADALAT CC) 60 MG 24 hr tablet     metoprolol succinate ER (TOPROL XL) 100 MG 24 hr tablet     losartan (COZAAR) 100 MG tablet     Comprehensive metabolic panel (BMP + Alb, Alk Phos, ALT, AST, Total. Bili, TP)      5. Primary hypercholesterolemia  E78.00 Lipid panel reflex to direct LDL Non-fasting     Lipid panel reflex to direct LDL Non-fasting      6. Seasonal allergic rhinitis due to other allergic trigger  J30.89 montelukast (SINGULAIR) 10 MG tablet      7. Hypokalemia  E87.6 potassium chloride ER (KLOR-CON M) 20 MEQ CR tablet      8. Coronary atherosclerosis due to severely calcified coronary lesion  I25.84 metoprolol succinate ER (TOPROL XL) 100 MG 24 hr tablet      9. Morbid obesity (H)  E66.01       10. Neck pain  M54.2         Encouraged the patient to reach out to her old ophthalmologist office to see who is taking over their practice since retiring.  She has plans to do this already.  Update labs.  No red flags on exam regarding the neck pain.  Likely musculoskeletal strain along the cervical spine.  Discussed different treatment options including steroid burst versus muscle relaxant.  She is going to hold off on intervention for now but if persisting let me know.  Prevnar 20 given.  Update screening mammogram.  Colonoscopy due for repeat in 2024.    Patient has been advised of split billing requirements and indicates understanding: Yes      COUNSELING:  Reviewed preventive health counseling, as reflected in patient instructions      BMI:   Estimated body mass index is 38.15 kg/m  as calculated from the following:    Height as of this encounter: 1.575 m (5' 2\").    Weight as of this encounter: 94.6 kg (208 lb 9.6 oz).   Weight management plan: Discussed healthy diet and exercise guidelines      She reports that she has never smoked. She has never used smokeless tobacco.      Appropriate preventive services were discussed with this patient, including applicable " screening as appropriate for cardiovascular disease, diabetes, osteopenia/osteoporosis, and glaucoma.  As appropriate for age/gender, discussed screening for colorectal cancer, prostate cancer, breast cancer, and cervical cancer. Checklist reviewing preventive services available has been given to the patient.    Reviewed patients plan of care and provided an AVS. The Basic Care Plan (routine screening as documented in Health Maintenance) for Alvina meets the Care Plan requirement. This Care Plan has been established and reviewed with the Patient.          Jong Claudio NP  [facility]    Identified Health Risks:  I have reviewed Opioid Use Disorder and Substance Use Disorder risk factors and made any needed referrals. She is at risk for lack of exercise and has been provided with information to increase physical activity for the benefit of her well-being.  The patient was counseled and encouraged to consider modifying their diet and eating habits. She was provided with information on recommended healthy diet options.  Information on urinary incontinence and treatment options given to patient.

## 2023-08-15 ENCOUNTER — THERAPY VISIT (OUTPATIENT)
Dept: PHYSICAL THERAPY | Facility: REHABILITATION | Age: 68
End: 2023-08-15
Payer: COMMERCIAL

## 2023-08-15 DIAGNOSIS — R10.31 RIGHT INGUINAL PAIN: Primary | ICD-10-CM

## 2023-08-15 PROCEDURE — 97140 MANUAL THERAPY 1/> REGIONS: CPT | Mod: GP | Performed by: PHYSICAL THERAPIST

## 2023-08-15 PROCEDURE — 97161 PT EVAL LOW COMPLEX 20 MIN: CPT | Mod: GP | Performed by: PHYSICAL THERAPIST

## 2023-08-15 NOTE — PROGRESS NOTES
PHYSICAL THERAPY EVALUATION  Type of Visit: Evaluation    See electronic medical record for Abuse and Falls Screening details.    Subjective       Presenting condition or subjective complaint: In 2012 my femoral artery was torn during a procedure. Years later I developed scar tissue which became very disabling. Miley was able to break it up. About 6 months ago the pain slowly started to return & is getting worse.  Date of onset: 01/01/23    Relevant medical history: Heart problems   Dates & types of surgery: 1975-hemorrihoid, 1990-ovary removed, 1998-polyp removed from sinus, 2001-neck, 2002-hysterectomy, 2007-basil cell/had several removed since then, 9/20/12-heart stent, 10/25/12-repair right femoral artery torn during a angiogram, 12/31/13 brain aneurysm    Prior diagnostic imaging/testing results:       Prior therapy history for the same diagnosis, illness or injury: Yes ?therapy was with your office when it was in [facility]    Prior Level of Function  Transfers: Independent  Ambulation: Independent  ADL: Independent  IADL:     Living Environment  Social support: Alone   Type of home: House; 2-story; Basement   Stairs to enter the home: Yes 5 Is there a railing: Yes   Ramp: No   Stairs inside the home: Yes 18 Is there a railing: Yes   Help at home: None  Equipment owned: Grab bars     Employment: No    Hobbies/Interests: gardening, craft shows and attending festivals, shopping,    Patient goals for therapy: Wear pants again & be able to bend    Pain assessment: Pain present     Objective      PELVIC EVALUATION  ADDITIONAL HISTORY:  Sex assigned at birth: Female  Gender identity: Female    Pronouns: She/Her Hers      Bladder History:  Feels bladder filling: Yes  Triggers for feeling of inability to wait to go to the bathroom: No    How long can you wait to urinate: varies-I can lay in bed in the morning for an hour or more but when I get out of bed I have to rush to the bathroom. If I'm up moving around I can  sometimes wait for 20 minutes or more.  Gets up at night to urinate: No    Can stop the flow of urine when urinating: Sometimes  Volume of urine usually released: Large   Other issues:    Number of bladder infections in last 12 months:    Fluid intake per day: 24 ounces or more 10 ounces    Medications taken for bladder: No     Activities causing urine leak: Hurrying to the bathroom due to a strong urge to urinate (pee)    Amount of urine typically leaked: 1 ounce ? varies  Pads used to help with leaking: Yes I use this many pads per day: 1 I use this type/brand: poise    Bowel History:  Frequency of bowel movement: varies - anywhere from 0 to 4 times a day  Consistency of stool: Soft    Ignores the urge to defecate: No  Other bowel issues: Pain when pooping  Length of time spent trying to have a bowel movement:      Sexual Function History:  Sexual orientation: Straight    Sexually active: No  Lubrication used: No No  Pelvic pain:      Pain or difficulty with orgasms/erection/ejaculation: No    State of menopause: Post-menopause (I am done with menopause)  Hormone medications: No      Are you currently pregnant: No, Have you been diagnosed with pelvic prolapse or abdominal separation: No, Do you get regular exercise: No, Have you tried pelvic floor strengthening exercises for 4 weeks: No, Do you have any history of trauma that is relevant to your care that you d like to share: Yes, I d like to discuss it with my provider in person.    Discussed reason for referral regarding pelvic health needs and external/internal pelvic floor muscle examination with patient/guardian.  Opportunity provided to ask questions and verbal consent for assessment and intervention was given.    PAIN: Location: Right groin radiating at times down inner thigh, down back and outside of right thigh to lateral mid calf. Tingling started today down outer right thigh and stopping right mid calf. No tingling in either ankle/foot toes.  Best  1/10  Worst 8/10 with bending, sitting, direct pressure from wearing pants (prefers skirts), first few steps after sitting and sometimes longer, WB on right leg      POSTURE: Normal lordosis, Right iliac crest and trochanter higher in standing  LUMBAR SCREEN:   Trunk flexion 2 in fingertips to floor- slight r grin pain.  HIP SCREEN: R hip flexion to 130 deg with groin pain  Strength: 5/5 right ankle DF/ toe DF    PELVIC/SI SCREEN:  R PSIS high in sitting, + R FB sitting test, + Gillet test on R, R ASIS low supine    Fascial Tension/Restriction/Tone:   Cranial scan shows marked right more than left visceral, venous and anterior neural fascial dysfunction.    BIOFEEDBACK:  Deferred today due to time    DERMATOMES: WNL  DTR S: WNL    Assessment & Plan   CLINICAL IMPRESSIONS  Medical Diagnosis: Right inguinal pain    Treatment Diagnosis: Right inguinal pain   Impression/Assessment: Patient is a 67 year old female with R groin pain complaints.  The following significant findings have been identified: Pain, Decreased joint mobility, Impaired sensation, Inflammation, Impaired gait, and Decreased activity tolerance. These impairments interfere with their ability to perform self care tasks, recreational activities, household chores, household mobility, and community mobility as compared to previous level of function.     Clinical Decision Making (Complexity):  Clinical Presentation: Stable/Uncomplicated  Clinical Presentation Rationale: based on medical and personal factors listed in PT evaluation  Clinical Decision Making (Complexity): Low complexity    PLAN OF CARE  Treatment Interventions:  Interventions: Manual Therapy, Neuromuscular Re-education, Therapeutic Exercise, Self-Care/Home Management    Long Term Goals     PT Goal 1  Goal Identifier: Groin pain  Goal Description: R groin pain 0-4/10 with ADL's in 20 wks  Rationale: to maximize safety and independence with performance of ADLs and functional tasks;to maximize  safety and independence within the home;to maximize safety and independence within the community;to maximize safety and independence with transportation;to maximize safety and independence with self cares  Target Date: 11/15/23  PT Goal 2  Goal Identifier: Groin pain wearing pants  Goal Description: Pt will be able to tolerate right groin pressure to be able to wear loos fitting soft pants in 20 weeks  Rationale: to maximize safety and independence with performance of ADLs and functional tasks;to maximize safety and independence within the home;to maximize safety and independence within the community;to maximize safety and independence with transportation;to maximize safety and independence with self cares  Target Date: 11/15/23      Frequency of Treatment: 1x/wk  Duration of Treatment: 20 wks    Recommended Referrals to Other Professionals: none  Education Assessment:   Learner/Method: Patient;Listening;Reading;Demonstration;Pictures/Video;No Barriers to Learning    Risks and benefits of evaluation/treatment have been explained.   Patient/Family/caregiver agrees with Plan of Care.     Evaluation Time:     PT Eval, Low Complexity Minutes (80301): 30   Present: Not applicable     Signing Clinician: Miley Pradhan, PT      [facility]                                                                                   OUTPATIENT PHYSICAL THERAPY      PLAN OF TREATMENT FOR OUTPATIENT REHABILITATION   Patient's Last Name, First Name, Alvina Horne YOB: 1955   Provider's Name   [facility]   Medical Record No.  [id number]     Onset Date: 01/01/23  Start of Care Date: 08/15/23     Medical Diagnosis:  Right inguinal pain      PT Treatment Diagnosis:  Right inguinal pain Plan of Treatment  Frequency/Duration: 1x/wk/ 20 wks    Certification date from 08/15/23 to 11/15/23         See note for plan of treatment details and functional goals      Miley Pradhan, PT                         I CERTIFY THE NEED FOR THESE SERVICES FURNISHED UNDER        THIS PLAN OF TREATMENT AND WHILE UNDER MY CARE     (Physician attestation of this document indicates review and certification of the therapy plan).                Referring Provider:  Jong Claudio      Initial Assessment  See Epic Evaluation- Start of Care Date: 08/15/23

## 2023-08-17 ENCOUNTER — HOSPITAL ENCOUNTER (OUTPATIENT)
Dept: MAMMOGRAPHY | Facility: CLINIC | Age: 68
Discharge: HOME | End: 2023-08-17
Attending: NURSE PRACTITIONER | Admitting: NURSE PRACTITIONER
Payer: COMMERCIAL

## 2023-08-17 DIAGNOSIS — Z12.31 VISIT FOR SCREENING MAMMOGRAM: ICD-10-CM

## 2023-08-17 PROCEDURE — 77067 SCR MAMMO BI INCL CAD: CPT

## 2023-08-23 ENCOUNTER — THERAPY VISIT (OUTPATIENT)
Dept: PHYSICAL THERAPY | Facility: REHABILITATION | Age: 68
End: 2023-08-23
Payer: COMMERCIAL

## 2023-08-23 DIAGNOSIS — R68.84 JAW PAIN: ICD-10-CM

## 2023-08-23 DIAGNOSIS — R10.31 RIGHT INGUINAL PAIN: Primary | ICD-10-CM

## 2023-08-23 DIAGNOSIS — M54.2 CERVICAL PAIN: ICD-10-CM

## 2023-08-23 PROCEDURE — 97140 MANUAL THERAPY 1/> REGIONS: CPT | Mod: GP | Performed by: PHYSICAL THERAPIST

## 2023-09-19 ENCOUNTER — THERAPY VISIT (OUTPATIENT)
Dept: PHYSICAL THERAPY | Facility: REHABILITATION | Age: 68
End: 2023-09-19
Payer: COMMERCIAL

## 2023-09-19 DIAGNOSIS — R10.31 RIGHT INGUINAL PAIN: Primary | ICD-10-CM

## 2023-09-19 DIAGNOSIS — R68.84 JAW PAIN: ICD-10-CM

## 2023-09-19 DIAGNOSIS — M54.2 CERVICAL PAIN: ICD-10-CM

## 2023-09-19 PROCEDURE — 97140 MANUAL THERAPY 1/> REGIONS: CPT | Mod: GP | Performed by: PHYSICAL THERAPIST

## 2023-09-19 PROCEDURE — 97110 THERAPEUTIC EXERCISES: CPT | Mod: GP | Performed by: PHYSICAL THERAPIST

## 2023-09-19 PROCEDURE — 97535 SELF CARE MNGMENT TRAINING: CPT | Mod: GP | Performed by: PHYSICAL THERAPIST

## 2023-09-29 ENCOUNTER — THERAPY VISIT (OUTPATIENT)
Dept: PHYSICAL THERAPY | Facility: REHABILITATION | Age: 68
End: 2023-09-29
Attending: NURSE PRACTITIONER
Payer: COMMERCIAL

## 2023-09-29 DIAGNOSIS — R10.31 RIGHT INGUINAL PAIN: ICD-10-CM

## 2023-09-29 PROCEDURE — 97140 MANUAL THERAPY 1/> REGIONS: CPT | Mod: GP | Performed by: PHYSICAL THERAPIST

## 2023-10-06 ENCOUNTER — THERAPY VISIT (OUTPATIENT)
Dept: PHYSICAL THERAPY | Facility: REHABILITATION | Age: 68
End: 2023-10-06
Payer: COMMERCIAL

## 2023-10-06 DIAGNOSIS — M54.2 CERVICAL PAIN: ICD-10-CM

## 2023-10-06 DIAGNOSIS — R10.31 RIGHT INGUINAL PAIN: Primary | ICD-10-CM

## 2023-10-06 DIAGNOSIS — R68.84 JAW PAIN: ICD-10-CM

## 2023-10-06 PROCEDURE — 97140 MANUAL THERAPY 1/> REGIONS: CPT | Mod: GP | Performed by: PHYSICAL THERAPIST

## 2023-10-13 ENCOUNTER — THERAPY VISIT (OUTPATIENT)
Dept: PHYSICAL THERAPY | Facility: REHABILITATION | Age: 68
End: 2023-10-13
Payer: COMMERCIAL

## 2023-10-13 DIAGNOSIS — R68.84 JAW PAIN: ICD-10-CM

## 2023-10-13 DIAGNOSIS — R10.31 RIGHT INGUINAL PAIN: Primary | ICD-10-CM

## 2023-10-13 DIAGNOSIS — M54.2 CERVICAL PAIN: ICD-10-CM

## 2023-10-13 PROCEDURE — 97140 MANUAL THERAPY 1/> REGIONS: CPT | Mod: GP | Performed by: PHYSICAL THERAPIST

## 2023-10-16 ENCOUNTER — THERAPY VISIT (OUTPATIENT)
Dept: PHYSICAL THERAPY | Facility: REHABILITATION | Age: 68
End: 2023-10-16
Payer: COMMERCIAL

## 2023-10-16 DIAGNOSIS — M54.2 CERVICAL PAIN: ICD-10-CM

## 2023-10-16 DIAGNOSIS — R68.84 JAW PAIN: ICD-10-CM

## 2023-10-16 DIAGNOSIS — R10.31 RIGHT INGUINAL PAIN: Primary | ICD-10-CM

## 2023-10-16 PROCEDURE — 97140 MANUAL THERAPY 1/> REGIONS: CPT | Mod: GP | Performed by: PHYSICAL THERAPIST

## 2023-10-16 PROCEDURE — 97110 THERAPEUTIC EXERCISES: CPT | Mod: GP | Performed by: PHYSICAL THERAPIST

## 2023-10-22 RX ORDER — POTASSIUM CHLORIDE 1500 MG/1
40 TABLET, EXTENDED RELEASE ORAL 2 TIMES DAILY
Qty: 360 TABLET | OUTPATIENT
Start: 2023-10-22

## 2023-11-27 ENCOUNTER — TELEPHONE (OUTPATIENT)
Dept: FAMILY MEDICINE | Facility: CLINIC | Age: 68
End: 2023-11-27
Payer: COMMERCIAL

## 2023-11-27 NOTE — TELEPHONE ENCOUNTER
Reason for Call:  Appointment Request    Patient requesting this type of appt:  Sinus Infection and a flu shot    Requested provider: Jong Claudio    Reason patient unable to be scheduled: Not within requested timeframe    When does patient want to be seen/preferred time:  ASAP    Comments: also wants to know if she can get a flu shot while she is feeling ill.    Could we send this information to you in [facility] or would you prefer to receive a phone call?:   Patient would prefer a phone call   Okay to leave a detailed message?: Yes at Cell number on file:    Telephone Information:   Mobile [phone]       Call taken on 11/27/2023 at 3:56 PM by NEO HASTINGS

## 2023-11-28 ENCOUNTER — NURSE TRIAGE (OUTPATIENT)
Dept: FAMILY MEDICINE | Facility: CLINIC | Age: 68
End: 2023-11-28
Payer: COMMERCIAL

## 2023-11-28 NOTE — TELEPHONE ENCOUNTER
Did Triage note for Sinus infection and advised to wait to get flu shot.  Hermila Simpson, BERRYN RN  [facility]

## 2023-11-28 NOTE — TELEPHONE ENCOUNTER
Nurse Triage SBAR    Is this a 2nd Level Triage?NO    Situation: Sinus infection symptoms.    Background: Patient has had sinus pain and congestion for about 12 days.     Assessment: Patient has been using OTC medications and has sinus pain and congestion of green thick drainage from one side.  Has slight earache and sinus headache over whole forehead.  Is afebrile.      Protocol Recommended Disposition:   See in Office Today or Tomorrow    Recommendation: Plans to go to urgency room today since no appointments available in clinic.    CHALO Mccray RN  [facility]      Does the patient meet one of the following criteria for ADS visit consideration? 16+ years old, with an [facility] PCP     TIP  Providers, please consider if this condition is appropriate for management at one of our Acute and Diagnostic Services sites.     If patient is a good candidate, please use dotphrase <dot>triageresponse and select Refer to ADS to document.    Additional Information   Negative: Sounds like a life-threatening emergency to the triager   Negative: Difficulty breathing, and not from stuffy nose (e.g., not relieved by cleaning out the nose)   Negative: SEVERE headache and has fever   Negative: Patient sounds very sick or weak to the triager   Negative: SEVERE sinus pain   Negative: Severe headache   Negative: Redness or swelling on the cheek, forehead, or around the eye   Negative: Fever > 103 F (39.4 C)   Negative: Fever > 101 F (38.3 C) and over 60 years of age   Negative: Fever > 100.0 F (37.8 C) and has diabetes mellitus or a weak immune system (e.g., HIV positive, cancer chemotherapy, organ transplant, splenectomy, chronic steroids)   Negative: Fever > 100.0 F (37.8 C) and bedridden (e.g., nursing home patient, stroke, chronic illness, recovering from surgery)   Negative: Fever present > 3 days (72 hours)   Negative: Fever returns after gone for over 24 hours and symptoms worse or not improved    "Negative: Sinus pain (not just congestion) and fever   Negative: Earache   Sinus congestion (pressure, fullness) present > 10 days    Answer Assessment - Initial Assessment Questions  1. LOCATION: \"Where does it hurt?\"       Forehead did and cheeks and under eyes.   2. ONSET: \"When did the sinus pain start?\"  (e.g., hours, days)       Week and a half  3. SEVERITY: \"How bad is the pain?\"   (Scale 1-10; mild, moderate or severe)    - MILD (1-3): doesn't interfere with normal activities     - MODERATE (4-7): interferes with normal activities (e.g., work or school) or awakens from sleep    - SEVERE (8-10): excruciating pain and patient unable to do any normal activities         7/10  4. RECURRENT SYMPTOM: \"Have you ever had sinus problems before?\" If so, ask: \"When was the last time?\" and \"What happened that time?\"       Yes sometime last year  5. NASAL CONGESTION: \"Is the nose blocked?\" If so, ask, \"Can you open it or must you breathe through the mouth?\"      A lot of drainage  6. NASAL DISCHARGE: \"Do you have discharge from your nose?\" If so ask, \"What color?\"      green  7. FEVER: \"Do you have a fever?\" If so, ask: \"What is it, how was it measured, and when did it start?\"       no  8. OTHER SYMPTOMS: \"Do you have any other symptoms?\" (e.g., sore throat, cough, earache, difficulty breathing)      Sore throat is mostly on left side.  Headache is all the way across.  Left side.  Ear is still hurting a little bit.  Feels more like in the throat.    9. PREGNANCY: \"Is there any chance you are pregnant?\" \"When was your last menstrual period?\"      no    Protocols used: Sinus Pain and Congestion-A-[place]    "

## 2023-12-01 ENCOUNTER — NURSE TRIAGE (OUTPATIENT)
Dept: NURSING | Facility: CLINIC | Age: 68
End: 2023-12-01

## 2023-12-01 NOTE — TELEPHONE ENCOUNTER
Nurse Triage SBAR    Is this a 2nd Level Triage? NO    Situation: Patient has continued sinus congestion and pain  Consent: not needed    Background: called and spoke to a nurse the other did- did not go in to be seen. Thought her symptoms were improving    Assessment:   sinus symptoms are worsening- ongoing about 2 weeks  History of sinus infections- on allergy meds - nasal spray   - has been using her nasal spray and a netti pot   Thick green mucous   No fevers   Under her eyes is tender to touch and headache across her forehead   Headache- 5/10   Notes pain in her left ear  Also notes pain on the left side of her throat     Protocol Recommended Disposition:       Recommendation: Advised patient to be seen - reviewed additional care advice with patient and she verbalizes understanding. Attempted to get an appointment at home clinic- nothing available- Patient indicates she will go to the  in [place] today.      [facility] recommended    Does the patient meet one of the following criteria for ADS visit consideration? 16+ years old, with an [facility] PCP     TIP  Providers, please consider if this condition is appropriate for management at one of our Acute and Diagnostic Services sites.     If patient is a good candidate, please use dotphrase <dot>triageresponse and select Refer to ADS to document.      Tiana Heredia RN 9:26 AM 12/1/2023  Reason for Disposition   Earache    Additional Information   Negative: Sounds like a life-threatening emergency to the triager   Negative: Difficulty breathing, and not from stuffy nose (e.g., not relieved by cleaning out the nose)   Negative: SEVERE headache and has fever   Negative: Patient sounds very sick or weak to the triager   Negative: SEVERE sinus pain   Negative: SEVERE headache   Negative: Redness or swelling on the cheek, forehead, or around the eye   Negative: Fever > 103 F (39.4 C)   Negative: Fever > 101 F (38.3 C) and over 60 years of age   Negative: Fever > 100.0 F  (37.8 C) and has diabetes mellitus or a weak immune system (e.g., HIV positive, cancer chemotherapy, organ transplant, splenectomy, chronic steroids)   Negative: Fever > 100.0 F (37.8 C) and bedridden (e.g., CVA, chronic illness, recovering from surgery)   Negative: Fever present > 3 days (72 hours)   Negative: Fever returns after gone for over 24 hours and symptoms worse or not improved   Negative: Sinus pain (not just congestion) and fever    Protocols used: Sinus Pain or Congestion-A-[place]

## 2023-12-07 ENCOUNTER — ANCILLARY PROCEDURE (OUTPATIENT)
Dept: GENERAL RADIOLOGY | Facility: CLINIC | Age: 68
End: 2023-12-07
Attending: NURSE PRACTITIONER
Payer: COMMERCIAL

## 2023-12-07 ENCOUNTER — OFFICE VISIT (OUTPATIENT)
Dept: FAMILY MEDICINE | Facility: CLINIC | Age: 68
End: 2023-12-07
Payer: COMMERCIAL

## 2023-12-07 VITALS
BODY MASS INDEX: 38.92 KG/M2 | WEIGHT: 211.5 LBS | SYSTOLIC BLOOD PRESSURE: 134 MMHG | HEART RATE: 70 BPM | DIASTOLIC BLOOD PRESSURE: 80 MMHG | HEIGHT: 62 IN | TEMPERATURE: 98.8 F | RESPIRATION RATE: 16 BRPM | OXYGEN SATURATION: 95 %

## 2023-12-07 DIAGNOSIS — R07.89 CHEST PRESSURE: ICD-10-CM

## 2023-12-07 DIAGNOSIS — R06.2 WHEEZING: ICD-10-CM

## 2023-12-07 DIAGNOSIS — E11.59 TYPE 2 DIABETES MELLITUS WITH OTHER CIRCULATORY COMPLICATION, WITHOUT LONG-TERM CURRENT USE OF INSULIN (H): ICD-10-CM

## 2023-12-07 DIAGNOSIS — J01.00 ACUTE NON-RECURRENT MAXILLARY SINUSITIS: Primary | ICD-10-CM

## 2023-12-07 LAB
ATRIAL RATE - MUSE: 68 BPM
DIASTOLIC BLOOD PRESSURE - MUSE: NORMAL MMHG
INTERPRETATION ECG - MUSE: NORMAL
P AXIS - MUSE: 9 DEGREES
PR INTERVAL - MUSE: 144 MS
QRS DURATION - MUSE: 110 MS
QT - MUSE: 424 MS
QTC - MUSE: 450 MS
R AXIS - MUSE: 11 DEGREES
SYSTOLIC BLOOD PRESSURE - MUSE: NORMAL MMHG
T AXIS - MUSE: -2 DEGREES
VENTRICULAR RATE- MUSE: 68 BPM

## 2023-12-07 PROCEDURE — 93005 ELECTROCARDIOGRAM TRACING: CPT | Performed by: NURSE PRACTITIONER

## 2023-12-07 PROCEDURE — 71046 X-RAY EXAM CHEST 2 VIEWS: CPT | Mod: TC | Performed by: RADIOLOGY

## 2023-12-07 PROCEDURE — 93010 ELECTROCARDIOGRAM REPORT: CPT | Performed by: INTERNAL MEDICINE

## 2023-12-07 PROCEDURE — 99214 OFFICE O/P EST MOD 30 MIN: CPT | Mod: 25 | Performed by: NURSE PRACTITIONER

## 2023-12-07 RX ORDER — METHYLPREDNISOLONE 4 MG
TABLET, DOSE PACK ORAL
Qty: 21 TABLET | Refills: 0 | Status: SHIPPED | OUTPATIENT
Start: 2023-12-07 | End: 2024-03-06

## 2023-12-07 RX ORDER — CETIRIZINE HYDROCHLORIDE 10 MG/1
10 TABLET ORAL DAILY
COMMUNITY

## 2023-12-07 RX ORDER — RESPIRATORY SYNCYTIAL VIRUS VACCINE 120MCG/0.5
0.5 KIT INTRAMUSCULAR ONCE
Qty: 1 EACH | Refills: 0 | Status: CANCELLED | OUTPATIENT
Start: 2023-12-07 | End: 2023-12-07

## 2023-12-07 ASSESSMENT — PAIN SCALES - GENERAL: PAINLEVEL: NO PAIN (0)

## 2023-12-07 NOTE — PROGRESS NOTES
"Assessment & Plan     ICD-10-CM    1. Acute non-recurrent maxillary sinusitis  J01.00       2. Chest pressure  R07.89 EKG 12-lead, tracing only     XR Chest 2 Views      3. Wheezing  R06.2 methylPREDNISolone (MEDROL DOSEPAK) 4 MG tablet therapy pack      4. Type 2 diabetes mellitus with other circulatory complication, without long-term current use of insulin (H)  E11.59           Constellation of symptoms.  He could be having wheezing from reactive airway.  Could be having postnasal drip issues.  Could also be reflux breaking through despite lansoprazole.  ECG reassuring of no active cardiac disease.  Get chest x-ray.  Has follow-up with GI next week.  If symptoms evolve or worsen, get checked out in ED again.  Trial of Medrol Dosepak sent to the pharmacy.  Anticipate temporary rise in blood sugars with diabetes history.  Recently finished azithromycin and is starting to show slow improvements.    Reviewed stress test x 1, echocardiogram x 1, cardiology note x 1, ED note x 1    Subjective     HPI     Sinusitis    Seen in the ED on 12/1/2023.  Was given prescription for doxycycline.  Had been having symptoms for 2 weeks prior to that including headache and sinus pressure. Initially got doxycycline.  Pharmacy noted she's had issues in the past with this.  Called [facility] again and got azithromycin.  Has been dealing with chest pressure a bit of wheezing. Lansoprazole has helped in the past.  Will be seeing GI again next week for review.       Review of Systems - negative except for what's listed in the HPI      Objective    /80 (BP Location: Left arm, Patient Position: Sitting, Cuff Size: Adult Large)   Pulse 70   Temp 98.8  F (37.1  C) (Oral)   Resp 16   Ht 1.575 m (5' 2\")   Wt 95.9 kg (211 lb 8 oz)   SpO2 95%   BMI 38.68 kg/m    Physical Exam   General appearance - alert, well appearing, and in no distress  Mental status - alert, oriented to person, place, and time  Nose -mildly swollen nasal " mucosa.  Scant drainage  Mouth - mucous membranes moist. No oral lesions.  Lymphatics - no palpable lymphadenopathy  Chest - clear to auscultation, no wheezes, rales or rhonchi, symmetric air entry  Heart - normal rate and regular rhythm, S1 and S2 normal, no murmurs  Extremities - peripheral pulses normal, trace lower extremity edema in the legs  Skin - normal coloration and turgor.    Jong Claudio, CNP    This note has been dictated using voice recognition software. Any grammatical or context distortions are unintentional and inherent to the software.

## 2023-12-07 NOTE — PATIENT INSTRUCTIONS
EKG looks okay.  No major changes.  We will check a chest x-ray as well for confirmation of no developing pneumonia.    I did send a Medrol Dosepak to your pharmacy that can be used if failing to nip this in the blood with the azithromycin.  It may help some of the wheezing as well.

## 2023-12-08 ENCOUNTER — THERAPY VISIT (OUTPATIENT)
Dept: PHYSICAL THERAPY | Facility: REHABILITATION | Age: 68
End: 2023-12-08
Attending: NURSE PRACTITIONER
Payer: COMMERCIAL

## 2023-12-08 DIAGNOSIS — R68.84 JAW PAIN: ICD-10-CM

## 2023-12-08 DIAGNOSIS — R10.31 RIGHT INGUINAL PAIN: Primary | ICD-10-CM

## 2023-12-08 DIAGNOSIS — M54.2 CERVICAL PAIN: ICD-10-CM

## 2023-12-08 PROCEDURE — 97140 MANUAL THERAPY 1/> REGIONS: CPT | Mod: GP | Performed by: PHYSICAL THERAPIST

## 2023-12-08 PROCEDURE — 97110 THERAPEUTIC EXERCISES: CPT | Mod: GP | Performed by: PHYSICAL THERAPIST

## 2023-12-08 NOTE — PROGRESS NOTES
[facility]                                                                                   OUTPATIENT PHYSICAL THERAPY    PLAN OF TREATMENT FOR OUTPATIENT REHABILITATION   Patient's Last Name, First Name, Alvina Horne YOB: 1955   Provider's Name   [facility]   Medical Record No.  [id number]     Onset Date: 01/01/23  Start of Care Date: 08/15/23     Medical Diagnosis:  Right inguinal pain      PT Treatment Diagnosis:  Right inguinal pain Plan of Treatment  Frequency/Duration: 1x/wk/ 20 wks    Certification date from 10/14/23 to 02/14/24         See note for plan of treatment details and functional goals     Miley Pradhan, PT                         I CERTIFY THE NEED FOR THESE SERVICES FURNISHED UNDER        THIS PLAN OF TREATMENT AND WHILE UNDER MY CARE     (Physician attestation of this document indicates review and certification of the therapy plan).              Referring Provider:  Jong Claudio    Initial Assessment  See Epic Evaluation- Start of Care Date: 08/15/23     12/08/23 0500   Appointment Info   Signing clinician's name / credentials Miley Pradhan PT   Visits Used 8   Medical Diagnosis Right inguinal pain   PT Tx Diagnosis Right inguinal pain   Quick Adds Pelvic Consent;Certification   Progress Note/Certification   Start of Care Date 08/15/23   Onset of illness/injury or Date of Surgery 01/01/23   Therapy Frequency 1x/wk   Predicted Duration 20 wks   Certification date from 10/14/23   Certification date to 02/14/24       Present No   GOALS   PT Goals 2   PT Goal 1   Goal Identifier Groin pain   Goal Description R groin pain 0-4/10 with ADL's (especially gardening/bending) in 20 wks   Rationale to maximize safety and independence with performance of ADLs and functional tasks;to maximize safety and independence within the home;to maximize safety and independence within the community;to maximize  safety and independence with transportation;to maximize safety and independence with self cares   Goal Progress When I am doing normal ADL's the R gorin pain was getting better and was 3-4/10. I have been down with a sinus infection the past 2 weeks so now it is hard to tell   Target Date 02/14/24   PT Goal 2   Goal Identifier Groin pain wearing pants   Goal Description Pt will be able to tolerate right groin pressure to be able to wear loose fitting soft pants with 0-2/10 right groin pain in 20 weeks   Rationale to maximize safety and independence with performance of ADLs and functional tasks;to maximize safety and independence within the home;to maximize safety and independence within the community;to maximize safety and independence with transportation;to maximize safety and independence with self cares   Goal Progress R groin pain has been increasing the past 3 days- 6-7/10 . The right groin pain had been improving the weeks prior to this and I was even thinking I was better and could have today be the last visit.   Target Date 02/14/24   Subjective Report   Subjective Report I have been down the past 2 weeks with a sinus infection and ended up going to urgent care and was on a 5 day course of antibiotics. R groin pain with normal ADL's -6/10. The pain seemed to be oin the right anterior knee and then went up to my right groin and into the right back leg. I had been doing the exercises up until past 2 weeks when the HA got worse and I stopped as the exercises increased my HA. I start the exercises to correct the SI again 3 days ago. I should be starting a short course of steroids to futher make sure the sinus issue is resolved- I start this today.   Objective Measures   Objective Measures Objective Measure 1;Objective Measure 2   Objective Measure 1   Objective Measure SI   Details R PSIS high in sitting, R + FB test   Objective Measure 2   Objective Measure Cranial scan   Treatment Interventions (PT)    Interventions Manual Therapy;Therapeutic Procedure/Exercise   Therapeutic Procedure/Exercise   Therapeutic Procedures: strength, endurance, ROM, flexibillity minutes ([id number]) 15   Ther Proc 1 Back exercises   Skilled Intervention Reviewed EIS and JULI, as well as R KTC and pubic squeeze- pt had not been doing them the past 2 wks and had been completely forgetting extension exercises   Manual Therapy   Manual Therapy: Mobilization, MFR, MLD, friction massage minutes ([id number]) 40   Manual Therapy 1 Counterstrain   Manual Therapy 1 - Details MT/SCS to neeck/chest and pelvis areas   Skilled Intervention B REC-LV, mm energy correction of right ant SI, L SUGAR-LV, R IJ-LV, R LTHOR-LV, L INOM-LV   Education   Learner/Method Patient;Listening;Reading;Demonstration;Pictures/Video;No Barriers to Learning   Plan   Home program R KTC/Pubic squeeze, diaphragm breathing /ankle pumps 2x/day, EIS, JULI   Updates to plan of care Has orthotics (may need new pair)   Plan for next session COntinue to focus on pelvis neural - ant/post.MMEST   Comments   Comments Overall she increased right groin pain with activity, She is still not wearing tight pants. Some of her right posterior thigh pain is suggestive of Lumbar disc pathology and increases with bending and sitting.   Pelvic Health Informed Consent Statement Discussed with patient/guardian reason for referral regarding pelvic health needs and external/internal pelvic floor muscle examination.  Opportunity provided to ask questions and verbal consent for assessment and intervention was given.

## 2023-12-12 ENCOUNTER — TELEPHONE (OUTPATIENT)
Dept: FAMILY MEDICINE | Facility: CLINIC | Age: 68
End: 2023-12-12
Payer: COMMERCIAL

## 2023-12-12 NOTE — TELEPHONE ENCOUNTER
Left message to call back for: Alvina  Information to relay to patient: please relay the message below

## 2023-12-12 NOTE — TELEPHONE ENCOUNTER
----- Message from Jong Claudio NP sent at 12/11/2023 11:34 AM CST -----  Please call patient.  Chest x-ray looks good.

## 2023-12-12 NOTE — TELEPHONE ENCOUNTER
Patient Returning Call    Reason for call:  return call    Information relayed to patient:  yes, see below    Patient has additional questions:  No      Could we send this information to you in [employer] [place] or would you prefer to receive a phone call?:

## 2023-12-18 ENCOUNTER — THERAPY VISIT (OUTPATIENT)
Dept: PHYSICAL THERAPY | Facility: REHABILITATION | Age: 68
End: 2023-12-18
Attending: NURSE PRACTITIONER
Payer: COMMERCIAL

## 2023-12-18 DIAGNOSIS — R10.31 RIGHT INGUINAL PAIN: Primary | ICD-10-CM

## 2023-12-18 DIAGNOSIS — R68.84 JAW PAIN: ICD-10-CM

## 2023-12-18 DIAGNOSIS — M54.2 CERVICAL PAIN: ICD-10-CM

## 2023-12-18 PROCEDURE — 97140 MANUAL THERAPY 1/> REGIONS: CPT | Mod: GP | Performed by: PHYSICAL THERAPIST

## 2023-12-21 ENCOUNTER — TRANSFERRED RECORDS (OUTPATIENT)
Dept: HEALTH INFORMATION MANAGEMENT | Facility: CLINIC | Age: 68
End: 2023-12-21
Payer: COMMERCIAL

## 2023-12-22 DIAGNOSIS — I25.84 CORONARY ATHEROSCLEROSIS DUE TO SEVERELY CALCIFIED CORONARY LESION: ICD-10-CM

## 2023-12-27 ENCOUNTER — THERAPY VISIT (OUTPATIENT)
Dept: PHYSICAL THERAPY | Facility: REHABILITATION | Age: 68
End: 2023-12-27
Payer: COMMERCIAL

## 2023-12-27 DIAGNOSIS — M54.2 CERVICAL PAIN: ICD-10-CM

## 2023-12-27 DIAGNOSIS — R10.31 RIGHT INGUINAL PAIN: Primary | ICD-10-CM

## 2023-12-27 DIAGNOSIS — R68.84 JAW PAIN: ICD-10-CM

## 2023-12-27 PROCEDURE — 97140 MANUAL THERAPY 1/> REGIONS: CPT | Mod: GP | Performed by: PHYSICAL THERAPIST

## 2023-12-27 RX ORDER — NITROGLYCERIN 0.4 MG/1
TABLET SUBLINGUAL
Qty: 25 TABLET | Refills: 1 | Status: SHIPPED | OUTPATIENT
Start: 2023-12-27

## 2024-01-12 DIAGNOSIS — I10 ESSENTIAL HYPERTENSION: ICD-10-CM

## 2024-01-12 RX ORDER — HYDROCHLOROTHIAZIDE 25 MG/1
50 TABLET ORAL DAILY
Qty: 180 TABLET | Refills: 0 | Status: SHIPPED | OUTPATIENT
Start: 2024-01-12 | End: 2024-04-22

## 2024-01-27 DIAGNOSIS — E87.6 HYPOKALEMIA: ICD-10-CM

## 2024-01-29 RX ORDER — POTASSIUM CHLORIDE 1500 MG/1
TABLET, EXTENDED RELEASE ORAL
Qty: 360 TABLET | Refills: 1 | Status: SHIPPED | OUTPATIENT
Start: 2024-01-29 | End: 2024-08-12

## 2024-02-14 ENCOUNTER — THERAPY VISIT (OUTPATIENT)
Dept: PHYSICAL THERAPY | Facility: REHABILITATION | Age: 69
End: 2024-02-14
Payer: COMMERCIAL

## 2024-02-14 DIAGNOSIS — M54.2 CERVICAL PAIN: ICD-10-CM

## 2024-02-14 DIAGNOSIS — R10.31 RIGHT INGUINAL PAIN: Primary | ICD-10-CM

## 2024-02-14 DIAGNOSIS — R68.84 JAW PAIN: ICD-10-CM

## 2024-02-14 PROCEDURE — 97140 MANUAL THERAPY 1/> REGIONS: CPT | Mod: GP | Performed by: PHYSICAL THERAPIST

## 2024-02-14 NOTE — PROGRESS NOTES
[facility]                                                                                   OUTPATIENT PHYSICAL THERAPY    PLAN OF TREATMENT FOR OUTPATIENT REHABILITATION   Patient's Last Name, First Name, Alvina Horne YOB: 1955   Provider's Name   [facility]   Medical Record No.  [id number]     Onset Date: 01/01/23  Start of Care Date: 08/15/23     Medical Diagnosis:  Right inguinal pain      PT Treatment Diagnosis:  Right inguinal pain Plan of Treatment  Frequency/Duration: 1x/wk/ 20 wks    Certification date from 02/15/24 to 05/15/24         See note for plan of treatment details and functional goals     Miley Pradhan, PT                         I CERTIFY THE NEED FOR THESE SERVICES FURNISHED UNDER        THIS PLAN OF TREATMENT AND WHILE UNDER MY CARE     (Physician attestation of this document indicates review and certification of the therapy plan).              Referring Provider:  Jong Claudio    Initial Assessment  See Epic Evaluation- Start of Care Date: 08/15/23       02/14/24 0500   Appointment Info   Signing clinician's name / credentials Miley Pradhan PT   Visits Used 1/10 + 10   Medical Diagnosis Right inguinal pain   PT Tx Diagnosis Right inguinal pain   Quick Adds Pelvic Consent;Certification   Progress Note/Certification   Start of Care Date 08/15/23   Onset of illness/injury or Date of Surgery 01/01/23   Therapy Frequency 1x/wk   Predicted Duration 20 wks   Certification date from 02/15/24   Certification date to 05/15/24       Present No   GOALS   PT Goals 2   PT Goal 1   Goal Identifier R Groin pain   Goal Description R groin pain 0-4/10 with ADL's (especially gardening/bending) in 20 wks   Rationale to maximize safety and independence with performance of ADLs and functional tasks;to maximize safety and independence within the home;to maximize safety and independence within the  community;to maximize safety and independence with transportation;to maximize safety and independence with self cares   Goal Progress Overall improved. Right groin pain always present at a 2/10, and at times (at least daily) can increase to 5/10. 5/10 can occur when bending or sitting.   Target Date 05/15/24   PT Goal 2   Goal Identifier Groin pain wearing pants   Goal Description Pt will be able to tolerate right groin pressure to be able to wear loose fitting soft pants with 0-2/10 right groin pain in 20 weeks   Rationale to maximize safety and independence with performance of ADLs and functional tasks;to maximize safety and independence within the home;to maximize safety and independence within the community;to maximize safety and independence with transportation;to maximize safety and independence with self cares   Goal Progress Overall improved, not met.. Pt wears loose fitting clothing and is averaging 4/10- variable from 3-7/10.   Target Date 05/15/24   Subjective Report   Subjective Report I have been having alot of problem with right LB/SI which radiates to right thigh to the front knee.- intermittently tingling in the right front thigh. LBP worse with sitting and hard to straighten after sitting. Right upper shin still seems to be swelling more - especially in medial aspect. I am also fee,likng some ear pain/fulless and sensation of vertigo starting/lopsided. Head feels heavy and forehead pain.   Objective Measures   Objective Measures Objective Measure 1;Objective Measure 2   Objective Measure 1   Objective Measure SI   Details R pelvs and trochanter high in standing, R PSIS high, + R FB test.Pt has not been wearing orthotics   Objective Measure 2   Objective Measure Swelling   Details Upper and medial right calf- obvious swelling in pes anserince area which is tender to palpate. Neg Hoffmasn's or tenderness to palpate calf.obvious indent 4 inches below joint line- 2/10. Did keep her awake due to pain  7/10. Pt reports this has been happening daily for past 18 months. Pt has mild Lyphedema entire right leg   Treatment Interventions (PT)   Interventions Manual Therapy;Therapeutic Procedure/Exercise   Therapeutic Procedure/Exercise   Ther Proc 1 SI correction   Manual Therapy   Manual Therapy: Mobilization, MFR, MLD, friction massage minutes ([id number]) 55   Manual Therapy 1 Counterstrain   Manual Therapy 1 - Details MT/SCS to neck/chest and pelvis areas   Skilled Intervention MM energy correction of R SI joint, but stopped R hip flexion at 100 deg to avoid right groin pain, L SUGAR-LV, L IJ-LV, IVC-LV, B MED-LV, L PAR-LV   Education   Learner/Method Patient;Listening;Reading;Demonstration;Pictures/Video;No Barriers to Learning   Plan   Home program R KTC(on hold 12/27/23 due to pain)/Pubic squeeze, diaphragm breathing /ankle pumps 2x/day, EIS, JULI   Updates to plan of care Has orthotics (may need new pair)   Plan for next session Recheck SI and continue LV right pelvis and R LE where dysfunction noted today..COntinue to focus on pelvis neural/PG and add right plantar arterial dysfunction   Comments   Comments Past month pain increased R LB/SI pain with radiation to L4 area (anteriolateral thigh) to the knee with some tingling. Concern for lumbar disc derangement vs SI dysfunction. Forehead pain and sinus issues lately - has recovered from previous sinus infection. Pt would benefit from continued PT to address right SI and head sx   Pelvic Health Informed Consent Statement Discussed with patient/guardian reason for referral regarding pelvic health needs and external/internal pelvic floor muscle examination.  Opportunity provided to ask questions and verbal consent for assessment and intervention was given.

## 2024-02-18 ENCOUNTER — HEALTH MAINTENANCE LETTER (OUTPATIENT)
Age: 69
End: 2024-02-18

## 2024-03-05 DIAGNOSIS — J30.89 SEASONAL ALLERGIC RHINITIS DUE TO OTHER ALLERGIC TRIGGER: ICD-10-CM

## 2024-03-05 RX ORDER — FLUTICASONE PROPIONATE 50 MCG
SPRAY, SUSPENSION (ML) NASAL
Qty: 48 G | Refills: 0 | Status: SHIPPED | OUTPATIENT
Start: 2024-03-05 | End: 2024-10-01

## 2024-03-06 ENCOUNTER — OFFICE VISIT (OUTPATIENT)
Dept: FAMILY MEDICINE | Facility: CLINIC | Age: 69
End: 2024-03-06
Payer: COMMERCIAL

## 2024-03-06 VITALS
OXYGEN SATURATION: 98 % | TEMPERATURE: 98.3 F | DIASTOLIC BLOOD PRESSURE: 80 MMHG | BODY MASS INDEX: 37.23 KG/M2 | RESPIRATION RATE: 16 BRPM | HEIGHT: 62 IN | WEIGHT: 202.3 LBS | HEART RATE: 67 BPM | SYSTOLIC BLOOD PRESSURE: 160 MMHG

## 2024-03-06 DIAGNOSIS — E11.59 TYPE 2 DIABETES MELLITUS WITH OTHER CIRCULATORY COMPLICATION, WITHOUT LONG-TERM CURRENT USE OF INSULIN (H): Primary | ICD-10-CM

## 2024-03-06 DIAGNOSIS — I10 ESSENTIAL HYPERTENSION: ICD-10-CM

## 2024-03-06 DIAGNOSIS — E83.52 HYPERCALCEMIA: ICD-10-CM

## 2024-03-06 DIAGNOSIS — M79.89 LEG SWELLING: ICD-10-CM

## 2024-03-06 DIAGNOSIS — E66.01 MORBID OBESITY (H): ICD-10-CM

## 2024-03-06 LAB — HBA1C MFR BLD: 6.2 % (ref 0–5.6)

## 2024-03-06 PROCEDURE — G2211 COMPLEX E/M VISIT ADD ON: HCPCS | Performed by: NURSE PRACTITIONER

## 2024-03-06 PROCEDURE — 83036 HEMOGLOBIN GLYCOSYLATED A1C: CPT | Performed by: NURSE PRACTITIONER

## 2024-03-06 PROCEDURE — 80053 COMPREHEN METABOLIC PANEL: CPT | Performed by: NURSE PRACTITIONER

## 2024-03-06 PROCEDURE — 82306 VITAMIN D 25 HYDROXY: CPT | Performed by: NURSE PRACTITIONER

## 2024-03-06 PROCEDURE — 36415 COLL VENOUS BLD VENIPUNCTURE: CPT | Performed by: NURSE PRACTITIONER

## 2024-03-06 PROCEDURE — 99214 OFFICE O/P EST MOD 30 MIN: CPT | Performed by: NURSE PRACTITIONER

## 2024-03-06 RX ORDER — RESPIRATORY SYNCYTIAL VIRUS VACCINE 120MCG/0.5
0.5 KIT INTRAMUSCULAR ONCE
Qty: 1 EACH | Refills: 0 | Status: CANCELLED | OUTPATIENT
Start: 2024-03-06 | End: 2024-03-06

## 2024-03-06 RX ORDER — FUROSEMIDE 20 MG
20 TABLET ORAL DAILY PRN
Qty: 30 TABLET | Refills: 0 | Status: SHIPPED | OUTPATIENT
Start: 2024-03-06

## 2024-03-06 ASSESSMENT — ASTHMA QUESTIONNAIRES
QUESTION_3 LAST FOUR WEEKS HOW OFTEN DID YOUR ASTHMA SYMPTOMS (WHEEZING, COUGHING, SHORTNESS OF BREATH, CHEST TIGHTNESS OR PAIN) WAKE YOU UP AT NIGHT OR EARLIER THAN USUAL IN THE MORNING: NOT AT ALL
ACT_TOTALSCORE: 23
QUESTION_5 LAST FOUR WEEKS HOW WOULD YOU RATE YOUR ASTHMA CONTROL: WELL CONTROLLED
QUESTION_4 LAST FOUR WEEKS HOW OFTEN HAVE YOU USED YOUR RESCUE INHALER OR NEBULIZER MEDICATION (SUCH AS ALBUTEROL): NOT AT ALL
QUESTION_1 LAST FOUR WEEKS HOW MUCH OF THE TIME DID YOUR ASTHMA KEEP YOU FROM GETTING AS MUCH DONE AT WORK, SCHOOL OR AT HOME: NONE OF THE TIME
QUESTION_2 LAST FOUR WEEKS HOW OFTEN HAVE YOU HAD SHORTNESS OF BREATH: ONCE OR TWICE A WEEK
ACT_TOTALSCORE: 23

## 2024-03-06 ASSESSMENT — PAIN SCALES - GENERAL: PAINLEVEL: NO PAIN (0)

## 2024-03-06 NOTE — PATIENT INSTRUCTIONS
I sent a different water pill called furosemide/lasix to use IF you get big puffy issues again.    I have a low suspicion of this being a blood clot, but if swelling worsens and medicine doesn't help, we should get an ASAP ultrasound.    Updating labs while we have you here to keep an eye on sugars and organs.     Let's see about getting some fitted compression socks.  These can be picked up at the [employer] [facility] store in [place].

## 2024-03-06 NOTE — PROGRESS NOTES
Assessment & Plan     ICD-10-CM    1. Type 2 diabetes mellitus with other circulatory complication, without long-term current use of insulin (H)  E11.59 HEMOGLOBIN A1C     Comprehensive metabolic panel (BMP + Alb, Alk Phos, ALT, AST, Total. Bili, TP)     HEMOGLOBIN A1C     Comprehensive metabolic panel (BMP + Alb, Alk Phos, ALT, AST, Total. Bili, TP)      2. Need for vaccination against respiratory syncytial virus  Z29.11       3. Leg swelling  M79.89 Comprehensive metabolic panel (BMP + Alb, Alk Phos, ALT, AST, Total. Bili, TP)     furosemide (LASIX) 20 MG tablet     Compression Sleeve/Stocking Order for DME - ONLY FOR DME     Comprehensive metabolic panel (BMP + Alb, Alk Phos, ALT, AST, Total. Bili, TP)        Low suspicion for DVT.  Wells score for DVT-0.  Suspect venous insufficiency given bilateral nature.  Seems to be self improving.  Lasix to hold onto in case swelling worsens.  Elevate when possible.  Use compression socks.  If swelling and not responding to conservative treatment, let me know and we can get an ultrasound.  Update diabetic labs while we have her here today    Reviewed gastroenterology note x 1, cardiology note x 1    The longitudinal plan of care for the diagnosis(es)/condition(s) as documented were addressed during this visit. Due to the added complexity in care, I will continue to support Alvina in the subsequent management and with ongoing continuity of care.      Total time spent was 32 minutes including reviewing records prior to arrival, consultation, placing orders, education, and reviewing the plan of care on the date of service.      Subjective     HPI     Right leg swelling.    Patient presents today with concerns of bilateral lower extremity swelling.  Has been creeping in over the last 6 weeks.  Notes improvement with elevation.  Symptoms are best in the morning.  Progressively worsens as the day goes on.  No breathing difficulties or chest pain.  No DVT history.  Symptoms  "actually seem to be self improving.  No neurological changes/deficits.    Due for eye exam.  Last ophthalmologist retired.  Unsure who she is going to see.  Due for updated labs.    Review of Systems - negative except for what's listed in the HPI      Objective    BP (!) 160/80 (BP Location: Left arm, Patient Position: Sitting, Cuff Size: Adult Large)   Pulse 67   Temp 98.3  F (36.8  C) (Oral)   Resp 16   Ht 1.575 m (5' 2\")   Wt 91.8 kg (202 lb 4.8 oz)   SpO2 98%   BMI 37.00 kg/m    Physical Exam   General appearance - alert, well appearing, and in no distress  Mental status - alert, oriented to person, place, and time  Chest - clear to auscultation, no wheezes, rales or rhonchi, symmetric air entry  Heart - normal rate and regular rhythm, S1 and S2 normal, no murmurs noted  Neurological - grossly intact.   Musculoskeletal -grossly intact.  Gait stable.  Extremities - peripheral pulses normal, no peripheral edema, left calf 40.5cm right calf 40cm.  Negative Homans' sign  Skin - normal coloration and turgor.    Jong Claudio, CNP    This note has been dictated using voice recognition software. Any grammatical or context distortions are unintentional and inherent to the software.    "

## 2024-03-07 LAB
ALBUMIN SERPL BCG-MCNC: 4.9 G/DL (ref 3.5–5.2)
ALP SERPL-CCNC: 94 U/L (ref 40–150)
ALT SERPL W P-5'-P-CCNC: 24 U/L (ref 0–50)
ANION GAP SERPL CALCULATED.3IONS-SCNC: 8 MMOL/L (ref 7–15)
AST SERPL W P-5'-P-CCNC: 20 U/L (ref 0–45)
BILIRUB SERPL-MCNC: 0.4 MG/DL
BUN SERPL-MCNC: 15.6 MG/DL (ref 8–23)
CALCIUM SERPL-MCNC: 11 MG/DL (ref 8.8–10.2)
CHLORIDE SERPL-SCNC: 101 MMOL/L (ref 98–107)
CREAT SERPL-MCNC: 0.76 MG/DL (ref 0.51–0.95)
DEPRECATED HCO3 PLAS-SCNC: 30 MMOL/L (ref 22–29)
EGFRCR SERPLBLD CKD-EPI 2021: 84 ML/MIN/1.73M2
GLUCOSE SERPL-MCNC: 109 MG/DL (ref 70–99)
POTASSIUM SERPL-SCNC: 4.4 MMOL/L (ref 3.4–5.3)
PROT SERPL-MCNC: 7.7 G/DL (ref 6.4–8.3)
SODIUM SERPL-SCNC: 139 MMOL/L (ref 135–145)

## 2024-03-08 LAB — VIT D+METAB SERPL-MCNC: 35 NG/ML (ref 20–50)

## 2024-03-13 ENCOUNTER — THERAPY VISIT (OUTPATIENT)
Dept: PHYSICAL THERAPY | Facility: REHABILITATION | Age: 69
End: 2024-03-13
Payer: COMMERCIAL

## 2024-03-13 DIAGNOSIS — R10.31 RIGHT INGUINAL PAIN: Primary | ICD-10-CM

## 2024-03-13 DIAGNOSIS — R68.84 JAW PAIN: ICD-10-CM

## 2024-03-13 DIAGNOSIS — M54.2 CERVICAL PAIN: ICD-10-CM

## 2024-03-13 PROCEDURE — 97140 MANUAL THERAPY 1/> REGIONS: CPT | Mod: GP | Performed by: PHYSICAL THERAPIST

## 2024-03-18 DIAGNOSIS — J30.89 SEASONAL ALLERGIC RHINITIS DUE TO OTHER ALLERGIC TRIGGER: ICD-10-CM

## 2024-03-26 ENCOUNTER — TRANSFERRED RECORDS (OUTPATIENT)
Dept: HEALTH INFORMATION MANAGEMENT | Facility: CLINIC | Age: 69
End: 2024-03-26
Payer: COMMERCIAL

## 2024-04-11 ENCOUNTER — TELEPHONE (OUTPATIENT)
Dept: CARDIOLOGY | Facility: CLINIC | Age: 69
End: 2024-04-11
Payer: COMMERCIAL

## 2024-04-11 NOTE — TELEPHONE ENCOUNTER
Called patient to review recent elevated blood pressure reading.  Per [place] Community Measures guidelines, patients blood pressure is out of parameters and recheck blood pressure is recommended.    Last Blood Pressure: 160/80  Last Heart Rate: 67  Date: 3/16/24  Location: PCP    Patient will call back with updated blood pressure readings. Call back phone number was provided

## 2024-04-19 ENCOUNTER — THERAPY VISIT (OUTPATIENT)
Dept: PHYSICAL THERAPY | Facility: REHABILITATION | Age: 69
End: 2024-04-19
Payer: COMMERCIAL

## 2024-04-19 DIAGNOSIS — R68.84 JAW PAIN: ICD-10-CM

## 2024-04-19 DIAGNOSIS — M54.2 CERVICAL PAIN: ICD-10-CM

## 2024-04-19 DIAGNOSIS — R10.31 RIGHT INGUINAL PAIN: Primary | ICD-10-CM

## 2024-04-19 PROCEDURE — 97140 MANUAL THERAPY 1/> REGIONS: CPT | Mod: GP | Performed by: PHYSICAL THERAPIST

## 2024-04-21 DIAGNOSIS — I10 ESSENTIAL HYPERTENSION: ICD-10-CM

## 2024-04-22 RX ORDER — HYDROCHLOROTHIAZIDE 25 MG/1
TABLET ORAL
Qty: 180 TABLET | Refills: 0 | Status: SHIPPED | OUTPATIENT
Start: 2024-04-22 | End: 2024-06-25

## 2024-04-29 ENCOUNTER — NURSE TRIAGE (OUTPATIENT)
Dept: FAMILY MEDICINE | Facility: CLINIC | Age: 69
End: 2024-04-29
Payer: COMMERCIAL

## 2024-04-29 ENCOUNTER — OFFICE VISIT (OUTPATIENT)
Dept: FAMILY MEDICINE | Facility: CLINIC | Age: 69
End: 2024-04-29
Payer: COMMERCIAL

## 2024-04-29 ENCOUNTER — ANCILLARY PROCEDURE (OUTPATIENT)
Dept: GENERAL RADIOLOGY | Facility: CLINIC | Age: 69
End: 2024-04-29
Attending: NURSE PRACTITIONER
Payer: COMMERCIAL

## 2024-04-29 ENCOUNTER — MYC MEDICAL ADVICE (OUTPATIENT)
Dept: CARDIOLOGY | Facility: CLINIC | Age: 69
End: 2024-04-29

## 2024-04-29 VITALS
TEMPERATURE: 99.1 F | HEART RATE: 78 BPM | BODY MASS INDEX: 36.82 KG/M2 | DIASTOLIC BLOOD PRESSURE: 78 MMHG | HEIGHT: 62 IN | SYSTOLIC BLOOD PRESSURE: 140 MMHG | WEIGHT: 200.1 LBS | RESPIRATION RATE: 18 BRPM | OXYGEN SATURATION: 97 %

## 2024-04-29 DIAGNOSIS — I10 ESSENTIAL HYPERTENSION: ICD-10-CM

## 2024-04-29 DIAGNOSIS — J22 LOWER RESPIRATORY INFECTION (E.G., BRONCHITIS, PNEUMONIA, PNEUMONITIS, PULMONITIS): ICD-10-CM

## 2024-04-29 DIAGNOSIS — J22 LOWER RESPIRATORY INFECTION (E.G., BRONCHITIS, PNEUMONIA, PNEUMONITIS, PULMONITIS): Primary | ICD-10-CM

## 2024-04-29 DIAGNOSIS — R06.2 WHEEZING: ICD-10-CM

## 2024-04-29 PROBLEM — R13.19 ESOPHAGEAL DYSPHAGIA: Status: ACTIVE | Noted: 2024-04-29

## 2024-04-29 LAB
FLUAV RNA SPEC QL NAA+PROBE: NEGATIVE
FLUBV RNA RESP QL NAA+PROBE: NEGATIVE
RSV RNA SPEC NAA+PROBE: NEGATIVE
SARS-COV-2 RNA RESP QL NAA+PROBE: NEGATIVE

## 2024-04-29 PROCEDURE — 99214 OFFICE O/P EST MOD 30 MIN: CPT | Performed by: NURSE PRACTITIONER

## 2024-04-29 PROCEDURE — 71046 X-RAY EXAM CHEST 2 VIEWS: CPT | Mod: TC | Performed by: RADIOLOGY

## 2024-04-29 PROCEDURE — 87637 SARSCOV2&INF A&B&RSV AMP PRB: CPT | Performed by: NURSE PRACTITIONER

## 2024-04-29 RX ORDER — PREDNISONE 20 MG/1
TABLET ORAL
Qty: 10 TABLET | Refills: 0 | Status: SHIPPED | OUTPATIENT
Start: 2024-04-29 | End: 2024-06-25

## 2024-04-29 RX ORDER — ALBUTEROL SULFATE 90 UG/1
2 AEROSOL, METERED RESPIRATORY (INHALATION) EVERY 6 HOURS PRN
Qty: 18 G | Refills: 1 | Status: SHIPPED | OUTPATIENT
Start: 2024-04-29 | End: 2024-06-25

## 2024-04-29 RX ORDER — DOXYCYCLINE 100 MG/1
100 CAPSULE ORAL 2 TIMES DAILY
Qty: 20 CAPSULE | Refills: 0 | Status: SHIPPED | OUTPATIENT
Start: 2024-04-29 | End: 2024-05-09

## 2024-04-29 ASSESSMENT — PAIN SCALES - GENERAL: PAINLEVEL: SEVERE PAIN (6)

## 2024-04-29 NOTE — TELEPHONE ENCOUNTER
Nurse Triage SBAR    Is this a 2nd Level Triage? NO    Situation: Cough for over a week.  Wheezing.     Background: History of asthma, obesity, type 2 DM, hypertension, coronary atherosclerosis.     Assessment: Patient has had wheezing and coughing frequently.  Unable to bring much phlegm up.  Cough has been going on for about one week.    Protocol Recommended Disposition:   See in Office Today or Tomorrow    Recommendation: Scheduled today to see Alvina per her approval.    CHALO Mccray RN  [facility]            Does the patient meet one of the following criteria for ADS visit consideration? 16+ years old, with an [facility] PCP     TIP  Providers, please consider if this condition is appropriate for management at one of our Acute and Diagnostic Services sites.     If patient is a good candidate, please use dotphrase <dot>triageresponse and select Refer to ADS to document.  Reason for Disposition   Continuous (nonstop) coughing interferes with work or school and no improvement using cough treatment per Care Advice    Additional Information   Negative: Bluish (or gray) lips or face   Negative: SEVERE difficulty breathing (e.g., struggling for each breath, speaks in single words)   Negative: Rapid onset of cough and has hives   Negative: Coughing started suddenly after medicine, an allergic food or bee sting   Negative: Difficulty breathing after exposure to flames, smoke, or fumes   Negative: Sounds like a life-threatening emergency to the triager   Negative: Previous asthma attacks and this feels like asthma attack   Negative: Dry cough (non-productive; no sputum or minimal clear sputum) and within 14 days of COVID-19 Exposure   Negative: MODERATE difficulty breathing (e.g., speaks in phrases, SOB even at rest, pulse 100-120) and still present when not coughing   Negative: Chest pain present when not coughing   Negative: Passed out (i.e., fainted, collapsed and was not responding)    "Negative: Patient sounds very sick or weak to the triager   Negative: MILD difficulty breathing (e.g., minimal/no SOB at rest, SOB with walking, pulse <100) and still present when not coughing   Negative: Coughed up > 1 tablespoon (15 ml) blood (Exception: Blood-tinged sputum.)   Negative: Fever > 103 F (39.4 C)   Negative: Fever > 101 F (38.3 C) and over 60 years of age   Negative: Fever > 100.0 F (37.8 C) and has diabetes mellitus or a weak immune system (e.g., HIV positive, cancer chemotherapy, organ transplant, splenectomy, chronic steroids)   Negative: Fever > 100.0 F (37.8 C) and bedridden (e.g., CVA, chronic illness, recovering from surgery)   Negative: Increasing ankle swelling   Negative: Wheezing is present   Negative: SEVERE coughing spells (e.g., whooping sound after coughing, vomiting after coughing)   Negative: Coughing up terri-colored (reddish-brown) or blood-tinged sputum   Negative: Fever present > 3 days (72 hours)   Negative: Fever returns after gone for over 24 hours and symptoms worse or not improved   Negative: Using nasal washes and pain medicine > 24 hours and sinus pain persists   Negative: Known COPD or other severe lung disease (i.e., bronchiectasis, cystic fibrosis, lung surgery) and worsening symptoms (i.e., increased sputum purulence or amount, increased breathing difficulty)    Answer Assessment - Initial Assessment Questions  1. ONSET: \"When did the cough begin?\"       One week ago  2. SEVERITY: \"How bad is the cough today?\"       Cough is tight and unable to bring up.  Is bad today.  3. SPUTUM: \"Describe the color of your sputum\" (none, dry cough; clear, white, yellow, green)      Occasionally - uses a netti pot - is thick and still seems more solid - and not particular color.   Has changed a tinge of yellow - lots thicker  4. HEMOPTYSIS: \"Are you coughing up any blood?\" If so ask: \"How much?\" (flecks, streaks, tablespoons, etc.)      no  5. DIFFICULTY BREATHING: \"Are you having " "difficulty breathing?\" If Yes, ask: \"How bad is it?\" (e.g., mild, moderate, severe)     - MILD: No SOB at rest, mild SOB with walking, speaks normally in sentences, can lie down, no retractions, pulse < 100.     - MODERATE: SOB at rest, SOB with minimal exertion and prefers to sit, cannot lie down flat, speaks in phrases, mild retractions, audible wheezing, pulse 100-120.     - SEVERE: Very SOB at rest, speaks in single words, struggling to breathe, sitting hunched forward, retractions, pulse > 120       When breathes it seems like creates a cough and is a bit wheezy.  More wheezy when lays down to go to bed.    6. FEVER: \"Do you have a fever?\" If Yes, ask: \"What is your temperature, how was it measured, and when did it start?\"      Took last night and today and it 98 - a little up from her normal.  7. CARDIAC HISTORY: \"Do you have any history of heart disease?\" (e.g., heart attack, congestive heart failure)       Yes - has a stent - sees cardiology  8. LUNG HISTORY: \"Do you have any history of lung disease?\"  (e.g., pulmonary embolus, asthma, emphysema)      Allergies - yes  9. PE RISK FACTORS: \"Do you have a history of blood clots?\" (or: recent major surgery, recent prolonged travel, bedridden)      no  10. OTHER SYMPTOMS: \"Do you have any other symptoms?\" (e.g., runny nose, wheezing, chest pain)        Nose was runny a little bit yesterday and today.  Took benadryl and it seems to keep it from.   Chest feels tight - from coughing.  11. PREGNANCY: \"Is there any chance you are pregnant?\" \"When was your last menstrual period?\"        no  12. TRAVEL: \"Have you traveled out of the country in the last month?\" (e.g., travel history, exposures)        no    Protocols used: Cough-A-OH    "

## 2024-04-29 NOTE — TELEPHONE ENCOUNTER
Reason for Call:  Appointment Request    Patient requesting this type of appt:  other    Requested provider: Jong Claudio    Reason patient unable to be scheduled: Not within requested timeframe    When does patient want to be seen/preferred time: Same day    Comments: 1+week cough, congestion     [facility] or would you prefer to receive a phone call?:   Patient would prefer a phone call   Okay to leave a detailed message?: Yes at Home number on file [phone] (home)    Call taken on 4/29/2024 at 9:50 AM by Madeleine Pugh

## 2024-04-29 NOTE — PROGRESS NOTES
"  Subjective   Alvina is a 68 year old, presenting for the following health issues:  URI (Wheezing, Coughing lots of phlegm hard to cough it up. Short of breathe.)      4/29/2024     2:20 PM   Additional Questions   Roomed by  DILMA     URI    History of Present Illness       Reason for visit:  Alot of phlemand tightness which makes it hard to check my breath gets worse when i lay down to sleep  Symptom onset:  3-7 days ago  Symptoms include:  Cought alot of mucus tightness in chest sometimes hard to breath and catch me breath  Symptom intensity:  Severe  Symptom progression:  Worsening  Had these symptoms before:  Yes  Has tried/received treatment for these symptoms:  Yes  Previous treatment was successful:  No  What makes it worse:  Laying down  What makes it better:  Sittingup    She eats 2-3 servings of fruits and vegetables daily.She consumes 1 sweetened beverage(s) daily.She exercises with enough effort to increase her heart rate 10 to 19 minutes per day.  She exercises with enough effort to increase her heart rate 4 days per week.   She is taking medications regularly.       Acute Illness  Acute illness concerns: cough   Onset/Duration: x 1 week  Symptoms:  Fever: low-grade fever   Chills/Sweats: YES  Headache (location?): YES  Sinus Pressure: YES  Conjunctivitis:  No  Ear Pain: YES: left  Rhinorrhea: YES  Congestion: YES  Sore Throat: YES  Cough: YES-worsening over time  Wheeze: YES  Decreased Appetite: No  Nausea: No  Vomiting: No  Diarrhea: No  Dysuria/Freq.: No  Dysuria or Hematuria: No  Fatigue/Achiness: YES  Sick/Strep Exposure: No  Therapies tried and outcome: None        Objective    BP (!) 140/78 (BP Location: Right arm, Patient Position: Sitting, Cuff Size: Adult Regular)   Pulse 78   Temp 99.1  F (37.3  C) (Oral)   Resp 18   Ht 1.575 m (5' 2\")   Wt 90.8 kg (200 lb 1.6 oz)   SpO2 97%   Breastfeeding No   BMI 36.60 kg/m    Body mass index is 36.6 kg/m .  Physical Exam   GENERAL: alert and no " distress  HENT: normal cephalic/atraumatic, ear canals and TM's normal, nose and mouth without ulcers or lesions, nasal mucosa edematous , rhinorrhea yellow, oropharynx clear, and oral mucous membranes moist  RESP: wheezing and rhonchi throughout   CV: regular rate and rhythm, normal S1 S2, no S3 or S4, no murmur, click or rub, no peripheral edema   NEURO: Normal strength and tone, mentation intact and speech normal  PSYCH: mentation appears normal, affect normal/bright    CXR - Reviewed and interpreted by me awaiting formal interpretation from Radiologist at this time      A/P:  1. Lower respiratory infection (e.g., bronchitis, pneumonia, pneumonitis, pulmonitis)  ER if worsening  - XR Chest 2 Views; Future  - Symptomatic Influenza A/B, RSV, & SARS-CoV2 PCR (COVID-19) Nasopharyngeal; Future  - doxycycline hyclate (VIBRAMYCIN) 100 MG capsule; Take 1 capsule (100 mg) by mouth 2 times daily for 10 days  Dispense: 20 capsule; Refill: 0  - Symptomatic Influenza A/B, RSV, & SARS-CoV2 PCR (COVID-19) Nasopharyngeal    2. Wheezing  - albuterol (PROAIR HFA/PROVENTIL HFA/VENTOLIN HFA) 108 (90 Base) MCG/ACT inhaler; Inhale 2 puffs into the lungs every 6 hours as needed for shortness of breath, wheezing or cough  Dispense: 18 g; Refill: 1  - predniSONE (DELTASONE) 20 MG tablet; Take 2 tablets once a day x 5 days  Dispense: 10 tablet; Refill: 0      Signed Electronically by: Alvina Salinas CNP

## 2024-05-02 ENCOUNTER — MYC MEDICAL ADVICE (OUTPATIENT)
Dept: CARDIOLOGY | Facility: CLINIC | Age: 69
End: 2024-05-02
Payer: COMMERCIAL

## 2024-05-03 NOTE — TELEPHONE ENCOUNTER
Pt is scheduled with STEPHANIE 5/13. Blood pressure management to be addressed during this scheduled visit.-[facility]

## 2024-05-07 RX ORDER — LOSARTAN POTASSIUM 100 MG/1
100 TABLET ORAL DAILY
Qty: 100 TABLET | Refills: 1 | Status: SHIPPED | OUTPATIENT
Start: 2024-05-07 | End: 2024-10-01

## 2024-05-07 NOTE — PROGRESS NOTES
Patient has [facility] coverage and with this insurance plan, the patient is eligible to receive certain prescriptions as a 100-day supply at the 90-day supply cost.      Prescriptions updated to 100-day supply: Jonathan AlonzoD, [facility]  Population Health Pharmacist  [phone]

## 2024-05-07 NOTE — TELEPHONE ENCOUNTER
Patient has [facility] coverage and with this insurance plan, the patient is eligible to receive certain prescriptions as a 100-day supply at the 90-day supply cost.      Prescriptions updated to 100-day supply: Jonathan AlonzoD, [facility]  Population Health Pharmacist  [phone]

## 2024-05-15 ENCOUNTER — THERAPY VISIT (OUTPATIENT)
Dept: PHYSICAL THERAPY | Facility: REHABILITATION | Age: 69
End: 2024-05-15
Payer: COMMERCIAL

## 2024-05-15 DIAGNOSIS — M54.2 CERVICAL PAIN: ICD-10-CM

## 2024-05-15 DIAGNOSIS — R68.84 JAW PAIN: ICD-10-CM

## 2024-05-15 DIAGNOSIS — R10.31 RIGHT INGUINAL PAIN: Primary | ICD-10-CM

## 2024-05-15 PROCEDURE — 97140 MANUAL THERAPY 1/> REGIONS: CPT | Mod: GP | Performed by: PHYSICAL THERAPIST

## 2024-05-15 PROCEDURE — 97110 THERAPEUTIC EXERCISES: CPT | Mod: GP | Performed by: PHYSICAL THERAPIST

## 2024-05-31 ENCOUNTER — THERAPY VISIT (OUTPATIENT)
Dept: PHYSICAL THERAPY | Facility: REHABILITATION | Age: 69
End: 2024-05-31
Payer: COMMERCIAL

## 2024-05-31 DIAGNOSIS — R10.31 RIGHT INGUINAL PAIN: Primary | ICD-10-CM

## 2024-05-31 DIAGNOSIS — M54.2 CERVICAL PAIN: ICD-10-CM

## 2024-05-31 DIAGNOSIS — R68.84 JAW PAIN: ICD-10-CM

## 2024-05-31 PROCEDURE — 97140 MANUAL THERAPY 1/> REGIONS: CPT | Mod: GP | Performed by: PHYSICAL THERAPIST

## 2024-05-31 NOTE — PROGRESS NOTES
[facility]                                                                                   OUTPATIENT PHYSICAL THERAPY    PLAN OF TREATMENT FOR OUTPATIENT REHABILITATION   Patient's Last Name, First Name, Alvina Horne YOB: 1955   Provider's Name   [facility]   Medical Record No.  [id number]     Onset Date:   1/1/2023 Start of Care Date:    8/15/2023   Medical Diagnosis:   Right groin pain-Chronic      PT Treatment Diagnosis:   Right groin pain-chronic Plan of Treatment  Frequency/Duration:  1x/wk      Certification date from   5/15/24 to    8/13/24       See note for plan of treatment details and functional goals     Miley Prdahan, PT                         I CERTIFY THE NEED FOR THESE SERVICES FURNISHED UNDER        THIS PLAN OF TREATMENT AND WHILE UNDER MY CARE     (Physician attestation of this document indicates review and certification of the therapy plan).              Referring Provider:  Jong Claudio    Initial Assessment  See Epic Evaluation-              PLAN  Continue therapy per current plan of care. Access limited to PT    Beginning/End Dates of Progress Note Reporting Period:    2/13/24 to 05/15/2024    Referring Provider:  Jong Claudio      05/15/24 0500   Appointment Info   Signing clinician's name / credentials Miley Pradhan PT   Visits Used 1/10 + 13   Medical Diagnosis Right inguinal pain   PT Tx Diagnosis Right inguinal pain   Quick Adds Pelvic Consent;Certification   Progress Note/Certification   Start of Care Date 08/15/23   Onset of illness/injury or Date of Surgery 01/01/23   Therapy Frequency 1x/wk   Predicted Duration 20 wks   Certification date from 05/15/24   Certification date to 08/13/24       Present No   GOALS   PT Goals 2   PT Goal 1   Goal Identifier R Groin pain   Goal Description R groin pain 0-4/10 with ADL's (especially gardening/bending) in 20 wks   Rationale to  maximize safety and independence with performance of ADLs and functional tasks;to maximize safety and independence within the home;to maximize safety and independence within the community;to maximize safety and independence with transportation;to maximize safety and independence with self cares   Goal Progress Overall improved. She had resolution of all pain while on Prednisone for Lower respiratory infection. Right groin pain at best is 1/10 and at end of day was 3/10. More recently it has increased to 6/10 no instigationg factors, and also return of some right sciatica.   Target Date 08/13/24   PT Goal 2   Goal Identifier Groin pain wearing pants   Goal Description Pt will be able to tolerate right groin pressure to be able to wear loose fitting soft pants with 0-2/10 right groin pain in 20 weeks   Rationale to maximize safety and independence with performance of ADLs and functional tasks;to maximize safety and independence within the home;to maximize safety and independence within the community;to maximize safety and independence with transportation;to maximize safety and independence with self cares   Goal Progress Overall improved, not met.. Pt wears loose fitting clothing and is averaging 2-3/10. Will monitor future symptoms when wearing variety of closthes and sitting ( ie groin comprssion when sitting caused by clothing.)   Target Date 08/13/24   Subjective Report   Subjective Report I had an lower respiratory infection and was placed Prednisone, antiobiotics, and an inhaler- was down for 2 weeks. My right gorin has been better- largely because of the Prednisone. The lump in the right knee (pes anserinie area) restarted 5/10 and scitica restarted last night- R SI down the back and side of the the leg- along L4.   Objective Measures   Objective Measures Objective Measure 1;Objective Measure 2;Objective Measure 3   Objective Measure 1   Objective Measure SI: ASIS level in supinr   Details Palpation: Marked  pain with moderate palpation right groin area.   Objective Measure 2   Objective Measure Swelling noted diffusely entire right thigh and knee, and at ankle.   Details SLR: L 58 deg, R 48 deg   Objective Measure 3   Objective Measure R Knee   Details Obvious right knee swelling at joint line.   Treatment Interventions (PT)   Interventions Manual Therapy;Therapeutic Procedure/Exercise   Therapeutic Procedure/Exercise   Therapeutic Procedures: strength, endurance, ROM, flexibility minutes (65915) 40   Ther Proc 1 - Details HS stretch   Skilled Intervention DIscussed differences in HS length and how it affects pain and gait, also discussed need to floos nerve rather than try to strctch sciatic complex   PTRx Ther Proc 1 Prone On Elbows   PTRx Ther Proc 1 - Details Hug a pillow to support neck. relax and sag back. to increase stretch or to stretch the front of the hips bends the knee/knees. Use a pillow under pelvis and stomach to reduce pressure if needed but remove this pillow after 2-3 days to progress. 1-3 minutes daily   PTRx Ther Proc 2 Standing Extension   PTRx Ther Proc 2 - Details Lift chest up tuck chin sway belly forward look up slightly to ceiling. It is helpful top put hands on hips or waist 1x/hour   PTRx Ther Proc 3 Single Knee to Chest   PTRx Ther Proc 3 - Details When using this to correct the SI joint only bend the hip needing correction (usually the right side). Pull the knee up and out to the armpit while pressing the left leg into the bed daily   PTRx Ther Proc 4 Pubic Shot Gun MET   PTRx Ther Proc 4 - Details Feet should be level and touching each other. Use a folded pillow or ball between the knees daily   PTRx Ther Proc 5 Supine Hamstring Stretch   PTRx Ther Proc 5 - Details Bend knee to 90 deg and hold with hands while extending knee 15- 30 seconds within pain tolerance- daily bilat.    PTRx Ther Proc 6 Strap Assisted Straight Leg Stretch   PTRx Ther Proc 6 - Details Place strap on ball of foot   straighten knee and raise leg until tension felt on back of leg. Then unlock knee and relock as you progressively raise foot to ceiling. Be gentle to avoid irritation of sciatic nerve. 10 reps 1-2x/daily   Manual Therapy   Manual Therapy: Mobilization, MFR, MLD, friction massage minutes (47871) 15   Manual Therapy 1 Counterstrain   Manual Therapy 1 - Details MT/SCS to neck/chest   Skilled Intervention R SUGAR-LV, R IJ-LV,   Patient Response/Progress Pt feels right pant leg is tighter this past 2 weeks indicating increased general R LE effusion. Cranial scan shows _ R neck and pelvis LV fascia, and R ant hip neural fascia dysfunction   Education   Learner/Method Patient;Listening;Reading;Demonstration;Pictures/Video;No Barriers to Learning   Plan   Home program R KTC, Pubic squeeze, diaphragm breathing /ankle pumps 2x/day, EIS, JULI, HS stretches   Updates to plan of care New Orthotics April 2024 (unraveling May), Knee High compression April 2024   Plan for next session Continue LV right pelvis and R LE.continue AEPI-ADP 95, R ant hip neural   Comments   Comments Pt has not been in for 4 weeks, and 2 weeks of LRI.  Overall right groin is better. Mild loss of right SLR may affect walking and pain level. Pt would benefit from PT 2x this month to correct venous fascial dysfunctions likely caused by infection. Pt would benefit from continued PT to address LB, Right groin and Head symptoms.Pt has had cardiologist checking her BP which is higher- 140/90.   Pelvic Health Informed Consent Statement Discussed with patient/guardian reason for referral regarding pelvic health needs and external/internal pelvic floor muscle examination.  Opportunity provided to ask questions and verbal consent for assessment and intervention was given.   Total Session Time   Timed Code Treatment Minutes 55   Total Treatment Time (sum of timed and untimed services) 55

## 2024-06-14 ENCOUNTER — THERAPY VISIT (OUTPATIENT)
Dept: PHYSICAL THERAPY | Facility: REHABILITATION | Age: 69
End: 2024-06-14
Payer: COMMERCIAL

## 2024-06-14 DIAGNOSIS — R68.84 JAW PAIN: ICD-10-CM

## 2024-06-14 DIAGNOSIS — M54.2 CERVICAL PAIN: ICD-10-CM

## 2024-06-14 DIAGNOSIS — R10.31 RIGHT INGUINAL PAIN: Primary | ICD-10-CM

## 2024-06-14 PROCEDURE — 97140 MANUAL THERAPY 1/> REGIONS: CPT | Mod: GP | Performed by: PHYSICAL THERAPIST

## 2024-06-24 NOTE — PROGRESS NOTES
HEART CARE ENCOUNTER CONSULTATON NOTE      [facility]  [phone]      Assessment/Recommendations   Assessment:   Coronary artery disease: s/p LAD stenting 2012, repeat angiogram 2015 with mild-moderate CAD and no intervention, nonischemic NM stress test 2019 - aspirin 81 mg.  Ambiguous chest pressure symptom improved after starting amitriptyline for GI dysfunction.   Hyperlipidemia: Uncontrolled and never started Zetia.  Historical intolerance to multiple statins.  This discussed PCSK9 inhibitors in the past but was not started due to cost, as well as hesitancy of side effects.  Hypertension: Elevated 144/84 on Losartan 100 mg, metoprolol succinate 100 mg twice daily, HCTZ 50 mg, nifedipine 60 mg.  Home blood pressure measurements affect appropriate control.  LE swelling: Minimal to mild.  Prescribed furosemide 20 mg as needed, has used 1 time.  No evidence of decompensation on exam, less likely cardiac related.  Seems to be consistent development with starting of amitriptyline      Plan:   We had long discussion regarding cholesterol control and contribution to CAD progression.  Have asked her to retry Zetia, she wishes to delay this due to current side effects of another medication she wants to figure out.  I have asked her to notify us when she trial Zetia again so we can monitor/trial.  We did briefly discuss potential future trial of PCSK9 inhibitors as cholesterol is quite elevated.  She prefers to try Zetia first.  We discussed monitoring stress test with history of mild to moderate disease and uncontrolled cholesterol.  Last evaluation 2019.  An improvement in chest pressure with treatment of GI issues patient elects to delay evaluation.  Discussed symptoms of angina to monitor for and she will notify clinic if this happens.  Recommend discussion with GI specialist of joint pain and muscle aches as well as lower extremity swelling is possibly related to amitriptyline.  If wanting to  try a more consistent furosemide use discussed with Jong Claudio for lab monitoring  Continue current antihypertensive regimen      Follow up in 1 year or sooner as needed     History of Present Illness/Subjective    HPI: Alvina Maynard is a 68 year old female with PMHx of CAD, HLD, HTN, presents for follow up.    Patient reports no significant change over the last year.  Reviewed blood pressure log at home and appears to be adequate control overall despite mild elevation today.  She has had ANNELIESE with symptoms of chest pressure in the past and recently wondering if it is cardiac or GI related.  She has had EGD workup showing abnormal esophageal manometry results, underwent dilation procedure without symptom improvement.  Was then started on amitriptyline and chest pressure symptoms noted to get better.  Although, has had some joint pain, mild lower extremity swelling and muscle aches since increasing the dose with GI specialist.  Was prescribed as needed furosemide by primary provider although has only taken this once.  No improvement with compression socks.  Worsens throughout the da, and improves in the morning.    Cholesterol is quite high and she is not tolerated statins.  Was instructed to start Zetia by Dr. Rae but never did for fear of side effects.  Was told PCSK9 inhibitors were not recommended by the pharmacist.      She denies lightheadedness, shortness of breath, dyspnea on exertion, orthopnea, PND, palpitations, and chest pain.        2019 Lexiscan stress    The nuclear stress test is negative for inducible myocardial ischemia or infarction.    The left ventricular ejection fraction at stress is greater than 70%.    A prior study was conducted on 8/15/2017.  This study has no change when compared with the prior study.     Physical Examination  Review of Systems   Vitals: BP (!) 144/84 (BP Location: Right arm, Patient Position: Sitting, Cuff Size: Adult Large)   Pulse 56   Resp 16   Ht 1.6 m (5'  "3\")   Wt 90.1 kg (198 lb 11.2 oz)   BMI 35.20 kg/m    BMI= Body mass index is 35.2 kg/m .  Wt Readings from Last 3 Encounters:   06/25/24 90.1 kg (198 lb 11.2 oz)   04/29/24 90.8 kg (200 lb 1.6 oz)   03/06/24 91.8 kg (202 lb 4.8 oz)           ENT/Mouth: membranes moist, no oral lesions or bleeding gums.      EYES:  no scleral icterus, normal conjunctivae                    Neck: No carotid bruit or thyromegaly   Chest/Lungs:   lungs are clear to auscultation, no rales or wheezing, equal chest wall expansion    Cardiovascular:   Regular. Normal first and second heart sounds with no murmurs, rubs, or gallops; the carotid, radial and posterior tibial pulses are intact, Jugular venous pressure normal, minimal to mild edema bilaterally        Extremities: no cyanosis or clubbing   Skin: no xanthelasma, warm.    Neurologic:  no tremors     Psychiatric: alert and oriented x3, calm        Please refer above for cardiac ROS details.        Medical History  Surgical History Family History Social History   Past Medical History:   Diagnosis Date    Arthritis     Arthritis     Basal cell cancer 2007    Benign neoplasm of colon     Brain aneurysm     removed on 12/31/13    Cancer (H)     basel cell    Cerebral embolism with cerebral infarction (H)     Cerebral vascular accident (H) 12/31/2013    Colitis     Coronary artery disease     External hemorrhoid 6/15/2016    Fibrocystic breast     High blood pressure     History of heart artery stent 9/2012    Hyperlipidemia     Hypertension     Hypertension     Myalgia and myositis, unspecified     S/P hysterectomy 7/28/2019    Total hysterectomy with BSO benign disease, edometriosis?    Sleep apnea, obstructive     c-pap    Statin intolerance     Stroke (H)     Type 2 diabetes mellitus without complications (H)     borderline? blood sugars are fine/nosx no meds     Past Surgical History:   Procedure Laterality Date    BRAIN SURGERY  2013    brain aneurism    CARDIAC CATHETERIZATION  " 9/28/15    mild to moderate coronary disease only without obstructive stenosis    CEREBRAL ANEURYSM REPAIR  12/31/2013    [facility] Aneurysm clipping, Dr Harvey    CORONARY STENT PLACEMENT  9/20/12    bare metal stent in the proximal LAD     HEMORRHOID SURGERY  1975    HEMORRHOIDECTOMY INTERNAL LIGATION  1975    HYSTERECTOMY  2002    HYSTERECTOMY      NECK SURGERY  2001    OOPHORECTOMY      OVARY SURGERY  removed 1990    MS NASAL/SINUS ENDOSCOPY,BX/RMV POLYP/DEBRID  1998    Description: Nasal Endoscopy Polypectomy;    MS REMOVAL OF OVARY/TUBE(S) Right      Salpingo-oophorectomy Right Side    REPAIR ARTERY CORONARY  2012    femerol artery torn during angiogram    SINUS SURGERY  1998    polyp remvd    STENT  2012    heart    ZZC TOTAL ABDOM HYSTERECTOMY  08/01/2002    endometriosis     Family History   Problem Relation Age of Onset    Cancer Mother     Diabetes Mother     Hypertension Mother     Thyroid Disease Mother     Heart Disease Mother     Stomach Cancer Mother     Coronary Artery Disease Mother 60    Hypertension Father     Heart Disease Sister     GERD Sister     CABG Sister     Colon Cancer Brother     Heart Disease Maternal Grandfather     Breast Cancer Maternal Aunt         Social History     Socioeconomic History    Marital status: Single     Spouse name: Not on file    Number of children: Not on file    Years of education: Not on file    Highest education level: Not on file   Occupational History    Not on file   Tobacco Use    Smoking status: Never     Passive exposure: Never    Smokeless tobacco: Never   Vaping Use    Vaping status: Never Used   Substance and Sexual Activity    Alcohol use: Yes    Drug use: No    Sexual activity: Not on file   Other Topics Concern    Not on file   Social History Narrative    Not on file     Social Determinants of Health     Financial Resource Strain: Low Risk  (12/4/2023)    Financial Resource Strain     Within the past 12 months, have you or your family members you live  with been unable to get utilities (heat, electricity) when it was really needed?: No   Food Insecurity: Low Risk  (12/4/2023)    Food Insecurity     Within the past 12 months, did you worry that your food would run out before you got money to buy more?: No     Within the past 12 months, did the food you bought just not last and you didn t have money to get more?: No   Transportation Needs: Low Risk  (12/4/2023)    Transportation Needs     Within the past 12 months, has lack of transportation kept you from medical appointments, getting your medicines, non-medical meetings or appointments, work, or from getting things that you need?: No   Physical Activity: Not on file   Stress: Not on file   Social Connections: Not on file   Interpersonal Safety: Low Risk  (12/7/2023)    Interpersonal Safety     Do you feel physically and emotionally safe where you currently live?: Yes     Within the past 12 months, have you been hit, slapped, kicked or otherwise physically hurt by someone?: No     Within the past 12 months, have you been humiliated or emotionally abused in other ways by your partner or ex-partner?: No   Housing Stability: Low Risk  (12/4/2023)    Housing Stability     Do you have housing? : Yes     Are you worried about losing your housing?: No           Medications  Allergies   Current Outpatient Medications   Medication Sig Dispense Refill    amitriptyline (ELAVIL) 25 MG tablet Take 25 mg by mouth at bedtime      aspirin 81 MG EC tablet Take 81 mg by mouth daily      azelastine (ASTELIN) 0.1 % nasal spray USE 2 SPRAYS IN EACH NOSTRIL TWICE DAILY AS NEEDED 90 mL 3    cetirizine (ZYRTEC) 10 MG tablet Take 10 mg by mouth daily      Cholecalciferol (VITAMIN D3 PO) Take 2,000 Units by mouth every morning       Cyanocobalamin (B-12) 5000 MCG SUBL Take by mouth every 24 hours      Ferrous Sulfate (IRON) 90 (18 Fe) MG TABS Take by mouth every 24 hours      fluticasone (FLONASE) 50 MCG/ACT nasal spray INSTILL 1 SPRAY IN  EACH NOSTRIL DAILY 48 g 0    furosemide (LASIX) 20 MG tablet Take 1 tablet (20 mg) by mouth daily as needed (leg swelling) 30 tablet 0    hydrochlorothiazide (HYDRODIURIL) 25 MG tablet TAKE 2 TABLETS(50 MG) BY MOUTH DAILY. FOLLOW-UP WITH DOCTOR ROLANDA** 180 tablet 0    losartan (COZAAR) 100 MG tablet Take 1 tablet (100 mg) by mouth daily 100 tablet 1    MAGNESIUM GLUCONATE PO Take 400 mg by mouth daily      metoprolol succinate ER (TOPROL XL) 100 MG 24 hr tablet Take 1 tablet (100 mg) by mouth 2 times daily 180 tablet 3    montelukast (SINGULAIR) 10 MG tablet Take 1 tablet (10 mg) by mouth daily 90 tablet 3    NIFEdipine ER (ADALAT CC) 60 MG 24 hr tablet TAKE 1 TABLET(60 MG) BY MOUTH EVERY 24 HOURS 90 tablet 3    nitroGLYcerin (NITROSTAT) 0.4 MG sublingual tablet DISSOLVE 1 TABLET UNDER THE TONGUE EVERY 5 MINUTES FOR 3 DOSES. IF SYMPTOMS PERSIST AFTER FIRST DOSE CALL 911 25 tablet 1    Omega-3 Fatty Acids (OMEGA-3 FISH OIL PO) Take 1,400 mg by mouth At Bedtime      potassium chloride ER (KLOR-CON M) 20 MEQ CR tablet TAKE 2 TABLETS BY MOUTH TWICE DAILY 360 tablet 1       Allergies   Allergen Reactions    Propylene Glycol Itching and Rash    Red Dye Rash     Any kind of red pills.    Adhesive Tape Itching    Atorvastatin Cramps     Mevacor, and zocor also leg cramping  Other reaction(s): leg cramps    Carvedilol      hives    Cephalosporins Hives    Clindamycin Hives    Demerol Nausea and Vomiting    Dilantin [Phenytoin]      Hives    Lisinopril      Trouble breathing    Meperidine Nausea and Vomiting     Hives  Other reaction(s): hives and vomiting    Metronidazole Hives    Nickel      Other reaction(s): Rash, itching    Penicillins Hives    Rosuvastatin Cramps     Leg cramping    Sulfa Antibiotics Hives    Latex Rash          Lab Results    Chemistry/lipid CBC Cardiac Enzymes/BNP/TSH/INR   Recent Labs   Lab Test 08/09/23  1112   CHOL 270*   HDL 57   *   TRIG 133     Recent Labs   Lab Test 08/09/23  1112  12/20/21  1022 12/22/20  1723   * 176* 159*     Recent Labs   Lab Test 03/06/24  1233      POTASSIUM 4.4   CHLORIDE 101   CO2 30*   *   BUN 15.6   CR 0.76   GFRESTIMATED 84   BELA 11.0*     Recent Labs   Lab Test 03/06/24  1233 08/09/23  1112 08/03/22  1623   CR 0.76 0.74 0.69     Recent Labs   Lab Test 03/06/24  1233 08/09/23  1112 08/03/22  1623   A1C 6.2* 6.3* 6.2*          Recent Labs   Lab Test 10/27/21  0958   WBC 8.5   HGB 14.1   HCT 42.4   MCV 87        Recent Labs   Lab Test 10/27/21  0958 05/01/20  1107 10/21/19  1209   HGB 14.1 13.7 13.5    Recent Labs   Lab Test 10/22/19  1002   TROPONINI <0.01     Recent Labs   Lab Test 11/20/19  1527   BNP 28     Recent Labs   Lab Test 12/20/21  1022   TSH 1.40     Recent Labs   Lab Test 05/01/20  1107   INR 0.94          This note has been dictated using voice recognition software. Any grammatical, typographical, or context distortions are unintentional and inherent to the software    Sanaz Valdez PA-C

## 2024-06-25 ENCOUNTER — OFFICE VISIT (OUTPATIENT)
Dept: CARDIOLOGY | Facility: CLINIC | Age: 69
End: 2024-06-25
Attending: INTERNAL MEDICINE
Payer: COMMERCIAL

## 2024-06-25 VITALS
HEIGHT: 63 IN | WEIGHT: 198.7 LBS | RESPIRATION RATE: 16 BRPM | BODY MASS INDEX: 35.21 KG/M2 | DIASTOLIC BLOOD PRESSURE: 84 MMHG | SYSTOLIC BLOOD PRESSURE: 144 MMHG | HEART RATE: 56 BPM

## 2024-06-25 DIAGNOSIS — I10 ESSENTIAL HYPERTENSION: ICD-10-CM

## 2024-06-25 DIAGNOSIS — M79.89 LEG SWELLING: ICD-10-CM

## 2024-06-25 DIAGNOSIS — I25.10 ATHEROSCLEROSIS OF NATIVE CORONARY ARTERY OF NATIVE HEART WITHOUT ANGINA PECTORIS: Primary | ICD-10-CM

## 2024-06-25 DIAGNOSIS — E78.00 PRIMARY HYPERCHOLESTEROLEMIA: ICD-10-CM

## 2024-06-25 PROCEDURE — 99214 OFFICE O/P EST MOD 30 MIN: CPT

## 2024-06-25 PROCEDURE — G2211 COMPLEX E/M VISIT ADD ON: HCPCS

## 2024-06-25 RX ORDER — HYDROCHLOROTHIAZIDE 25 MG/1
50 TABLET ORAL DAILY
Qty: 180 TABLET | Refills: 3 | Status: SHIPPED | OUTPATIENT
Start: 2024-06-25

## 2024-06-25 NOTE — NURSING NOTE
Patient brought in own BP cuff to compare Omron Rt arm 152/89 P 65, we did have to move the chair in order to plug in the patient's machine.  AMILCAR SamuelA

## 2024-06-25 NOTE — LETTER
6/25/2024    Jong Claudio, NP  [address] [place] [address]    RE: Alvina Maynard       Dear Colleague,     I had the pleasure of seeing Alvina Maynard in the [facility].    HEART CARE ENCOUNTER CONSULTATON NOTE      [facility]  [phone]      Assessment/Recommendations   Assessment:   Coronary artery disease: s/p LAD stenting 2012, repeat angiogram 2015 with mild-moderate CAD and no intervention, nonischemic NM stress test 2019 - aspirin 81 mg.  Ambiguous chest pressure symptom improved after starting amitriptyline for GI dysfunction.   Hyperlipidemia: Uncontrolled and never started Zetia.  Historical intolerance to multiple statins.  This discussed PCSK9 inhibitors in the past but was not started due to cost, as well as hesitancy of side effects.  Hypertension: Elevated 144/84 on Losartan 100 mg, metoprolol succinate 100 mg twice daily, HCTZ 50 mg, nifedipine 60 mg.  Home blood pressure measurements affect appropriate control.  LE swelling: Minimal to mild.  Prescribed furosemide 20 mg as needed, has used 1 time.  No evidence of decompensation on exam, less likely cardiac related.  Seems to be consistent development with starting of amitriptyline      Plan:   We had long discussion regarding cholesterol control and contribution to CAD progression.  Have asked her to retry Zetia, she wishes to delay this due to current side effects of another medication she wants to figure out.  I have asked her to notify us when she trial Zetia again so we can monitor/trial.  We did briefly discuss potential future trial of PCSK9 inhibitors as cholesterol is quite elevated.  She prefers to try Zetia first.  We discussed monitoring stress test with history of mild to moderate disease and uncontrolled cholesterol.  Last evaluation 2019.  An improvement in chest pressure with treatment of GI issues patient elects to delay evaluation.  Discussed symptoms of angina to monitor  for and she will notify clinic if this happens.  Recommend discussion with GI specialist of joint pain and muscle aches as well as lower extremity swelling is possibly related to amitriptyline.  If wanting to try a more consistent furosemide use discussed with Jong Claudio for lab monitoring  Continue current antihypertensive regimen      Follow up in 1 year or sooner as needed     History of Present Illness/Subjective    HPI: Alvina Maynard is a 68 year old female with PMHx of CAD, HLD, HTN, presents for follow up.    Patient reports no significant change over the last year.  Reviewed blood pressure log at home and appears to be adequate control overall despite mild elevation today.  She has had ANNELIESE with symptoms of chest pressure in the past and recently wondering if it is cardiac or GI related.  She has had EGD workup showing abnormal esophageal manometry results, underwent dilation procedure without symptom improvement.  Was then started on amitriptyline and chest pressure symptoms noted to get better.  Although, has had some joint pain, mild lower extremity swelling and muscle aches since increasing the dose with GI specialist.  Was prescribed as needed furosemide by primary provider although has only taken this once.  No improvement with compression socks.  Worsens throughout the da, and improves in the morning.    Cholesterol is quite high and she is not tolerated statins.  Was instructed to start Zetia by Dr. Rae but never did for fear of side effects.  Was told PCSK9 inhibitors were not recommended by the pharmacist.      She denies lightheadedness, shortness of breath, dyspnea on exertion, orthopnea, PND, palpitations, and chest pain.        2019 Lexiscan stress    The nuclear stress test is negative for inducible myocardial ischemia or infarction.    The left ventricular ejection fraction at stress is greater than 70%.    A prior study was conducted on 8/15/2017.  This study has no change when  "compared with the prior study.     Physical Examination  Review of Systems   Vitals: BP (!) 144/84 (BP Location: Right arm, Patient Position: Sitting, Cuff Size: Adult Large)   Pulse 56   Resp 16   Ht 1.6 m (5' 3\")   Wt 90.1 kg (198 lb 11.2 oz)   BMI 35.20 kg/m    BMI= Body mass index is 35.2 kg/m .  Wt Readings from Last 3 Encounters:   06/25/24 90.1 kg (198 lb 11.2 oz)   04/29/24 90.8 kg (200 lb 1.6 oz)   03/06/24 91.8 kg (202 lb 4.8 oz)           ENT/Mouth: membranes moist, no oral lesions or bleeding gums.      EYES:  no scleral icterus, normal conjunctivae                    Neck: No carotid bruit or thyromegaly   Chest/Lungs:   lungs are clear to auscultation, no rales or wheezing, equal chest wall expansion    Cardiovascular:   Regular. Normal first and second heart sounds with no murmurs, rubs, or gallops; the carotid, radial and posterior tibial pulses are intact, Jugular venous pressure normal, minimal to mild edema bilaterally        Extremities: no cyanosis or clubbing   Skin: no xanthelasma, warm.    Neurologic:  no tremors     Psychiatric: alert and oriented x3, calm        Please refer above for cardiac ROS details.        Medical History  Surgical History Family History Social History   Past Medical History:   Diagnosis Date    Arthritis     Arthritis     Basal cell cancer 2007    Benign neoplasm of colon     Brain aneurysm     removed on 12/31/13    Cancer (H)     basel cell    Cerebral embolism with cerebral infarction (H)     Cerebral vascular accident (H) 12/31/2013    Colitis     Coronary artery disease     External hemorrhoid 6/15/2016    Fibrocystic breast     High blood pressure     History of heart artery stent 9/2012    Hyperlipidemia     Hypertension     Hypertension     Myalgia and myositis, unspecified     S/P hysterectomy 7/28/2019    Total hysterectomy with BSO benign disease, edometriosis?    Sleep apnea, obstructive     c-pap    Statin intolerance     Stroke (H)     Type 2 " diabetes mellitus without complications (H)     borderline? blood sugars are fine/nosx no meds     Past Surgical History:   Procedure Laterality Date    BRAIN SURGERY  2013    brain aneurism    CARDIAC CATHETERIZATION  9/28/15    mild to moderate coronary disease only without obstructive stenosis    CEREBRAL ANEURYSM REPAIR  12/31/2013    [facility] Aneurysm clipping, Dr Harvey    CORONARY STENT PLACEMENT  9/20/12    bare metal stent in the proximal LAD     HEMORRHOID SURGERY  1975    HEMORRHOIDECTOMY INTERNAL LIGATION  1975    HYSTERECTOMY  2002    HYSTERECTOMY      NECK SURGERY  2001    OOPHORECTOMY      OVARY SURGERY  removed 1990    FL NASAL/SINUS ENDOSCOPY,BX/RMV POLYP/DEBRID  1998    Description: Nasal Endoscopy Polypectomy;    FL REMOVAL OF OVARY/TUBE(S) Right      Salpingo-oophorectomy Right Side    REPAIR ARTERY CORONARY  2012    femerol artery torn during angiogram    SINUS SURGERY  1998    polyp remvd    STENT  2012    heart    ZZC TOTAL ABDOM HYSTERECTOMY  08/01/2002    endometriosis     Family History   Problem Relation Age of Onset    Cancer Mother     Diabetes Mother     Hypertension Mother     Thyroid Disease Mother     Heart Disease Mother     Stomach Cancer Mother     Coronary Artery Disease Mother 60    Hypertension Father     Heart Disease Sister     GERD Sister     CABG Sister     Colon Cancer Brother     Heart Disease Maternal Grandfather     Breast Cancer Maternal Aunt         Social History     Socioeconomic History    Marital status: Single     Spouse name: Not on file    Number of children: Not on file    Years of education: Not on file    Highest education level: Not on file   Occupational History    Not on file   Tobacco Use    Smoking status: Never     Passive exposure: Never    Smokeless tobacco: Never   Vaping Use    Vaping status: Never Used   Substance and Sexual Activity    Alcohol use: Yes    Drug use: No    Sexual activity: Not on file   Other Topics Concern    Not on file   Social  History Narrative    Not on file     Social Determinants of Health     Financial Resource Strain: Low Risk  (12/4/2023)    Financial Resource Strain     Within the past 12 months, have you or your family members you live with been unable to get utilities (heat, electricity) when it was really needed?: No   Food Insecurity: Low Risk  (12/4/2023)    Food Insecurity     Within the past 12 months, did you worry that your food would run out before you got money to buy more?: No     Within the past 12 months, did the food you bought just not last and you didn t have money to get more?: No   Transportation Needs: Low Risk  (12/4/2023)    Transportation Needs     Within the past 12 months, has lack of transportation kept you from medical appointments, getting your medicines, non-medical meetings or appointments, work, or from getting things that you need?: No   Physical Activity: Not on file   Stress: Not on file   Social Connections: Not on file   Interpersonal Safety: Low Risk  (12/7/2023)    Interpersonal Safety     Do you feel physically and emotionally safe where you currently live?: Yes     Within the past 12 months, have you been hit, slapped, kicked or otherwise physically hurt by someone?: No     Within the past 12 months, have you been humiliated or emotionally abused in other ways by your partner or ex-partner?: No   Housing Stability: Low Risk  (12/4/2023)    Housing Stability     Do you have housing? : Yes     Are you worried about losing your housing?: No           Medications  Allergies   Current Outpatient Medications   Medication Sig Dispense Refill    amitriptyline (ELAVIL) 25 MG tablet Take 25 mg by mouth at bedtime      aspirin 81 MG EC tablet Take 81 mg by mouth daily      azelastine (ASTELIN) 0.1 % nasal spray USE 2 SPRAYS IN EACH NOSTRIL TWICE DAILY AS NEEDED 90 mL 3    cetirizine (ZYRTEC) 10 MG tablet Take 10 mg by mouth daily      Cholecalciferol (VITAMIN D3 PO) Take 2,000 Units by mouth every  morning       Cyanocobalamin (B-12) 5000 MCG SUBL Take by mouth every 24 hours      Ferrous Sulfate (IRON) 90 (18 Fe) MG TABS Take by mouth every 24 hours      fluticasone (FLONASE) 50 MCG/ACT nasal spray INSTILL 1 SPRAY IN EACH NOSTRIL DAILY 48 g 0    furosemide (LASIX) 20 MG tablet Take 1 tablet (20 mg) by mouth daily as needed (leg swelling) 30 tablet 0    hydrochlorothiazide (HYDRODIURIL) 25 MG tablet TAKE 2 TABLETS(50 MG) BY MOUTH DAILY. FOLLOW-UP WITH DOCTOR ROLANDA** 180 tablet 0    losartan (COZAAR) 100 MG tablet Take 1 tablet (100 mg) by mouth daily 100 tablet 1    MAGNESIUM GLUCONATE PO Take 400 mg by mouth daily      metoprolol succinate ER (TOPROL XL) 100 MG 24 hr tablet Take 1 tablet (100 mg) by mouth 2 times daily 180 tablet 3    montelukast (SINGULAIR) 10 MG tablet Take 1 tablet (10 mg) by mouth daily 90 tablet 3    NIFEdipine ER (ADALAT CC) 60 MG 24 hr tablet TAKE 1 TABLET(60 MG) BY MOUTH EVERY 24 HOURS 90 tablet 3    nitroGLYcerin (NITROSTAT) 0.4 MG sublingual tablet DISSOLVE 1 TABLET UNDER THE TONGUE EVERY 5 MINUTES FOR 3 DOSES. IF SYMPTOMS PERSIST AFTER FIRST DOSE CALL 911 25 tablet 1    Omega-3 Fatty Acids (OMEGA-3 FISH OIL PO) Take 1,400 mg by mouth At Bedtime      potassium chloride ER (KLOR-CON M) 20 MEQ CR tablet TAKE 2 TABLETS BY MOUTH TWICE DAILY 360 tablet 1       Allergies   Allergen Reactions    Propylene Glycol Itching and Rash    Red Dye Rash     Any kind of red pills.    Adhesive Tape Itching    Atorvastatin Cramps     Mevacor, and zocor also leg cramping  Other reaction(s): leg cramps    Carvedilol      hives    Cephalosporins Hives    Clindamycin Hives    Demerol Nausea and Vomiting    Dilantin [Phenytoin]      Hives    Lisinopril      Trouble breathing    Meperidine Nausea and Vomiting     Hives  Other reaction(s): hives and vomiting    Metronidazole Hives    Nickel      Other reaction(s): Rash, itching    Penicillins Hives    Rosuvastatin Cramps     Leg cramping    Sulfa  Antibiotics Hives    Latex Rash          Lab Results    Chemistry/lipid CBC Cardiac Enzymes/BNP/TSH/INR   Recent Labs   Lab Test 08/09/23  1112   CHOL 270*   HDL 57   *   TRIG 133     Recent Labs   Lab Test 08/09/23  1112 12/20/21  1022 12/22/20  1723   * 176* 159*     Recent Labs   Lab Test 03/06/24  1233      POTASSIUM 4.4   CHLORIDE 101   CO2 30*   *   BUN 15.6   CR 0.76   GFRESTIMATED 84   BELA 11.0*     Recent Labs   Lab Test 03/06/24  1233 08/09/23  1112 08/03/22  1623   CR 0.76 0.74 0.69     Recent Labs   Lab Test 03/06/24  1233 08/09/23  1112 08/03/22  1623   A1C 6.2* 6.3* 6.2*          Recent Labs   Lab Test 10/27/21  0958   WBC 8.5   HGB 14.1   HCT 42.4   MCV 87        Recent Labs   Lab Test 10/27/21  0958 05/01/20  1107 10/21/19  1209   HGB 14.1 13.7 13.5    Recent Labs   Lab Test 10/22/19  1002   TROPONINI <0.01     Recent Labs   Lab Test 11/20/19  1527   BNP 28     Recent Labs   Lab Test 12/20/21  1022   TSH 1.40     Recent Labs   Lab Test 05/01/20  1107   INR 0.94          This note has been dictated using voice recognition software. Any grammatical, typographical, or context distortions are unintentional and inherent to the software    Sanaz Valdez PA-C    Thank you for allowing me to participate in the care of your patient.      Sincerely,     Sanaz Hamilton PA-C     [facility]  cc:   Latonia Rae MD  [address] [place] [address]

## 2024-06-25 NOTE — PATIENT INSTRUCTIONS
It was a pleasure taking part in your care today:    - Discuss amitriptyline   - Consider trial of Zetia 10 mg once daily cholesterol, and tell Dr. Rae when you are ready to start it  - If wanting to try furosemide more often notify Primary Jong Claudio   - Follow up in 1 year     Please call the [facility] clinic with any questions or concerns at [phone].     Sanaz Valdez PA-C

## 2024-07-25 ENCOUNTER — TRANSFERRED RECORDS (OUTPATIENT)
Dept: HEALTH INFORMATION MANAGEMENT | Facility: CLINIC | Age: 69
End: 2024-07-25
Payer: COMMERCIAL

## 2024-07-29 ENCOUNTER — THERAPY VISIT (OUTPATIENT)
Dept: PHYSICAL THERAPY | Facility: REHABILITATION | Age: 69
End: 2024-07-29
Payer: COMMERCIAL

## 2024-07-29 DIAGNOSIS — M54.2 CERVICAL PAIN: ICD-10-CM

## 2024-07-29 DIAGNOSIS — R10.31 RIGHT INGUINAL PAIN: Primary | ICD-10-CM

## 2024-07-29 DIAGNOSIS — R68.84 JAW PAIN: ICD-10-CM

## 2024-07-29 PROCEDURE — 97110 THERAPEUTIC EXERCISES: CPT | Mod: GP | Performed by: PHYSICAL THERAPIST

## 2024-08-07 ENCOUNTER — TELEPHONE (OUTPATIENT)
Dept: VASCULAR SURGERY | Facility: CLINIC | Age: 69
End: 2024-08-07

## 2024-08-07 ENCOUNTER — OFFICE VISIT (OUTPATIENT)
Dept: FAMILY MEDICINE | Facility: CLINIC | Age: 69
End: 2024-08-07
Payer: COMMERCIAL

## 2024-08-07 VITALS
HEART RATE: 85 BPM | HEIGHT: 63 IN | RESPIRATION RATE: 16 BRPM | BODY MASS INDEX: 35.38 KG/M2 | WEIGHT: 199.7 LBS | DIASTOLIC BLOOD PRESSURE: 72 MMHG | TEMPERATURE: 97.9 F | SYSTOLIC BLOOD PRESSURE: 130 MMHG | OXYGEN SATURATION: 96 %

## 2024-08-07 DIAGNOSIS — M54.41 CHRONIC RIGHT-SIDED LOW BACK PAIN WITH RIGHT-SIDED SCIATICA: ICD-10-CM

## 2024-08-07 DIAGNOSIS — R39.89 ABNORMAL URINE COLOR: ICD-10-CM

## 2024-08-07 DIAGNOSIS — G89.29 CHRONIC RIGHT-SIDED LOW BACK PAIN WITH RIGHT-SIDED SCIATICA: ICD-10-CM

## 2024-08-07 DIAGNOSIS — L65.9 ALOPECIA: ICD-10-CM

## 2024-08-07 DIAGNOSIS — M79.89 LOCALIZED SWELLING OF BOTH LOWER EXTREMITIES: Primary | ICD-10-CM

## 2024-08-07 DIAGNOSIS — R00.2 PALPITATIONS: ICD-10-CM

## 2024-08-07 LAB
ALBUMIN SERPL BCG-MCNC: 4.8 G/DL (ref 3.5–5.2)
ALBUMIN UR-MCNC: NEGATIVE MG/DL
ALP SERPL-CCNC: 93 U/L (ref 40–150)
ALT SERPL W P-5'-P-CCNC: 35 U/L (ref 0–50)
ANION GAP SERPL CALCULATED.3IONS-SCNC: 13 MMOL/L (ref 7–15)
APPEARANCE UR: CLEAR
AST SERPL W P-5'-P-CCNC: 27 U/L (ref 0–45)
BILIRUB SERPL-MCNC: 0.4 MG/DL
BILIRUB UR QL STRIP: NEGATIVE
BUN SERPL-MCNC: 16.4 MG/DL (ref 8–23)
CALCIUM SERPL-MCNC: 10.8 MG/DL (ref 8.8–10.4)
CHLORIDE SERPL-SCNC: 101 MMOL/L (ref 98–107)
COLOR UR AUTO: YELLOW
CREAT SERPL-MCNC: 0.69 MG/DL (ref 0.51–0.95)
EGFRCR SERPLBLD CKD-EPI 2021: >90 ML/MIN/1.73M2
FERRITIN SERPL-MCNC: 260 NG/ML (ref 11–328)
GLUCOSE SERPL-MCNC: 126 MG/DL (ref 70–99)
GLUCOSE UR STRIP-MCNC: NEGATIVE MG/DL
HCO3 SERPL-SCNC: 26 MMOL/L (ref 22–29)
HGB UR QL STRIP: NEGATIVE
IRON BINDING CAPACITY (ROCHE): 361 UG/DL (ref 240–430)
IRON SATN MFR SERPL: 29 % (ref 15–46)
IRON SERPL-MCNC: 104 UG/DL (ref 37–145)
KETONES UR STRIP-MCNC: NEGATIVE MG/DL
LEUKOCYTE ESTERASE UR QL STRIP: NEGATIVE
MAGNESIUM SERPL-MCNC: 2.1 MG/DL (ref 1.7–2.3)
NITRATE UR QL: NEGATIVE
PH UR STRIP: 8 [PH] (ref 5–8)
POTASSIUM SERPL-SCNC: 4.1 MMOL/L (ref 3.4–5.3)
PROT SERPL-MCNC: 7.6 G/DL (ref 6.4–8.3)
SODIUM SERPL-SCNC: 140 MMOL/L (ref 135–145)
SP GR UR STRIP: 1.02 (ref 1–1.03)
TSH SERPL DL<=0.005 MIU/L-ACNC: 1.96 UIU/ML (ref 0.3–4.2)
UROBILINOGEN UR STRIP-ACNC: 0.2 E.U./DL

## 2024-08-07 PROCEDURE — 82728 ASSAY OF FERRITIN: CPT | Performed by: NURSE PRACTITIONER

## 2024-08-07 PROCEDURE — 80053 COMPREHEN METABOLIC PANEL: CPT | Performed by: NURSE PRACTITIONER

## 2024-08-07 PROCEDURE — 83735 ASSAY OF MAGNESIUM: CPT | Performed by: NURSE PRACTITIONER

## 2024-08-07 PROCEDURE — 84443 ASSAY THYROID STIM HORMONE: CPT | Performed by: NURSE PRACTITIONER

## 2024-08-07 PROCEDURE — 93242 EXT ECG>48HR<7D RECORDING: CPT | Performed by: NURSE PRACTITIONER

## 2024-08-07 PROCEDURE — 83550 IRON BINDING TEST: CPT | Performed by: NURSE PRACTITIONER

## 2024-08-07 PROCEDURE — 81003 URINALYSIS AUTO W/O SCOPE: CPT | Performed by: NURSE PRACTITIONER

## 2024-08-07 PROCEDURE — 83540 ASSAY OF IRON: CPT | Performed by: NURSE PRACTITIONER

## 2024-08-07 PROCEDURE — 36415 COLL VENOUS BLD VENIPUNCTURE: CPT | Performed by: NURSE PRACTITIONER

## 2024-08-07 PROCEDURE — 99214 OFFICE O/P EST MOD 30 MIN: CPT | Performed by: NURSE PRACTITIONER

## 2024-08-07 PROCEDURE — G2211 COMPLEX E/M VISIT ADD ON: HCPCS | Performed by: NURSE PRACTITIONER

## 2024-08-07 ASSESSMENT — PAIN SCALES - GENERAL: PAINLEVEL: NO PAIN (0)

## 2024-08-07 NOTE — TELEPHONE ENCOUNTER
Vascular Referral Intake    Referred by: Jong Claudio NP for M79.89 (ICD-10-CM) - Localized swelling of both lower extremities     Specialty: Vascular Medicine    Specific Provider if Necessary:  MD Segun Wakefield, if does not want to wait can go to [facility]    Visit Type: New    Time Frame: No Preference    Testing/Imaging Needed Before Consult: None    Appt Note: (to be pasted into appt comments, also add where additional information is located, ie, outside images pushed to PACS, in Epic, sent to [facility], etc)  CONSULT BLE swelling

## 2024-08-07 NOTE — PATIENT INSTRUCTIONS
We got you connected with the Zio patch to evaluate the palpitations.  The blood work today will also help evaluate it.    You are also collecting urine to evaluate the concentration to make sure there is no hydration problems.    I ordered an MRI for further evaluation of your low back pain.    I also ordered that ultrasound to make sure there are no clots in the legs.    Scheduling number for these are highlighted in your paperwork.    If everything comes back normal, I did place that referral to vascular medicine.  Scheduling number is highlighted in your paperwork there.  If you want to avoid the furosemide until you talk with them, that is okay.

## 2024-08-07 NOTE — TELEPHONE ENCOUNTER
Routing to scheduling to coordinate the following:    NEW VASCULAR PATIENT consult with Vascular Medicine  Please schedule this at next available      Appt note:  Referral for leg swelling    Chaya ISABEL RN    [facility]  Office: [phone]  Fax: [phone]

## 2024-08-07 NOTE — PROGRESS NOTES
Assessment & Plan     ICD-10-CM    1. Localized swelling of both lower extremities  M79.89 US Lower Extremity Venous Duplex Bilateral     Vascular Medicine Referral      2. Abnormal urine color  R39.89 UA Macroscopic with reflex to Microscopic and Culture - Lab Collect     Comprehensive metabolic panel (BMP + Alb, Alk Phos, ALT, AST, Total. Bili, TP)     UA Macroscopic with reflex to Microscopic and Culture - Lab Collect     Comprehensive metabolic panel (BMP + Alb, Alk Phos, ALT, AST, Total. Bili, TP)      3. Palpitations  R00.2 Comprehensive metabolic panel (BMP + Alb, Alk Phos, ALT, AST, Total. Bili, TP)     Magnesium     TSH with free T4 reflex     ZIO PATCH 3-7 DAYS (additional cost to patient)     ZIO PATCH 3-7 DAYS APPLICATION     Comprehensive metabolic panel (BMP + Alb, Alk Phos, ALT, AST, Total. Bili, TP)     Magnesium     TSH with free T4 reflex      4. Alopecia  L65.9 Iron and iron binding capacity     Ferritin     Iron and iron binding capacity     Ferritin      5. Chronic right-sided low back pain with right-sided sciatica  M54.41 MR Lumbar Spine w/o Contrast    G89.29         Continued lower back pain despite trying physical therapy.  Update lumbar MRI.  Ultrasound for lower extremity edema evaluation and connect with vascular medicine for opinion.  Okay to continue holding furosemide.  Zio patch and labs to assess palpitations.    Reviewed cardiology note x 1    The longitudinal plan of care for the diagnosis(es)/condition(s) as documented were addressed during this visit. Due to the added complexity in care, I will continue to support Alvina in the subsequent management and with ongoing continuity of care.      Subjective     HPI     Patient presents today with complaints of right low back pain and lower extremity edema.    When I last saw the patient back in March, she had complaints of LE edema.  Given prescription for furosemide.  Only used 1 tablet but became concerned about potential side  "effects she read about online.    Regarding the lower back, she reports episodes where she will get shooting pains when she steps down. No LOC of bowel or bladder. No saddle anaesthesia.       Review of Systems - negative except for what's listed in the HPI      Objective    /72 (BP Location: Left arm, Patient Position: Sitting, Cuff Size: Adult Large)   Pulse 85   Temp 97.9  F (36.6  C) (Oral)   Resp 16   Ht 1.6 m (5' 3\")   Wt 90.6 kg (199 lb 11.2 oz)   SpO2 96%   BMI 35.38 kg/m    Physical Exam   General appearance - alert, well appearing, and in no distress  Mental status - alert, oriented to person, place, and time  Lymphatics - no palpable lymphadenopathy  Chest - clear to auscultation, no wheezes, rales or rhonchi, symmetric air entry  Heart - normal rate and regular rhythm, S1 and S2 normal, no murmurs noted  Abdomen - soft, nontender, nondistended, no masses or organomegaly  Neurological -baseline intact  Musculoskeletal -slow sit to stand.    Extremities - peripheral pulses normal, +1 bilateral lower extremity edema.  Scattered venous varicosities  Skin - normal coloration and turgor.    Jong Claudio, CNP    This note has been dictated using voice recognition software. Any grammatical or context distortions are unintentional and inherent to the software.    "

## 2024-08-07 NOTE — TELEPHONE ENCOUNTER
Spoke with patient.  She would like to go back to see Dr. Abdiel Edouard at [facility].  She is going to call to see if she can get in with Dr. Edouard and if she can, she will ask Jong Claudio's office for the referral to be sent to [facility].  If it takes too long to get in to see Dr. Edouard, [facility] and lastly she will call us back again if everywhere is a long wait and be scheduled with Dr. Wakefield in December.  No call back needed, pt will follow up.

## 2024-08-08 NOTE — TELEPHONE ENCOUNTER
Spoke with patient - patient wants to speak with her outside provider before moving forward to schedule with [facility]. Patient stated she will call back when she is ready to schedule. Flagging for follow up.

## 2024-08-09 ENCOUNTER — HOSPITAL ENCOUNTER (OUTPATIENT)
Dept: ULTRASOUND IMAGING | Facility: CLINIC | Age: 69
Discharge: HOME | End: 2024-08-09
Attending: NURSE PRACTITIONER | Admitting: NURSE PRACTITIONER
Payer: COMMERCIAL

## 2024-08-09 DIAGNOSIS — M79.89 LOCALIZED SWELLING OF BOTH LOWER EXTREMITIES: ICD-10-CM

## 2024-08-09 PROCEDURE — 93970 EXTREMITY STUDY: CPT

## 2024-08-10 DIAGNOSIS — E87.6 HYPOKALEMIA: ICD-10-CM

## 2024-08-12 RX ORDER — POTASSIUM CHLORIDE 1500 MG/1
TABLET, EXTENDED RELEASE ORAL
Qty: 360 TABLET | Refills: 2 | Status: SHIPPED | OUTPATIENT
Start: 2024-08-12

## 2024-08-21 ENCOUNTER — THERAPY VISIT (OUTPATIENT)
Dept: PHYSICAL THERAPY | Facility: REHABILITATION | Age: 69
End: 2024-08-21
Payer: COMMERCIAL

## 2024-08-21 ENCOUNTER — ANCILLARY PROCEDURE (OUTPATIENT)
Dept: MAMMOGRAPHY | Facility: CLINIC | Age: 69
End: 2024-08-21
Attending: NURSE PRACTITIONER
Payer: COMMERCIAL

## 2024-08-21 DIAGNOSIS — R68.84 JAW PAIN: ICD-10-CM

## 2024-08-21 DIAGNOSIS — M54.2 CERVICAL PAIN: ICD-10-CM

## 2024-08-21 DIAGNOSIS — R10.31 RIGHT INGUINAL PAIN: Primary | ICD-10-CM

## 2024-08-21 DIAGNOSIS — Z12.31 VISIT FOR SCREENING MAMMOGRAM: ICD-10-CM

## 2024-08-21 PROCEDURE — 97110 THERAPEUTIC EXERCISES: CPT | Mod: GP | Performed by: PHYSICAL THERAPIST

## 2024-08-21 PROCEDURE — 77063 BREAST TOMOSYNTHESIS BI: CPT | Mod: TC | Performed by: RADIOLOGY

## 2024-08-21 PROCEDURE — 93244 EXT ECG>48HR<7D REV&INTERPJ: CPT | Performed by: INTERNAL MEDICINE

## 2024-08-21 PROCEDURE — 77067 SCR MAMMO BI INCL CAD: CPT | Mod: TC | Performed by: RADIOLOGY

## 2024-08-21 PROCEDURE — 97140 MANUAL THERAPY 1/> REGIONS: CPT | Mod: GP | Performed by: PHYSICAL THERAPIST

## 2024-08-21 NOTE — PROGRESS NOTES
[facility]                                                                                   OUTPATIENT PHYSICAL THERAPY    PLAN OF TREATMENT FOR OUTPATIENT REHABILITATION   Patient's Last Name, First Name, Alvina Horne YOB: 1955   Provider's Name   [facility]   Medical Record No.  [id number]     Onset Date: 01/01/23  Start of Care Date: 08/15/23     Medical Diagnosis:  Right inguinal pain      PT Treatment Diagnosis:  Right inguinal pain Plan of Treatment  Frequency/Duration: 1x/wk/ 20 wks    Certification date from 08/14/24 to 11/12/24         See note for plan of treatment details and functional goals     Miley Pradhan, PT                         I CERTIFY THE NEED FOR THESE SERVICES FURNISHED UNDER        THIS PLAN OF TREATMENT AND WHILE UNDER MY CARE     (Physician attestation of this document indicates review and certification of the therapy plan).              Referring Provider:  Jong Claudio    Initial Assessment  See Epic Evaluation- Start of Care Date: 08/15/23            PLAN  Continue therapy per current plan of care.    Beginning/End Dates of Progress Note Reporting Period:    5/15 to 08/21/2024    Referring Provider:  Jong Claudio      08/21/24 0500   Appointment Info   Signing clinician's name / credentials Miley Pradhan PT   Visits Used 5/10 + 13   Medical Diagnosis Right inguinal pain   PT Tx Diagnosis Right inguinal pain   Quick Adds Certification;Pelvic Consent   Progress Note/Certification   Start of Care Date 08/15/23   Onset of illness/injury or Date of Surgery 01/01/23   Therapy Frequency 1x/wk   Predicted Duration 20 wks   Certification date from 08/14/24   Certification date to 11/12/24       Present No   GOALS   PT Goals 2   PT Goal 1   Goal Identifier R Groin pain   Goal Description R groin pain 0-4/10 with ADL's (especially gardening/bending) in 20 wks   Rationale to maximize  safety and independence with performance of ADLs and functional tasks;to maximize safety and independence within the home;to maximize safety and independence within the community;to maximize safety and independence with transportation;to maximize safety and independence with self cares   Goal Progress Overall improved.  Right groin pain 0-3/10 with nl house activities. Entire right leg has been sore preventing sleeping, and left groin and L buttock area pain present with ADL's x3 wks.   Target Date 11/12/24   PT Goal 2   Goal Identifier Groin pain wearing pants   Goal Description Pt will be able to tolerate right groin pressure to be able to wear loose fitting soft pants with 0-2/10 right groin pain in 20 weeks   Rationale to maximize safety and independence with performance of ADLs and functional tasks;to maximize safety and independence within the home;to maximize safety and independence within the community;to maximize safety and independence with transportation;to maximize safety and independence with self cares   Goal Progress Overall improved, not met.. Pt wears loose fitting clothing and is averaging 2-3/10 R groin pain. Clothing when sitting no longer a big factor causing right groin pain. Pt wears flexible clothing to accommadate swelling in the right LE.   Target Date 11/12/24   Subjective Report   Subjective Report R groin pain now 2/10. The worst pain has been 2-3/10. I found my folder of exercises and did my band exercises  L groin has been cathcing and L LB has been sore. The right inner knee and calf still is swelling/pain. They did an US of Bilateral legs 8/9/24 and it was negative for DVT and cysts. I am having an MRI of the LB 8/30/24, and will have a Vascular consult 9/5/24. I have had to stop my Amytritlline due to hair loss, general achiness- it did help my chest SOB when walking.   Objective Measures   Objective Measures Objective Measure 1;Objective Measure 2;Objective Measure 3   Objective  Measure 1   Objective Measure Trunk AROM: easily touch floor with fingertips,   Details Palpation: Marked pain bilateral ant pelvis, R > L adductor mm pain lee near the knee, bilat calf. Pain along the R med knee jt line woith obvious swelling and enlargment of jt.   Objective Measure 2   Objective Measure Pelvis is level standing and sitting   Details SLR: L 58 deg, R 48 deg   Objective Measure 3   Details Obvious right knee swelling at joint line and also increased swelling entire Right thigh   Treatment Interventions (PT)   Interventions Therapeutic Procedure/Exercise   Therapeutic Procedure/Exercise   Therapeutic Procedures: strength, endurance, ROM, flexibility minutes (47818) 30   Ther Proc 1 - Details HS stretch   PTRx Ther Proc 1 Prone On Elbows   PTRx Ther Proc 1 - Details Hug a pillow to support neck. relax and sag back. to increase stretch or to stretch the front of the hips bends the knee/knees. Use a pillow under pelvis and stomach to reduce pressure if needed but remove this pillow after 2-3 days to progress. 1-3 minutes daily   PTRx Ther Proc 2 Standing Extension   PTRx Ther Proc 2 - Details Lift chest up tuck chin sway belly forward look up slightly to ceiling. It is helpful top put hands on hips or waist 1x/hour   PTRx Ther Proc 3 Single Knee to Chest   PTRx Ther Proc 3 - Details When using this to correct the SI joint only bend the hip needing correction (usually the right side). Pull the knee up and out to the armpit while pressing the left leg into the bed daily   PTRx Ther Proc 4 Pubic Shot Gun MET   PTRx Ther Proc 4 - Details Feet should be level and touching each other. Use a folded pillow or ball between the knees daily   PTRx Ther Proc 5 Supine Hamstring Stretch   PTRx Ther Proc 5 - Details Bend knee to 90 deg and hold with hands while extending knee 15- 30 seconds within pain tolerance- daily bilat.  Avoid stretching adductors.   PTRx Ther Proc 6 Strap Assisted Straight Leg Stretch   PTRx  Ther Proc 6 - Details Place strap on ball of foot  straighten knee and raise leg until tension felt on back of leg. Then unlock knee and relock as you progressively raise foot to ceiling. Be gentle to avoid irritation of sciatic nerve. 10 reps 1-2x/daily   Skilled Intervention Verbal review of all exercises with emphasis to SI and lumbar spine stretching. Pt has been told to decrease adductor stretch, hold all stretches until symptoms subside in pelvis and LE.   Manual Therapy   Manual Therapy: Mobilization, MFR, MLD, friction massage minutes (75872) 30   Manual Therapy 1 Counterstrain   Manual Therapy 1 - Details MT/SCS to abdomen/pelvis and B LE   Skilled Intervention R MPERF-A, R DSCI-N, R DFEM-A, L EPUD-A,   Education   Learner/Method Patient;Listening;Reading;Demonstration;Pictures/Video;No Barriers to Learning   Plan   Home program R KTC, Pubic squeeze, diaphragm breathing /ankle pumps 2x/day, EIS, JULI, HS stretches   Updates to plan of care New Orthotics April 2024 (unraveling May- got new ones), Knee High compression April 2024   Plan for next session Pt seeing GP next week and will discuss changes in swelling nd pain with the provider. Continue R LE arterial,.continue AEPI-ADP 95, R ant hip neural   Comments   Comments Pt last in for PT 6 wks ago, and there is increased R LBP, R inner thigh/knee/calf pain and SWELLING, and Right knee pain. Pt denies LE numbness and tingling. R ant SI dysfunction noted today. Pt is extremely painful to palpate entire B legs in all major muscle groups. I have concern for LE circulatory disorders R >L, and concern also for lumbar DDD/DJD. Last lumbar MRI 8/2014 indicated some mod to severe changes. Pt also has R knee DJD. Pt would like to have these areas of pain addressed as she has a December trip planned to [place].   Pelvic Health Informed Consent Statement Discussed with patient/guardian reason for referral regarding pelvic health needs and external/internal pelvic  floor muscle examination.  Opportunity provided to ask questions and verbal consent for assessment and intervention was given.   Total Session Time   Timed Code Treatment Minutes 60   Total Treatment Time (sum of timed and untimed services) 60

## 2024-08-22 ENCOUNTER — TELEPHONE (OUTPATIENT)
Dept: FAMILY MEDICINE | Facility: CLINIC | Age: 69
End: 2024-08-22
Payer: COMMERCIAL

## 2024-08-22 NOTE — TELEPHONE ENCOUNTER
----- Message from Jong Claudio sent at 8/22/2024 10:44 AM CDT -----  Please call patient.    I got the results of her continuous cardiac monitor.  They are borderline abnormal.  No continual runs of dangerous rhythm but she did have a couple runs of what is called ventricular tachycardia.  Interestingly enough this happened and wasn't correlated with her noting in the diary that she was having heart fluttering.  Still though, runs like this can cause symptoms.  Would recommend nonurgent follow-up with the heart team to review palpitation complaints to see if they have anything to offer.    Jong Claudio, CNP

## 2024-08-22 NOTE — TELEPHONE ENCOUNTER
Patient returned call.     Message from Jong Claudio was relayed to patient and patient verbalized understanding and will follow up with her Heart care team.     Jorge Montiel RN  [facility]

## 2024-08-27 ENCOUNTER — PATIENT OUTREACH (OUTPATIENT)
Dept: CARE COORDINATION | Facility: CLINIC | Age: 69
End: 2024-08-27
Payer: COMMERCIAL

## 2024-08-28 DIAGNOSIS — J30.89 SEASONAL ALLERGIC RHINITIS DUE TO OTHER ALLERGIC TRIGGER: ICD-10-CM

## 2024-08-30 ENCOUNTER — HOSPITAL ENCOUNTER (OUTPATIENT)
Dept: MRI IMAGING | Facility: CLINIC | Age: 69
Discharge: HOME | End: 2024-08-30
Attending: NURSE PRACTITIONER | Admitting: NURSE PRACTITIONER
Payer: COMMERCIAL

## 2024-08-30 DIAGNOSIS — G89.29 CHRONIC RIGHT-SIDED LOW BACK PAIN WITH RIGHT-SIDED SCIATICA: ICD-10-CM

## 2024-08-30 DIAGNOSIS — M54.41 CHRONIC RIGHT-SIDED LOW BACK PAIN WITH RIGHT-SIDED SCIATICA: ICD-10-CM

## 2024-08-30 PROCEDURE — 72148 MRI LUMBAR SPINE W/O DYE: CPT

## 2024-09-03 DIAGNOSIS — I10 ESSENTIAL HYPERTENSION: ICD-10-CM

## 2024-09-03 DIAGNOSIS — M48.061 SPINAL STENOSIS, LUMBAR REGION, WITHOUT NEUROGENIC CLAUDICATION: Primary | ICD-10-CM

## 2024-09-03 DIAGNOSIS — I25.84 CORONARY ATHEROSCLEROSIS DUE TO SEVERELY CALCIFIED CORONARY LESION: ICD-10-CM

## 2024-09-05 ENCOUNTER — OFFICE VISIT (OUTPATIENT)
Dept: OTHER | Facility: CLINIC | Age: 69
End: 2024-09-05
Attending: INTERNAL MEDICINE
Payer: COMMERCIAL

## 2024-09-05 VITALS
BODY MASS INDEX: 35.78 KG/M2 | DIASTOLIC BLOOD PRESSURE: 89 MMHG | OXYGEN SATURATION: 97 % | SYSTOLIC BLOOD PRESSURE: 157 MMHG | WEIGHT: 202 LBS | HEART RATE: 76 BPM

## 2024-09-05 DIAGNOSIS — I47.29 NON-SUSTAINED VENTRICULAR TACHYCARDIA (H): ICD-10-CM

## 2024-09-05 DIAGNOSIS — I25.83 CORONARY ARTERY DISEASE DUE TO LIPID RICH PLAQUE: ICD-10-CM

## 2024-09-05 DIAGNOSIS — I25.10 CORONARY ARTERY DISEASE DUE TO LIPID RICH PLAQUE: ICD-10-CM

## 2024-09-05 DIAGNOSIS — Z78.9 STATIN INTOLERANCE: ICD-10-CM

## 2024-09-05 DIAGNOSIS — R73.01 IFG (IMPAIRED FASTING GLUCOSE): ICD-10-CM

## 2024-09-05 DIAGNOSIS — E83.52 HYPERCALCEMIA: ICD-10-CM

## 2024-09-05 DIAGNOSIS — I10 ESSENTIAL HYPERTENSION: ICD-10-CM

## 2024-09-05 DIAGNOSIS — E78.5 HYPERLIPIDEMIA LDL GOAL <70: ICD-10-CM

## 2024-09-05 DIAGNOSIS — E66.01 MORBID OBESITY (H): ICD-10-CM

## 2024-09-05 DIAGNOSIS — M79.89 LEG SWELLING: Primary | ICD-10-CM

## 2024-09-05 PROCEDURE — G2211 COMPLEX E/M VISIT ADD ON: HCPCS | Performed by: INTERNAL MEDICINE

## 2024-09-05 PROCEDURE — G0463 HOSPITAL OUTPT CLINIC VISIT: HCPCS | Performed by: INTERNAL MEDICINE

## 2024-09-05 PROCEDURE — 99417 PROLNG OP E/M EACH 15 MIN: CPT | Performed by: INTERNAL MEDICINE

## 2024-09-05 PROCEDURE — 99205 OFFICE O/P NEW HI 60 MIN: CPT | Performed by: INTERNAL MEDICINE

## 2024-09-05 RX ORDER — METOPROLOL SUCCINATE 100 MG/1
100 TABLET, EXTENDED RELEASE ORAL 2 TIMES DAILY
Qty: 180 TABLET | Refills: 3 | Status: SHIPPED | OUTPATIENT
Start: 2024-09-05

## 2024-09-05 NOTE — PROGRESS NOTES
[facility]        Patient is here for a consult to discuss leg swelling.    Pt is currently taking Aspirin.    BP (!) 157/89 (BP Location: Right arm, Patient Position: Sitting, Cuff Size: Adult Large)   Pulse 76   Wt 202 lb (91.6 kg)   SpO2 97%   BMI 35.78 kg/m      The provider has been notified that the patient has no concerns.     Questions patient would like addressed today are: N/A.    Refills are needed: No    Has homecare services and agency name:  Shirley Howe MA

## 2024-09-05 NOTE — PROGRESS NOTES
INITIAL VASCULAR MEDICAL ASSESSMENT  REFERRAL SOURCE: Jong Claudio NP   REASON FOR CONSULT: for M79.89 (ICD-10-CM) - Localized swelling of both lower extremities           A/P:      (M79.89) Leg swelling  (primary encounter diagnosis)    Comment: This is likely leg swelling due to venous insufficiency without other factors extrinsic to the legs, but to be thorough and complete, we will check the below.     Plan: US Venous Competency Bilateral, Echocardiogram         Complete, CTV Abdomen Pelvis w Contrast,         Lactate Dehydrogenase, TSH, T4 free, T3 Free              (I47.29) Non-sustained ventricular tachycardia (H)    Comment: Asymptomatic.    Plan: Check TTE.      (I25.10,  I25.83) CAD S/P BMS to LAD in 2012    Comment: She is not being properly treated for this with guideline directed multidrug therapy.    Plan: See statin intolerance below.      (E78.5) Hyperlipidemia LDL goal <70    Comment: Not at goal. Obtain below. RTC to discuss statin intolerance.    Plan: TSH, T4 free, T3 Free, C-Reactive Protein, High        Sensitivity, LipoFit by NMR, Lipoprotein (a)              (Z78.9) Statin intolerance    Comment: As above    Plan: Plan to discuss use of Zetia, PCSK9 inhibitors or siRNA molecules, Nexletol at next visit.       (R73.01) IFG (impaired fasting glucose)    Comment: Check the below    Plan: Hemoglobin A1c              (I10) Essential hypertension    Comment: BP elevated her. Check BP at home, obtain TTE.     Plan: Echocardiogram Complete        Readdress BP needs once her results are in as above.       (E83.52) Hypercalcemia    Comment: I-PTH is normal, but hypercalcemia is persistent and could be related to ANGELIQUE so we will obtian the below.     Plan: Comprehensive metabolic panel, Ionized Calcium,        PTH Related Peptide Test, Parathyroid Hormone         Intact, Protein Immunofixation Serum              (E66.01) Morbid obesity (H)    Comment: Body mass index is 35.78 kg/m .     Plan: I will  discuss weight loss with her at her next visit.          The longitudinal care of plan for Alvina was addressed during this visit. Due to added complexity of care, we will continue to support Alvina Maynard  and the subsequent management of these conditions and with ongoing continuity of care for these conditions.       96 minutes total medical care on today's date.      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 The patient presents today to address BLE edema Rt>>>Lt worse over the last year. She has skin tightness in association with this and leg heaviness.    Review Of Systems  Skin: as above  Eyes: negative  Ears/Nose/Throat: negative  Respiratory: No shortness of breath, dyspnea on exertion, cough, or hemoptysis  Cardiovascular: negative  Gastrointestinal: negative  Genitourinary: negative  Musculoskeletal: as above  Neurologic: negative  Psychiatric: negative  Hematologic/Lymphatic/Immunologic: negative  Endocrine: negative      PAST MEDICAL HISTORY:                  Past Medical History:   Diagnosis Date    Arthritis     Arthritis     Basal cell cancer 2007    Benign neoplasm of colon     Brain aneurysm     removed on 12/31/13    Cancer (H)     basel cell    Cerebral embolism with cerebral infarction (H)     Cerebral vascular accident (H) 12/31/2013    Colitis     Coronary artery disease     External hemorrhoid 6/15/2016    Fibrocystic breast     High blood pressure     History of heart artery stent 9/2012    Hyperlipidemia     Hypertension     Hypertension     Myalgia and myositis, unspecified     S/P hysterectomy 7/28/2019    Total hysterectomy with BSO benign disease, edometriosis?    Sleep apnea, obstructive     c-pap    Statin intolerance     Stroke (H)     Type 2 diabetes mellitus without complications (H)      borderline? blood sugars are fine/nosx no meds       PAST SURGICAL HISTORY:                  Past Surgical History:   Procedure Laterality Date    BRAIN SURGERY  2013    brain aneurism    CARDIAC CATHETERIZATION  9/28/15    mild to moderate coronary disease only without obstructive stenosis    CEREBRAL ANEURYSM REPAIR  12/31/2013    [facility] Aneurysm clipping, Dr Harvey    CORONARY STENT PLACEMENT  9/20/12    bare metal stent in the proximal LAD     HEMORRHOID SURGERY  1975    HEMORRHOIDECTOMY INTERNAL LIGATION  1975    HYSTERECTOMY  2002    HYSTERECTOMY      NECK SURGERY  2001    OOPHORECTOMY      OVARY SURGERY  removed 1990    MT NASAL/SINUS ENDOSCOPY,BX/RMV POLYP/DEBRID  1998    Description: Nasal Endoscopy Polypectomy;    MT REMOVAL OF OVARY/TUBE(S) Right      Salpingo-oophorectomy Right Side    REPAIR ARTERY CORONARY  2012    femerol artery torn during angiogram    SINUS SURGERY  1998    polyp remvd    STENT  2012    heart    ZZC TOTAL ABDOM HYSTERECTOMY  08/01/2002    endometriosis       CURRENT MEDICATIONS:                  Current Outpatient Medications   Medication Sig Dispense Refill    amitriptyline (ELAVIL) 25 MG tablet Take 25 mg by mouth at bedtime      aspirin 81 MG EC tablet Take 81 mg by mouth daily      azelastine (ASTELIN) 0.1 % nasal spray USE 2 SPRAYS IN EACH NOSTRIL TWICE DAILY AS NEEDED 90 mL 3    cetirizine (ZYRTEC) 10 MG tablet Take 10 mg by mouth daily      Cholecalciferol (VITAMIN D3 PO) Take 2,000 Units by mouth every morning       Cyanocobalamin (B-12) 5000 MCG SUBL Take by mouth every 24 hours      Ferrous Sulfate (IRON) 90 (18 Fe) MG TABS Take by mouth every 24 hours      fluticasone (FLONASE) 50 MCG/ACT nasal spray INSTILL 1 SPRAY IN EACH NOSTRIL DAILY 48 g 0    furosemide (LASIX) 20 MG tablet Take 1 tablet (20 mg) by mouth daily as needed (leg swelling) (Patient not taking: Reported on 8/7/2024) 30 tablet 0    hydrochlorothiazide (HYDRODIURIL) 25 MG tablet Take 2 tablets (50 mg) by  mouth daily 180 tablet 3    losartan (COZAAR) 100 MG tablet Take 1 tablet (100 mg) by mouth daily 100 tablet 1    MAGNESIUM GLUCONATE PO Take 400 mg by mouth daily      metoprolol succinate ER (TOPROL XL) 100 MG 24 hr tablet Take 1 tablet (100 mg) by mouth 2 times daily 180 tablet 3    montelukast (SINGULAIR) 10 MG tablet TAKE 1 TABLET BY MOUTH EVERY DAY 90 tablet 2    NIFEdipine ER (ADALAT CC) 60 MG 24 hr tablet TAKE 1 TABLET(60 MG) BY MOUTH EVERY 24 HOURS 90 tablet 3    nitroGLYcerin (NITROSTAT) 0.4 MG sublingual tablet DISSOLVE 1 TABLET UNDER THE TONGUE EVERY 5 MINUTES FOR 3 DOSES. IF SYMPTOMS PERSIST AFTER FIRST DOSE CALL 911 25 tablet 1    Omega-3 Fatty Acids (OMEGA-3 FISH OIL PO) Take 1,400 mg by mouth At Bedtime      potassium chloride sharonda ER (KLOR-CON M20) 20 MEQ CR tablet TAKE 2 TABLETS BY MOUTH TWICE DAILY 360 tablet 2       ALLERGIES:                  Allergies   Allergen Reactions    Propylene Glycol Itching and Rash    Red Dye #40 (Allura Red) Rash     Any kind of red pills.    Adhesive Tape Itching    Atorvastatin Cramps     Mevacor, and zocor also leg cramping  Other reaction(s): leg cramps    Carvedilol      hives    Cephalosporins Hives    Clindamycin Hives    Demerol Nausea and Vomiting    Dilantin [Phenytoin]      Hives    Lisinopril      Trouble breathing    Meperidine Nausea and Vomiting     Hives  Other reaction(s): hives and vomiting    Metronidazole Hives    Nickel      Other reaction(s): Rash, itching    Penicillins Hives    Rosuvastatin Cramps     Leg cramping    Sulfa Antibiotics Hives    Latex Rash       SOCIAL HISTORY:                  Social History     Socioeconomic History    Marital status: Single     Spouse name: Not on file    Number of children: Not on file    Years of education: Not on file    Highest education level: Not on file   Occupational History    Not on file   Tobacco Use    Smoking status: Never     Passive exposure: Never    Smokeless tobacco: Never   Vaping Use     Vaping status: Never Used   Substance and Sexual Activity    Alcohol use: Yes    Drug use: No    Sexual activity: Not on file   Other Topics Concern    Not on file   Social History Narrative    Not on file     Social Determinants of Health     Financial Resource Strain: Low Risk  (12/4/2023)    Financial Resource Strain     Within the past 12 months, have you or your family members you live with been unable to get utilities (heat, electricity) when it was really needed?: No   Food Insecurity: Low Risk  (12/4/2023)    Food Insecurity     Within the past 12 months, did you worry that your food would run out before you got money to buy more?: No     Within the past 12 months, did the food you bought just not last and you didn t have money to get more?: No   Transportation Needs: Low Risk  (12/4/2023)    Transportation Needs     Within the past 12 months, has lack of transportation kept you from medical appointments, getting your medicines, non-medical meetings or appointments, work, or from getting things that you need?: No   Physical Activity: Not on file   Stress: Not on file   Social Connections: Not on file   Interpersonal Safety: Low Risk  (8/7/2024)    Interpersonal Safety     Do you feel physically and emotionally safe where you currently live?: Yes     Within the past 12 months, have you been hit, slapped, kicked or otherwise physically hurt by someone?: No     Within the past 12 months, have you been humiliated or emotionally abused in other ways by your partner or ex-partner?: No   Housing Stability: Low Risk  (12/4/2023)    Housing Stability     Do you have housing? : Yes     Are you worried about losing your housing?: No       FAMILY HISTORY:                   Family History   Problem Relation Age of Onset    Cancer Mother     Diabetes Mother     Hypertension Mother     Thyroid Disease Mother     Heart Disease Mother     Stomach Cancer Mother     Coronary Artery Disease Mother 60    Hypertension Father      Heart Disease Sister     GERD Sister     CABG Sister     Colon Cancer Brother     Heart Disease Maternal Grandfather     Breast Cancer Maternal Aunt          Physical exam Reveals:    O/P: WNL  HEENT: WNL  NECK: No JVD, thyromegaly, or lymphadenopathy  HEART: RRR, no murmurs, gallops, or rubs  LUNGS: CTA bilaterally without rales, wheezes, or rhonchi  GI: NABS, nondistended, nontender, soft  EXT:without cyanosis, clubbing, or two plus LLE and one plus RLE edema; CEAP C3 BLE venous insufficiency  NEURO: nonfocal  : no flank tenderness            EXAM: US LOWER EXTREMITY VENOUS DUPLEX BILATERAL  LOCATION: [facility]  DATE: 8/9/2024     INDICATION: Bilateral lower extremity pain, swelling and edema.  COMPARISON: None.  TECHNIQUE: Venous Duplex ultrasound of bilateral lower extremities with and without compression, augmentation and duplex. Color flow and spectral Doppler with waveform analysis performed.     FINDINGS: Exam includes the common femoral, femoral, popliteal veins as well as segmentally visualized deep calf veins and greater saphenous vein.      RIGHT: No deep vein thrombosis. No superficial thrombophlebitis. No popliteal cyst.     LEFT: No deep vein thrombosis. No superficial thrombophlebitis. No popliteal cyst.                                                                      IMPRESSION:  1.  No deep venous thrombosis in the bilateral lower extremities.      [facility]  Cardiac Electrophysiology      Zio Patch Monitor Report 8/7/2024     Results:  Indication for study: Palpitations  Time monitored: 6 days 13 hours.    Predominant rhythm: Normal sinus rhythm.  Conduction intervals are normal.  Patient recordings: Diary: A single (fluttering, heart racing), and recordings demonstrated normal sinus rhythm with heart rates in the mid 80s, there was no ectopy or other pathologic rhythm disturbance.  Triggered (no symptoms): 11, and recordings demonstrated normal sinus  rhythm with rates ranging between 81 and 108 bpm.  Three of the recordings demonstrated isolated PAC and all of the others demonstrated normal sinus rhythm alone.  Auto triggered recordings: recordings demonstrated normal sinus rhythm with rare atrial and ventricular ectopy.  The patient had 1 run of nonsustained VT lasting 12 beats at 165 bpm (monomorphic) without associated symptoms.  The patient also demonstrated 17 short runs of ectopic atrial tachycardia with the longest being 5.1 seconds and the fastest 157 bpm, again without any associated symptoms     Impression:  Borderline multiday cardiac monitor largely by virtue the presence of nonsustained VT.  This was asymptomatic, but if the patient has structural heart disease and/or LVEF <40% then consider further electrophysiologic evaluation.  The patient had multiple short runs of ectopic atrial tachycardia but no sustained atrial arrhythmia and in particular no atrial fibrillation or flutter.    No complex or sustained ventricular tachyarrhythmia.  No profound bradycardia or significant/symptomatic pauses.

## 2024-09-06 ENCOUNTER — TELEPHONE (OUTPATIENT)
Dept: OTHER | Facility: CLINIC | Age: 69
End: 2024-09-06
Payer: COMMERCIAL

## 2024-09-06 DIAGNOSIS — E78.5 HYPERLIPIDEMIA LDL GOAL <70: ICD-10-CM

## 2024-09-06 DIAGNOSIS — M79.89 LEG SWELLING: Primary | ICD-10-CM

## 2024-09-09 NOTE — TELEPHONE ENCOUNTER
Left voicemail for patient to call back to schedule appointment(s), provided telephone number for patient to call back to schedule.

## 2024-09-11 NOTE — TELEPHONE ENCOUNTER
Patient is scheduled for imaging, lab, and follow up with Dr Carrion.    Patient would like a nurse to call her regarding the imaging. Patient is asking if the imaging is of her whole leg.

## 2024-09-11 NOTE — TELEPHONE ENCOUNTER
Returned call to patient.    Discussed the imaging that was ordered.  Patient verbalized understanding and had no further questions.    Pat Maldonado, BERRYN, RN, -[facility]

## 2024-09-15 ENCOUNTER — HEALTH MAINTENANCE LETTER (OUTPATIENT)
Age: 69
End: 2024-09-15

## 2024-09-17 ENCOUNTER — OFFICE VISIT (OUTPATIENT)
Dept: PHYSICAL MEDICINE AND REHAB | Facility: CLINIC | Age: 69
End: 2024-09-17
Attending: NURSE PRACTITIONER
Payer: COMMERCIAL

## 2024-09-17 VITALS
DIASTOLIC BLOOD PRESSURE: 82 MMHG | BODY MASS INDEX: 35.47 KG/M2 | SYSTOLIC BLOOD PRESSURE: 184 MMHG | HEART RATE: 71 BPM | WEIGHT: 200.2 LBS | HEIGHT: 63 IN

## 2024-09-17 DIAGNOSIS — M48.061 SPINAL STENOSIS, LUMBAR REGION, WITHOUT NEUROGENIC CLAUDICATION: ICD-10-CM

## 2024-09-17 DIAGNOSIS — M54.16 LUMBAR RADICULOPATHY: Primary | ICD-10-CM

## 2024-09-17 PROCEDURE — 99204 OFFICE O/P NEW MOD 45 MIN: CPT | Performed by: NURSE PRACTITIONER

## 2024-09-17 ASSESSMENT — PAIN SCALES - GENERAL: PAINLEVEL: MODERATE PAIN (4)

## 2024-09-17 NOTE — LETTER
9/17/2024      Alvina Maynard  [address] [place] [address]      Dear Colleague,    Thank you for referring your patient, Alvina Maynard, to the [facility]. Please see a copy of my visit note below.    ASSESSMENT: Alvina Maynard is a 69 year old female who presents for consultation at the request of PCP Jong Claudio, who presents today for new patient evaluation of:    -lumbar stenosis    Patient is neurologically intact on exam. We reviewed her lumbar MRI. She has a listhesis at L3-4 and L4-5, with dorsal paraspinal atrophy, severe facet arthropathy and severe canal stenosis at L3-4 and moderate at L4-5. She has severe right lateral recess stenosis at L4-5 which could be causing some of her leg pain although the distribution does not match perfectly and I still recommended pursuing her upcoming vascular testing. We talked about options including medications, PT, injections. We talked about reasons for neurosurgery referral such as continued back/leg pain despite exhausting all conservative treatment options or any signs of nerve damage developing. Recommended touching base with PCP to rule out causes of her recent urinary urgency. If this were to progress to saddle anesthesia or loss of control, would consider neurosurgery referral for severe canal stenosis. She will let us know sooner than next planned appt if occur. We will start with some PT and follow up with her afterwards        9/15/2024     1:58 PM   OSWESTRY DISABILITY INDEX   Count 9   Sum 12   Oswestry Score (%) 26.67 %            Diagnoses and all orders for this visit:  Lumbar radiculopathy  -     Physical Therapy  Referral; Future  Spinal stenosis, lumbar region, without neurogenic claudication  -     Spine  Referral  -     Physical Therapy  Referral; Future      PLAN:  Reviewed spine anatomy and disease process. Discussed diagnosis and treatment options with the patient today. A  shared decision making model was used.  The patient's values and choices were respected. The following represents what was discussed and decided upon by the provider and the patient.      -DIAGNOSTIC TESTS:  Images were personally reviewed and interpreted and explained to patient today using a spine model.   --no new imaging    -PHYSICAL THERAPY:    --Referral to PT placed  Discussed the importance of core strengthening, ROM, stretching exercises with the patient and how each of these entities is important in decreasing pain.  Explained to the patient that the purpose of physical therapy is to teach the patient a home exercise program.  These exercises need to be performed every day in order to decrease pain and prevent future occurrences of pain.        -MEDICATIONS:    --we talked about gabapentin - she is not interested at this time  - would not recommend NSAID given BP today  -tylenol    -INTERVENTIONS:    -Discussed the role of injections with patient today. Patient would be a good candidate in the future for either epidural steroid injections or medial branch blocks if indicated based on symptoms and supported by imaging results.    -PATIENT EDUCATION:  Total time of 40 minutes, on the day of service, spent with the patient, reviewing the chart, placing orders, and documenting.   -Today we also discussed the pros and cons of the current treatment plan.    -FOLLOW-UP:   after PT    Advised patient to call the [facility] if symptoms worsen, new numbness or weakness develops in the legs, or if they develop new or worsening problems controlling bladder or bowel function.   ______________________________________________________________________    SUBJECTIVE:    HPI:  Alvina Maynard is a 69 year old female on asa 81mg with history of type 2 diabetes, HTN, R MCA aneurysm clipping 2023, KRYSTLE, stroke, stomach ulcers, GERD, colitis, cancer, neuropathy, IBS and asthma who presents today for new patient evaluation of  lumbar stenosis    She states she was referred here based on her lumbar MRI results. She is not sure what was found.     She endorses chronic atraumatic waxing and waning back pain. Today pain 4/10, at worst 8/10, at best 3/10. Sometimes feels fine for a few days. Other times, pain can be worse without cause. Over the last 6 mos, pain had seemed to be worsening overall, and she had been experiencing pain into her right anterolateral thigh and anterior medial shin. When she goes to sleep, she particularly has difficulty getting comfortable. She notices some tingly burning feeling in both sides of her lower back, and down the back and side of the right thigh which comes and goes. She has noticed some swelling in both legs, that gets worse over the course of the day. She is followed by vascular medicine and has some upcoming related tests for this.     She has not been walking as much as she used to, has had some difficulty with the fit of her orthotics.    She has had some chronic weakness of her bladder she states due to age and following her hysterectomy (for which she has worn a pad). But in the last week and a half, she has noticed morning urinary urgency. This morning actually it seems like it has gotten a little better again. This seemed to correlate with days of worse leg pain. She has had to get up during the night in the last week or week and a half at night which she never has to do.     No fevers, chills, or body aches.    She has tried using heat, lidocaine patches. The recliner also helps.     She has been going to physical therapy for her femoral artery but states she has not gone specifically for her back pain.      -Treatment to Date:     -Medications:  Lidocaine patches    Current Outpatient Medications   Medication Sig Dispense Refill     amitriptyline (ELAVIL) 25 MG tablet Take 25 mg by mouth at bedtime       aspirin 81 MG EC tablet Take 81 mg by mouth daily       azelastine (ASTELIN) 0.1 % nasal  spray USE 2 SPRAYS IN EACH NOSTRIL TWICE DAILY AS NEEDED 90 mL 3     cetirizine (ZYRTEC) 10 MG tablet Take 10 mg by mouth daily       Cholecalciferol (VITAMIN D3 PO) Take 2,000 Units by mouth every morning        Cyanocobalamin (B-12) 5000 MCG SUBL Take by mouth every 24 hours       Ferrous Sulfate (IRON) 90 (18 Fe) MG TABS Take by mouth every 24 hours       fluticasone (FLONASE) 50 MCG/ACT nasal spray INSTILL 1 SPRAY IN EACH NOSTRIL DAILY 48 g 0     furosemide (LASIX) 20 MG tablet Take 1 tablet (20 mg) by mouth daily as needed (leg swelling) 30 tablet 0     hydrochlorothiazide (HYDRODIURIL) 25 MG tablet Take 2 tablets (50 mg) by mouth daily 180 tablet 3     losartan (COZAAR) 100 MG tablet Take 1 tablet (100 mg) by mouth daily 100 tablet 1     MAGNESIUM GLUCONATE PO Take 400 mg by mouth daily       metoprolol succinate ER (TOPROL XL) 100 MG 24 hr tablet TAKE 1 TABLET(100 MG) BY MOUTH TWICE DAILY 180 tablet 3     montelukast (SINGULAIR) 10 MG tablet TAKE 1 TABLET BY MOUTH EVERY DAY 90 tablet 2     NIFEdipine ER (ADALAT CC) 60 MG 24 hr tablet TAKE 1 TABLET(60 MG) BY MOUTH EVERY 24 HOURS 90 tablet 3     nitroGLYcerin (NITROSTAT) 0.4 MG sublingual tablet DISSOLVE 1 TABLET UNDER THE TONGUE EVERY 5 MINUTES FOR 3 DOSES. IF SYMPTOMS PERSIST AFTER FIRST DOSE CALL 911 25 tablet 1     Omega-3 Fatty Acids (OMEGA-3 FISH OIL PO) Take 1,400 mg by mouth At Bedtime       potassium chloride sharonda ER (KLOR-CON M20) 20 MEQ CR tablet TAKE 2 TABLETS BY MOUTH TWICE DAILY 360 tablet 2     No current facility-administered medications for this visit.       Allergies   Allergen Reactions     Propylene Glycol Itching and Rash     Red Dye #40 (Allura Red) Rash     Any kind of red pills.     Adhesive Tape Itching     Atorvastatin Cramps     Mevacor, and zocor also leg cramping  Other reaction(s): leg cramps     Carvedilol      hives     Cephalosporins Hives     Clindamycin Hives     Demerol Nausea and Vomiting     Dilantin [Phenytoin]      Hives      Lisinopril      Trouble breathing     Meperidine Nausea and Vomiting     Hives  Other reaction(s): hives and vomiting     Metronidazole Hives     Nickel      Other reaction(s): Rash, itching     Penicillins Hives     Rosuvastatin Cramps     Leg cramping     Sulfa Antibiotics Hives     Latex Rash       Past Medical History:   Diagnosis Date     Arthritis      Arthritis      Basal cell cancer 2007     Benign neoplasm of colon      Brain aneurysm     removed on 12/31/13     Cancer (H)     basel cell     Cerebral embolism with cerebral infarction (H)      Cerebral vascular accident (H) 12/31/2013     Colitis      Coronary artery disease      External hemorrhoid 6/15/2016     Fibrocystic breast      High blood pressure      History of heart artery stent 9/2012     Hyperlipidemia      Hypertension      Hypertension      Myalgia and myositis, unspecified      S/P hysterectomy 7/28/2019    Total hysterectomy with BSO benign disease, edometriosis?     Sleep apnea, obstructive     c-pap     Statin intolerance      Stroke (H)      Type 2 diabetes mellitus without complications (H)     borderline? blood sugars are fine/nosx no meds        Patient Active Problem List   Diagnosis     Hemianopia     Allergic contact dermatitis     Alopecia     Asthma     Cerebral aneurysm, nonruptured     Cervical spondylosis without myelopathy     Coronary atherosclerosis     Esophageal reflux     Essential hypertension     Hypokalemia     Hypomagnesemia     Hypophosphatemia     Irritable bowel syndrome (IBS)     Morbid obesity (H)     Obstructive sleep apnea (adult) (pediatric)     Primary hypercholesterolemia     Type 2 diabetes mellitus with other circulatory complication, without long-term current use of insulin (H)     Atypical chest pain     Benign neoplasm of ascending colon     Chronic abdominal pain     Polyp of duodenum     Polyp of colon     Diverticular disease of large intestine     Esophageal dysphagia       Past Surgical  History:   Procedure Laterality Date     BRAIN SURGERY  2013    brain aneurism     CARDIAC CATHETERIZATION  9/28/15    mild to moderate coronary disease only without obstructive stenosis     CEREBRAL ANEURYSM REPAIR  12/31/2013    [facility] Aneurysm clipping, Dr Harvey     CORONARY STENT PLACEMENT  9/20/12    bare metal stent in the proximal LAD      HEMORRHOID SURGERY  1975     HEMORRHOIDECTOMY INTERNAL LIGATION  1975     HYSTERECTOMY  2002     HYSTERECTOMY       NECK SURGERY  2001     OOPHORECTOMY       OVARY SURGERY  removed 1990     UT NASAL/SINUS ENDOSCOPY,BX/RMV POLYP/DEBRID  1998    Description: Nasal Endoscopy Polypectomy;     UT REMOVAL OF OVARY/TUBE(S) Right      Salpingo-oophorectomy Right Side     REPAIR ARTERY CORONARY  2012    femerol artery torn during angiogram     SINUS SURGERY  1998    polyp remvd     STENT  2012    heart     ZZC TOTAL ABDOM HYSTERECTOMY  08/01/2002    endometriosis       Family History   Problem Relation Age of Onset     Cancer Mother      Diabetes Mother      Hypertension Mother      Thyroid Disease Mother      Heart Disease Mother      Stomach Cancer Mother      Coronary Artery Disease Mother 60     Hypertension Father      Heart Disease Sister      GERD Sister      CABG Sister      Colon Cancer Brother      Heart Disease Maternal Grandfather      Breast Cancer Maternal Aunt        Reviewed past medical, surgical, and family history with patient found on new patient intake packet located in EMR Media tab.     SOCIAL HX: nonsmoker, no alcohol use, no heavy drinking, no rec drug use    ROS: positive for weight gain, headache, feet/leg swelling, SOB, reflux, joint pain, muscle pain, sciatica. Specifically negative for bowel/bladder dysfunction, balance changes, headache, dizziness, foot drop, fevers, chills, appetite changes, nausea/vomiting, unexplained weight loss. Otherwise 13 systems reviewed are negative. Please see the patient's intake questionnaire from today for  "details.    OBJECTIVE:  BP (!) 184/82   Pulse 71   Ht 5' 3\" (1.6 m)   Wt 200 lb 3.2 oz (90.8 kg)   BMI 35.46 kg/m      PHYSICAL EXAMINATION:    --CONSTITUTIONAL:  Vital signs as above.  No acute distress.  The patient is well nourished and well groomed.  --PSYCHIATRIC:  Appropriate mood and affect. The patient is awake, alert, oriented to person, place, time and answering questions appropriately with clear speech.    --SKIN:  Skin over the face, bilateral lower extremities, and posterior torso is clean, dry, intact without rashes.    --RESPIRATORY: Normal rhythm and effort. No abnormal accessory muscle breathing patterns noted.   --ABDOMINAL:  Non-distended.    --GROSS MOTOR: Gait is non-antalgic. Easily arises from a seated position. Toe walking and heel walking are normal without significant difficulty.      --LOWER EXTREMITY MOTOR TESTING:  Hip flexion: right 5/5, left 5/5  Hip abduction: right 5/5, left 5/5  Hip adduction: right 5/5, left 5/5   Quads: right 5/5, left 5/5  Hamstrings: right 5/5, left 5/5  Dorsiflexion: right 5/5, left 5/5  Plantar flexion: right 5/5, left 5/5    Great toe MTP extension/EHL: right 5/5, left 5/5    --NEUROLOGICAL:  1/4 patellar and achilles reflexes bilaterally. Sensation to light touch is intact throughout both lower extremities. Babinski is negative. No clonus.    --STANDING EXAMINATION:  Normal lumbar lordosis noted, no lateral shift.    --MUSCULOSKELETAL: Lumbar spine inspection reveals no evidence of deformity. Range of motion is not limited in lumbar flexion, extension, lateral rotation. No point tenderness to palpation lumbar spine. No paraspinal musculature tenderness.     Straight leg raising is positive (causes posterior R thigh pain), neg on left    --HIPS: Full range of motion bilaterally. Negative STEVEN and positive R FADIR .     --SACROILIAC JOINT: Gaenslen's Test was positive on R. Thigh thrust was negative. Sacroiliac Joint Compression Test was negative. One " finger point test was positive on R. Positive R SI joint tenderness     --VASCULAR:  Bilateral lower extremities are warm without any discoloration.  There is some focal mild R medial calf edema.     RESULTS:   Prior medical records from [facility]where were reviewed today.    Imaging: Spine imaging was personally reviewed and interpreted today. The images were shown to the patient and the findings were explained using a spine model.      MR Lumbar Spine w/o Contrast    Result Date: 9/1/2024  EXAM: MR LUMBAR SPINE W/O CONTRAST LOCATION: [facility] DATE: 8/30/2024 INDICATION:  RIGHT lower back pain radiating into right leg. Tried and failed PT COMPARISON:  MRI lumbar spine 9/18/2014. TECHNIQUE: Routine Lumbar Spine MRI without IV contrast. FINDINGS: Nomenclature is based on 5 lumbar type vertebral bodies with L5-S1 defined on image 36 of series 7. Mild levocurvature. 3 mm degenerative anterolisthesis of L3 on L4 and 4 mm degenerative anterolisthesis of L4 on L5, progressed since 2014. Minimal retrolisthesis of L1 on L2. Vertebral body heights maintained. Degenerative endplate edema anteriorly at L5-S1 (Modic type I). Otherwise, no suspicious marrow signal abnormality. Conus medullaris at L1-L2. Tiny filar lipoma. Prevertebral soft tissues are unremarkable. Dorsal paraspinal muscular atrophy. Visualized intra-abdominal structures are unremarkable. T12-L1: No significant disc herniation or disc height loss. Mild facet arthropathy. No significant canal or foraminal stenosis. L1-L2: Mild disc height loss. No significant disc herniation. Moderate facet arthropathy. No significant canal or foraminal stenosis. L2-L3: Mild disc height loss. Mild diffuse bulge. Moderate facet arthropathy. No significant canal or foraminal stenosis. L3-L4: Mild disc height loss. Anterolisthesis with uncovering of the disc due to severe facet arthropathy with ligamentum flavum hypertrophy  resulting in severe canal stenosis, progressed. No significant foraminal stenosis. L4-L5: Mild disc height loss. Anterolisthesis with uncovering of the disc due to severe facet arthropathy. Moderate canal stenosis, progressed. Severe right lateral recess stenosis, progressed. No significant foraminal stenosis. L5-S1: Severe disc height loss. Circumferential disc and osteophyte. Moderate facet arthropathy. No significant canal or foraminal stenosis.     IMPRESSION: 1.  Interval progression of multilevel lumbar spondylosis, most pronounced at L3-L4 and L4-L5 since 2014. *  L3-L4: Worsened grade 1 degenerative anterolisthesis. Severe canal stenosis. *  L4-L5: New grade 1 degenerative anterolisthesis. Moderate canal stenosis and severe lateral recess stenosis. 2.  No high-grade canal or foraminal stenosis at the remaining levels.        This note was dictated using voice recognition software. Any grammatical or context distortions are unintentional and inherent to the software.       Aure GREENEP-C  [facility]  O. [phone]             Again, thank you for allowing me to participate in the care of your patient.        Sincerely,        YVETTE Kerns CNP

## 2024-09-17 NOTE — PROGRESS NOTES
ASSESSMENT: Alvina Maynard is a 69 year old female who presents for consultation at the request of PCP Jong Claudio, who presents today for new patient evaluation of:    -lumbar stenosis    Patient is neurologically intact on exam. We reviewed her lumbar MRI. She has a listhesis at L3-4 and L4-5, with dorsal paraspinal atrophy, severe facet arthropathy and severe canal stenosis at L3-4 and moderate at L4-5. She has severe right lateral recess stenosis at L4-5 which could be causing some of her leg pain although the distribution does not match perfectly and I still recommended pursuing her upcoming vascular testing. We talked about options including medications, PT, injections. We talked about reasons for neurosurgery referral such as continued back/leg pain despite exhausting all conservative treatment options or any signs of nerve damage developing. Recommended touching base with PCP to rule out causes of her recent urinary urgency. If this were to progress to saddle anesthesia or loss of control, would consider neurosurgery referral for severe canal stenosis. She will let us know sooner than next planned appt if occur. We will start with some PT and follow up with her afterwards        9/15/2024     1:58 PM   OSWESTRY DISABILITY INDEX   Count 9   Sum 12   Oswestry Score (%) 26.67 %            Diagnoses and all orders for this visit:  Lumbar radiculopathy  -     Physical Therapy  Referral; Future  Spinal stenosis, lumbar region, without neurogenic claudication  -     Spine  Referral  -     Physical Therapy  Referral; Future      PLAN:  Reviewed spine anatomy and disease process. Discussed diagnosis and treatment options with the patient today. A shared decision making model was used.  The patient's values and choices were respected. The following represents what was discussed and decided upon by the provider and the patient.      -DIAGNOSTIC TESTS:  Images were personally reviewed and  interpreted and explained to patient today using a spine model.   --no new imaging    -PHYSICAL THERAPY:    --Referral to PT placed  Discussed the importance of core strengthening, ROM, stretching exercises with the patient and how each of these entities is important in decreasing pain.  Explained to the patient that the purpose of physical therapy is to teach the patient a home exercise program.  These exercises need to be performed every day in order to decrease pain and prevent future occurrences of pain.        -MEDICATIONS:    --we talked about gabapentin - she is not interested at this time  - would not recommend NSAID given BP today  -tylenol    -INTERVENTIONS:    -Discussed the role of injections with patient today. Patient would be a good candidate in the future for either epidural steroid injections or medial branch blocks if indicated based on symptoms and supported by imaging results.    -PATIENT EDUCATION:  Total time of 40 minutes, on the day of service, spent with the patient, reviewing the chart, placing orders, and documenting.   -Today we also discussed the pros and cons of the current treatment plan.    -FOLLOW-UP:   after PT    Advised patient to call the [facility] if symptoms worsen, new numbness or weakness develops in the legs, or if they develop new or worsening problems controlling bladder or bowel function.   ______________________________________________________________________    SUBJECTIVE:    HPI:  Alvina Maynard is a 69 year old female on asa 81mg with history of type 2 diabetes, HTN, R MCA aneurysm clipping 2023, KRYSTLE, stroke, stomach ulcers, GERD, colitis, cancer, neuropathy, IBS and asthma who presents today for new patient evaluation of lumbar stenosis    She states she was referred here based on her lumbar MRI results. She is not sure what was found.     She endorses chronic atraumatic waxing and waning back pain. Today pain 4/10, at worst 8/10, at best 3/10. Sometimes feels  fine for a few days. Other times, pain can be worse without cause. Over the last 6 mos, pain had seemed to be worsening overall, and she had been experiencing pain into her right anterolateral thigh and anterior medial shin. When she goes to sleep, she particularly has difficulty getting comfortable. She notices some tingly burning feeling in both sides of her lower back, and down the back and side of the right thigh which comes and goes. She has noticed some swelling in both legs, that gets worse over the course of the day. She is followed by vascular medicine and has some upcoming related tests for this.     She has not been walking as much as she used to, has had some difficulty with the fit of her orthotics.    She has had some chronic weakness of her bladder she states due to age and following her hysterectomy (for which she has worn a pad). But in the last week and a half, she has noticed morning urinary urgency. This morning actually it seems like it has gotten a little better again. This seemed to correlate with days of worse leg pain. She has had to get up during the night in the last week or week and a half at night which she never has to do.     No fevers, chills, or body aches.    She has tried using heat, lidocaine patches. The recliner also helps.     She has been going to physical therapy for her femoral artery but states she has not gone specifically for her back pain.      -Treatment to Date:     -Medications:  Lidocaine patches    Current Outpatient Medications   Medication Sig Dispense Refill    amitriptyline (ELAVIL) 25 MG tablet Take 25 mg by mouth at bedtime      aspirin 81 MG EC tablet Take 81 mg by mouth daily      azelastine (ASTELIN) 0.1 % nasal spray USE 2 SPRAYS IN EACH NOSTRIL TWICE DAILY AS NEEDED 90 mL 3    cetirizine (ZYRTEC) 10 MG tablet Take 10 mg by mouth daily      Cholecalciferol (VITAMIN D3 PO) Take 2,000 Units by mouth every morning       Cyanocobalamin (B-12) 5000 MCG SUBL  Take by mouth every 24 hours      Ferrous Sulfate (IRON) 90 (18 Fe) MG TABS Take by mouth every 24 hours      fluticasone (FLONASE) 50 MCG/ACT nasal spray INSTILL 1 SPRAY IN EACH NOSTRIL DAILY 48 g 0    furosemide (LASIX) 20 MG tablet Take 1 tablet (20 mg) by mouth daily as needed (leg swelling) 30 tablet 0    hydrochlorothiazide (HYDRODIURIL) 25 MG tablet Take 2 tablets (50 mg) by mouth daily 180 tablet 3    losartan (COZAAR) 100 MG tablet Take 1 tablet (100 mg) by mouth daily 100 tablet 1    MAGNESIUM GLUCONATE PO Take 400 mg by mouth daily      metoprolol succinate ER (TOPROL XL) 100 MG 24 hr tablet TAKE 1 TABLET(100 MG) BY MOUTH TWICE DAILY 180 tablet 3    montelukast (SINGULAIR) 10 MG tablet TAKE 1 TABLET BY MOUTH EVERY DAY 90 tablet 2    NIFEdipine ER (ADALAT CC) 60 MG 24 hr tablet TAKE 1 TABLET(60 MG) BY MOUTH EVERY 24 HOURS 90 tablet 3    nitroGLYcerin (NITROSTAT) 0.4 MG sublingual tablet DISSOLVE 1 TABLET UNDER THE TONGUE EVERY 5 MINUTES FOR 3 DOSES. IF SYMPTOMS PERSIST AFTER FIRST DOSE CALL 911 25 tablet 1    Omega-3 Fatty Acids (OMEGA-3 FISH OIL PO) Take 1,400 mg by mouth At Bedtime      potassium chloride sharonda ER (KLOR-CON M20) 20 MEQ CR tablet TAKE 2 TABLETS BY MOUTH TWICE DAILY 360 tablet 2     No current facility-administered medications for this visit.       Allergies   Allergen Reactions    Propylene Glycol Itching and Rash    Red Dye #40 (Allura Red) Rash     Any kind of red pills.    Adhesive Tape Itching    Atorvastatin Cramps     Mevacor, and zocor also leg cramping  Other reaction(s): leg cramps    Carvedilol      hives    Cephalosporins Hives    Clindamycin Hives    Demerol Nausea and Vomiting    Dilantin [Phenytoin]      Hives    Lisinopril      Trouble breathing    Meperidine Nausea and Vomiting     Hives  Other reaction(s): hives and vomiting    Metronidazole Hives    Nickel      Other reaction(s): Rash, itching    Penicillins Hives    Rosuvastatin Cramps     Leg cramping    Sulfa  Antibiotics Hives    Latex Rash       Past Medical History:   Diagnosis Date    Arthritis     Arthritis     Basal cell cancer 2007    Benign neoplasm of colon     Brain aneurysm     removed on 12/31/13    Cancer (H)     basel cell    Cerebral embolism with cerebral infarction (H)     Cerebral vascular accident (H) 12/31/2013    Colitis     Coronary artery disease     External hemorrhoid 6/15/2016    Fibrocystic breast     High blood pressure     History of heart artery stent 9/2012    Hyperlipidemia     Hypertension     Hypertension     Myalgia and myositis, unspecified     S/P hysterectomy 7/28/2019    Total hysterectomy with BSO benign disease, edometriosis?    Sleep apnea, obstructive     c-pap    Statin intolerance     Stroke (H)     Type 2 diabetes mellitus without complications (H)     borderline? blood sugars are fine/nosx no meds        Patient Active Problem List   Diagnosis    Hemianopia    Allergic contact dermatitis    Alopecia    Asthma    Cerebral aneurysm, nonruptured    Cervical spondylosis without myelopathy    Coronary atherosclerosis    Esophageal reflux    Essential hypertension    Hypokalemia    Hypomagnesemia    Hypophosphatemia    Irritable bowel syndrome (IBS)    Morbid obesity (H)    Obstructive sleep apnea (adult) (pediatric)    Primary hypercholesterolemia    Type 2 diabetes mellitus with other circulatory complication, without long-term current use of insulin (H)    Atypical chest pain    Benign neoplasm of ascending colon    Chronic abdominal pain    Polyp of duodenum    Polyp of colon    Diverticular disease of large intestine    Esophageal dysphagia       Past Surgical History:   Procedure Laterality Date    BRAIN SURGERY  2013    brain aneurism    CARDIAC CATHETERIZATION  9/28/15    mild to moderate coronary disease only without obstructive stenosis    CEREBRAL ANEURYSM REPAIR  12/31/2013    [facility] Aneurysm clipping, Dr Harvey    CORONARY STENT PLACEMENT  9/20/12    bare metal stent  "in the proximal LAD     HEMORRHOID SURGERY  1975    HEMORRHOIDECTOMY INTERNAL LIGATION  1975    HYSTERECTOMY  2002    HYSTERECTOMY      NECK SURGERY  2001    OOPHORECTOMY      OVARY SURGERY  removed 1990    NM NASAL/SINUS ENDOSCOPY,BX/RMV POLYP/DEBRID  1998    Description: Nasal Endoscopy Polypectomy;    NM REMOVAL OF OVARY/TUBE(S) Right      Salpingo-oophorectomy Right Side    REPAIR ARTERY CORONARY  2012    femerol artery torn during angiogram    SINUS SURGERY  1998    polyp remvd    STENT  2012    heart    ZZC TOTAL ABDOM HYSTERECTOMY  08/01/2002    endometriosis       Family History   Problem Relation Age of Onset    Cancer Mother     Diabetes Mother     Hypertension Mother     Thyroid Disease Mother     Heart Disease Mother     Stomach Cancer Mother     Coronary Artery Disease Mother 60    Hypertension Father     Heart Disease Sister     GERD Sister     CABG Sister     Colon Cancer Brother     Heart Disease Maternal Grandfather     Breast Cancer Maternal Aunt        Reviewed past medical, surgical, and family history with patient found on new patient intake packet located in EMR Media tab.     SOCIAL HX: nonsmoker, no alcohol use, no heavy drinking, no rec drug use    ROS: positive for weight gain, headache, feet/leg swelling, SOB, reflux, joint pain, muscle pain, sciatica. Specifically negative for bowel/bladder dysfunction, balance changes, headache, dizziness, foot drop, fevers, chills, appetite changes, nausea/vomiting, unexplained weight loss. Otherwise 13 systems reviewed are negative. Please see the patient's intake questionnaire from today for details.    OBJECTIVE:  BP (!) 184/82   Pulse 71   Ht 5' 3\" (1.6 m)   Wt 200 lb 3.2 oz (90.8 kg)   BMI 35.46 kg/m      PHYSICAL EXAMINATION:    --CONSTITUTIONAL:  Vital signs as above.  No acute distress.  The patient is well nourished and well groomed.  --PSYCHIATRIC:  Appropriate mood and affect. The patient is awake, alert, oriented to person, place, time " and answering questions appropriately with clear speech.    --SKIN:  Skin over the face, bilateral lower extremities, and posterior torso is clean, dry, intact without rashes.    --RESPIRATORY: Normal rhythm and effort. No abnormal accessory muscle breathing patterns noted.   --ABDOMINAL:  Non-distended.    --GROSS MOTOR: Gait is non-antalgic. Easily arises from a seated position. Toe walking and heel walking are normal without significant difficulty.      --LOWER EXTREMITY MOTOR TESTING:  Hip flexion: right 5/5, left 5/5  Hip abduction: right 5/5, left 5/5  Hip adduction: right 5/5, left 5/5   Quads: right 5/5, left 5/5  Hamstrings: right 5/5, left 5/5  Dorsiflexion: right 5/5, left 5/5  Plantar flexion: right 5/5, left 5/5    Great toe MTP extension/EHL: right 5/5, left 5/5    --NEUROLOGICAL:  1/4 patellar and achilles reflexes bilaterally. Sensation to light touch is intact throughout both lower extremities. Babinski is negative. No clonus.    --STANDING EXAMINATION:  Normal lumbar lordosis noted, no lateral shift.    --MUSCULOSKELETAL: Lumbar spine inspection reveals no evidence of deformity. Range of motion is not limited in lumbar flexion, extension, lateral rotation. No point tenderness to palpation lumbar spine. No paraspinal musculature tenderness.     Straight leg raising is positive (causes posterior R thigh pain), neg on left    --HIPS: Full range of motion bilaterally. Negative STEVEN and positive R FADIR .     --SACROILIAC JOINT: Gaenslen's Test was positive on R. Thigh thrust was negative. Sacroiliac Joint Compression Test was negative. One finger point test was positive on R. Positive R SI joint tenderness     --VASCULAR:  Bilateral lower extremities are warm without any discoloration.  There is some focal mild R medial calf edema.     RESULTS:   Prior medical records from [facility] were reviewed today.    Imaging: Spine imaging was personally reviewed and interpreted  today. The images were shown to the patient and the findings were explained using a spine model.      MR Lumbar Spine w/o Contrast    Result Date: 9/1/2024  EXAM: MR LUMBAR SPINE W/O CONTRAST LOCATION: [facility] DATE: 8/30/2024 INDICATION:  RIGHT lower back pain radiating into right leg. Tried and failed PT COMPARISON:  MRI lumbar spine 9/18/2014. TECHNIQUE: Routine Lumbar Spine MRI without IV contrast. FINDINGS: Nomenclature is based on 5 lumbar type vertebral bodies with L5-S1 defined on image 36 of series 7. Mild levocurvature. 3 mm degenerative anterolisthesis of L3 on L4 and 4 mm degenerative anterolisthesis of L4 on L5, progressed since 2014. Minimal retrolisthesis of L1 on L2. Vertebral body heights maintained. Degenerative endplate edema anteriorly at L5-S1 (Modic type I). Otherwise, no suspicious marrow signal abnormality. Conus medullaris at L1-L2. Tiny filar lipoma. Prevertebral soft tissues are unremarkable. Dorsal paraspinal muscular atrophy. Visualized intra-abdominal structures are unremarkable. T12-L1: No significant disc herniation or disc height loss. Mild facet arthropathy. No significant canal or foraminal stenosis. L1-L2: Mild disc height loss. No significant disc herniation. Moderate facet arthropathy. No significant canal or foraminal stenosis. L2-L3: Mild disc height loss. Mild diffuse bulge. Moderate facet arthropathy. No significant canal or foraminal stenosis. L3-L4: Mild disc height loss. Anterolisthesis with uncovering of the disc due to severe facet arthropathy with ligamentum flavum hypertrophy resulting in severe canal stenosis, progressed. No significant foraminal stenosis. L4-L5: Mild disc height loss. Anterolisthesis with uncovering of the disc due to severe facet arthropathy. Moderate canal stenosis, progressed. Severe right lateral recess stenosis, progressed. No significant foraminal stenosis. L5-S1: Severe disc height loss. Circumferential disc and  osteophyte. Moderate facet arthropathy. No significant canal or foraminal stenosis.     IMPRESSION: 1.  Interval progression of multilevel lumbar spondylosis, most pronounced at L3-L4 and L4-L5 since 2014. *  L3-L4: Worsened grade 1 degenerative anterolisthesis. Severe canal stenosis. *  L4-L5: New grade 1 degenerative anterolisthesis. Moderate canal stenosis and severe lateral recess stenosis. 2.  No high-grade canal or foraminal stenosis at the remaining levels.        This note was dictated using voice recognition software. Any grammatical or context distortions are unintentional and inherent to the software.       Aure Scott FNP-C  [facility]  O. [phone]

## 2024-09-18 ENCOUNTER — THERAPY VISIT (OUTPATIENT)
Dept: PHYSICAL THERAPY | Facility: REHABILITATION | Age: 69
End: 2024-09-18
Payer: COMMERCIAL

## 2024-09-18 DIAGNOSIS — R68.84 JAW PAIN: ICD-10-CM

## 2024-09-18 DIAGNOSIS — R10.31 RIGHT INGUINAL PAIN: Primary | ICD-10-CM

## 2024-09-18 DIAGNOSIS — M54.2 CERVICAL PAIN: ICD-10-CM

## 2024-09-18 PROCEDURE — 97535 SELF CARE MNGMENT TRAINING: CPT | Mod: GP | Performed by: PHYSICAL THERAPIST

## 2024-09-18 PROCEDURE — 97140 MANUAL THERAPY 1/> REGIONS: CPT | Mod: GP | Performed by: PHYSICAL THERAPIST

## 2024-09-27 SDOH — HEALTH STABILITY: PHYSICAL HEALTH: ON AVERAGE, HOW MANY MINUTES DO YOU ENGAGE IN EXERCISE AT THIS LEVEL?: 50 MIN

## 2024-09-27 SDOH — HEALTH STABILITY: PHYSICAL HEALTH: ON AVERAGE, HOW MANY DAYS PER WEEK DO YOU ENGAGE IN MODERATE TO STRENUOUS EXERCISE (LIKE A BRISK WALK)?: 2 DAYS

## 2024-09-27 ASSESSMENT — ASTHMA QUESTIONNAIRES
QUESTION_4 LAST FOUR WEEKS HOW OFTEN HAVE YOU USED YOUR RESCUE INHALER OR NEBULIZER MEDICATION (SUCH AS ALBUTEROL): TWO OR THREE TIMES PER WEEK
QUESTION_3 LAST FOUR WEEKS HOW OFTEN DID YOUR ASTHMA SYMPTOMS (WHEEZING, COUGHING, SHORTNESS OF BREATH, CHEST TIGHTNESS OR PAIN) WAKE YOU UP AT NIGHT OR EARLIER THAN USUAL IN THE MORNING: TWO OR THREE NIGHTS A WEEK
QUESTION_1 LAST FOUR WEEKS HOW MUCH OF THE TIME DID YOUR ASTHMA KEEP YOU FROM GETTING AS MUCH DONE AT WORK, SCHOOL OR AT HOME: SOME OF THE TIME
QUESTION_2 LAST FOUR WEEKS HOW OFTEN HAVE YOU HAD SHORTNESS OF BREATH: MORE THAN ONCE A DAY
ACT_TOTALSCORE: 11
ACT_TOTALSCORE: 11
QUESTION_5 LAST FOUR WEEKS HOW WOULD YOU RATE YOUR ASTHMA CONTROL: POORLY CONTROLLED

## 2024-09-27 ASSESSMENT — SOCIAL DETERMINANTS OF HEALTH (SDOH): HOW OFTEN DO YOU GET TOGETHER WITH FRIENDS OR RELATIVES?: MORE THAN THREE TIMES A WEEK

## 2024-10-01 ENCOUNTER — OFFICE VISIT (OUTPATIENT)
Dept: FAMILY MEDICINE | Facility: CLINIC | Age: 69
End: 2024-10-01
Payer: COMMERCIAL

## 2024-10-01 VITALS
DIASTOLIC BLOOD PRESSURE: 80 MMHG | TEMPERATURE: 98.1 F | BODY MASS INDEX: 36.8 KG/M2 | HEART RATE: 59 BPM | OXYGEN SATURATION: 97 % | HEIGHT: 62 IN | SYSTOLIC BLOOD PRESSURE: 126 MMHG | WEIGHT: 200 LBS | RESPIRATION RATE: 16 BRPM

## 2024-10-01 DIAGNOSIS — M79.89 LEG SWELLING: ICD-10-CM

## 2024-10-01 DIAGNOSIS — R73.01 IFG (IMPAIRED FASTING GLUCOSE): ICD-10-CM

## 2024-10-01 DIAGNOSIS — Z12.11 SCREEN FOR COLON CANCER: ICD-10-CM

## 2024-10-01 DIAGNOSIS — E83.52 HYPERCALCEMIA: ICD-10-CM

## 2024-10-01 DIAGNOSIS — E11.59 TYPE 2 DIABETES MELLITUS WITH OTHER CIRCULATORY COMPLICATION, WITHOUT LONG-TERM CURRENT USE OF INSULIN (H): ICD-10-CM

## 2024-10-01 DIAGNOSIS — E78.5 HYPERLIPIDEMIA LDL GOAL <70: ICD-10-CM

## 2024-10-01 DIAGNOSIS — R06.01 ORTHOPNEA: ICD-10-CM

## 2024-10-01 DIAGNOSIS — J30.89 SEASONAL ALLERGIC RHINITIS DUE TO OTHER ALLERGIC TRIGGER: ICD-10-CM

## 2024-10-01 DIAGNOSIS — J45.30 MILD PERSISTENT ASTHMA WITHOUT COMPLICATION: ICD-10-CM

## 2024-10-01 DIAGNOSIS — I10 ESSENTIAL HYPERTENSION: ICD-10-CM

## 2024-10-01 DIAGNOSIS — I25.10 ATHEROSCLEROSIS OF CORONARY ARTERY OF NATIVE HEART WITHOUT ANGINA PECTORIS, UNSPECIFIED VESSEL OR LESION TYPE: ICD-10-CM

## 2024-10-01 DIAGNOSIS — Z00.00 ENCOUNTER FOR MEDICARE ANNUAL WELLNESS EXAM: Primary | ICD-10-CM

## 2024-10-01 DIAGNOSIS — I25.10 ATHEROSCLEROSIS OF NATIVE CORONARY ARTERY OF NATIVE HEART WITHOUT ANGINA PECTORIS: ICD-10-CM

## 2024-10-01 LAB
ALBUMIN SERPL BCG-MCNC: 4.7 G/DL (ref 3.5–5.2)
ALP SERPL-CCNC: 93 U/L (ref 40–150)
ALT SERPL W P-5'-P-CCNC: 29 U/L (ref 0–50)
ANION GAP SERPL CALCULATED.3IONS-SCNC: 13 MMOL/L (ref 7–15)
APO A-I SERPL-MCNC: 56 MG/DL
AST SERPL W P-5'-P-CCNC: 27 U/L (ref 0–45)
BILIRUB SERPL-MCNC: 0.5 MG/DL
BUN SERPL-MCNC: 12.3 MG/DL (ref 8–23)
CA-I BLD-MCNC: 5.5 MG/DL (ref 4.4–5.2)
CALCIUM SERPL-MCNC: 11 MG/DL (ref 8.8–10.4)
CHLORIDE SERPL-SCNC: 102 MMOL/L (ref 98–107)
CHOLEST SERPL-MCNC: 268 MG/DL
CREAT SERPL-MCNC: 0.68 MG/DL (ref 0.51–0.95)
CREAT UR-MCNC: 22.5 MG/DL
CRP SERPL HS-MCNC: 1.33 MG/L
EGFRCR SERPLBLD CKD-EPI 2021: >90 ML/MIN/1.73M2
EST. AVERAGE GLUCOSE BLD GHB EST-MCNC: 140 MG/DL
FASTING STATUS PATIENT QL REPORTED: ABNORMAL
FASTING STATUS PATIENT QL REPORTED: ABNORMAL
GLUCOSE SERPL-MCNC: 119 MG/DL (ref 70–99)
HBA1C MFR BLD: 6.5 % (ref 0–5.6)
HCO3 SERPL-SCNC: 22 MMOL/L (ref 22–29)
HDLC SERPL-MCNC: 62 MG/DL
LDH SERPL L TO P-CCNC: 196 U/L (ref 0–250)
LDLC SERPL CALC-MCNC: 181 MG/DL
MICROALBUMIN UR-MCNC: <12 MG/L
MICROALBUMIN/CREAT UR: NORMAL MG/G{CREAT}
NONHDLC SERPL-MCNC: 206 MG/DL
NT-PROBNP SERPL-MCNC: 219 PG/ML (ref 0–900)
POTASSIUM SERPL-SCNC: 4.2 MMOL/L (ref 3.4–5.3)
PROT SERPL-MCNC: 7.8 G/DL (ref 6.4–8.3)
PTH-INTACT SERPL-MCNC: 80 PG/ML (ref 15–65)
SODIUM SERPL-SCNC: 137 MMOL/L (ref 135–145)
T3FREE SERPL-MCNC: 3.4 PG/ML (ref 2–4.4)
T4 FREE SERPL-MCNC: 1.36 NG/DL (ref 0.9–1.7)
TRIGL SERPL-MCNC: 124 MG/DL
TSH SERPL DL<=0.005 MIU/L-ACNC: 1.41 UIU/ML (ref 0.3–4.2)

## 2024-10-01 PROCEDURE — 82330 ASSAY OF CALCIUM: CPT | Performed by: FAMILY MEDICINE

## 2024-10-01 PROCEDURE — 84443 ASSAY THYROID STIM HORMONE: CPT | Performed by: FAMILY MEDICINE

## 2024-10-01 PROCEDURE — 99214 OFFICE O/P EST MOD 30 MIN: CPT | Mod: 25 | Performed by: NURSE PRACTITIONER

## 2024-10-01 PROCEDURE — 80061 LIPID PANEL: CPT | Mod: 90 | Performed by: FAMILY MEDICINE

## 2024-10-01 PROCEDURE — 84439 ASSAY OF FREE THYROXINE: CPT | Performed by: FAMILY MEDICINE

## 2024-10-01 PROCEDURE — 83036 HEMOGLOBIN GLYCOSYLATED A1C: CPT | Performed by: FAMILY MEDICINE

## 2024-10-01 PROCEDURE — 83704 LIPOPROTEIN BLD QUAN PART: CPT | Mod: 90 | Performed by: FAMILY MEDICINE

## 2024-10-01 PROCEDURE — 80061 LIPID PANEL: CPT | Performed by: NURSE PRACTITIONER

## 2024-10-01 PROCEDURE — 36415 COLL VENOUS BLD VENIPUNCTURE: CPT | Performed by: FAMILY MEDICINE

## 2024-10-01 PROCEDURE — 83970 ASSAY OF PARATHORMONE: CPT | Performed by: FAMILY MEDICINE

## 2024-10-01 PROCEDURE — 83615 LACTATE (LD) (LDH) ENZYME: CPT | Performed by: FAMILY MEDICINE

## 2024-10-01 PROCEDURE — 83695 ASSAY OF LIPOPROTEIN(A): CPT | Performed by: FAMILY MEDICINE

## 2024-10-01 PROCEDURE — 90662 IIV NO PRSV INCREASED AG IM: CPT | Performed by: NURSE PRACTITIONER

## 2024-10-01 PROCEDURE — 82043 UR ALBUMIN QUANTITATIVE: CPT | Performed by: NURSE PRACTITIONER

## 2024-10-01 PROCEDURE — 83880 ASSAY OF NATRIURETIC PEPTIDE: CPT | Performed by: NURSE PRACTITIONER

## 2024-10-01 PROCEDURE — 86141 C-REACTIVE PROTEIN HS: CPT | Performed by: FAMILY MEDICINE

## 2024-10-01 PROCEDURE — 82397 CHEMILUMINESCENT ASSAY: CPT | Mod: 90 | Performed by: FAMILY MEDICINE

## 2024-10-01 PROCEDURE — G0439 PPPS, SUBSEQ VISIT: HCPCS | Performed by: NURSE PRACTITIONER

## 2024-10-01 PROCEDURE — G0008 ADMIN INFLUENZA VIRUS VAC: HCPCS | Performed by: NURSE PRACTITIONER

## 2024-10-01 PROCEDURE — 84481 FREE ASSAY (FT-3): CPT | Performed by: FAMILY MEDICINE

## 2024-10-01 PROCEDURE — 80053 COMPREHEN METABOLIC PANEL: CPT | Performed by: FAMILY MEDICINE

## 2024-10-01 PROCEDURE — 99000 SPECIMEN HANDLING OFFICE-LAB: CPT | Performed by: FAMILY MEDICINE

## 2024-10-01 PROCEDURE — 82570 ASSAY OF URINE CREATININE: CPT | Performed by: NURSE PRACTITIONER

## 2024-10-01 PROCEDURE — 86334 IMMUNOFIX E-PHORESIS SERUM: CPT | Performed by: PATHOLOGY

## 2024-10-01 RX ORDER — LOSARTAN POTASSIUM 100 MG/1
100 TABLET ORAL DAILY
Qty: 100 TABLET | Refills: 3 | Status: SHIPPED | OUTPATIENT
Start: 2024-10-01

## 2024-10-01 RX ORDER — MONTELUKAST SODIUM 10 MG/1
TABLET ORAL
COMMUNITY

## 2024-10-01 RX ORDER — FLUTICASONE PROPIONATE 50 MCG
1-2 SPRAY, SUSPENSION (ML) NASAL DAILY
Qty: 48 G | Refills: 3 | Status: SHIPPED | OUTPATIENT
Start: 2024-10-01

## 2024-10-01 ASSESSMENT — PAIN SCALES - GENERAL: PAINLEVEL: NO PAIN (0)

## 2024-10-01 NOTE — LETTER
October 3, 2024      Alvina Maynard  [address] [place] [address]        Dear ,    We are writing to inform you of your test results.    Cholesterol levels remain quite elevated. If open to that repatha injection we've talked about in the past, let your cardiologist know and they can help you out     Resulted Orders   Lactate Dehydrogenase   Result Value Ref Range    Lactate Dehydrogenase 196 0 - 250 U/L   TSH   Result Value Ref Range    TSH 1.41 0.30 - 4.20 uIU/mL   T4 free   Result Value Ref Range    Free T4 1.36 0.90 - 1.70 ng/dL   T3 Free   Result Value Ref Range    T3 Free 3.4 2.0 - 4.4 pg/mL   Comprehensive metabolic panel   Result Value Ref Range    Sodium 137 135 - 145 mmol/L    Potassium 4.2 3.4 - 5.3 mmol/L    Carbon Dioxide (CO2) 22 22 - 29 mmol/L    Anion Gap 13 7 - 15 mmol/L    Urea Nitrogen 12.3 8.0 - 23.0 mg/dL    Creatinine 0.68 0.51 - 0.95 mg/dL    GFR Estimate >90 >60 mL/min/1.73m2      Comment:      eGFR calculated using 2021 CKD-EPI equation.    Calcium 11.0 (H) 8.8 - 10.4 mg/dL      Comment:      Reference intervals for this test were updated on 7/16/2024 to reflect our healthy population more accurately. There may be differences in the flagging of prior results with similar values performed with this method. Those prior results can be interpreted in the context of the updated reference intervals.    Chloride 102 98 - 107 mmol/L    Glucose 119 (H) 70 - 99 mg/dL    Alkaline Phosphatase 93 40 - 150 U/L    AST 27 0 - 45 U/L    ALT 29 0 - 50 U/L    Protein Total 7.8 6.4 - 8.3 g/dL    Albumin 4.7 3.5 - 5.2 g/dL    Bilirubin Total 0.5 <=1.2 mg/dL    Patient Fasting > 8hrs? Unknown    C-Reactive Protein, High Sensitivity   Result Value Ref Range    C-Reactive Protein High Sensitivity 1.33       Comment:      Low risk < 1.0 mg/L   Average risk 1.0 to 3.0 mg/L   High risk > 3.0 mg/L   Hemoglobin A1c   Result Value Ref Range    Estimated Average Glucose 140 (H) <117 mg/dL    Hemoglobin  A1C 6.5 (H) 0.0 - 5.6 %      Comment:      Normal <5.7%   Prediabetes 5.7-6.4%    Diabetes 6.5% or higher     Note: Adopted from ADA consensus guidelines.   Lipoprotein (a)   Result Value Ref Range    Lipoprotein (a) 56 (H) <30 mg/dL   Ionized Calcium   Result Value Ref Range    Calcium Ionized Whole Blood 5.5 (H) 4.4 - 5.2 mg/dL   Parathyroid Hormone Intact   Result Value Ref Range    Parathyroid Hormone Intact 80 (H) 15 - 65 pg/mL    Narrative    This result was obtained with the Roche Elecsys PTH STAT assay.   This reference range differs from PTH assays used in other [facility] laboratories.   Protein Immunofixation Serum   Result Value Ref Range    Immunofixation ELP       No monoclonal protein seen on immunofixation. Pathologic significance requires clinical correlation.  FUNMILAYO Shaffer M.D., Ph.D., Pathologist ()    Signout Location if Remote [facility]    Lipid panel reflex to direct LDL Non-fasting   Result Value Ref Range    Cholesterol 268 (H) <200 mg/dL    Triglycerides 124 <150 mg/dL    Direct Measure HDL 62 >=50 mg/dL    LDL Cholesterol Calculated 181 (H) <100 mg/dL    Non HDL Cholesterol 206 (H) <130 mg/dL    Patient Fasting > 8hrs? Unknown     Narrative    Cholesterol  Desirable: < 200 mg/dL  Borderline High: 200 - 239 mg/dL  High: >= 240 mg/dL    Triglycerides  Normal: < 150 mg/dL  Borderline High: 150 - 199 mg/dL  High: 200-499 mg/dL  Very High: >= 500 mg/dL    Direct Measure HDL  Female: >= 50 mg/dL   Male: >= 40 mg/dL    LDL Cholesterol  Desirable: < 100 mg/dL  Above Desirable: 100 - 129 mg/dL   Borderline High: 130 - 159 mg/dL   High:  160 - 189 mg/dL   Very High: >= 190 mg/dL    Non HDL Cholesterol  Desirable: < 130 mg/dL  Above Desirable: 130 - 159 mg/dL  Borderline High: 160 - 189 mg/dL  High: 190 - 219 mg/dL  Very High: >= 220 mg/dL   Albumin Random Urine Quantitative with Creat Ratio   Result Value Ref Range    Creatinine Urine mg/dL 22.5 mg/dL      Comment:      The reference  ranges have not been established in urine creatinine. The results should be integrated into the clinical context for interpretation.    Albumin Urine mg/L <12.0 mg/L      Comment:      The reference ranges have not been established in urine albumin. The results should be integrated into the clinical context for interpretation.    Albumin Urine mg/g Cr        Comment:      Unable to calculate, urine albumin and/or urine creatinine is outside detectable limits.  Microalbuminuria is defined as an albumin:creatinine ratio of 17 to 299 for males and 25 to 299 for females. A ratio of albumin:creatinine of 300 or higher is indicative of overt proteinuria.  Due to biologic variability, positive results should be confirmed by a second, first-morning random or 24-hour timed urine specimen. If there is discrepancy, a third specimen is recommended. When 2 out of 3 results are in the microalbuminuria range, this is evidence for incipient nephropathy and warrants increased efforts at glucose control, blood pressure control, and institution of therapy with an angiotensin-converting-enzyme (ACE) inhibitor (if the patient can tolerate it).     BNP-N terminal pro   Result Value Ref Range    N Terminal Pro BNP Outpatient 219 0 - 900 pg/mL      Comment:      Reference range shown and results flagged as abnormal are for the outpatient, non acute settings. Establishing a baseline value for each individual patient is useful for follow-up.    Suggested inpatient cut points for confirming diagnosis of CHF in an acute setting are:  >450 pg/mL (age 18 to less than 50)  >900 pg/mL (age 50 to less than 75)  >1800 pg/mL (75 yrs and older)    An inpatient or emergency department NT-proPBNP <300 pg/mL effectively rules out acute CHF, with 99% negative predictive value.           If you have any questions or concerns, please call the clinic at the number listed above.       Sincerely,      Armen Carrion MD

## 2024-10-01 NOTE — PROGRESS NOTES
Preventive Care Visit  [facility]  Jong Claudio NP,    Oct 1, 2024      Assessment & Plan       ICD-10-CM    1. Encounter for Medicare annual wellness exam  Z00.00       2. Seasonal allergic rhinitis due to other allergic trigger  J30.89 fluticasone (FLONASE) 50 MCG/ACT nasal spray      3. Essential hypertension  I10 losartan (COZAAR) 100 MG tablet      4. Type 2 diabetes mellitus with other circulatory complication, without long-term current use of insulin (H)  E11.59 Albumin Random Urine Quantitative with Creat Ratio     Hemoglobin A1c     Albumin Random Urine Quantitative with Creat Ratio     CANCELED: Hemoglobin A1c      5. Atherosclerosis of coronary artery of native heart without angina pectoris, unspecified vessel or lesion type  I25.10 Lipid panel reflex to direct LDL Non-fasting     Lipid panel reflex to direct LDL Non-fasting      6. Screen for colon cancer  Z12.11 Colonoscopy Screening  Referral      7. Orthopnea  R06.01 BNP-N terminal pro     BNP-N terminal pro      8. Mild persistent asthma without complication  J45.30 beclomethasone HFA (QVAR REDIHALER) 40 MCG/ACT inhaler     DISCONTINUED: beclomethasone HFA (QVAR REDIHALER) 40 MCG/ACT inhaler      9. Atherosclerosis of native coronary artery of native heart without angina pectoris  I25.10       10. Leg swelling  M79.89 Lactate Dehydrogenase     TSH     T4 free     T3 Free      11. Hyperlipidemia LDL goal <70  E78.5 TSH     T4 free     T3 Free     C-Reactive Protein, High Sensitivity     LipoFit by NMR     Lipoprotein (a)      12. Hypercalcemia  E83.52 Comprehensive metabolic panel     Ionized Calcium     PTH Related Peptide Test     Parathyroid Hormone Intact     Protein Immunofixation Serum      13. IFG (impaired fasting glucose)  R73.01 Hemoglobin A1c        Update labs for Dr. Carrion and I.  Continue same antihypertensives.  Update colonoscopy.  Make sure not fluid volume overloaded given nighttime symptoms.   "Given wheezing and reported asthma history, breathing difficulty could be related to that.  Does find relief with albuterol but only for a few hours at a time.  Try maintenance inhaler.  Rinse and spit after using.  Headaches after stopping amitriptyline.  Discussed trying topiramate.  She will think about this.  Flu shot given.          BMI  Estimated body mass index is 36.58 kg/m  as calculated from the following:    Height as of this encounter: 1.575 m (5' 2\").    Weight as of this encounter: 90.7 kg (200 lb).   Weight management plan: Discussed healthy diet and exercise guidelines    Counseling  Appropriate preventive services were addressed with this patient via screening, questionnaire, or discussion as appropriate for fall prevention, nutrition, physical activity, Tobacco-use cessation, social engagement, weight loss and cognition.  Checklist reviewing preventive services available has been given to the patient.  Reviewed patient's diet, addressing concerns and/or questions.   She is at risk for lack of exercise and has been provided with information to increase physical activity for the benefit of her well-being.   Discussed possible causes of fatigue. Information on urinary incontinence and treatment options given to patient.       Shelby Tinsley is a 69 year old, presenting for the following:  Annual Visit (Fasting)        10/1/2024     8:41 AM   Additional Questions   Roomed by Clarisa Chen [facility]       Health Care Directive  Patient does not have a Health Care Directive or Living Will: Previously reviewed    HPI    No big changes or concerns today.  Will be following up with cardiology in November for palpitation episodes.  Did meet with vascular for concerns of swelling.  They agreed that it is likely venous insufficiency.  Working with PT for management of low back pain.  Mammogram up-to-date.    Having more shallow breathing and difficulty with breathing. Having some wheezing and albuterol helps but " for only 4-6 hours.      Did see vascular.  Agreed that likely venous insufficiency is the culprit for her lower extremity edema.  Will be having workup with imaging done in a couple weeks.  They did note her uncontrolled hyperlipidemia in the setting of coronary arthrosclerosis.  Discussed statin intolerance.  Previously has been given recommendations for Repatha.  They also noted hypercalcemia.  PTH normal.  I suspect related to HCTZ prescribed by cardiology.        9/27/2024   General Health   How would you rate your overall physical health? (!) POOR   Feel stress (tense, anxious, or unable to sleep) Patient declined            9/27/2024   Nutrition   Diet: I don't know            9/27/2024   Exercise   Days per week of moderate/strenous exercise 2 days   Average minutes spent exercising at this level 50 min      (!) EXERCISE CONCERN      9/27/2024   Social Factors   Frequency of gathering with friends or relatives More than three times a week   Worry food won't last until get money to buy more No   Food not last or not have enough money for food? No   Do you have housing? (Housing is defined as stable permanent housing and does not include staying ouside in a car, in a tent, in an abandoned building, in an overnight shelter, or couch-surfing.) Yes   Are you worried about losing your housing? No   Lack of transportation? No   Unable to get utilities (heat,electricity)? No            10/1/2024   Fall Risk   Gait Speed Test (Document in seconds) 4   Gait Speed Test Interpretation Less than or equal to 5.00 seconds - PASS             9/27/2024   Activities of Daily Living- Home Safety   Needs help with the following daily activites None of the above   Safety concerns in the home None of the above            9/27/2024   Dental   Dentist two times every year? Yes            9/27/2024   Hearing Screening   Hearing concerns? None of the above            9/27/2024   Driving Risk Screening   Patient/family members have  concerns about driving No            9/27/2024   General Alertness/Fatigue Screening   Have you been more tired than usual lately? (!) YES            9/27/2024   Urinary Incontinence Screening   Bothered by leaking urine in past 6 months Yes            9/27/2024   TB Screening   Were you born outside of the [place]? No      Today's PHQ-2 Score:       10/1/2024     8:41 AM   PHQ-2 ( 1999 [employer])   Q1: Little interest or pleasure in doing things 1   Q2: Feeling down, depressed or hopeless 1   PHQ-2 Score 2   Q1: Little interest or pleasure in doing things Several days   Q2: Feeling down, depressed or hopeless Several days   PHQ-2 Score 2           9/27/2024   Substance Use   Alcohol more than 3/day or more than 7/wk Not Applicable   Do you have a current opioid prescription? No   How severe/bad is pain from 1 to 10? 6/10   Do you use any other substances recreationally? No        Social History     Tobacco Use    Smoking status: Never     Passive exposure: Never    Smokeless tobacco: Never   Vaping Use    Vaping status: Never Used   Substance Use Topics    Alcohol use: Yes    Drug use: No         8/21/2024   LAST FHS-7 RESULTS   1st degree relative breast or ovarian cancer Yes   Any relative bilateral breast cancer No   Any male have breast cancer No   Any ONE woman have BOTH breast AND ovarian cancer No   Any woman with breast cancer before 50yrs Yes   2 or more relatives with breast AND/OR ovarian cancer No   2 or more relatives with breast AND/OR bowel cancer No         Mammogram Screening - Mammogram every 1-2 years updated in Health Maintenance based on mutual decision making        History of abnormal Pap smear: No - age 65 or older with adequate negative prior screening test results (3 consecutive negative cytology results, 2 consecutive negative cotesting results, or 2 consecutive negative HrHPV test results within 10 years, with the most recent test occurring within the recommended screening interval for the test  used)       ASCVD Risk   The ASCVD Risk score (Emigdio LU, et al., 2019) failed to calculate for the following reasons:    The patient has a prior MI or stroke diagnosis      Reviewed and updated as needed this visit by Provider                    Past Medical History:   Diagnosis Date    Arthritis     Arthritis     Basal cell cancer 2007    Benign neoplasm of colon     Brain aneurysm     removed on 12/31/13    Cancer (H)     basel cell    Cerebral embolism with cerebral infarction (H)     Cerebral vascular accident (H) 12/31/2013    Colitis     Coronary artery disease     External hemorrhoid 6/15/2016    Fibrocystic breast     High blood pressure     History of heart artery stent 9/2012    Hyperlipidemia     Hypertension     Hypertension     Myalgia and myositis, unspecified     S/P hysterectomy 7/28/2019    Total hysterectomy with BSO benign disease, edometriosis?    Sleep apnea, obstructive     c-pap    Statin intolerance     Stroke (H)     Type 2 diabetes mellitus without complications (H)     borderline? blood sugars are fine/nosx no meds     Past Surgical History:   Procedure Laterality Date    BRAIN SURGERY  2013    brain aneurism    CARDIAC CATHETERIZATION  9/28/15    mild to moderate coronary disease only without obstructive stenosis    CEREBRAL ANEURYSM REPAIR  12/31/2013    [facility] Aneurysm clipping, Dr Harvey    CORONARY STENT PLACEMENT  9/20/12    bare metal stent in the proximal LAD     HEMORRHOID SURGERY  1975    HEMORRHOIDECTOMY INTERNAL LIGATION  1975    HYSTERECTOMY  2002    HYSTERECTOMY      NECK SURGERY  2001    OOPHORECTOMY      OVARY SURGERY  removed 1990    IN NASAL/SINUS ENDOSCOPY,BX/RMV POLYP/DEBRID  1998    Description: Nasal Endoscopy Polypectomy;    IN REMOVAL OF OVARY/TUBE(S) Right      Salpingo-oophorectomy Right Side    REPAIR ARTERY CORONARY  2012    femerol artery torn during angiogram    SINUS SURGERY  1998    polyp remvd    STENT  2012    heart    ZZC TOTAL ABDOM  "HYSTERECTOMY  08/01/2002    endometriosis     Current providers sharing in care for this patient include:  Patient Care Team:  Jong Claudio NP as PCP - General  Jong Claudio NP as Assigned PCP  Kayce Mitchell, PharmD as MTM Pharmacist (Pharmacist)  No Ref-Primary, Physician  Miley Pradhan PT as Physical Therapist (Physical Therapy)  Latonia Rae MD as MD (Cardiology)  Aure Scott APRN CNP as Assigned Neuroscience Provider  Armen Carrion MD as Assigned Heart and Vascular Provider    The following health maintenance items are reviewed in Epic and correct as of today:  Health Maintenance   Topic Date Due    ASTHMA ACTION PLAN  Never done    RSV VACCINE (1 - Risk 60-74 years 1-dose series) Never done    ANNUAL REVIEW OF  ORDERS  12/20/2022    EYE EXAM  08/11/2023    LIPID  08/09/2024    MICROALBUMIN  08/09/2024    DIABETIC FOOT EXAM  08/09/2024    COVID-19 Vaccine (4 - 2024-25 season) 09/01/2024    COLORECTAL CANCER SCREENING  08/27/2024    A1C  04/01/2025    ASTHMA CONTROL TEST  04/01/2025    BMP  08/07/2025    MEDICARE ANNUAL WELLNESS VISIT  10/01/2025    FALL RISK ASSESSMENT  10/01/2025    MAMMO SCREENING  08/21/2026    ADVANCE CARE PLANNING  08/09/2028    DTAP/TDAP/TD IMMUNIZATION (3 - Td or Tdap) 09/27/2028    DEXA  01/18/2036    HEPATITIS C SCREENING  Completed    PHQ-2 (once per calendar year)  Completed    INFLUENZA VACCINE  Completed    Pneumococcal Vaccine: 65+ Years  Completed    ZOSTER IMMUNIZATION  Completed    HPV IMMUNIZATION  Aged Out    MENINGITIS IMMUNIZATION  Aged Out    RSV MONOCLONAL ANTIBODY  Aged Out         Review of Systems  Constitutional, HEENT, cardiovascular, pulmonary, gi and gu systems are negative, except as otherwise noted.     Objective    Exam  /80 (BP Location: Left arm, Patient Position: Sitting, Cuff Size: Adult Large)   Pulse 59   Temp 98.1  F (36.7  C) (Oral)   Resp 16   Ht 1.575 m (5' 2\")   Wt 90.7 kg (200 lb)   SpO2 97% " "  BMI 36.58 kg/m     Estimated body mass index is 36.58 kg/m  as calculated from the following:    Height as of this encounter: 1.575 m (5' 2\").    Weight as of this encounter: 90.7 kg (200 lb).    Physical Exam  GENERAL: alert and no distress  EYES: Eyes grossly normal to inspection, PERRL and conjunctivae and sclerae normal  HENT: ear canals and TM's normal, nose and mouth without ulcers or lesions  NECK: no adenopathy, no asymmetry, masses, or scars  RESP: lungs clear to auscultation - no rales, rhonchi or wheezes  CV: regular rate and rhythm, normal S1 S2, no S3 or S4, no murmur, click or rub, no peripheral edema  ABDOMEN: soft, nontender, no hepatosplenomegaly, no masses and bowel sounds normal  MS: no gross musculoskeletal defects noted, no edema  SKIN: no suspicious lesions or rashes  NEURO: Normal strength and tone, mentation intact and speech normal  PSYCH: mentation appears normal, affect normal/bright        10/1/2024   Mini Cog   Clock Draw Score 0 Abnormal   3 Item Recall 1 object recalled   Mini Cog Total Score 1                 Signed Electronically by: Jong Claudio NP    "

## 2024-10-01 NOTE — PATIENT INSTRUCTIONS
It is okay to use 1-2 puffs of the albuterol every 4-6 hours as needed.    To try to keep the airway open I sent a prescription of Qvar to your pharmacy.  This is a maintenance inhaler you take twice a day.  Rinse and spit after using.  It may take 10-14 days before this starts to work    It is interesting that the headaches came back after stopping the amitriptyline.  I wonder if it was may be suppressing a chronic headache/migraine issue.  A possible option would be taking a daily medicine called topiramate/Topamax.  This does carry a side effect risk of grogginess/sleepiness/fuzzy thinking but we can certainly give it a try to help with headaches if interested.    If open to trying another round of steroids for the congestion, let me know.  We could also reconnected with the allergist Dr. Rosario    Colonoscopy is due this fall.  Scheduling information is highlighted in your paperwork.          Patient Education   Preventive Care Advice   This is general advice given by our system to help you stay healthy. However, your care team may have specific advice just for you. Please talk to your care team about your preventive care needs.  Nutrition  Eat 5 or more servings of fruits and vegetables each day.  Try wheat bread, brown rice and whole grain pasta (instead of white bread, rice, and pasta).  Get enough calcium and vitamin D. Check the label on foods and aim for 100% of the RDA (recommended daily allowance).  Lifestyle  Exercise at least 150 minutes each week  (30 minutes a day, 5 days a week).  Do muscle strengthening activities 2 days a week. These help control your weight and prevent disease.  No smoking.  Wear sunscreen to prevent skin cancer.  Have a dental exam and cleaning every 6 months.  Yearly exams  See your health care team every year to talk about:  Any changes in your health.  Any medicines your care team has prescribed.  Preventive care, family planning, and ways to prevent chronic diseases.  Shots  (vaccines)   HPV shots (up to age 26), if you've never had them before.  Hepatitis B shots (up to age 59), if you've never had them before.  COVID-19 shot: Get this shot when it's due.  Flu shot: Get a flu shot every year.  Tetanus shot: Get a tetanus shot every 10 years.  Pneumococcal, hepatitis A, and RSV shots: Ask your care team if you need these based on your risk.  Shingles shot (for age 50 and up)  General health tests  Diabetes screening:  Starting at age 35, Get screened for diabetes at least every 3 years.  If you are younger than age 35, ask your care team if you should be screened for diabetes.  Cholesterol test: At age 39, start having a cholesterol test every 5 years, or more often if advised.  Bone density scan (DEXA): At age 50, ask your care team if you should have this scan for osteoporosis (brittle bones).  Hepatitis C: Get tested at least once in your life.  STIs (sexually transmitted infections)  Before age 24: Ask your care team if you should be screened for STIs.  After age 24: Get screened for STIs if you're at risk. You are at risk for STIs (including HIV) if:  You are sexually active with more than one person.  You don't use condoms every time.  You or a partner was diagnosed with a sexually transmitted infection.  If you are at risk for HIV, ask about PrEP medicine to prevent HIV.  Get tested for HIV at least once in your life, whether you are at risk for HIV or not.  Cancer screening tests  Cervical cancer screening: If you have a cervix, begin getting regular cervical cancer screening tests starting at age 21.  Breast cancer scan (mammogram): If you've ever had breasts, begin having regular mammograms starting at age 40. This is a scan to check for breast cancer.  Colon cancer screening: It is important to start screening for colon cancer at age 45.  Have a colonoscopy test every 10 years (or more often if you're at risk) Or, ask your provider about stool tests like a FIT test every year  or Cologuard test every 3 years.  To learn more about your testing options, visit:   .  For help making a decision, visit:   https://bit.ly/kp89989.  Prostate cancer screening test: If you have a prostate, ask your care team if a prostate cancer screening test (PSA) at age 55 is right for you.  Lung cancer screening: If you are a current or former smoker ages 50 to 80, ask your care team if ongoing lung cancer screenings are right for you.  For informational purposes only. Not to replace the advice of your health care provider. Copyright   2023 [employer]. All rights reserved. Clinically reviewed by the [facility]. [employer] 254723 - REV 01/24.  Preventing Falls: Care Instructions  Injuries and health problems such as trouble walking or poor eyesight can increase your risk of falling. So can some medicines. But there are things you can do to help prevent falls. You can exercise to get stronger. You can also arrange your home to make it safer.    Talk to your doctor about the medicines you take. Ask if any of them increase the risk of falls and whether they can be changed or stopped.   Try to exercise regularly. It can help improve your strength and balance. This can help lower your risk of falling.     Practice fall safety and prevention.    Wear low-heeled shoes that fit well and give your feet good support. Talk to your doctor if you have foot problems that make this hard.  Carry a cellphone or wear a medical alert device that you can use to call for help.  Use stepladders instead of chairs to reach high objects. Don't climb if you're at risk for falls. Ask for help, if needed.  Wear the correct eyeglasses, if you need them.    Make your home safer.    Remove rugs, cords, clutter, and furniture from walkways.  Keep your house well lit. Use night-lights in hallways and bathrooms.  Install and use sturdy handrails on stairways.  Wear nonskid footwear, even inside. Don't walk  "barefoot or in socks without shoes.    Be safe outside.    Use handrails, curb cuts, and ramps whenever possible.  Keep your hands free by using a shoulder bag or backpack.  Try to walk in well-lit areas. Watch out for uneven ground, changes in pavement, and debris.  Be careful in the winter. Walk on the grass or gravel when sidewalks are slippery. Use de-icer on steps and walkways. Add non-slip devices to shoes.    Put grab bars and nonskid mats in your shower or tub and near the toilet. Try to use a shower chair or bath bench when bathing.   Get into a tub or shower by putting in your weaker leg first. Get out with your strong side first. Have a phone or medical alert device in the bathroom with you.   Where can you learn more?  Go to https://www.[employer]it.[employer]/patiented  Enter G117 in the search box to learn more about \"Preventing Falls: Care Instructions.\"  Current as of: July 17, 2023               Content Version: 14.0    5069-0609 [employer].   Care instructions adapted under license by your healthcare professional. If you have questions about a medical condition or this instruction, always ask your healthcare professional. [employer] disclaims any warranty or liability for your use of this information.      Learning About Sleeping Well  What does sleeping well mean?     Sleeping well means getting enough sleep to feel good and stay healthy. How much sleep is enough varies among people.  The number of hours you sleep and how you feel when you wake up are both important. If you do not feel refreshed, you probably need more sleep. Another sign of not getting enough sleep is feeling tired during the day.  Experts recommend that adults get at least 7 or more hours of sleep per day. Children and older adults need more sleep.  Why is getting enough sleep important?  Getting enough quality sleep is a basic part of good health. When your sleep suffers, your physical health, mood, and your " "thoughts can suffer too. You may find yourself feeling more grumpy or stressed. Not getting enough sleep also can lead to serious problems, including injury, accidents, anxiety, and depression.  What might cause poor sleeping?  Many things can cause sleep problems, including:  Changes to your sleep schedule.  Stress. Stress can be caused by fear about a single event, such as giving a speech. Or you may have ongoing stress, such as worry about work or school.  Depression, anxiety, and other mental or emotional conditions.  Changes in your sleep habits or surroundings. This includes changes that happen where you sleep, such as noise, light, or sleeping in a different bed. It also includes changes in your sleep pattern, such as having jet lag or working a late shift.  Health problems, such as pain, breathing problems, and restless legs syndrome.  Lack of regular exercise.  Using alcohol, nicotine, or caffeine before bed.  How can you help yourself?  Here are some tips that may help you sleep more soundly and wake up feeling more refreshed.  Your sleeping area   Use your bedroom only for sleeping and sex. A bit of light reading may help you fall asleep. But if it doesn't, do your reading elsewhere in the house. Try not to use your TV, computer, smartphone, or tablet while you are in bed.  Be sure your bed is big enough to stretch out comfortably, especially if you have a sleep partner.  Keep your bedroom quiet, dark, and cool. Use curtains, blinds, or a sleep mask to block out light. To block out noise, use earplugs, soothing music, or a \"white noise\" machine.  Your evening and bedtime routine   Create a relaxing bedtime routine. You might want to take a warm shower or bath, or listen to soothing music.  Go to bed at the same time every night. And get up at the same time every morning, even if you feel tired.  What to avoid   Limit caffeine (coffee, tea, caffeinated sodas) during the day, and don't have any for at least " "6 hours before bedtime.  Avoid drinking alcohol before bedtime. Alcohol can cause you to wake up more often during the night.  Try not to smoke or use tobacco, especially in the evening. Nicotine can keep you awake.  Limit naps during the day, especially close to bedtime.  Avoid lying in bed awake for too long. If you can't fall asleep or if you wake up in the middle of the night and can't get back to sleep within about 20 minutes, get out of bed and go to another room until you feel sleepy.  Avoid taking medicine right before bed that may keep you awake or make you feel hyper or energized. Your doctor can tell you if your medicine may do this and if you can take it earlier in the day.  If you can't sleep   Imagine yourself in a peaceful, pleasant scene. Focus on the details and feelings of being in a place that is relaxing.  Get up and do a quiet or boring activity until you feel sleepy.  Avoid drinking any liquids before going to bed to help prevent waking up often to use the bathroom.  Where can you learn more?  Go to https://www.[employer].net/patiented  Enter J942 in the search box to learn more about \"Learning About Sleeping Well.\"  Current as of: July 10, 2023  Content Version: 14.1 2006-2024 [employer].   Care instructions adapted under license by your healthcare professional. If you have questions about a medical condition or this instruction, always ask your healthcare professional. [employer] disclaims any warranty or liability for your use of this information.    Bladder Training: Care Instructions  Your Care Instructions     Bladder training is used to treat urge incontinence and stress incontinence. Urge incontinence means that the need to urinate comes on so fast that you can't get to a toilet in time. Stress incontinence means that you leak urine because of pressure on your bladder. For example, it may happen when you laugh, cough, or lift something heavy.  Bladder " training can increase how long you can wait before you have to urinate. It can also help your bladder hold more urine. And it can give you better control over the urge to urinate.  It is important to remember that bladder training takes a few weeks to a few months to make a difference. You may not see results right away, but don't give up.  Follow-up care is a key part of your treatment and safety. Be sure to make and go to all appointments, and call your doctor if you are having problems. It's also a good idea to know your test results and keep a list of the medicines you take.  How can you care for yourself at home?  Work with your doctor to come up with a bladder training program that is right for you. You may use one or more of the following methods.  Delayed urination  In the beginning, try to keep from urinating for 5 minutes after you first feel the need to go.  While you wait, take deep, slow breaths to relax. Kegel exercises can also help you delay the need to go to the bathroom.  After some practice, when you can easily wait 5 minutes to urinate, try to wait 10 minutes before you urinate.  Slowly increase the waiting period until you are able to control when you have to urinate.  Scheduled urination  Empty your bladder when you first wake up in the morning.  Schedule times throughout the day when you will urinate.  Start by going to the bathroom every hour, even if you don't need to go.  Slowly increase the time between trips to the bathroom.  When you have found a schedule that works well for you, keep doing it.  If you wake up during the night and have to urinate, do it. Apply your schedule to waking hours only.  Kegel exercises  These tighten and strengthen pelvic muscles, which can help you control the flow of urine. (If doing these exercises causes pain, stop doing them and talk with your doctor.) To do Kegel exercises:  Squeeze your muscles as if you were trying not to pass gas. Or squeeze your  "muscles as if you were stopping the flow of urine. Your belly, legs, and buttocks shouldn't move.  Hold the squeeze for 3 seconds, then relax for 5 to 10 seconds.  Start with 3 seconds, then add 1 second each week until you are able to squeeze for 10 seconds.  Repeat the exercise 10 times a session. Do 3 to 8 sessions a day.  When should you call for help?  Watch closely for changes in your health, and be sure to contact your doctor if:    Your incontinence is getting worse.     You do not get better as expected.   Where can you learn more?  Go to https://www.[employer].net/patiented  Enter V684 in the search box to learn more about \"Bladder Training: Care Instructions.\"  Current as of: November 15, 2023               Content Version: 14.0    3301-1034 [employer].   Care instructions adapted under license by your healthcare professional. If you have questions about a medical condition or this instruction, always ask your healthcare professional. [employer] disclaims any warranty or liability for your use of this information.         "

## 2024-10-02 LAB
LOCATION OF TASK: NORMAL
PROT PATTERN SERPL IFE-IMP: NORMAL

## 2024-10-03 LAB — PTH RELATED PROT SERPL-SCNC: <0.4 PMOL/L

## 2024-10-05 LAB
CHOLEST SERPL-MCNC: 275 MG/DL
HDL SERPL QN: 8.7 NM
HDL SERPL-SCNC: 40.6 UMOL/L
HDLC SERPL-MCNC: 62 MG/DL
HLD.LARGE SERPL-SCNC: 5.6 UMOL/L
LDL SERPL QN: 21.2 NM
LDL SERPL-SCNC: 2004 NMOL/L
LDL SMALL SERPL-SCNC: 682 NMOL/L
LDLC SERPL CALC-MCNC: 187 MG/DL
PATHOLOGY STUDY: ABNORMAL
TRIGL SERPL-MCNC: 131 MG/DL
VLDL LARGE SERPL-SCNC: 3.4 NMOL/L
VLDL SERPL QN: 47.3 NM

## 2024-10-10 ENCOUNTER — HOSPITAL ENCOUNTER (OUTPATIENT)
Dept: CT IMAGING | Facility: CLINIC | Age: 69
Discharge: HOME | End: 2024-10-10
Attending: INTERNAL MEDICINE | Admitting: INTERNAL MEDICINE
Payer: COMMERCIAL

## 2024-10-10 ENCOUNTER — HOSPITAL ENCOUNTER (OUTPATIENT)
Dept: CARDIOLOGY | Facility: CLINIC | Age: 69
Discharge: HOME | End: 2024-10-10
Attending: INTERNAL MEDICINE | Admitting: INTERNAL MEDICINE
Payer: COMMERCIAL

## 2024-10-10 ENCOUNTER — ANCILLARY PROCEDURE (OUTPATIENT)
Dept: ULTRASOUND IMAGING | Facility: CLINIC | Age: 69
End: 2024-10-10
Attending: INTERNAL MEDICINE
Payer: COMMERCIAL

## 2024-10-10 DIAGNOSIS — M79.89 LEG SWELLING: ICD-10-CM

## 2024-10-10 DIAGNOSIS — I10 ESSENTIAL HYPERTENSION: ICD-10-CM

## 2024-10-10 LAB — LVEF ECHO: NORMAL

## 2024-10-10 PROCEDURE — 250N000011 HC RX IP 250 OP 636: Performed by: INTERNAL MEDICINE

## 2024-10-10 PROCEDURE — 74174 CTA ABD&PLVS W/CONTRAST: CPT

## 2024-10-10 PROCEDURE — 93970 EXTREMITY STUDY: CPT | Performed by: SURGERY

## 2024-10-10 PROCEDURE — 93306 TTE W/DOPPLER COMPLETE: CPT

## 2024-10-10 PROCEDURE — 93306 TTE W/DOPPLER COMPLETE: CPT | Mod: 26 | Performed by: INTERNAL MEDICINE

## 2024-10-10 RX ORDER — IOPAMIDOL 755 MG/ML
123 INJECTION, SOLUTION INTRAVASCULAR ONCE
Status: COMPLETED | OUTPATIENT
Start: 2024-10-10 | End: 2024-10-10

## 2024-10-10 RX ADMIN — IOPAMIDOL 123 ML: 755 INJECTION, SOLUTION INTRAVENOUS at 12:14

## 2024-10-16 ENCOUNTER — THERAPY VISIT (OUTPATIENT)
Dept: PHYSICAL THERAPY | Facility: REHABILITATION | Age: 69
End: 2024-10-16
Payer: COMMERCIAL

## 2024-10-16 DIAGNOSIS — R68.84 JAW PAIN: ICD-10-CM

## 2024-10-16 DIAGNOSIS — M54.2 CERVICAL PAIN: ICD-10-CM

## 2024-10-16 DIAGNOSIS — R10.31 RIGHT INGUINAL PAIN: Primary | ICD-10-CM

## 2024-10-16 PROCEDURE — 97140 MANUAL THERAPY 1/> REGIONS: CPT | Mod: GP | Performed by: PHYSICAL THERAPIST

## 2024-10-16 NOTE — PROGRESS NOTES
PLAN  Discontinue PT    Beginning/End Dates of Progress Note Reporting Period:    8/21/24 to 10/16/2024    Referring Provider:  Jong Claudio    DISCHARGE  Reason for Discharge: Pt has improved but medical condition and area of concern has changed to LB    Equipment Issued: none    Discharge Plan: Patient to continue home program for groin and LB    Referring Provider:  Jong Claudio      10/16/24 0500   Appointment Info   Signing clinician's name / credentials Miley Pradhan PT   Visits Used 2/10 + 18   Medical Diagnosis Right inguinal pain   PT Tx Diagnosis Right inguinal pain   Precautions/Limitations Pt having LBP and also urinary urgency   Other pertinent information 8/30/24 MRI lumbar IMPRESSION:  1.  Interval progression of multilevel lumbar spondylosis, most pronounced at L3-L4 and L4-L5 since 2014.  *  L3-L4: Worsened grade 1 degenerative anterolisthesis. Severe canal stenosis.  *  L4-L5: New grade 1 degenerative anterolisthesis. Moderate canal stenosis and severe lateral recess stenosis.  2.  No high-grade canal or foraminal stenosis at the remaining levels.   Progress Note/Certification   Start of Care Date 08/15/23   Onset of illness/injury or Date of Surgery 01/01/23   Therapy Frequency 1x/wk   Predicted Duration 20 wks   Certification date from 08/14/24   Certification date to 11/12/24       Present No   GOALS   PT Goals 2   PT Goal 1   Goal Identifier R Groin pain   Goal Description R groin pain 0-4/10 with ADL's (especially gardening/bending) in 20 wks   Rationale to maximize safety and independence with performance of ADLs and functional tasks;to maximize safety and independence within the home;to maximize safety and independence within the community;to maximize safety and independence with transportation;to maximize safety and independence with self cares   Goal Progress Worse- pt has been paacking and moving items in prep for having rogers floors refinished. Right groin pain  "2-8/10 with nl house activities. 8/10 doens't happen frequently. The left groin actually is more painful and stuck compaired to the right 2-10/10- momentary and happens 10/10 2x/wk.. \" Something gets trapped in there\". Sleeping has been terrible due to difficulty breathing - even with sleep apnea machine feels stuggle to inhale. She does have inhaler.   Target Date 11/12/24   PT Goal 2   Goal Identifier Groin pain wearing pants   Goal Description Pt will be able to tolerate right groin pressure to be able to wear loose fitting soft pants with 0-2/10 right groin pain in 20 weeks   Rationale to maximize safety and independence with performance of ADLs and functional tasks;to maximize safety and independence within the home;to maximize safety and independence within the community;to maximize safety and independence with transportation;to maximize safety and independence with self cares   Goal Progress Overall improved, not met. Pt wears loose fitting clothing as the right inner thigh is swelling, rather than groin pin. Clothing when sitting no longer a big factor causing right groin pain. Pt wears flexible clothing to accommadate swelling in the right LE.  (Pt went off Amitriptylene and notes the swelling in both legs has decreased.)   Target Date 11/12/24   Subjective Report   Subjective Report The worst areas is the swelling and lump in the right inner calf and it fos up to the inner knee and inner thigh- pants get tighter. I also get random right buttock pain down the post leg stopping in the mid hamstring. Today that area is a 6/10- more a burning. At the best I laurel't even notice any pain. I have no known triggers for the right sciatica pain. I went to a spine consult 9/17/24 and they ordered PT- I haven't yet scheduled this but they did call. I had a Vascular consult 9/5/24 and they are going to do US of bilat veins and CTV of abdomen and pelvis on 10/10/24.   Objective Measures   Objective Measures Objective " Measure 1;Objective Measure 2;Objective Measure 3   Objective Measure 1   Objective Measure Trunk AROM: easily touch floor with fingertips,   Details Palpation: Marked pain bilateral ant pelvis, R > L adductor mm pain lee near the knee, bilat calf. Pain along the R med knee jt line woith obvious swelling and enlargment of jt.   Objective Measure 2   Objective Measure Pelvis is level standing and sitting   Details SLR: L 58 deg, R 48 deg   Objective Measure 3   Details Obvious right knee swelling at joint line and also increased swelling entire Right thigh   Treatment Interventions (PT)   Interventions Manual Therapy;Self Care/Home Management   Therapeutic Procedure/Exercise   Ther Proc 1 - Details HS stretch   PTRx Ther Proc 1 Prone On Elbows   PTRx Ther Proc 1 - Details Hug a pillow to support neck. relax and sag back. to increase stretch or to stretch the front of the hips bends the knee/knees. Use a pillow under pelvis and stomach to reduce pressure if needed but remove this pillow after 2-3 days to progress. 1-3 minutes daily   PTRx Ther Proc 2 Standing Extension   PTRx Ther Proc 2 - Details Lift chest up tuck chin sway belly forward look up slightly to ceiling. It is helpful top put hands on hips or waist 1x/hour   PTRx Ther Proc 3 Single Knee to Chest   PTRx Ther Proc 3 - Details When using this to correct the SI joint only bend the hip needing correction (usually the right side). Pull the knee up and out to the armpit while pressing the left leg into the bed daily   PTRx Ther Proc 4 Pubic Shot Gun MET   PTRx Ther Proc 4 - Details Feet should be level and touching each other. Use a folded pillow or ball between the knees daily   PTRx Ther Proc 5 Supine Hamstring Stretch   PTRx Ther Proc 5 - Details Bend knee to 90 deg and hold with hands while extending knee 15- 30 seconds within pain tolerance- daily bilat.  Avoid stretching adductors.   PTRx Ther Proc 6 Strap Assisted Straight Leg Stretch   PTRx Ther Proc 6  - Details Place strap on ball of foot  straighten knee and raise leg until tension felt on back of leg. Then unlock knee and relock as you progressively raise foot to ceiling. Be gentle to avoid irritation of sciatic nerve. 10 reps 1-2x/daily   Skilled Intervention Verbal review of all exercises with emphasis to SI and lumbar spine stretching. Pt has been told to decrease adductor stretch, hold all stretches until symptoms subside in pelvis and LE.   Manual Therapy   Manual Therapy: Mobilization, MFR, MLD, friction massage minutes ([id number]) 55   Manual Therapy 1 Counterstrain   Manual Therapy 1 - Details MT/SCS to neck , abdomen and R LE   Skilled Intervention R SUGAR-LV, R PORT-LV,R PES-LV, R POP-LVR LSYR-AM R PTIB-A   Self Care/home Management   Skilled Intervention Discussed MRI findings with pt regarding her lumbar spine and current pain pattern. I recommended she follow through with PT spine program for core stability. We also discussed her upcoming vascular tests regarding the right groin pain, and implications for PT treatment.   Education   Learner/Method Patient;Listening;Reading;Demonstration;Pictures/Video;No Barriers to Learning   Plan   Home program R KTC, Pubic squeeze, diaphragm breathing /ankle pumps 2x/day, EIS, JULI, HS stretches   Updates to plan of care New Orthotics April 2024 (unraveling May- got new ones), Knee High compression April 2024   Plan for next session Pt symptoms have changed and she is having equal amounts of L groin pain with R groin pain. She is having more medical work up of right LE swelling, and has stopped a medication that has side effect of LE swelling which has helped. PT has helped overall, but pt and therapist made decision to end PT today for groin pain.  She has been diagnosed with lumbar stenosis and referred to Spine PT for for strengthening of core.  She has not scheduled this yet and today is last visit with PT for groin.   Comments   Comments Pt last in for PT 4 wks  ago, and there is continued R LBP and radicular pain into right post leg and increasing bladder urgency. She has a new PT order for this area of concern, She denies numbness or strength loss of LE.  Pt will continue current HEP- had US 10/10/24 for abdomen and will be following up regarding outcome. DC PT   Pelvic Health Informed Consent Statement Discussed with patient/guardian reason for referral regarding pelvic health needs and external/internal pelvic floor muscle examination.  Opportunity provided to ask questions and verbal consent for assessment and intervention was given.   Total Session Time   Timed Code Treatment Minutes 55   Total Treatment Time (sum of timed and untimed services) 55

## 2024-10-24 ENCOUNTER — OFFICE VISIT (OUTPATIENT)
Dept: OTHER | Facility: CLINIC | Age: 69
End: 2024-10-24
Attending: INTERNAL MEDICINE
Payer: COMMERCIAL

## 2024-10-24 VITALS
DIASTOLIC BLOOD PRESSURE: 85 MMHG | WEIGHT: 198 LBS | OXYGEN SATURATION: 98 % | SYSTOLIC BLOOD PRESSURE: 168 MMHG | BODY MASS INDEX: 36.21 KG/M2 | HEART RATE: 77 BPM

## 2024-10-24 DIAGNOSIS — I25.10 CORONARY ARTERY DISEASE DUE TO LIPID RICH PLAQUE: ICD-10-CM

## 2024-10-24 DIAGNOSIS — E66.01 MORBID OBESITY (H): ICD-10-CM

## 2024-10-24 DIAGNOSIS — I25.83 CORONARY ARTERY DISEASE DUE TO LIPID RICH PLAQUE: ICD-10-CM

## 2024-10-24 DIAGNOSIS — I87.2 VENOUS (PERIPHERAL) INSUFFICIENCY: ICD-10-CM

## 2024-10-24 DIAGNOSIS — I25.10 ASCVD (ARTERIOSCLEROTIC CARDIOVASCULAR DISEASE): ICD-10-CM

## 2024-10-24 DIAGNOSIS — I47.29 NON-SUSTAINED VENTRICULAR TACHYCARDIA (H): ICD-10-CM

## 2024-10-24 DIAGNOSIS — E21.3 HYPERPARATHYROIDISM (H): ICD-10-CM

## 2024-10-24 DIAGNOSIS — E11.9 TYPE 2 DIABETES, HBA1C GOAL < 7% (H): ICD-10-CM

## 2024-10-24 DIAGNOSIS — M79.89 LEG SWELLING: Primary | ICD-10-CM

## 2024-10-24 DIAGNOSIS — E83.52 HYPERCALCEMIA: ICD-10-CM

## 2024-10-24 DIAGNOSIS — E78.5 HYPERLIPIDEMIA LDL GOAL <70: ICD-10-CM

## 2024-10-24 DIAGNOSIS — G47.33 OSA (OBSTRUCTIVE SLEEP APNEA): ICD-10-CM

## 2024-10-24 DIAGNOSIS — I10 ESSENTIAL HYPERTENSION: ICD-10-CM

## 2024-10-24 DIAGNOSIS — Z78.9 STATIN INTOLERANCE: ICD-10-CM

## 2024-10-24 DIAGNOSIS — I25.84 CORONARY ATHEROSCLEROSIS DUE TO SEVERELY CALCIFIED CORONARY LESION: ICD-10-CM

## 2024-10-24 PROCEDURE — 99417 PROLNG OP E/M EACH 15 MIN: CPT | Performed by: INTERNAL MEDICINE

## 2024-10-24 PROCEDURE — G2211 COMPLEX E/M VISIT ADD ON: HCPCS | Performed by: INTERNAL MEDICINE

## 2024-10-24 PROCEDURE — G0463 HOSPITAL OUTPT CLINIC VISIT: HCPCS | Performed by: INTERNAL MEDICINE

## 2024-10-24 PROCEDURE — 99215 OFFICE O/P EST HI 40 MIN: CPT | Performed by: INTERNAL MEDICINE

## 2024-10-24 RX ORDER — ALBUTEROL SULFATE 90 UG/1
2 INHALANT RESPIRATORY (INHALATION) EVERY 4 HOURS PRN
COMMUNITY

## 2024-10-24 RX ORDER — ISOSORBIDE MONONITRATE 30 MG/1
30 TABLET, EXTENDED RELEASE ORAL DAILY
Qty: 30 TABLET | Refills: 11 | Status: SHIPPED | OUTPATIENT
Start: 2024-10-24 | End: 2024-11-09

## 2024-10-24 RX ORDER — METOPROLOL SUCCINATE 200 MG/1
200 TABLET, EXTENDED RELEASE ORAL DAILY
Qty: 90 TABLET | Refills: 3 | Status: SHIPPED | OUTPATIENT
Start: 2024-10-24 | End: 2024-10-24 | Stop reason: ALTCHOICE

## 2024-10-24 RX ORDER — EZETIMIBE 10 MG/1
10 TABLET ORAL DAILY
Qty: 90 TABLET | Refills: 3 | Status: SHIPPED | OUTPATIENT
Start: 2024-10-24 | End: 2024-11-09

## 2024-10-24 RX ORDER — METOPROLOL SUCCINATE 100 MG/1
100 TABLET, EXTENDED RELEASE ORAL 2 TIMES DAILY
Qty: 180 TABLET | Refills: 3 | Status: SHIPPED | OUTPATIENT
Start: 2024-10-24

## 2024-10-24 NOTE — PROGRESS NOTES
[facility]        Patient is here for a follow up.    Pt is currently taking Aspirin.    BP (!) 168/85 (BP Location: Left arm, Patient Position: Sitting, Cuff Size: Adult Regular)   Pulse 77   Wt 198 lb (89.8 kg)   SpO2 98%   BMI 36.21 kg/m      The provider has been notified that the patient has concerns of right calf pain and swelling that travels in the thigh. Increased when laying on either side which has made it hard to sleep.     Questions patient would like addressed today are: N/A.    Refills are needed: N/A    Has homecare services and agency name:  Shirley Howe MA

## 2024-10-24 NOTE — PROGRESS NOTES
VASCULAR MEDICAL FOLLOW UP ASSESSMENT  REFERRAL SOURCE: Jong Claudio NP   REASON FOR CONSULT: for M79.89 (ICD-10-CM) - Localized swelling of both lower extremities       A/P:        (G47.33) KRYSTLE (obstructive sleep apnea)  Comment: She is advised to see her PCP to obtain a referral to sleep medicine so as to obtian a better fitting CPAP mask.  Plan: as above.           (M79.89) Leg swelling  (primary encounter diagnosis)     Comment: This is likely leg swelling due to venous insufficiency without other factors extrinsic to the legs. No pelvic outflow obstruction or May Thurner anatomy on CTV. Normal EF, PA pressures, normal LVEF, RVEF, PA pressures on TTE. Venous comp study revealed minimal venous insufficiency and she has no evidence of LE. She is obese however, and plasma volume could be expanded contributing to this as well.   TFTs and renal function WNL     Plan: US Venous Competency Bilateral, Echocardiogram         Complete, CTV Abdomen Pelvis w Contrast,         Lactate Dehydrogenase, TSH, T4 free, T3 Free                (I47.29) Non-sustained ventricular tachycardia (H)     Comment: Asymptomatic. TTE unimpressive. Only one run of self terminating , short lasting, monomorphic VT.     Plan: Reassurance        (I25.10,  I25.83) CAD S/P BMS to LAD in 2012     Comment: She is not being properly treated for this with guideline directed multidrug therapy.     Plan: See statin intolerance below.        (E78.5) Hyperlipidemia LDL goal <70     Comment: Not at goal. Statin intolerance with myalgias to three statins.      Plan:I recommended she start a PCSK9 inhibitor but she declines as she does not want to change to many variables simultaneously. I advised her that with her insulin resistance , known CAD, new dx of DM2, and high LDL-P that she is at dramatically increased risk of stroke, MI, limb loss in the future if not optimally controlled. She agrees to start Zetia. Check  TSH, T4 free, T3 Free, C-Reactive  Protein, High        Sensitivity, LipoFit by NMR, Lipoprotein (a) in 01/2025, RTC two weeks later.                 (Z78.9) Statin intolerance     Comment: As above     Plan: Plan to discuss use of Zetia, PCSK9 inhibitors or siRNA molecules, Nexletol at next visit.         (E11.9)  Diabetes, type 2, goal A1C < 7%     Comment: This is a new dx.She does not want to start metformin right now, and does not want want to see a DM2 educator right now.     Plan: Avoid CHO, check Hemoglobin A1c in 01/2025.                (I10) Essential hypertension     Comment:  TTE WNL other than dilated LA. Her event monitor revealed no AF. She has sleep apnea and has difficulty wearing CPAP mask. See below     Plan: Stop calcium channel blocker due to ankle swelling and venous insufficiency. Continue Toprol  mg daily, add Imdur 30 mg daily. She has an alleged allergy to food dye, making isordil and hydralazine less optimal. She was warned of headaches with Imdur. RTC one month for a BP check        (E83.52) Hypercalcemia     Comment: No monoclonal protein elevation. I-PTH is abnormal, but hypercalcemia is persistent and could be related to ANGELIQUE so we will obtian the below.      Plan: She is advised to discuss this non vascular issue with her PCP, and to consider evaluation in consideration of parathyroidectomy.                 (E66.01) Morbid obesity (H)     Comment: Body mass index is 35.78 kg/m .      Plan: I will discuss weight loss with her at her next visit.              The longitudinal care of plan for Alvina was addressed during this visit. Due to added complexity of care, we will continue to support Alvina BUCKLEY Caesar  and the subsequent management of these conditions and with ongoing continuity of care for these conditions.         83 minutes total medical care on today's date.Extended time secondary to multiple medical comorbidities and more than one dozen medication allergies and intolerances adding to the complexity of  medical care delivery to this individual.         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 The patient presents today to address BLE edema Rt>>>Lt worse over the last year. She has skin tightness in association with this and leg heaviness.     Review Of Systems  Skin: as above  Eyes: negative  Ears/Nose/Throat: negative  Respiratory: No shortness of breath, dyspnea on exertion, cough, or hemoptysis  Cardiovascular: negative  Gastrointestinal: negative  Genitourinary: negative  Musculoskeletal: as above  Neurologic: negative  Psychiatric: negative  Hematologic/Lymphatic/Immunologic: negative  Endocrine: negative        PAST MEDICAL HISTORY:                  Past Medical History        Past Medical History:   Diagnosis Date    Arthritis      Arthritis      Basal cell cancer 2007    Benign neoplasm of colon      Brain aneurysm       removed on 12/31/13    Cancer (H)       basel cell    Cerebral embolism with cerebral infarction (H)      Cerebral vascular accident (H) 12/31/2013    Colitis      Coronary artery disease      External hemorrhoid 6/15/2016    Fibrocystic breast      High blood pressure      History of heart artery stent 9/2012    Hyperlipidemia      Hypertension      Hypertension      Myalgia and myositis, unspecified      S/P hysterectomy 7/28/2019     Total hysterectomy with BSO benign disease, edometriosis?    Sleep apnea, obstructive       c-pap    Statin intolerance      Stroke (H)      Type 2 diabetes mellitus without complications (H)       borderline? blood sugars are fine/nosx no meds            PAST SURGICAL HISTORY:                  Past Surgical History         Past Surgical History:   Procedure Laterality Date    BRAIN SURGERY   2013     brain aneurism    CARDIAC CATHETERIZATION   9/28/15     mild to  moderate coronary disease only without obstructive stenosis    CEREBRAL ANEURYSM REPAIR   12/31/2013     RMCA Aneurysm clipping, Dr Harvey    CORONARY STENT PLACEMENT   9/20/12     bare metal stent in the proximal LAD     HEMORRHOID SURGERY   1975    HEMORRHOIDECTOMY INTERNAL LIGATION   1975    HYSTERECTOMY   2002    HYSTERECTOMY        NECK SURGERY   2001    OOPHORECTOMY        OVARY SURGERY   removed 1990    DE NASAL/SINUS ENDOSCOPY,BX/RMV POLYP/DEBRID   1998     Description: Nasal Endoscopy Polypectomy;    DE REMOVAL OF OVARY/TUBE(S) Right        Salpingo-oophorectomy Right Side    REPAIR ARTERY CORONARY   2012     femerol artery torn during angiogram    SINUS SURGERY   1998     polyp remvd    STENT   2012     heart    ZZC TOTAL ABDOM HYSTERECTOMY   08/01/2002     endometriosis            CURRENT MEDICATIONS:                  Current Outpatient Prescriptions          Current Outpatient Medications   Medication Sig Dispense Refill    amitriptyline (ELAVIL) 25 MG tablet Take 25 mg by mouth at bedtime        aspirin 81 MG EC tablet Take 81 mg by mouth daily        azelastine (ASTELIN) 0.1 % nasal spray USE 2 SPRAYS IN EACH NOSTRIL TWICE DAILY AS NEEDED 90 mL 3    cetirizine (ZYRTEC) 10 MG tablet Take 10 mg by mouth daily        Cholecalciferol (VITAMIN D3 PO) Take 2,000 Units by mouth every morning         Cyanocobalamin (B-12) 5000 MCG SUBL Take by mouth every 24 hours        Ferrous Sulfate (IRON) 90 (18 Fe) MG TABS Take by mouth every 24 hours        fluticasone (FLONASE) 50 MCG/ACT nasal spray INSTILL 1 SPRAY IN EACH NOSTRIL DAILY 48 g 0    furosemide (LASIX) 20 MG tablet Take 1 tablet (20 mg) by mouth daily as needed (leg swelling) (Patient not taking: Reported on 8/7/2024) 30 tablet 0    hydrochlorothiazide (HYDRODIURIL) 25 MG tablet Take 2 tablets (50 mg) by mouth daily 180 tablet 3    losartan (COZAAR) 100 MG tablet Take 1 tablet (100 mg) by mouth daily 100 tablet 1    MAGNESIUM GLUCONATE PO Take 400 mg by  mouth daily        metoprolol succinate ER (TOPROL XL) 100 MG 24 hr tablet Take 1 tablet (100 mg) by mouth 2 times daily 180 tablet 3    montelukast (SINGULAIR) 10 MG tablet TAKE 1 TABLET BY MOUTH EVERY DAY 90 tablet 2    NIFEdipine ER (ADALAT CC) 60 MG 24 hr tablet TAKE 1 TABLET(60 MG) BY MOUTH EVERY 24 HOURS 90 tablet 3    nitroGLYcerin (NITROSTAT) 0.4 MG sublingual tablet DISSOLVE 1 TABLET UNDER THE TONGUE EVERY 5 MINUTES FOR 3 DOSES. IF SYMPTOMS PERSIST AFTER FIRST DOSE CALL 911 25 tablet 1    Omega-3 Fatty Acids (OMEGA-3 FISH OIL PO) Take 1,400 mg by mouth At Bedtime        potassium chloride sharonda ER (KLOR-CON M20) 20 MEQ CR tablet TAKE 2 TABLETS BY MOUTH TWICE DAILY 360 tablet 2            ALLERGIES:                  Allergies         Allergies   Allergen Reactions    Propylene Glycol Itching and Rash    Red Dye #40 (Allura Red) Rash       Any kind of red pills.    Adhesive Tape Itching    Atorvastatin Cramps       Mevacor, and zocor also leg cramping  Other reaction(s): leg cramps    Carvedilol         hives    Cephalosporins Hives    Clindamycin Hives    Demerol Nausea and Vomiting    Dilantin [Phenytoin]         Hives    Lisinopril         Trouble breathing    Meperidine Nausea and Vomiting       Hives  Other reaction(s): hives and vomiting    Metronidazole Hives    Nickel         Other reaction(s): Rash, itching    Penicillins Hives    Rosuvastatin Cramps       Leg cramping    Sulfa Antibiotics Hives    Latex Rash            SOCIAL HISTORY:                  Social History   Social History            Socioeconomic History    Marital status: Single       Spouse name: Not on file    Number of children: Not on file    Years of education: Not on file    Highest education level: Not on file   Occupational History    Not on file   Tobacco Use    Smoking status: Never       Passive exposure: Never    Smokeless tobacco: Never   Vaping Use    Vaping status: Never Used   Substance and Sexual Activity    Alcohol use:  Yes    Drug use: No    Sexual activity: Not on file   Other Topics Concern    Not on file   Social History Narrative    Not on file      Social Determinants of Health           Financial Resource Strain: Low Risk  (12/4/2023)     Financial Resource Strain      Within the past 12 months, have you or your family members you live with been unable to get utilities (heat, electricity) when it was really needed?: No   Food Insecurity: Low Risk  (12/4/2023)     Food Insecurity      Within the past 12 months, did you worry that your food would run out before you got money to buy more?: No      Within the past 12 months, did the food you bought just not last and you didn t have money to get more?: No   Transportation Needs: Low Risk  (12/4/2023)     Transportation Needs      Within the past 12 months, has lack of transportation kept you from medical appointments, getting your medicines, non-medical meetings or appointments, work, or from getting things that you need?: No   Physical Activity: Not on file   Stress: Not on file   Social Connections: Not on file   Interpersonal Safety: Low Risk  (8/7/2024)     Interpersonal Safety      Do you feel physically and emotionally safe where you currently live?: Yes      Within the past 12 months, have you been hit, slapped, kicked or otherwise physically hurt by someone?: No      Within the past 12 months, have you been humiliated or emotionally abused in other ways by your partner or ex-partner?: No   Housing Stability: Low Risk  (12/4/2023)     Housing Stability      Do you have housing? : Yes      Are you worried about losing your housing?: No            FAMILY HISTORY:                   Family History         Family History   Problem Relation Age of Onset    Cancer Mother      Diabetes Mother      Hypertension Mother      Thyroid Disease Mother      Heart Disease Mother      Stomach Cancer Mother      Coronary Artery Disease Mother 60    Hypertension Father      Heart Disease  Sister      GERD Sister      CABG Sister      Colon Cancer Brother      Heart Disease Maternal Grandfather      Breast Cancer Maternal Aunt                 Physical exam Reveals:     O/P: WNL  HEENT: WNL  NECK: No JVD, thyromegaly, or lymphadenopathy  HEART: RRR, no murmurs, gallops, or rubs  LUNGS: CTA bilaterally without rales, wheezes, or rhonchi  GI: NABS, nondistended, nontender, soft  EXT:without cyanosis, clubbing, or two plus LLE and one plus RLE edema; CEAP C3 BLE venous insufficiency  NEURO: nonfocal  : no flank tenderness                 EXAM: US LOWER EXTREMITY VENOUS DUPLEX BILATERAL  LOCATION: [facility]  DATE: 8/9/2024     INDICATION: Bilateral lower extremity pain, swelling and edema.  COMPARISON: None.  TECHNIQUE: Venous Duplex ultrasound of bilateral lower extremities with and without compression, augmentation and duplex. Color flow and spectral Doppler with waveform analysis performed.     FINDINGS: Exam includes the common femoral, femoral, popliteal veins as well as segmentally visualized deep calf veins and greater saphenous vein.      RIGHT: No deep vein thrombosis. No superficial thrombophlebitis. No popliteal cyst.     LEFT: No deep vein thrombosis. No superficial thrombophlebitis. No popliteal cyst.                                                                      IMPRESSION:  1.  No deep venous thrombosis in the bilateral lower extremities.        [facility]  Cardiac Electrophysiology      Zio Patch Monitor Report 8/7/2024     Results:  Indication for study: Palpitations  Time monitored: 6 days 13 hours.    Predominant rhythm: Normal sinus rhythm.  Conduction intervals are normal.  Patient recordings: Diary: A single (fluttering, heart racing), and recordings demonstrated normal sinus rhythm with heart rates in the mid 80s, there was no ectopy or other pathologic rhythm disturbance.  Triggered (no symptoms): 11, and recordings demonstrated normal  sinus rhythm with rates ranging between 81 and 108 bpm.  Three of the recordings demonstrated isolated PAC and all of the others demonstrated normal sinus rhythm alone.  Auto triggered recordings: recordings demonstrated normal sinus rhythm with rare atrial and ventricular ectopy.  The patient had 1 run of nonsustained VT lasting 12 beats at 165 bpm (monomorphic) without associated symptoms.  The patient also demonstrated 17 short runs of ectopic atrial tachycardia with the longest being 5.1 seconds and the fastest 157 bpm, again without any associated symptoms     Impression:  Borderline multiday cardiac monitor largely by virtue the presence of nonsustained VT.  This was asymptomatic, but if the patient has structural heart disease and/or LVEF <40% then consider further electrophysiologic evaluation.  The patient had multiple short runs of ectopic atrial tachycardia but no sustained atrial arrhythmia and in particular no atrial fibrillation or flutter.    No complex or sustained ventricular tachyarrhythmia.  No profound bradycardia or significant/symptomatic pauses.

## 2024-10-28 ENCOUNTER — TRANSFERRED RECORDS (OUTPATIENT)
Dept: HEALTH INFORMATION MANAGEMENT | Facility: CLINIC | Age: 69
End: 2024-10-28
Payer: COMMERCIAL

## 2024-10-30 ENCOUNTER — TELEPHONE (OUTPATIENT)
Dept: OTHER | Facility: CLINIC | Age: 69
End: 2024-10-30
Payer: COMMERCIAL

## 2024-10-30 DIAGNOSIS — E11.9 TYPE 2 DIABETES, HBA1C GOAL < 7% (H): ICD-10-CM

## 2024-10-30 DIAGNOSIS — E83.52 HYPERCALCEMIA: ICD-10-CM

## 2024-10-30 DIAGNOSIS — I87.2 VENOUS (PERIPHERAL) INSUFFICIENCY: ICD-10-CM

## 2024-10-30 DIAGNOSIS — I10 ESSENTIAL HYPERTENSION: Primary | ICD-10-CM

## 2024-10-30 DIAGNOSIS — M79.89 LEG SWELLING: ICD-10-CM

## 2024-10-30 DIAGNOSIS — E78.5 HYPERLIPIDEMIA LDL GOAL <70: ICD-10-CM

## 2024-10-30 NOTE — TELEPHONE ENCOUNTER
"[facility]    Who is the name of the provider?:  ROLDAN MORENO   What is the location you see this provider at/preferred location?: [place]  Person calling / Facility: Alvina Maynard  Phone number:  [phone] (home)   Nurse call back needed:  Yes     Reason for call:  Patient has questions about where she can purchase compression garments. Patient is also requesting her most recent after visit summary as well as \"a few things Dr. Jimenes wanted me to do.\"     Pharmacy location:     [id number] - [place], [place] - [address] AT [id number] [address] AT [facility] & [place] [address]  Outside Imaging: n/a   Can we leave a detailed message on this number?  YES     10/30/2024, 1:05 PM      "

## 2024-10-30 NOTE — TELEPHONE ENCOUNTER
Routing to scheduling to coordinate the following:    In person follow up   Please schedule this in 1 month     Appt note: BP check    Chaya ISABEL RN    [facility]  Office: [phone]  Fax: [phone]

## 2024-10-31 NOTE — TELEPHONE ENCOUNTER
Returned call to Alvina and she states she was provided a piece of paper from Dr. Carrion at her LOV with him but she lost it. I asked if it was her AVS and she is unsure. She is unsure how to access this in [employer] so copy of AVS was mailed to her home address. We discussed locations for her to get her compression stockings and she verbalized understanding.    Chaya ISABEL, RN    [facility]  [facility] Center  Office: [phone]  Fax: [phone]

## 2024-11-08 ENCOUNTER — OFFICE VISIT (OUTPATIENT)
Dept: FAMILY MEDICINE | Facility: CLINIC | Age: 69
End: 2024-11-08
Payer: COMMERCIAL

## 2024-11-08 VITALS
TEMPERATURE: 98.6 F | HEART RATE: 66 BPM | OXYGEN SATURATION: 95 % | DIASTOLIC BLOOD PRESSURE: 70 MMHG | HEIGHT: 62 IN | RESPIRATION RATE: 18 BRPM | BODY MASS INDEX: 35 KG/M2 | WEIGHT: 190.2 LBS | SYSTOLIC BLOOD PRESSURE: 128 MMHG

## 2024-11-08 DIAGNOSIS — K62.5 RECTAL BLEEDING: ICD-10-CM

## 2024-11-08 DIAGNOSIS — R06.2 WHEEZING: ICD-10-CM

## 2024-11-08 DIAGNOSIS — E11.59 TYPE 2 DIABETES MELLITUS WITH OTHER CIRCULATORY COMPLICATION, WITHOUT LONG-TERM CURRENT USE OF INSULIN (H): Primary | ICD-10-CM

## 2024-11-08 DIAGNOSIS — R79.89 ELEVATED PARATHYROID HORMONE: ICD-10-CM

## 2024-11-08 DIAGNOSIS — R79.89 ELEVATED PTHRP LEVEL: ICD-10-CM

## 2024-11-08 DIAGNOSIS — J45.30 MILD PERSISTENT ASTHMA WITHOUT COMPLICATION: ICD-10-CM

## 2024-11-08 PROCEDURE — 99214 OFFICE O/P EST MOD 30 MIN: CPT | Performed by: NURSE PRACTITIONER

## 2024-11-08 PROCEDURE — G2211 COMPLEX E/M VISIT ADD ON: HCPCS | Performed by: NURSE PRACTITIONER

## 2024-11-08 ASSESSMENT — ASTHMA QUESTIONNAIRES
QUESTION_5 LAST FOUR WEEKS HOW WOULD YOU RATE YOUR ASTHMA CONTROL: SOMEWHAT CONTROLLED
ACT_TOTALSCORE: 12
QUESTION_4 LAST FOUR WEEKS HOW OFTEN HAVE YOU USED YOUR RESCUE INHALER OR NEBULIZER MEDICATION (SUCH AS ALBUTEROL): TWO OR THREE TIMES PER WEEK
ACT_TOTALSCORE: 12
QUESTION_1 LAST FOUR WEEKS HOW MUCH OF THE TIME DID YOUR ASTHMA KEEP YOU FROM GETTING AS MUCH DONE AT WORK, SCHOOL OR AT HOME: SOME OF THE TIME
QUESTION_3 LAST FOUR WEEKS HOW OFTEN DID YOUR ASTHMA SYMPTOMS (WHEEZING, COUGHING, SHORTNESS OF BREATH, CHEST TIGHTNESS OR PAIN) WAKE YOU UP AT NIGHT OR EARLIER THAN USUAL IN THE MORNING: TWO OR THREE NIGHTS A WEEK
QUESTION_2 LAST FOUR WEEKS HOW OFTEN HAVE YOU HAD SHORTNESS OF BREATH: MORE THAN ONCE A DAY

## 2024-11-08 ASSESSMENT — PAIN SCALES - GENERAL: PAINLEVEL_OUTOF10: NO PAIN (0)

## 2024-11-08 NOTE — PROGRESS NOTES
Assessment & Plan     ICD-10-CM    1. Type 2 diabetes mellitus with other circulatory complication, without long-term current use of insulin (H)  E11.59       2. Elevated PTHrP level  R79.89       3. Elevated parathyroid hormone  R79.89 Adult Endocrinology  Referral      4. Rectal bleeding  K62.5 CANCELED: Adult GI  Referral - Procedure Only      5. Mild persistent asthma without complication  J45.30       6. Wheezing  R06.2 beclomethasone HFA (QVAR REDIHALER) 80 MCG/ACT inhaler        GI wants to hold off on colonoscopy until the spring time.  Referral placed to endocrinology for hyperparathyroidism.  Improvements in wheezing with Qvar.  Increased to 80 mcg.  Diabetic labs up-to-date.  Discussed opportunity for Repatha.  At this point she is hesitant to do medication intervention for cholesterol lowering to improve cardiovascular risk.  Blood pressure shows good control.  Self discontinued isosorbide mononitrate after side effects.    Reviewed gastroenterology note x 1, vascular note x 2, LE ultrasound x 1, PTH x 1, metabolic panel x 1, A1c x 1    The longitudinal plan of care for the diagnosis(es)/condition(s) as documented were addressed during this visit. Due to the added complexity in care, I will continue to support Alvina in the subsequent management and with ongoing continuity of care.      Subjective     HPI     Started qvar. Helped. PTH elevated at vascular. Nifedipine stopped by vascular d/t concerns of possibly contributing to swelling.  Added in isosorbide mononitrate. Stools started to get looser. Noticed a small amount of BRB.  Patient is VERY concerned about cholesterol lowering medicines fearing they will do more harm than benefit.  Started Zetia at the same time she started nitrates.  Got brain fog.  Not interested in trying again.      Review of Systems - negative except for what's listed in the HPI      Objective    /70 (BP Location: Left arm, Patient Position: Sitting,  "Cuff Size: Adult Large)   Pulse 66   Temp 98.6  F (37  C) (Oral)   Resp 18   Ht 1.575 m (5' 2\")   Wt 86.3 kg (190 lb 3.2 oz)   SpO2 95%   BMI 34.79 kg/m    Physical Exam   General appearance - alert, well appearing, and in no distress  Mental status - alert, oriented to person, place, and time  Lymphatics - no palpable lymphadenopathy  Chest - clear to auscultation, no wheezes, rales or rhonchi, symmetric air entry  Heart - normal rate and regular rhythm, S1 and S2 normal, no murmurs noted  Abdomen - soft, nontender, nondistended, no masses or organomegaly  Neurological - alert, oriented, normal speech, no focal findings or movement disorder noted, neck supple without rigidity, cranial nerves II through XII intact, motor and sensory grossly normal bilaterally, normal muscle tone, no tremors, strength 5/5  Musculoskeletal - no joint tenderness, deformity or swelling  Extremities - peripheral pulses normal, +1 peripheral edema  Skin - normal coloration and turgor.    Jong Claudio, CNP    This note has been dictated using voice recognition software. Any grammatical or context distortions are unintentional and inherent to the software.    "

## 2024-11-08 NOTE — PROGRESS NOTES
"  {PROVIDER CHARTING PREFERENCE:388495}    Subjective   Alvina is a 69 year old, presenting for the following health issues:  Consult (Vascular visit findings)      11/8/2024    11:42 AM   Additional Questions   Roomed by Clarisa Nunez CMA     History of Present Illness       Reason for visit:  Follow up with breathing problems & results from test done by Dr Carrion    She eats 2-3 servings of fruits and vegetables daily.She consumes 0 sweetened beverage(s) daily.She exercises with enough effort to increase her heart rate 10 to 19 minutes per day.  She exercises with enough effort to increase her heart rate 4 days per week.   She is taking medications regularly.       {MA/LPN/RN Pre-Provider Visit Orders- hCG/UA/Strep (Optional):424322}  {SUPERLIST (Optional):061197}  {additonal problems for provider to add (Optional):248808}    {ROS Picklists (Optional):825104}      Objective    /70 (BP Location: Left arm, Patient Position: Sitting, Cuff Size: Adult Large)   Pulse 66   Temp 98.6  F (37  C) (Oral)   Resp 18   Ht 1.575 m (5' 2\")   Wt 86.3 kg (190 lb 3.2 oz)   SpO2 95%   BMI 34.79 kg/m    Body mass index is 34.79 kg/m .  Physical Exam   {Exam List (Optional):151155}    {Diagnostic Test Results (Optional):546308}        Signed Electronically by: Jong Claudio NP  {Email feedback regarding this note to [email or web address]   :422340}  "

## 2024-11-08 NOTE — PATIENT INSTRUCTIONS
Since the qvar has helped but didn't get you to goal, let's go ahead and increase to strength to the 80mcg dose.      I'll call Dr. Luis to make sure he's aware of the rectal bleeding to make sure he's still ok holding off on the colonoscopy.    For the elevated PTH, let's connect with a hormone specialist/endocrinologist.

## 2024-11-21 ENCOUNTER — THERAPY VISIT (OUTPATIENT)
Dept: PHYSICAL THERAPY | Facility: REHABILITATION | Age: 69
End: 2024-11-21
Attending: NURSE PRACTITIONER
Payer: COMMERCIAL

## 2024-11-21 DIAGNOSIS — M48.061 SPINAL STENOSIS, LUMBAR REGION, WITHOUT NEUROGENIC CLAUDICATION: ICD-10-CM

## 2024-11-21 DIAGNOSIS — M54.16 LUMBAR RADICULOPATHY: ICD-10-CM

## 2024-11-21 NOTE — PROGRESS NOTES
"PHYSICAL THERAPY EVALUATION  Type of Visit: Evaluation       Fall Risk Screen:  Fall screen completed by: PT  Have you fallen 2 or more times in the past year?: No  Have you fallen and had an injury in the past year?: No  Is patient a fall risk?: No            Subjective      Condition type:  Chronic (continuous duration <3 months)  Cause of current episode:  Unspecified     Nature of treatment:  Rehabilitative  Functional ability:  Minimal functional limitations  Documented POC (choose all that apply):  Patient agrees to program participation including home program  Briefly describe symptoms:  low back pain R>L, scaitic pain down the R leg, weak bladder concerns  How did the symptoms start:  \"just have come and gone\"  Average pain/intensity last 24 hours:  5/10  Average pain/intensity past week:  6/10  Frequency of Symptoms:  Frequently (51-75% of the time)  Symptom impact on ADLs:  Moderately  Condition change since eval:  N/A (first visit)  General health reported by patient:  Fair      Presenting condition or subjective complaint: (Patient-Rptd) based on the MRI there are issues in the lower back which can be causing a weak bladder and sciatic nerve pain down the lower right leg along with strengthing my lower backeak bladder and pain. Want to strenthen      The patient is a 69-year-old female presenting with low back pain and radiating sciatic pain down her right leg. She also reports experiencing bladder weakness, noting increased frequency and difficulty holding urine. Her primary concern is strengthening the muscles that may help address her bladder issues. In terms of staying active, she s doing some of the exercises Miley provided, though not consistently. She s also making an effort to walk more. She also reports experiencing numbness and tingling down the left arm, following the ulnar nerve distribution. This has been ongoing for the past 6 weeks.      Date of onset: 09/17/24    Relevant medical history: " (Patient-Rptd) Arthritis; Asthma; Bladder or bowel problems   Dates & types of surgery: (Patient-Rptd) i will bring a list    Prior diagnostic imaging/testing results: (Patient-Rptd) MRI     Prior therapy history for the same diagnosis, illness or injury:        Prior Level of Function  IND    Living Environment  Social support: (Patient-Rptd) With family members   Type of home: (Patient-Rptd) House; 2-story; Basement   Stairs to enter the home: (Patient-Rptd) Yes (Patient-Rptd) 5 Is there a railing: (Patient-Rptd) Yes     Ramp: (Patient-Rptd) No   Stairs inside the home: (Patient-Rptd) Yes (Patient-Rptd) 17 Is there a railing: (Patient-Rptd) Yes     Help at home: (Patient-Rptd) None  Equipment owned: (Patient-Rptd) Grab bars     Employment: (Patient-Rptd) No    Hobbies/Interests: (Patient-Rptd) shopping, going to [employer] shows, gardening, playing games on I pad, watching tv    Patient goals for therapy: (Patient-Rptd) sit without having pain run down the back of my leg and strengthen my bladder    Pain assessment: Pain present     Objective   LUMBAR SPINE EVALUATION  PAIN:       She rates her pain as 5/10 at rest, which can increase to 8/10 with activity. To relieve her pain, she sometimes sits in a recliner with her feet elevated and has recently started using a heat pack on her back. She describes the pain as tingling and spasmodic. The pain is generally constant, though there are occasional moments when it subsides. Activities that worsen her pain include bending forward and prolonged sitting. Since the past week her pain has worsened.         INTEGUMENTARY (edema, incisions):   POSTURE: Sitting Posture: Rounded shoulders, Forward head, Thoracic kyphosis increased  GAIT:   Weightbearing Status:   Assistive Device(s):   Gait Deviations:   BALANCE/PROPRIOCEPTION:   WEIGHTBEARING ALIGNMENT:   NON-WEIGHTBEARING ALIGNMENT:    ROM:   (Degrees) Left AROM Left PROM  Right AROM Right PROM   Hip Flexion       Hip Extension        Hip Abduction       Hip Adduction       Hip Internal Rotation       Hip External Rotation       Knee Flexion       Knee Extension       Lumbar Side glide Wfl; hurts    Lumbar Flexion Wfl; feels good   Lumbar Extension Wfl; lower back hurts a little    Pain:   End feel:   PELVIC/SI SCREEN:   STRENGTH:     MYOTOMES:    Left Right   T12-L3 (Hip Flexion) 4+ 4+   L2-4 (Quads)  5 5   L4 (Ankle DF)     L5 (Great Toe Ext)     S1 (Toe Raise)       DTR S:   CORD SIGNS:   DERMATOMES:   NEURAL TENSION:   FLEXIBILITY:   LUMBAR/HIP Special Tests:    PELVIS/SI SPECIAL TESTS:   FUNCTIONAL TESTS:   PALPATION:  TTP lumbar paraspinals, R>L  SPINAL SEGMENTAL CONCLUSIONS:       Assessment & Plan   CLINICAL IMPRESSIONS  Medical Diagnosis: M48.061 (ICD-10-CM) - Spinal stenosis, lumbar region, without neurogenic claudication  M54.16 (ICD-10-CM) - Lumbar radiculopathy    Treatment Diagnosis: back pain, sciatica, impaired posture, decreased activity tolerance   Impression/Assessment: Patient is a 69 year old female with low back pain R>L and radiation symptoms into the R leg complaints.  The following significant findings have been identified: Pain, Decreased ROM/flexibility, Decreased joint mobility, Decreased strength, Impaired balance, Impaired gait, Impaired muscle performance, Decreased activity tolerance, and Impaired posture. These impairments interfere with their ability to perform self care tasks, work tasks, recreational activities, household chores, driving , household mobility, and community mobility as compared to previous level of function.     Clinical Decision Making (Complexity):  Clinical Presentation: Stable/Uncomplicated  Clinical Presentation Rationale: based on medical and personal factors listed in PT evaluation  Clinical Decision Making (Complexity): Low complexity    PLAN OF CARE  Treatment Interventions:  Modalities: Cryotherapy, Hot Pack  Interventions: Gait Training, Manual Therapy, Neuromuscular  Re-education, Therapeutic Activity, Therapeutic Exercise    Long Term Goals     PT Goal 1  Goal Identifier: HEP  Goal Description: Pt will be IND in HEP and self care management of symptoms  Target Date: 02/13/25  PT Goal 2  Goal Identifier: Bending/lifting  Goal Description: Pt will be able to bend and  object from floor with proper body mechanics for ADLs.  Target Date: 02/13/25  PT Goal 3  Goal Identifier: Sitting  Goal Description: Patient will be able to sit for 15+ minutes without an inc in symptoms to improve ADLs  Target Date: 02/13/25      Frequency of Treatment: 1x/week over 12 weeks  Duration of Treatment: 12 weeks    Recommended Referrals to Other Professionals:   Education Assessment:   Learner/Method: Patient    Risks and benefits of evaluation/treatment have been explained.   Patient/Family/caregiver agrees with Plan of Care.     Evaluation Time:     PT Eval, Low Complexity Minutes (86740): 30       Signing Clinician: Clarence Summers, PT        [facility]                                                                                   OUTPATIENT PHYSICAL THERAPY      PLAN OF TREATMENT FOR OUTPATIENT REHABILITATION   Patient's Last Name, First Name, Alvina Horne YOB: 1955   Provider's Name   [facility]   Medical Record No.  [id number]     Onset Date: 09/17/24  Start of Care Date: 11/21/24     Medical Diagnosis:  M48.061 (ICD-10-CM) - Spinal stenosis, lumbar region, without neurogenic claudication  M54.16 (ICD-10-CM) - Lumbar radiculopathy      PT Treatment Diagnosis:  back pain, sciatica, impaired posture, decreased activity tolerance Plan of Treatment  Frequency/Duration: 1x/week over 12 weeks/ 12 weeks    Certification date from 11/21/24 to 02/13/25         See note for plan of treatment details and functional goals     Clarence Summers, PT                         I CERTIFY THE NEED FOR THESE  SERVICES FURNISHED UNDER        THIS PLAN OF TREATMENT AND WHILE UNDER MY CARE     (Physician attestation of this document indicates review and certification of the therapy plan).              Referring Provider:  Aure Scott    Initial Assessment  See Epic Evaluation- Start of Care Date: 11/21/24

## 2024-11-24 DIAGNOSIS — E66.01 MORBID OBESITY (H): ICD-10-CM

## 2024-11-24 DIAGNOSIS — I10 ESSENTIAL HYPERTENSION: ICD-10-CM

## 2024-11-24 DIAGNOSIS — I67.1 CEREBRAL ANEURYSM, NONRUPTURED: ICD-10-CM

## 2024-11-24 DIAGNOSIS — E11.59 TYPE 2 DIABETES MELLITUS WITH OTHER CIRCULATORY COMPLICATION, WITHOUT LONG-TERM CURRENT USE OF INSULIN (H): ICD-10-CM

## 2024-11-24 DIAGNOSIS — I25.10 ATHEROSCLEROSIS OF CORONARY ARTERY OF NATIVE HEART WITHOUT ANGINA PECTORIS, UNSPECIFIED VESSEL OR LESION TYPE: Primary | ICD-10-CM

## 2024-11-24 DIAGNOSIS — R07.89 ATYPICAL CHEST PAIN: ICD-10-CM

## 2024-11-24 DIAGNOSIS — E78.00 PRIMARY HYPERCHOLESTEROLEMIA: ICD-10-CM

## 2024-11-27 ENCOUNTER — TELEPHONE (OUTPATIENT)
Dept: FAMILY MEDICINE | Facility: CLINIC | Age: 69
End: 2024-11-27
Payer: COMMERCIAL

## 2024-11-27 NOTE — TELEPHONE ENCOUNTER
Forms/Letter Request    Type of form/letter: OTHER: documentation needed for PT approval       Do we have the form/letter: No specific form. Patient dropped off paperwork explaining situation and what is needed. Paperwork placed in [employer] mailbox    Who is the form from? Patient    Where did/will the form come from? Patient or family brought in       When is form/letter needed by: ASAP    How would you like the form/letter returned: N/A    Patient Notified form requests are processed in 5-7 business days:Yes    Could we send this information to you in elmenus or would you prefer to receive a phone call?:   Patient would like to be contacted via elmenus

## 2024-12-03 ENCOUNTER — THERAPY VISIT (OUTPATIENT)
Dept: PHYSICAL THERAPY | Facility: REHABILITATION | Age: 69
End: 2024-12-03
Attending: NURSE PRACTITIONER
Payer: COMMERCIAL

## 2024-12-03 DIAGNOSIS — M48.061 SPINAL STENOSIS, LUMBAR REGION, WITHOUT NEUROGENIC CLAUDICATION: Primary | ICD-10-CM

## 2024-12-03 PROCEDURE — 97112 NEUROMUSCULAR REEDUCATION: CPT | Mod: GP | Performed by: PHYSICAL THERAPIST

## 2024-12-03 PROCEDURE — 97110 THERAPEUTIC EXERCISES: CPT | Mod: GP | Performed by: PHYSICAL THERAPIST

## 2024-12-03 NOTE — TELEPHONE ENCOUNTER
Left message to call back for: Alvina  Information to relay to patient: We would have nothing to do with getting her approved for PT nor did we receive any request for records. This letter is only saying that they will give her 4 visits from 9/18/24-10/6/24, rather then 10 that were requested to use through 12/11/2024. In my experience her PT provider with just need to reapply for additional visits past Oct with her updated condition.        Pts letter and medicare denial sent to scans

## 2024-12-04 NOTE — TELEPHONE ENCOUNTER
Pt called back, state she is being told by insurance that the visits are not all being covered and she needs authorization from us in order for them to cover.    Writer cc'd referrals team to see if anything need to be done there.    Please advise pt as she is very worried and feels like time is running out and she cannot afford to pay for visit and would not have gone had she known they would not have been covered.

## 2024-12-04 NOTE — TELEPHONE ENCOUNTER
This not a referral issue.  Patient's insurance does not require a referral.  Sounds like additional visits need to be authorized.  This should be done by PT.    [facility] Referrals

## 2024-12-09 ENCOUNTER — MYC MEDICAL ADVICE (OUTPATIENT)
Dept: FAMILY MEDICINE | Facility: CLINIC | Age: 69
End: 2024-12-09
Payer: COMMERCIAL

## 2024-12-09 DIAGNOSIS — I10 ESSENTIAL HYPERTENSION: ICD-10-CM

## 2024-12-09 DIAGNOSIS — I25.84 CORONARY ATHEROSCLEROSIS DUE TO SEVERELY CALCIFIED CORONARY LESION: ICD-10-CM

## 2024-12-09 DIAGNOSIS — J30.2 SEASONAL ALLERGIC RHINITIS, UNSPECIFIED TRIGGER: Primary | ICD-10-CM

## 2024-12-09 RX ORDER — METOPROLOL SUCCINATE 100 MG/1
100 TABLET, EXTENDED RELEASE ORAL 2 TIMES DAILY
Qty: 180 TABLET | Refills: 3 | OUTPATIENT
Start: 2024-12-09

## 2024-12-09 RX ORDER — MONTELUKAST SODIUM 10 MG/1
TABLET ORAL
Status: CANCELLED | OUTPATIENT
Start: 2024-12-09

## 2024-12-09 NOTE — TELEPHONE ENCOUNTER
Called and spoke with pharmacy. Pt had previously received these prescriptions from a different provider, pt cancelled these prescriptions with the pharmacy and now needs refills and has none on file with pharmacy.     Prescriptions pended for provider review.

## 2024-12-10 RX ORDER — METOPROLOL SUCCINATE 100 MG/1
100 TABLET, EXTENDED RELEASE ORAL 2 TIMES DAILY
Qty: 180 TABLET | Refills: 3 | Status: SHIPPED | OUTPATIENT
Start: 2024-12-10

## 2024-12-10 RX ORDER — MONTELUKAST SODIUM 10 MG/1
1 TABLET ORAL DAILY
Qty: 90 TABLET | Refills: 3 | Status: SHIPPED | OUTPATIENT
Start: 2024-12-10

## 2024-12-11 ENCOUNTER — THERAPY VISIT (OUTPATIENT)
Dept: PHYSICAL THERAPY | Facility: REHABILITATION | Age: 69
End: 2024-12-11
Payer: COMMERCIAL

## 2024-12-11 DIAGNOSIS — M48.061 SPINAL STENOSIS, LUMBAR REGION, WITHOUT NEUROGENIC CLAUDICATION: Primary | ICD-10-CM

## 2024-12-11 PROCEDURE — 97112 NEUROMUSCULAR REEDUCATION: CPT | Mod: GP | Performed by: PHYSICAL THERAPIST

## 2024-12-11 PROCEDURE — 97110 THERAPEUTIC EXERCISES: CPT | Mod: GP | Performed by: PHYSICAL THERAPIST

## 2024-12-13 ENCOUNTER — ANCILLARY PROCEDURE (OUTPATIENT)
Dept: GENERAL RADIOLOGY | Facility: CLINIC | Age: 69
End: 2024-12-13
Attending: NURSE PRACTITIONER
Payer: COMMERCIAL

## 2024-12-13 DIAGNOSIS — M48.02 CERVICAL STENOSIS OF SPINAL CANAL: ICD-10-CM

## 2024-12-13 PROCEDURE — 72040 X-RAY EXAM NECK SPINE 2-3 VW: CPT | Mod: TC | Performed by: RADIOLOGY

## 2025-01-09 ENCOUNTER — THERAPY VISIT (OUTPATIENT)
Dept: PHYSICAL THERAPY | Facility: REHABILITATION | Age: 70
End: 2025-01-09
Payer: COMMERCIAL

## 2025-01-09 DIAGNOSIS — M54.2 CERVICAL PAIN: Primary | ICD-10-CM

## 2025-02-11 ENCOUNTER — OFFICE VISIT (OUTPATIENT)
Dept: CARDIOLOGY | Facility: CLINIC | Age: 70
End: 2025-02-11
Payer: COMMERCIAL

## 2025-02-11 VITALS
HEART RATE: 60 BPM | SYSTOLIC BLOOD PRESSURE: 140 MMHG | DIASTOLIC BLOOD PRESSURE: 86 MMHG | BODY MASS INDEX: 34.93 KG/M2 | OXYGEN SATURATION: 98 % | WEIGHT: 191 LBS

## 2025-02-11 DIAGNOSIS — R06.02 SOB (SHORTNESS OF BREATH): ICD-10-CM

## 2025-02-11 DIAGNOSIS — E78.5 DYSLIPIDEMIA: ICD-10-CM

## 2025-02-11 DIAGNOSIS — I25.10 CORONARY ARTERY DISEASE INVOLVING NATIVE CORONARY ARTERY OF NATIVE HEART WITHOUT ANGINA PECTORIS: Primary | ICD-10-CM

## 2025-02-11 DIAGNOSIS — I10 HYPERTENSION, UNSPECIFIED TYPE: ICD-10-CM

## 2025-02-11 DIAGNOSIS — R07.2 PRECORDIAL PAIN: ICD-10-CM

## 2025-02-11 PROCEDURE — 99214 OFFICE O/P EST MOD 30 MIN: CPT | Performed by: INTERNAL MEDICINE

## 2025-02-11 RX ORDER — FAMOTIDINE 20 MG/1
TABLET, FILM COATED ORAL
COMMUNITY
Start: 2025-01-13

## 2025-02-11 NOTE — LETTER
2/11/2025    Jong Claudio, NP  [address] [place] [address]    RE: Alvina Maynard       Dear Colleague,     I had the pleasure of seeing Alvina Maynard in the [facility].         [facility]   [address], [place] [address]   [email or web address]   OFFICE: [phone]          Thank you Dr. Claudio for asking the [facility] team to participate in the care of your patient, Alvina Maynard.     Impression and Plan     1. Coronary artery disease.  Alvina has known coronary artery disease as outlined in detail in history of present illness below.  Most recently, she underwent a regadenoson nuclear perfusion study 25 October 2019 that revealed no evidence of ischemia.         Alvina does report some chest pain though some features a bit atypical.  She has had a decrease in her exercise tolerance as well as some shortness of breath.  Do feel ischemic workup was reasonable and warranted.  She would be unable to exercise on treadmill for treadmill stress testing given some orthopedic/knee issues and therefore none treadmill imaging will be required.  Continue aspirin.  Continue metoprolol.  Stress regadenoson is on MRI.  Alvina has had MRIs in the past and he has no issues with claustrophobia and feels she could tolerate the procedure quite well.     2. NSVT.  Ambulatory monitor x 6  days August 2024 revealed a solitary episode of NSVT of 12 beats.  Echocardiogram 10 October 2024 revealed normal left ventricular systolic performance with ejection fraction of 60%.  Continue metoprolol.  Will pursue stress MRI as per problem #1.    3. Hypertension.    Blood pressure is mildly elevated in the office today.  She brought in some readings at home which are somewhat labile.  At times she is actually having some lower readings.  We jointly decided to continue to follow.     4. Dyslipidemia.  Lipid profile 1 October 2024 revealed   "mg/dL and HDL 62 mg/dL.  Alvina has tried though not tolerated the following statins: Atorvastatin, simvastatin, pravastatin, and rosuvastatin.  She had been on ezetimibe though felt \"foggy in the head\" on the therapy.     Discussed other options such as Repatha  (evolocumab).  She has had some intolerances to other medications and states that she is a bit \"leery\" though has not completely ruled out starting the therapy.  She would like to think about it some more and asked that she get back to us if she would like to initiate the therapy.     5.  Obstructive sleep apnea.    Follow-up and further recommendations pending stress MRI.    35 minutes spent reviewing prior records (including documentation, laboratory studies, cardiac testing/imaging), interview with patient along with physical exam, planning, and subsequent documentation/crafting of note).           History of Present Illness    Once again I would like to thank you again for asking me to participate in the care of your patient, Alvina Maynard.  As you know, but to reiterate for my own records, Alvina Maynard is a 69 year old female with history of hypertension and coronary disease. She underwent coronary angiography 20 September 2012 and had successful stenting of the LAD (bare metal).      She had recurrent chest discomfort symptoms and ultimately underwent repeat angiography 19 October 2012 at which time she had minimal in-stent restenosis, though had an ostial diagonal lesion of 80%, though this was unchanged from previous study. The vessel was diminutive/small. She otherwise had mild-moderate disease. Medical therapy was recommended and there was some thought that perhaps there may be a component of vasospasm.     Alvina underwent repeat angiography 4 December 2013. This revealed no new significant obstructive coronary disease.     In September 2015, the patient had reported some intermittent discomfort in the chest and a regadenoson nuclear perfusion " study was performed for further evaluation. The EKG portion of the test was felt positive for ischemia though nuclear imaging did not show any disparate perfusion defects. Nonetheless, given the EKG changes coronary angiography was pursued 28 September 2015 that revealed mild to moderate coronary disease only without obstructive stenosis.     Alvina had a regadenoson nuclear perfusion study 15 August 2017 that revealed no evidence of ischemia.  Repeat nuclear perfusion imaging study more recently on 25 October 2019 once again revealed no evidence of infarct or ischemia.  Ejection fraction 70%.     On interview today, Alvina reports some intermittent chest discomfort symptoms.  Is not necessarily predictable with exertion.  She also, however, he has somewhat of a diminution in her exercise tolerance.  She has had some associated shortness of breath.    Further review of systems is otherwise negative/noncontributory (medical record and 13 point review of systems reviewed as well and pertinent positives noted).         Cardiac Diagnostics        Echocardiogram 10 October 2024:  Normal left ventricular size and systolic performance with ejection fraction of 60-65%.  No significant valvular heart disease.  Normal right ventricular size and systolic performance.    Echocardiogram 10 December 2019:  Normal left ventricular size and systolic performance with ejection fraction of 60 to 65%.  No significant valvular heart disease.  Normal right ventricular size and systolic performance.  Mild left atrial enlargement.  Right atrium of normal dimension.    Echocardiogram 14 July 2017:  Normal left ventricular size and systolic performance with ejection fraction of 65%.  No significant valvular heart disease.  Normal right ventricular size and systolic performance.  Mild left atrial enlargement.  Right atrium of normal dimension.    Echocardiogram 22 September 2015:  Normal left ventricular size and systolic performance with  ejection fraction of 60-65%.  No significant valvular heart disease.    Regadenoson nuclear perfusion study 25 October 2019:  No evidence of infarct or ischemia.  Normal left ventricular systolic performance with ejection fraction of 70%.    Regadenoson nuclear perfusion study 15 August 2017:  No evidence of infarct or ischemia.  Normal left ventricular systolic performance with ejection fraction of 70%.    Regadenoson nuclear perfusion study 9 September 2015 (pre-coronary angiogram that was performed 28 September 2015):  Stress electrocardiogram positive for inducible ischemia.  Lexiscan stress nuclear imaging was negative for inducible ischemia or infarct.  Left ventricular systolic performance was normal with ejection fraction of 70%.    Coronary angiography 28 September 2015:  Mild-moderate coronary artery disease without significant obstructive stenosis.  Prior stent to proximal LAD without evidence of restenosis.  Left ventriculography revealed well-preserved LV systolic performance without wall motion abnormality.    Coronary angiogram performed 4 December 2013:  Moderate mid RCA disease.    There was mild codominant left circumflex disease.    Moderate proximal and mid LAD stenosis.    Ultimately, no intervention performed. Medical therapy with optimization given, non-obstructive coronary disease was recommended.    Ambulatory monitor x 6  days August 2024:  Borderline multiday cardiac monitor largely by virtue the presence of nonsustained VT. The patient had 1 run of nonsustained VT lasting 12 beats at 165 bpm (monomorphic) without associated symptoms. This was asymptomatic, but if the patient has structural heart disease and/or LVEF <40% then consider further electrophysiologic evaluation.  The patient had multiple short runs of ectopic atrial tachycardia but no sustained atrial arrhythmia and in particular no atrial fibrillation or flutter.    No complex or sustained ventricular tachyarrhythmia.  No  profound bradycardia or significant/symptomatic pauses.             Physical Examination       BP (!) 143/81 (BP Location: Left arm, Patient Position: Sitting, Cuff Size: Adult Large)   Pulse 60   Wt 86.6 kg (191 lb)   SpO2 98%   BMI 34.93 kg/m          Wt Readings from Last 3 Encounters:   02/11/25 86.6 kg (191 lb)   12/13/24 83.5 kg (184 lb)   11/08/24 86.3 kg (190 lb 3.2 oz)       The patient is alert and oriented times three. Sclerae are anicteric. Mucosal membranes are moist. Jugular venous pressure is normal. No significant adenopathy/thyromegally appreciated. Lungs are clear with good expansion. On cardiovascular exam, the patient has a regular S1 and S2. Abdomen is soft and non-tender. Extremities reveal no clubbing, cyanosis, some bilateral lower extremity edema.         Family History/Social History/Risk Factors   Patient does not smoke.  Family history reviewed, and family history includes Breast Cancer in her maternal aunt; CABG in her sister; Cancer in her mother; Colon Cancer in her brother; Coronary Artery Disease (age of onset: 60) in her mother; Diabetes in her mother; GERD in her sister; Heart Disease in her maternal grandfather, mother, and sister; Hypertension in her father and mother; Stomach Cancer in her mother; Thyroid Disease in her mother.          Medical History  Surgical History Family History Social History   Past Medical History:   Diagnosis Date     Arthritis      Arthritis      Basal cell cancer 2007     Benign neoplasm of colon      Brain aneurysm     removed on 12/31/13     Cancer (H)     basel cell     Cerebral embolism with cerebral infarction (H)      Cerebral vascular accident (H) 12/31/2013     Colitis      Coronary artery disease      External hemorrhoid 6/15/2016     Fibrocystic breast      High blood pressure      History of heart artery stent 9/2012     Hyperlipidemia      Hypertension      Hypertension      Myalgia and myositis, unspecified      S/P hysterectomy  7/28/2019    Total hysterectomy with BSO benign disease, edometriosis?     Sleep apnea, obstructive     c-pap     Statin intolerance      Stroke (H)      Type 2 diabetes mellitus without complications (H)     borderline? blood sugars are fine/nosx no meds     Past Surgical History:   Procedure Laterality Date     BRAIN SURGERY  2013    brain aneurism     CARDIAC CATHETERIZATION  9/28/15    mild to moderate coronary disease only without obstructive stenosis     CEREBRAL ANEURYSM REPAIR  12/31/2013    [facility] Aneurysm clipping, Dr Harvey     CORONARY STENT PLACEMENT  9/20/12    bare metal stent in the proximal LAD      HEMORRHOID SURGERY  1975     HEMORRHOIDECTOMY INTERNAL LIGATION  1975     HYSTERECTOMY  2002     HYSTERECTOMY       NECK SURGERY  2001     OOPHORECTOMY       OVARY SURGERY  removed 1990     [facility] NASAL/SINUS ENDOSCOPY,BX/RMV POLYP/DEBRID  1998    Description: Nasal Endoscopy Polypectomy;     VA REMOVAL OF OVARY/TUBE(S) Right      Salpingo-oophorectomy Right Side     REPAIR ARTERY CORONARY  2012    femerol artery torn during angiogram     SINUS SURGERY  1998    polyp remvd     STENT  2012    heart     ZZC TOTAL ABDOM HYSTERECTOMY  08/01/2002    endometriosis     Family History   Problem Relation Age of Onset     Cancer Mother      Diabetes Mother      Hypertension Mother      Thyroid Disease Mother      Heart Disease Mother      Stomach Cancer Mother      Coronary Artery Disease Mother 60     Hypertension Father      Heart Disease Sister      GERD Sister      CABG Sister      Colon Cancer Brother      Heart Disease Maternal Grandfather      Breast Cancer Maternal Aunt         Social History     Socioeconomic History     Marital status: Single     Spouse name: Not on file     Number of children: Not on file     Years of education: Not on file     Highest education level: Not on file   Occupational History     Not on file   Tobacco Use     Smoking status: Never     Passive exposure: Never     Smokeless  tobacco: Never   Vaping Use     Vaping status: Never Used   Substance and Sexual Activity     Alcohol use: Yes     Drug use: No     Sexual activity: Not on file   Other Topics Concern     Not on file   Social History Narrative     Not on file     Social Drivers of Health     Financial Resource Strain: Low Risk  (9/27/2024)    Financial Resource Strain      Within the past 12 months, have you or your family members you live with been unable to get utilities (heat, electricity) when it was really needed?: No   Food Insecurity: Low Risk  (9/27/2024)    Food Insecurity      Within the past 12 months, did you worry that your food would run out before you got money to buy more?: No      Within the past 12 months, did the food you bought just not last and you didn t have money to get more?: No   Transportation Needs: Low Risk  (9/27/2024)    Transportation Needs      Within the past 12 months, has lack of transportation kept you from medical appointments, getting your medicines, non-medical meetings or appointments, work, or from getting things that you need?: No   Physical Activity: Insufficiently Active (9/27/2024)    Exercise Vital Sign      Days of Exercise per Week: 2 days      Minutes of Exercise per Session: 50 min   Stress: Patient Declined (9/27/2024)    [employer] - Occupational Stress Questionnaire      Feeling of Stress : Patient declined   Social Connections: Unknown (9/27/2024)    Social Connection and Isolation Panel [NHANES]      Frequency of Communication with Friends and Family: Not on file      Frequency of Social Gatherings with Friends and Family: More than three times a week      Attends Rastafarian Services: Not on file      Active Member of Clubs or Organizations: Not on file      Attends Club or Organization Meetings: Not on file      Marital Status: Not on file   Interpersonal Safety: Low Risk  (10/1/2024)    Interpersonal Safety      Do you feel physically and  emotionally safe where you currently live?: Yes      Within the past 12 months, have you been hit, slapped, kicked or otherwise physically hurt by someone?: No      Within the past 12 months, have you been humiliated or emotionally abused in other ways by your partner or ex-partner?: No   Housing Stability: Low Risk  (9/27/2024)    Housing Stability      Do you have housing? : Yes      Are you worried about losing your housing?: No           Medications  Allergies   Current Outpatient Medications   Medication Sig Dispense Refill     albuterol (PROAIR HFA/PROVENTIL HFA/VENTOLIN HFA) 108 (90 Base) MCG/ACT inhaler 2 puffs every 4 hours as needed for shortness of breath, wheezing or cough.       aspirin 81 MG EC tablet Take 81 mg by mouth daily       azelastine (ASTELIN) 0.1 % nasal spray USE 2 SPRAYS IN EACH NOSTRIL TWICE DAILY AS NEEDED 90 mL 3     beclomethasone HFA (QVAR REDIHALER) 80 MCG/ACT inhaler Inhale 1 puff into the lungs 2 times daily. 10.6 g 5     cetirizine (ZYRTEC) 10 MG tablet Take 10 mg by mouth daily       Cholecalciferol (VITAMIN D3 PO) Take 2,000 Units by mouth every morning        Cyanocobalamin (B-12) 5000 MCG SUBL Take by mouth every 24 hours       famotidine (PEPCID) 20 MG tablet TAKE 1 TABLET BY MOUTH TWICE DAILY AS NEEDED FOR HEARTBURN       Ferrous Sulfate (IRON) 90 (18 Fe) MG TABS Take by mouth every 24 hours       fluticasone (FLONASE) 50 MCG/ACT nasal spray Spray 1-2 sprays into both nostrils daily. 48 g 3     furosemide (LASIX) 20 MG tablet Take 1 tablet (20 mg) by mouth daily as needed (leg swelling) 30 tablet 0     hydrochlorothiazide (HYDRODIURIL) 25 MG tablet Take 2 tablets (50 mg) by mouth daily 180 tablet 3     losartan (COZAAR) 100 MG tablet Take 1 tablet (100 mg) by mouth daily. 100 tablet 3     MAGNESIUM GLUCONATE PO Take 400 mg by mouth daily       metoprolol succinate ER (TOPROL XL) 100 MG 24 hr tablet Take 1 tablet (100 mg) by mouth 2 times daily. 180 tablet 3     montelukast  (SINGULAIR) 10 MG tablet TAKE 1 TABLET BY MOUTH EVERY DAY 90 tablet 3     NIFEdipine ER (ADALAT CC) 60 MG 24 hr tablet TAKE 1 TABLET(60 MG) BY MOUTH EVERY 24 HOURS 90 tablet 1     nitroGLYcerin (NITROSTAT) 0.4 MG sublingual tablet DISSOLVE 1 TABLET UNDER THE TONGUE EVERY 5 MINUTES FOR 3 DOSES. IF SYMPTOMS PERSIST AFTER FIRST DOSE CALL 911 25 tablet 1     Omega-3 Fatty Acids (OMEGA-3 FISH OIL PO) Take 1,400 mg by mouth At Bedtime       potassium chloride sharonda ER (KLOR-CON M20) 20 MEQ CR tablet TAKE 2 TABLETS BY MOUTH TWICE DAILY 360 tablet 2     predniSONE (DELTASONE) 10 MG tablet 4 tabs for 3 days. 3 tabs for 3 days. 2 tabs for 3 days. 1 tab for 3 days. 30 tablet 0       Allergies   Allergen Reactions     Propylene Glycol Itching and Rash     Red Dye #40 (Allura Red) Rash     Any kind of red pills.     Adhesive Tape Itching     Atorvastatin Cramps     Mevacor, and zocor also leg cramping  Other reaction(s): leg cramps     Carvedilol      hives     Cephalosporins Hives     Clindamycin Hives     Demerol Nausea and Vomiting     Dilantin [Phenytoin]      Hives     Imdur [Isosorbide Nitrate] Other (See Comments)     Stool issues     Lisinopril      Trouble breathing     Meperidine Nausea and Vomiting     Hives  Other reaction(s): hives and vomiting     Metronidazole Hives     Nickel      Other reaction(s): Rash, itching     Penicillins Hives     Rosuvastatin Cramps     Leg cramping     Sulfa Antibiotics Hives     Latex Rash          Lab Results    Chemistry/lipid CBC Cardiac Enzymes/BNP/TSH/INR   Recent Labs   Lab Test 10/01/24  0943   CHOL 268*   HDL 62   *   TRIG 124     Recent Labs   Lab Test 10/01/24  0943 08/09/23  1112 12/20/21  1022   * 186* 176*     Recent Labs   Lab Test 10/01/24  0943      POTASSIUM 4.2   CHLORIDE 102   CO2 22   *   BUN 12.3   CR 0.68   GFRESTIMATED >90   BELA 11.0*     Recent Labs   Lab Test 10/01/24  0943 08/07/24  0804 03/06/24  1233   CR 0.68 0.69 0.76     Recent  Labs   Lab Test 10/01/24  0943 03/06/24  1233 08/09/23  1112   A1C 6.5* 6.2* 6.3*          Recent Labs   Lab Test 10/27/21  0958   WBC 8.5   HGB 14.1   HCT 42.4   MCV 87        Recent Labs   Lab Test 10/27/21  0958 05/01/20  1107 10/21/19  1209   HGB 14.1 13.7 13.5    Recent Labs   Lab Test 10/22/19  1002   TROPONINI <0.01     Recent Labs   Lab Test 10/01/24  0943 11/20/19  1527   BNP  --  28   NTBNP 219  --      Recent Labs   Lab Test 10/01/24  0943   TSH 1.41     Recent Labs   Lab Test 05/01/20  1107   INR 0.94          Medications  Allergies   Current Outpatient Medications   Medication Sig Dispense Refill     albuterol (PROAIR HFA/PROVENTIL HFA/VENTOLIN HFA) 108 (90 Base) MCG/ACT inhaler 2 puffs every 4 hours as needed for shortness of breath, wheezing or cough.       aspirin 81 MG EC tablet Take 81 mg by mouth daily       azelastine (ASTELIN) 0.1 % nasal spray USE 2 SPRAYS IN EACH NOSTRIL TWICE DAILY AS NEEDED 90 mL 3     beclomethasone HFA (QVAR REDIHALER) 80 MCG/ACT inhaler Inhale 1 puff into the lungs 2 times daily. 10.6 g 5     cetirizine (ZYRTEC) 10 MG tablet Take 10 mg by mouth daily       Cholecalciferol (VITAMIN D3 PO) Take 2,000 Units by mouth every morning        Cyanocobalamin (B-12) 5000 MCG SUBL Take by mouth every 24 hours       famotidine (PEPCID) 20 MG tablet TAKE 1 TABLET BY MOUTH TWICE DAILY AS NEEDED FOR HEARTBURN       Ferrous Sulfate (IRON) 90 (18 Fe) MG TABS Take by mouth every 24 hours       fluticasone (FLONASE) 50 MCG/ACT nasal spray Spray 1-2 sprays into both nostrils daily. 48 g 3     furosemide (LASIX) 20 MG tablet Take 1 tablet (20 mg) by mouth daily as needed (leg swelling) 30 tablet 0     hydrochlorothiazide (HYDRODIURIL) 25 MG tablet Take 2 tablets (50 mg) by mouth daily 180 tablet 3     losartan (COZAAR) 100 MG tablet Take 1 tablet (100 mg) by mouth daily. 100 tablet 3     MAGNESIUM GLUCONATE PO Take 400 mg by mouth daily       metoprolol succinate ER (TOPROL XL) 100 MG  24 hr tablet Take 1 tablet (100 mg) by mouth 2 times daily. 180 tablet 3     montelukast (SINGULAIR) 10 MG tablet TAKE 1 TABLET BY MOUTH EVERY DAY 90 tablet 3     NIFEdipine ER (ADALAT CC) 60 MG 24 hr tablet TAKE 1 TABLET(60 MG) BY MOUTH EVERY 24 HOURS 90 tablet 1     nitroGLYcerin (NITROSTAT) 0.4 MG sublingual tablet DISSOLVE 1 TABLET UNDER THE TONGUE EVERY 5 MINUTES FOR 3 DOSES. IF SYMPTOMS PERSIST AFTER FIRST DOSE CALL 911 25 tablet 1     Omega-3 Fatty Acids (OMEGA-3 FISH OIL PO) Take 1,400 mg by mouth At Bedtime       potassium chloride sharonda ER (KLOR-CON M20) 20 MEQ CR tablet TAKE 2 TABLETS BY MOUTH TWICE DAILY 360 tablet 2     predniSONE (DELTASONE) 10 MG tablet 4 tabs for 3 days. 3 tabs for 3 days. 2 tabs for 3 days. 1 tab for 3 days. 30 tablet 0      Allergies   Allergen Reactions     Propylene Glycol Itching and Rash     Red Dye #40 (Allura Red) Rash     Any kind of red pills.     Adhesive Tape Itching     Atorvastatin Cramps     Mevacor, and zocor also leg cramping  Other reaction(s): leg cramps     Carvedilol      hives     Cephalosporins Hives     Clindamycin Hives     Demerol Nausea and Vomiting     Dilantin [Phenytoin]      Hives     Imdur [Isosorbide Nitrate] Other (See Comments)     Stool issues     Lisinopril      Trouble breathing     Meperidine Nausea and Vomiting     Hives  Other reaction(s): hives and vomiting     Metronidazole Hives     Nickel      Other reaction(s): Rash, itching     Penicillins Hives     Rosuvastatin Cramps     Leg cramping     Sulfa Antibiotics Hives     Latex Rash          Lab Results   Lab Results   Component Value Date     10/01/2024    CO2 22 10/01/2024    CO2 27 10/27/2021    BUN 12.3 10/01/2024    BUN 14 10/27/2021     Lab Results   Component Value Date    WBC 8.5 10/27/2021    HGB 14.1 10/27/2021    HCT 42.4 10/27/2021    MCV 87 10/27/2021     10/27/2021     Lab Results   Component Value Date    CHOL 268 10/01/2024    TRIG 124 10/01/2024    HDL 62  10/01/2024     Lab Results   Component Value Date    INR 0.94 05/01/2020     Lab Results   Component Value Date    BNP 28 11/20/2019     Lab Results   Component Value Date    TROPONINI <0.01 10/22/2019     Lab Results   Component Value Date    TSH 1.41 10/01/2024    TSH 1.40 12/20/2021                      Thank you for allowing me to participate in the care of your patient.      Sincerely,     Latonia Rae MD     [facility]  cc:   Jong Claudio, BOUCHRA  [address] [place] [address]

## 2025-02-11 NOTE — PROGRESS NOTES
"[facility] HEART CARE   [address], [place] [address]   [email or web address]   OFFICE: [phone]          Thank you Dr. Claudio for asking the [facility] Heart Care team to participate in the care of your patient, Alvina Maynard.     Impression and Plan     1. Coronary artery disease.  Alvina has known coronary artery disease as outlined in detail in history of present illness below.  Most recently, she underwent a regadenoson nuclear perfusion study 25 October 2019 that revealed no evidence of ischemia.         Alvina does report some chest pain though some features a bit atypical.  She has had a decrease in her exercise tolerance as well as some shortness of breath.  Do feel ischemic workup was reasonable and warranted.  She would be unable to exercise on treadmill for treadmill stress testing given some orthopedic/knee issues and therefore none treadmill imaging will be required.  Continue aspirin.  Continue metoprolol.  Stress regadenoson is on MRI.  Alvina has had MRIs in the past and he has no issues with claustrophobia and feels she could tolerate the procedure quite well.     2. NSVT.  Ambulatory monitor x 6  days August 2024 revealed a solitary episode of NSVT of 12 beats.  Echocardiogram 10 October 2024 revealed normal left ventricular systolic performance with ejection fraction of 60%.  Continue metoprolol.  Will pursue stress MRI as per problem #1.    3. Hypertension.    Blood pressure is mildly elevated in the office today.  She brought in some readings at home which are somewhat labile.  At times she is actually having some lower readings.  We jointly decided to continue to follow.     4. Dyslipidemia.  Lipid profile 1 October 2024 revealed  mg/dL and HDL 62 mg/dL.  Alvina has tried though not tolerated the following statins: Atorvastatin, simvastatin, pravastatin, and rosuvastatin.  She had been on ezetimibe though felt \"foggy in the head\" on the " "therapy.     Discussed other options such as Repatha  (evolocumab).  She has had some intolerances to other medications and states that she is a bit \"leery\" though has not completely ruled out starting the therapy.  She would like to think about it some more and asked that she get back to us if she would like to initiate the therapy.     5.  Obstructive sleep apnea.    Follow-up and further recommendations pending stress MRI.    35 minutes spent reviewing prior records (including documentation, laboratory studies, cardiac testing/imaging), interview with patient along with physical exam, planning, and subsequent documentation/crafting of note).           History of Present Illness    Once again I would like to thank you again for asking me to participate in the care of your patient, Alvina Maynard.  As you know, but to reiterate for my own records, Alvina Maynard is a 69 year old female with history of hypertension and coronary disease. She underwent coronary angiography 20 September 2012 and had successful stenting of the LAD (bare metal).      She had recurrent chest discomfort symptoms and ultimately underwent repeat angiography 19 October 2012 at which time she had minimal in-stent restenosis, though had an ostial diagonal lesion of 80%, though this was unchanged from previous study. The vessel was diminutive/small. She otherwise had mild-moderate disease. Medical therapy was recommended and there was some thought that perhaps there may be a component of vasospasm.     Alvina underwent repeat angiography 4 December 2013. This revealed no new significant obstructive coronary disease.     In September 2015, the patient had reported some intermittent discomfort in the chest and a regadenoson nuclear perfusion study was performed for further evaluation. The EKG portion of the test was felt positive for ischemia though nuclear imaging did not show any disparate perfusion defects. Nonetheless, given the EKG changes " coronary angiography was pursued 28 September 2015 that revealed mild to moderate coronary disease only without obstructive stenosis.     Alvina had a regadenoson nuclear perfusion study 15 August 2017 that revealed no evidence of ischemia.  Repeat nuclear perfusion imaging study more recently on 25 October 2019 once again revealed no evidence of infarct or ischemia.  Ejection fraction 70%.     On interview today, Alvina reports some intermittent chest discomfort symptoms.  Is not necessarily predictable with exertion.  She also, however, he has somewhat of a diminution in her exercise tolerance.  She has had some associated shortness of breath.    Further review of systems is otherwise negative/noncontributory (medical record and 13 point review of systems reviewed as well and pertinent positives noted).         Cardiac Diagnostics        Echocardiogram 10 October 2024:  Normal left ventricular size and systolic performance with ejection fraction of 60-65%.  No significant valvular heart disease.  Normal right ventricular size and systolic performance.    Echocardiogram 10 December 2019:  Normal left ventricular size and systolic performance with ejection fraction of 60 to 65%.  No significant valvular heart disease.  Normal right ventricular size and systolic performance.  Mild left atrial enlargement.  Right atrium of normal dimension.    Echocardiogram 14 July 2017:  Normal left ventricular size and systolic performance with ejection fraction of 65%.  No significant valvular heart disease.  Normal right ventricular size and systolic performance.  Mild left atrial enlargement.  Right atrium of normal dimension.    Echocardiogram 22 September 2015:  Normal left ventricular size and systolic performance with ejection fraction of 60-65%.  No significant valvular heart disease.    Regadenoson nuclear perfusion study 25 October 2019:  No evidence of infarct or ischemia.  Normal left ventricular systolic performance with  ejection fraction of 70%.    Regadenoson nuclear perfusion study 15 August 2017:  No evidence of infarct or ischemia.  Normal left ventricular systolic performance with ejection fraction of 70%.    Regadenoson nuclear perfusion study 9 September 2015 (pre-coronary angiogram that was performed 28 September 2015):  Stress electrocardiogram positive for inducible ischemia.  Lexiscan stress nuclear imaging was negative for inducible ischemia or infarct.  Left ventricular systolic performance was normal with ejection fraction of 70%.    Coronary angiography 28 September 2015:  Mild-moderate coronary artery disease without significant obstructive stenosis.  Prior stent to proximal LAD without evidence of restenosis.  Left ventriculography revealed well-preserved LV systolic performance without wall motion abnormality.    Coronary angiogram performed 4 December 2013:  Moderate mid RCA disease.    There was mild codominant left circumflex disease.    Moderate proximal and mid LAD stenosis.    Ultimately, no intervention performed. Medical therapy with optimization given, non-obstructive coronary disease was recommended.    Ambulatory monitor x 6  days August 2024:  Borderline multiday cardiac monitor largely by virtue the presence of nonsustained VT. The patient had 1 run of nonsustained VT lasting 12 beats at 165 bpm (monomorphic) without associated symptoms. This was asymptomatic, but if the patient has structural heart disease and/or LVEF <40% then consider further electrophysiologic evaluation.  The patient had multiple short runs of ectopic atrial tachycardia but no sustained atrial arrhythmia and in particular no atrial fibrillation or flutter.    No complex or sustained ventricular tachyarrhythmia.  No profound bradycardia or significant/symptomatic pauses.             Physical Examination       BP (!) 143/81 (BP Location: Left arm, Patient Position: Sitting, Cuff Size: Adult Large)   Pulse 60   Wt 86.6 kg (191 lb)    SpO2 98%   BMI 34.93 kg/m          Wt Readings from Last 3 Encounters:   02/11/25 86.6 kg (191 lb)   12/13/24 83.5 kg (184 lb)   11/08/24 86.3 kg (190 lb 3.2 oz)       The patient is alert and oriented times three. Sclerae are anicteric. Mucosal membranes are moist. Jugular venous pressure is normal. No significant adenopathy/thyromegally appreciated. Lungs are clear with good expansion. On cardiovascular exam, the patient has a regular S1 and S2. Abdomen is soft and non-tender. Extremities reveal no clubbing, cyanosis, some bilateral lower extremity edema.         Family History/Social History/Risk Factors   Patient does not smoke.  Family history reviewed, and family history includes Breast Cancer in her maternal aunt; CABG in her sister; Cancer in her mother; Colon Cancer in her brother; Coronary Artery Disease (age of onset: 60) in her mother; Diabetes in her mother; GERD in her sister; Heart Disease in her maternal grandfather, mother, and sister; Hypertension in her father and mother; Stomach Cancer in her mother; Thyroid Disease in her mother.          Medical History  Surgical History Family History Social History   Past Medical History:   Diagnosis Date    Arthritis     Arthritis     Basal cell cancer 2007    Benign neoplasm of colon     Brain aneurysm     removed on 12/31/13    Cancer (H)     basel cell    Cerebral embolism with cerebral infarction (H)     Cerebral vascular accident (H) 12/31/2013    Colitis     Coronary artery disease     External hemorrhoid 6/15/2016    Fibrocystic breast     High blood pressure     History of heart artery stent 9/2012    Hyperlipidemia     Hypertension     Hypertension     Myalgia and myositis, unspecified     S/P hysterectomy 7/28/2019    Total hysterectomy with BSO benign disease, edometriosis?    Sleep apnea, obstructive     c-pap    Statin intolerance     Stroke (H)     Type 2 diabetes mellitus without complications (H)     borderline? blood sugars are fine/nosx  no meds     Past Surgical History:   Procedure Laterality Date    BRAIN SURGERY  2013    brain aneurism    CARDIAC CATHETERIZATION  9/28/15    mild to moderate coronary disease only without obstructive stenosis    CEREBRAL ANEURYSM REPAIR  12/31/2013    [facility] Aneurysm clipping, Dr Harvey    CORONARY STENT PLACEMENT  9/20/12    bare metal stent in the proximal LAD     HEMORRHOID SURGERY  1975    HEMORRHOIDECTOMY INTERNAL LIGATION  1975    HYSTERECTOMY  2002    HYSTERECTOMY      NECK SURGERY  2001    OOPHORECTOMY      OVARY SURGERY  removed 1990    NC NASAL/SINUS ENDOSCOPY,BX/RMV POLYP/DEBRID  1998    Description: Nasal Endoscopy Polypectomy;    NC REMOVAL OF OVARY/TUBE(S) Right      Salpingo-oophorectomy Right Side    REPAIR ARTERY CORONARY  2012    femerol artery torn during angiogram    SINUS SURGERY  1998    polyp remvd    STENT  2012    heart    ZZC TOTAL ABDOM HYSTERECTOMY  08/01/2002    endometriosis     Family History   Problem Relation Age of Onset    Cancer Mother     Diabetes Mother     Hypertension Mother     Thyroid Disease Mother     Heart Disease Mother     Stomach Cancer Mother     Coronary Artery Disease Mother 60    Hypertension Father     Heart Disease Sister     GERD Sister     CABG Sister     Colon Cancer Brother     Heart Disease Maternal Grandfather     Breast Cancer Maternal Aunt         Social History     Socioeconomic History    Marital status: Single     Spouse name: Not on file    Number of children: Not on file    Years of education: Not on file    Highest education level: Not on file   Occupational History    Not on file   Tobacco Use    Smoking status: Never     Passive exposure: Never    Smokeless tobacco: Never   Vaping Use    Vaping status: Never Used   Substance and Sexual Activity    Alcohol use: Yes    Drug use: No    Sexual activity: Not on file   Other Topics Concern    Not on file   Social History Narrative    Not on file     Social Drivers of Health     Financial Resource  Strain: Low Risk  (9/27/2024)    Financial Resource Strain     Within the past 12 months, have you or your family members you live with been unable to get utilities (heat, electricity) when it was really needed?: No   Food Insecurity: Low Risk  (9/27/2024)    Food Insecurity     Within the past 12 months, did you worry that your food would run out before you got money to buy more?: No     Within the past 12 months, did the food you bought just not last and you didn t have money to get more?: No   Transportation Needs: Low Risk  (9/27/2024)    Transportation Needs     Within the past 12 months, has lack of transportation kept you from medical appointments, getting your medicines, non-medical meetings or appointments, work, or from getting things that you need?: No   Physical Activity: Insufficiently Active (9/27/2024)    Exercise Vital Sign     Days of Exercise per Week: 2 days     Minutes of Exercise per Session: 50 min   Stress: Patient Declined (9/27/2024)    [employer] - Occupational Stress Questionnaire     Feeling of Stress : Patient declined   Social Connections: Unknown (9/27/2024)    Social Connection and Isolation Panel [NHANES]     Frequency of Communication with Friends and Family: Not on file     Frequency of Social Gatherings with Friends and Family: More than three times a week     Attends Religion Services: Not on file     Active Member of Clubs or Organizations: Not on file     Attends Club or Organization Meetings: Not on file     Marital Status: Not on file   Interpersonal Safety: Low Risk  (10/1/2024)    Interpersonal Safety     Do you feel physically and emotionally safe where you currently live?: Yes     Within the past 12 months, have you been hit, slapped, kicked or otherwise physically hurt by someone?: No     Within the past 12 months, have you been humiliated or emotionally abused in other ways by your partner or ex-partner?: No   Housing Stability: Low Risk   (9/27/2024)    Housing Stability     Do you have housing? : Yes     Are you worried about losing your housing?: No           Medications  Allergies   Current Outpatient Medications   Medication Sig Dispense Refill    albuterol (PROAIR HFA/PROVENTIL HFA/VENTOLIN HFA) 108 (90 Base) MCG/ACT inhaler 2 puffs every 4 hours as needed for shortness of breath, wheezing or cough.      aspirin 81 MG EC tablet Take 81 mg by mouth daily      azelastine (ASTELIN) 0.1 % nasal spray USE 2 SPRAYS IN EACH NOSTRIL TWICE DAILY AS NEEDED 90 mL 3    beclomethasone HFA (QVAR REDIHALER) 80 MCG/ACT inhaler Inhale 1 puff into the lungs 2 times daily. 10.6 g 5    cetirizine (ZYRTEC) 10 MG tablet Take 10 mg by mouth daily      Cholecalciferol (VITAMIN D3 PO) Take 2,000 Units by mouth every morning       Cyanocobalamin (B-12) 5000 MCG SUBL Take by mouth every 24 hours      famotidine (PEPCID) 20 MG tablet TAKE 1 TABLET BY MOUTH TWICE DAILY AS NEEDED FOR HEARTBURN      Ferrous Sulfate (IRON) 90 (18 Fe) MG TABS Take by mouth every 24 hours      fluticasone (FLONASE) 50 MCG/ACT nasal spray Spray 1-2 sprays into both nostrils daily. 48 g 3    furosemide (LASIX) 20 MG tablet Take 1 tablet (20 mg) by mouth daily as needed (leg swelling) 30 tablet 0    hydrochlorothiazide (HYDRODIURIL) 25 MG tablet Take 2 tablets (50 mg) by mouth daily 180 tablet 3    losartan (COZAAR) 100 MG tablet Take 1 tablet (100 mg) by mouth daily. 100 tablet 3    MAGNESIUM GLUCONATE PO Take 400 mg by mouth daily      metoprolol succinate ER (TOPROL XL) 100 MG 24 hr tablet Take 1 tablet (100 mg) by mouth 2 times daily. 180 tablet 3    montelukast (SINGULAIR) 10 MG tablet TAKE 1 TABLET BY MOUTH EVERY DAY 90 tablet 3    NIFEdipine ER (ADALAT CC) 60 MG 24 hr tablet TAKE 1 TABLET(60 MG) BY MOUTH EVERY 24 HOURS 90 tablet 1    nitroGLYcerin (NITROSTAT) 0.4 MG sublingual tablet DISSOLVE 1 TABLET UNDER THE TONGUE EVERY 5 MINUTES FOR 3 DOSES. IF SYMPTOMS PERSIST AFTER FIRST DOSE CALL 911  25 tablet 1    Omega-3 Fatty Acids (OMEGA-3 FISH OIL PO) Take 1,400 mg by mouth At Bedtime      potassium chloride sharonda ER (KLOR-CON M20) 20 MEQ CR tablet TAKE 2 TABLETS BY MOUTH TWICE DAILY 360 tablet 2    predniSONE (DELTASONE) 10 MG tablet 4 tabs for 3 days. 3 tabs for 3 days. 2 tabs for 3 days. 1 tab for 3 days. 30 tablet 0       Allergies   Allergen Reactions    Propylene Glycol Itching and Rash    Red Dye #40 (Allura Red) Rash     Any kind of red pills.    Adhesive Tape Itching    Atorvastatin Cramps     Mevacor, and zocor also leg cramping  Other reaction(s): leg cramps    Carvedilol      hives    Cephalosporins Hives    Clindamycin Hives    Demerol Nausea and Vomiting    Dilantin [Phenytoin]      Hives    Imdur [Isosorbide Nitrate] Other (See Comments)     Stool issues    Lisinopril      Trouble breathing    Meperidine Nausea and Vomiting     Hives  Other reaction(s): hives and vomiting    Metronidazole Hives    Nickel      Other reaction(s): Rash, itching    Penicillins Hives    Rosuvastatin Cramps     Leg cramping    Sulfa Antibiotics Hives    Latex Rash          Lab Results    Chemistry/lipid CBC Cardiac Enzymes/BNP/TSH/INR   Recent Labs   Lab Test 10/01/24  0943   CHOL 268*   HDL 62   *   TRIG 124     Recent Labs   Lab Test 10/01/24  0943 08/09/23  1112 12/20/21  1022   * 186* 176*     Recent Labs   Lab Test 10/01/24  0943      POTASSIUM 4.2   CHLORIDE 102   CO2 22   *   BUN 12.3   CR 0.68   GFRESTIMATED >90   BELA 11.0*     Recent Labs   Lab Test 10/01/24  0943 08/07/24  0804 03/06/24  1233   CR 0.68 0.69 0.76     Recent Labs   Lab Test 10/01/24  0943 03/06/24  1233 08/09/23  1112   A1C 6.5* 6.2* 6.3*          Recent Labs   Lab Test 10/27/21  0958   WBC 8.5   HGB 14.1   HCT 42.4   MCV 87        Recent Labs   Lab Test 10/27/21  0958 05/01/20  1107 10/21/19  1209   HGB 14.1 13.7 13.5    Recent Labs   Lab Test 10/22/19  1002   TROPONINI <0.01     Recent Labs   Lab Test  10/01/24  0943 11/20/19  1527   BNP  --  28   NTBNP 219  --      Recent Labs   Lab Test 10/01/24  0943   TSH 1.41     Recent Labs   Lab Test 05/01/20  1107   INR 0.94          Medications  Allergies   Current Outpatient Medications   Medication Sig Dispense Refill    albuterol (PROAIR HFA/PROVENTIL HFA/VENTOLIN HFA) 108 (90 Base) MCG/ACT inhaler 2 puffs every 4 hours as needed for shortness of breath, wheezing or cough.      aspirin 81 MG EC tablet Take 81 mg by mouth daily      azelastine (ASTELIN) 0.1 % nasal spray USE 2 SPRAYS IN EACH NOSTRIL TWICE DAILY AS NEEDED 90 mL 3    beclomethasone HFA (QVAR REDIHALER) 80 MCG/ACT inhaler Inhale 1 puff into the lungs 2 times daily. 10.6 g 5    cetirizine (ZYRTEC) 10 MG tablet Take 10 mg by mouth daily      Cholecalciferol (VITAMIN D3 PO) Take 2,000 Units by mouth every morning       Cyanocobalamin (B-12) 5000 MCG SUBL Take by mouth every 24 hours      famotidine (PEPCID) 20 MG tablet TAKE 1 TABLET BY MOUTH TWICE DAILY AS NEEDED FOR HEARTBURN      Ferrous Sulfate (IRON) 90 (18 Fe) MG TABS Take by mouth every 24 hours      fluticasone (FLONASE) 50 MCG/ACT nasal spray Spray 1-2 sprays into both nostrils daily. 48 g 3    furosemide (LASIX) 20 MG tablet Take 1 tablet (20 mg) by mouth daily as needed (leg swelling) 30 tablet 0    hydrochlorothiazide (HYDRODIURIL) 25 MG tablet Take 2 tablets (50 mg) by mouth daily 180 tablet 3    losartan (COZAAR) 100 MG tablet Take 1 tablet (100 mg) by mouth daily. 100 tablet 3    MAGNESIUM GLUCONATE PO Take 400 mg by mouth daily      metoprolol succinate ER (TOPROL XL) 100 MG 24 hr tablet Take 1 tablet (100 mg) by mouth 2 times daily. 180 tablet 3    montelukast (SINGULAIR) 10 MG tablet TAKE 1 TABLET BY MOUTH EVERY DAY 90 tablet 3    NIFEdipine ER (ADALAT CC) 60 MG 24 hr tablet TAKE 1 TABLET(60 MG) BY MOUTH EVERY 24 HOURS 90 tablet 1    nitroGLYcerin (NITROSTAT) 0.4 MG sublingual tablet DISSOLVE 1 TABLET UNDER THE TONGUE EVERY 5 MINUTES FOR 3  DOSES. IF SYMPTOMS PERSIST AFTER FIRST DOSE CALL 911 25 tablet 1    Omega-3 Fatty Acids (OMEGA-3 FISH OIL PO) Take 1,400 mg by mouth At Bedtime      potassium chloride sharonda ER (KLOR-CON M20) 20 MEQ CR tablet TAKE 2 TABLETS BY MOUTH TWICE DAILY 360 tablet 2    predniSONE (DELTASONE) 10 MG tablet 4 tabs for 3 days. 3 tabs for 3 days. 2 tabs for 3 days. 1 tab for 3 days. 30 tablet 0      Allergies   Allergen Reactions    Propylene Glycol Itching and Rash    Red Dye #40 (Allura Red) Rash     Any kind of red pills.    Adhesive Tape Itching    Atorvastatin Cramps     Mevacor, and zocor also leg cramping  Other reaction(s): leg cramps    Carvedilol      hives    Cephalosporins Hives    Clindamycin Hives    Demerol Nausea and Vomiting    Dilantin [Phenytoin]      Hives    Imdur [Isosorbide Nitrate] Other (See Comments)     Stool issues    Lisinopril      Trouble breathing    Meperidine Nausea and Vomiting     Hives  Other reaction(s): hives and vomiting    Metronidazole Hives    Nickel      Other reaction(s): Rash, itching    Penicillins Hives    Rosuvastatin Cramps     Leg cramping    Sulfa Antibiotics Hives    Latex Rash          Lab Results   Lab Results   Component Value Date     10/01/2024    CO2 22 10/01/2024    CO2 27 10/27/2021    BUN 12.3 10/01/2024    BUN 14 10/27/2021     Lab Results   Component Value Date    WBC 8.5 10/27/2021    HGB 14.1 10/27/2021    HCT 42.4 10/27/2021    MCV 87 10/27/2021     10/27/2021     Lab Results   Component Value Date    CHOL 268 10/01/2024    TRIG 124 10/01/2024    HDL 62 10/01/2024     Lab Results   Component Value Date    INR 0.94 05/01/2020     Lab Results   Component Value Date    BNP 28 11/20/2019     Lab Results   Component Value Date    TROPONINI <0.01 10/22/2019     Lab Results   Component Value Date    TSH 1.41 10/01/2024    TSH 1.40 12/20/2021

## 2025-02-13 ENCOUNTER — THERAPY VISIT (OUTPATIENT)
Dept: PHYSICAL THERAPY | Facility: REHABILITATION | Age: 70
End: 2025-02-13
Payer: COMMERCIAL

## 2025-02-13 DIAGNOSIS — M54.2 CERVICAL PAIN: Primary | ICD-10-CM

## 2025-03-05 ENCOUNTER — TELEPHONE (OUTPATIENT)
Dept: CARDIOLOGY | Facility: HOSPITAL | Age: 70
End: 2025-03-05
Payer: COMMERCIAL

## 2025-03-06 ENCOUNTER — HOSPITAL ENCOUNTER (OUTPATIENT)
Dept: MRI IMAGING | Facility: HOSPITAL | Age: 70
End: 2025-03-06
Attending: INTERNAL MEDICINE
Payer: COMMERCIAL

## 2025-03-06 VITALS — DIASTOLIC BLOOD PRESSURE: 63 MMHG | HEART RATE: 78 BPM | OXYGEN SATURATION: 98 % | SYSTOLIC BLOOD PRESSURE: 145 MMHG

## 2025-03-06 DIAGNOSIS — I25.10 CORONARY ARTERY DISEASE INVOLVING NATIVE CORONARY ARTERY OF NATIVE HEART WITHOUT ANGINA PECTORIS: ICD-10-CM

## 2025-03-06 DIAGNOSIS — R06.02 SOB (SHORTNESS OF BREATH): ICD-10-CM

## 2025-03-06 DIAGNOSIS — R07.2 PRECORDIAL PAIN: ICD-10-CM

## 2025-03-06 DIAGNOSIS — I25.10 ATHEROSCLEROSIS OF NATIVE CORONARY ARTERY OF NATIVE HEART WITHOUT ANGINA PECTORIS: Primary | ICD-10-CM

## 2025-03-06 LAB
ATRIAL RATE - MUSE: 58 BPM
ATRIAL RATE - MUSE: 76 BPM
DIASTOLIC BLOOD PRESSURE - MUSE: NORMAL MMHG
DIASTOLIC BLOOD PRESSURE - MUSE: NORMAL MMHG
INTERPRETATION ECG - MUSE: NORMAL
INTERPRETATION ECG - MUSE: NORMAL
P AXIS - MUSE: 2 DEGREES
P AXIS - MUSE: 35 DEGREES
PR INTERVAL - MUSE: 158 MS
PR INTERVAL - MUSE: 172 MS
QRS DURATION - MUSE: 108 MS
QRS DURATION - MUSE: 110 MS
QT - MUSE: 428 MS
QT - MUSE: 440 MS
QTC - MUSE: 420 MS
QTC - MUSE: 495 MS
R AXIS - MUSE: 26 DEGREES
R AXIS - MUSE: 26 DEGREES
SYSTOLIC BLOOD PRESSURE - MUSE: NORMAL MMHG
SYSTOLIC BLOOD PRESSURE - MUSE: NORMAL MMHG
T AXIS - MUSE: 12 DEGREES
T AXIS - MUSE: 15 DEGREES
VENTRICULAR RATE- MUSE: 58 BPM
VENTRICULAR RATE- MUSE: 76 BPM

## 2025-03-06 PROCEDURE — A9585 GADOBUTROL INJECTION: HCPCS | Performed by: INTERNAL MEDICINE

## 2025-03-06 PROCEDURE — 255N000002 HC RX 255 OP 636: Performed by: INTERNAL MEDICINE

## 2025-03-06 PROCEDURE — 75563 CARD MRI W/STRESS IMG & DYE: CPT

## 2025-03-06 PROCEDURE — 250N000011 HC RX IP 250 OP 636: Performed by: INTERNAL MEDICINE

## 2025-03-06 RX ORDER — SODIUM CHLORIDE 9 MG/ML
INJECTION, SOLUTION INTRAVENOUS CONTINUOUS PRN
Status: ACTIVE | OUTPATIENT
Start: 2025-03-06 | End: 2025-03-06

## 2025-03-06 RX ORDER — GADOBUTROL 604.72 MG/ML
20 INJECTION INTRAVENOUS ONCE
Status: COMPLETED | OUTPATIENT
Start: 2025-03-06 | End: 2025-03-06

## 2025-03-06 RX ORDER — REGADENOSON 0.08 MG/ML
0.4 INJECTION, SOLUTION INTRAVENOUS ONCE
Status: COMPLETED | OUTPATIENT
Start: 2025-03-06 | End: 2025-03-06

## 2025-03-06 RX ADMIN — GADOBUTROL 20 ML: 604.72 INJECTION INTRAVENOUS at 14:34

## 2025-03-06 RX ADMIN — REGADENOSON 0.4 MG: 0.08 INJECTION, SOLUTION INTRAVENOUS at 13:34

## 2025-03-11 ENCOUNTER — THERAPY VISIT (OUTPATIENT)
Dept: PHYSICAL THERAPY | Facility: REHABILITATION | Age: 70
End: 2025-03-11
Payer: COMMERCIAL

## 2025-03-11 DIAGNOSIS — M54.2 CERVICAL PAIN: Primary | ICD-10-CM

## 2025-03-11 PROCEDURE — 97110 THERAPEUTIC EXERCISES: CPT | Mod: GP | Performed by: PHYSICAL THERAPIST

## 2025-03-11 NOTE — PROGRESS NOTES
[facility]                                                                                   OUTPATIENT PHYSICAL THERAPY    PLAN OF TREATMENT FOR OUTPATIENT REHABILITATION   Patient's Last Name, First Name, Alvina Horne YOB: 1955   Provider's Name   [facility]   Medical Record No.  [id number]     Onset Date: 12/13/24  Start of Care Date: 12/27/24     Medical Diagnosis:  M48.02 (ICD-10-CM) - Cervical stenosis of spinal canal. Spinal Stenosis of Lumbar Region (M48.061)      PT Treatment Diagnosis:  muscle power deficits in lumbo-pelvic-hip region, neck mobility deficits Plan of Treatment  Frequency/Duration: weekly/ 12 weeks    Certification date from 03/11/25 to 06/03/25         See note for plan of treatment details and functional goals     Henri Werner PT                         I CERTIFY THE NEED FOR THESE SERVICES FURNISHED UNDER        THIS PLAN OF TREATMENT AND WHILE UNDER MY CARE     (Physician attestation of this document indicates review and certification of the therapy plan).              Referring Provider:  Aure Scott    Initial Assessment  See Epic Evaluation- Start of Care Date: 12/27/24            PLAN  Continue therapy per current plan of care.    Beginning/End Dates of Progress Note Reporting Period:    to 03/11/2025    Referring Provider:  Aure Scott       03/11/25 0500   Appointment Info   Signing clinician's name / credentials Henri Werner PT   Total/Authorized Visits 12   Visits Used 4   Medical Diagnosis M48.02 (ICD-10-CM) - Cervical stenosis of spinal canal. Spinal Stenosis of Lumbar Region (M48.061)   PT Tx Diagnosis muscle power deficits in lumbo-pelvic-hip region, neck mobility deficits   Precautions/Limitations none   Progress Note/Certification   Start of Care Date 12/27/24   Onset of illness/injury or Date of Surgery 12/13/24   Therapy Frequency weekly   Predicted  Duration 12 weeks   Certification date from 03/11/25   Certification date to 06/03/25   Progress Note Due Date 06/03/25   [facility] Authorization Information   Condition type Chronic (continuous duration <3 months)   Cause of current episode Unspecified   Nature of treatment Rehabilitative   Documented POC (choose all that apply) Measurable short and long term/discharge treatment goals related to physical and functional deficits.;Frequency of treatment visits and treatment activities to address deficit areas.;Patient agrees to program participation including home program   How did the symptoms start patient reports having neck symptoms beginning 4 months ago, had a modified work station for computer and near the end of the work day would develop pain down left arm and into left hand digits 4-5. Has occasional tinging in left hand. Notices difficulty with grasping objects.   General health reported by patient Fair   PT Goal 1   Goal Identifier HEP   Goal Description Patient will demonstrate at least 50% compliance in their HEP to support progress towards functional and patient specific goals   Target Date 03/21/25   PT Goal 2   Goal Identifier Bending/lifting   Goal Description Pt will be able to bend and  object from floor with proper body mechanics for ADLs.   Goal Progress Met   Target Date 02/13/24   PT Goal 3   Goal Identifier Sitting   Goal Description Patient will be able to sit for 15+ minutes without an inc in symptoms to improve ADLs   Goal Progress not met, will feel burning in leg with sitting   Target Date 02/13/24   PT Goal 4   Goal Identifier neck ROM   Goal Description Patient will perform neck ROM through available range withotu experiencing pain or paresthesias into left upper extremity   Target Date 03/21/25   Subjective Report   Subjective Report patient states the pain in left 4th and 5th digit have lessened. they continue to have left sided neck, upper back, and lateral upper arm pain both at  rest and with repetitive uses of left arm   Objective Measures   Objective Measures Objective Measure 1   Objective Measure 1   Objective Measure cervical retraction   Details loss of chin tuck with retraction, ROM limited by 25-50%   Treatment Interventions (PT)   Interventions Therapeutic Procedure/Exercise;Therapeutic Activity;Neuromuscular Re-education;Gait Training;Manual Therapy   Therapeutic Procedure/Exercise   Therapeutic Procedures: strength, endurance, ROM, flexibility minutes (60702) 38   Ther Proc 1 - Details pallof press L2 RB x 10 reps ea   PTRx Ther Proc 1 Supine Lumbar Hip Roll   PTRx Ther Proc 1 - Details No Notes   PTRx Ther Proc 2 Bridging #1   PTRx Ther Proc 2 - Details x10 reps   PTRx Ther Proc 3 Front Pulldowns   PTRx Ther Proc 3 - Details L2 RB   PTRx Ther Proc 4 Shoulder Theraband Rows   PTRx Ther Proc 4 - Details not today   PTRx Ther Proc 5 Cervical Retraction With Patient Overpressure   PTRx Ther Proc 5 - Details not today   Skilled Intervention Patient was instructed in their home exercise program. Patient performed at least 1 set of each exercise during the session. Cues were provided to ensure proper movement and control. Patient reported appropriate tolerance with each exercise.   PTRx Ther Proc 6 Upper Trapezius Stretch   PTRx Ther Proc 6 - Details 2 x 30 sec ea   PTRx Ther Proc 7 Thoracic Rotation   PTRx Ther Proc 7 - Details x10 ea   Therapeutic Activity   Skilled Intervention Performed to improve technique and tolerance with dynamic functional activities and movement patterns related to activities of daily living, occupation, home, and/or wellness activities.   Neuromuscular Re-education   Skilled Intervention Performed to improve movement coordination, static and dynamic balance, kinesthetic awareness and positioning of involved body regions in the environment.   Gait Training   Skilled Intervention Performed to improve safety, coordination, and weight bearing redistribution for  improved balance and tolerance with ambulation.   Self Care/home Management   Skilled Intervention Education regarding mechanisms and rationale for physical therapy interventions selected to address their goals. Discussed self management strategies for ways patient can actively support progress towards goals. Education regarding activity precautions/restrictions. Addressed patients questions and concerns to their satisfaction prior to completion of visit.   Education   Learner/Method Patient   Plan   Home program cervical retractions, rows and lat pulldowns L2 RB   Plan for next session review and progress HEP as appropriate, progress cervical ROM and strength of scapula   Comments   Comments Assessment: Patient returns for their 4th visit of physical therapy to address their chief complaint of neck and left upper extremity pain with paresthesias and hand weakness. The following impairments and functional limitations were addressed during today's visit: neck and thoracic ROM limitations, activity tolerance involving repetitive resisted movements of left upper extremity. Progress towards goals are demonstrated by less pain with neck ROM and upper extremity strengthening exercises, and self reported improvements in hand sensation. Patient will benefit from additional skilled physical therapy to further improve these impairments and functional limitations for patient to achieve their goals.   Total Session Time   Timed Code Treatment Minutes 38   Total Treatment Time (sum of timed and untimed services) 38

## 2025-04-08 ENCOUNTER — THERAPY VISIT (OUTPATIENT)
Dept: PHYSICAL THERAPY | Facility: REHABILITATION | Age: 70
End: 2025-04-08
Payer: COMMERCIAL

## 2025-04-08 DIAGNOSIS — M54.2 CERVICAL PAIN: Primary | ICD-10-CM

## 2025-04-08 PROCEDURE — 97110 THERAPEUTIC EXERCISES: CPT | Mod: GP | Performed by: PHYSICAL THERAPIST

## 2025-04-08 PROCEDURE — 97112 NEUROMUSCULAR REEDUCATION: CPT | Mod: GP | Performed by: PHYSICAL THERAPIST

## 2025-04-20 ENCOUNTER — HEALTH MAINTENANCE LETTER (OUTPATIENT)
Age: 70
End: 2025-04-20

## 2025-05-05 ASSESSMENT — ASTHMA QUESTIONNAIRES
QUESTION_5 LAST FOUR WEEKS HOW WOULD YOU RATE YOUR ASTHMA CONTROL: SOMEWHAT CONTROLLED
QUESTION_3 LAST FOUR WEEKS HOW OFTEN DID YOUR ASTHMA SYMPTOMS (WHEEZING, COUGHING, SHORTNESS OF BREATH, CHEST TIGHTNESS OR PAIN) WAKE YOU UP AT NIGHT OR EARLIER THAN USUAL IN THE MORNING: FOUR OR MORE NIGHTS A WEEK
ACT_TOTALSCORE: 13
QUESTION_4 LAST FOUR WEEKS HOW OFTEN HAVE YOU USED YOUR RESCUE INHALER OR NEBULIZER MEDICATION (SUCH AS ALBUTEROL): NOT AT ALL
QUESTION_1 LAST FOUR WEEKS HOW MUCH OF THE TIME DID YOUR ASTHMA KEEP YOU FROM GETTING AS MUCH DONE AT WORK, SCHOOL OR AT HOME: SOME OF THE TIME
QUESTION_2 LAST FOUR WEEKS HOW OFTEN HAVE YOU HAD SHORTNESS OF BREATH: MORE THAN ONCE A DAY

## 2025-05-06 ENCOUNTER — OFFICE VISIT (OUTPATIENT)
Dept: FAMILY MEDICINE | Facility: CLINIC | Age: 70
End: 2025-05-06
Payer: COMMERCIAL

## 2025-05-06 DIAGNOSIS — M79.89 LEG SWELLING: ICD-10-CM

## 2025-05-06 DIAGNOSIS — E11.59 TYPE 2 DIABETES MELLITUS WITH OTHER CIRCULATORY COMPLICATION, WITHOUT LONG-TERM CURRENT USE OF INSULIN (H): ICD-10-CM

## 2025-05-06 DIAGNOSIS — R06.2 WHEEZING: ICD-10-CM

## 2025-05-06 DIAGNOSIS — R22.41 LUMP OF SKIN OF RIGHT LOWER EXTREMITY: Primary | ICD-10-CM

## 2025-05-06 DIAGNOSIS — I10 ESSENTIAL HYPERTENSION: ICD-10-CM

## 2025-05-06 DIAGNOSIS — E66.01 MORBID OBESITY (H): ICD-10-CM

## 2025-05-06 DIAGNOSIS — I47.29 NON-SUSTAINED VENTRICULAR TACHYCARDIA (H): ICD-10-CM

## 2025-05-06 PROCEDURE — 99214 OFFICE O/P EST MOD 30 MIN: CPT | Performed by: NURSE PRACTITIONER

## 2025-05-06 PROCEDURE — 3079F DIAST BP 80-89 MM HG: CPT | Performed by: NURSE PRACTITIONER

## 2025-05-06 PROCEDURE — 1126F AMNT PAIN NOTED NONE PRSNT: CPT | Performed by: NURSE PRACTITIONER

## 2025-05-06 PROCEDURE — G2211 COMPLEX E/M VISIT ADD ON: HCPCS | Performed by: NURSE PRACTITIONER

## 2025-05-06 PROCEDURE — 3077F SYST BP >= 140 MM HG: CPT | Performed by: NURSE PRACTITIONER

## 2025-05-06 RX ORDER — FLUTICASONE PROPIONATE AND SALMETEROL 113; 14 UG/1; UG/1
1 POWDER, METERED RESPIRATORY (INHALATION) 2 TIMES DAILY
Qty: 1 EACH | Refills: 0 | Status: SHIPPED | OUTPATIENT
Start: 2025-05-06

## 2025-05-06 RX ORDER — FUROSEMIDE 20 MG/1
20 TABLET ORAL DAILY PRN
Qty: 30 TABLET | Refills: 0 | Status: CANCELLED | OUTPATIENT
Start: 2025-05-06

## 2025-05-06 ASSESSMENT — PAIN SCALES - GENERAL: PAINLEVEL_OUTOF10: NO PAIN (0)

## 2025-05-06 NOTE — PROGRESS NOTES
Assessment & Plan     ICD-10-CM    1. Lump of skin of right lower extremity  R22.41 US MSK Limited      2. Leg swelling  M79.89       3. Essential hypertension  I10 Comprehensive metabolic panel (BMP + Alb, Alk Phos, ALT, AST, Total. Bili, TP)      4. Morbid obesity (H)  E66.01       5. Wheezing  R06.2 fluticasone-salmeterol (AIRDUO RESPICLICK) 113-14 MCG/ACT inhaler      6. Non-sustained ventricular tachycardia (H)  I47.29       7. Type 2 diabetes mellitus with other circulatory complication, without long-term current use of insulin (H)  E11.59 Hemoglobin A1c        For the increased wheezing/coughing, try switching out Qvar for air duo.  Ultrasound ordered for further evaluation of lump on the right leg.  Swelling issues remain persistent.  Actually does not look too bad today with some venous insufficiency but no pitting edema/weeping edema.  Can try seeing if furosemide helps more than HCTZ but take regularly for 2 weeks and let me know.  Will update metabolic panel and A1c at that time as well to make sure renal function/electrolytes are tolerating.    Reviewed vascular medicine note x 1, A1c x 1, stress cardiac MRI x 1, cardiology note x 1    The longitudinal plan of care for the diagnosis(es)/condition(s) as documented were addressed during this visit. Due to the added complexity in care, I will continue to support Alvina in the subsequent management and with ongoing continuity of care.      Subjective     HPI     Patient presents today with several things to discuss    First, is that she has continued lower extremity swelling issues.  Last year received a prescription for furosemide to try but she has been too anxious to actually give it a shot.  She has taken 2 tablets over the last year intermittently and did not find huge improvement so she stopped taking it.  No orthopnea.  No signs of HF.  Did see vascular already.  Cannot get compression socks on.    Patient notes a lump/mass along her right calf that  "she can palpate.  Tender to touch.    Patient notes increased wheezing.  On Qvar.  Getting more coughing fits.  Feels like she is coughing up more mucus.  But also the mucus is stuck.  This causes a choking sensation sometimes in her throat.  No dysphagia issues.  No hemoptysis.    Lab Results   Component Value Date    A1C 6.5 10/01/2024    A1C 6.2 03/06/2024    A1C 6.3 08/09/2023    A1C 6.2 08/03/2022    A1C 6.3 12/20/2021         Review of Systems - negative except for what's listed in the HPI      Objective    BP (!) 160/80 (BP Location: Right arm, Patient Position: Sitting, Cuff Size: Adult Large)   Pulse 76   Temp 99  F (37.2  C) (Oral)   Resp 18   Ht 1.575 m (5' 2\")   Wt 89.4 kg (197 lb)   SpO2 97%   BMI 36.03 kg/m    Physical Exam   General appearance - alert, well appearing, and in no distress  Mental status - alert, oriented to person, place, and time  Eyes -Sclera white. PERRLA  Ears - bilateral TM's and external ear canals normal  Nose - normal and patent, no erythema, discharge or polyps  Mouth - mucous membranes moist. No oral lesions.  Neck - no significant adenopathy  Lymphatics - no palpable lymphadenopathy  Chest -inspiratory wheezes.  Occasional coughing jags.  Symmetric air entry.  Heart - normal rate and regular rhythm, S1 and S2 normal, no murmurs noted  Abdomen - soft, nontender, nondistended, no masses or organomegaly  Neurological - alert, oriented, normal speech, no focal findings or movement disorder noted, neck supple without rigidity, cranial nerves II through XII intact, motor and sensory grossly normal bilaterally, normal muscle tone, no tremors, strength 5/5  Extremities - peripheral pulses normal, trace peripheral edema  Skin - normal coloration and turgor.    Jong Claudio, CNP    This note has been dictated using voice recognition software. Any grammatical or context distortions are unintentional and inherent to the software.    "

## 2025-05-06 NOTE — PATIENT INSTRUCTIONS
I would take the furosemide/lasix every day and let me know in 2 weeks how things go with the leg swelling.      To replace the qvar, I sent the airduo instead to see if it works better. If no better in 2 weeks, we could also see pulmonary medicine. It's ok to use that albuterol    Ultrasound ordered of the right leg to look into the lump.  Scheduling info is on your paperwork.

## 2025-05-08 ENCOUNTER — TELEPHONE (OUTPATIENT)
Dept: FAMILY MEDICINE | Facility: CLINIC | Age: 70
End: 2025-05-08
Payer: COMMERCIAL

## 2025-05-08 VITALS
TEMPERATURE: 99 F | OXYGEN SATURATION: 97 % | SYSTOLIC BLOOD PRESSURE: 148 MMHG | RESPIRATION RATE: 18 BRPM | WEIGHT: 197 LBS | HEART RATE: 76 BPM | BODY MASS INDEX: 36.25 KG/M2 | HEIGHT: 62 IN | DIASTOLIC BLOOD PRESSURE: 80 MMHG

## 2025-05-08 PROBLEM — I47.29 NON-SUSTAINED VENTRICULAR TACHYCARDIA (H): Status: ACTIVE | Noted: 2025-05-08

## 2025-05-08 NOTE — CONFIDENTIAL NOTE
Prior Authorization Retail Medication Request    Medication/Dose: fluticasone-salmeterol (AIRDUO RESPICLICK) 113-14 MCG/ACT inhaler  Diagnosis and ICD code (if different than what is on RX):  see chart  New/renewal/insurance change PA/secondary ins. PA:  Previously Tried and Failed:  see chart  Rationale:  see chart    Insurance   Primary: [employer]  Insurance ID:  [id number]       Clinic Information  Preferred routing pool for dept communication: [facility]    CMM Key: [id number]

## 2025-05-10 DIAGNOSIS — M79.89 LEG SWELLING: ICD-10-CM

## 2025-05-13 ENCOUNTER — MEDICAL CORRESPONDENCE (OUTPATIENT)
Dept: HEALTH INFORMATION MANAGEMENT | Facility: CLINIC | Age: 70
End: 2025-05-13
Payer: COMMERCIAL

## 2025-05-13 ENCOUNTER — TRANSFERRED RECORDS (OUTPATIENT)
Dept: HEALTH INFORMATION MANAGEMENT | Facility: CLINIC | Age: 70
End: 2025-05-13
Payer: COMMERCIAL

## 2025-05-13 RX ORDER — FUROSEMIDE 20 MG/1
TABLET ORAL
Qty: 90 TABLET | Refills: 2 | Status: SHIPPED | OUTPATIENT
Start: 2025-05-13

## 2025-05-13 NOTE — TELEPHONE ENCOUNTER
Retail Pharmacy Prior Authorization Team   Phone: [phone]    PA Initiation    Medication: FLUTICASONE-SALMETEROL 113-14 MCG/ACT IN AEPB  Insurance Company: [email or web address] Part D - Phone [phone] Fax [phone]  Pharmacy Filling the Rx: [id number] [place] [address] AT [place] [address]  Filling Pharmacy Phone: [phone]  Filling Pharmacy Fax:    Start Date: 5/13/2025

## 2025-05-13 NOTE — TELEPHONE ENCOUNTER
Note: Due to record-high volumes, our turn-around time is taking longer than usual . We are currently 3  business days behind in the pools.   We are working diligently to submit all requests in a timely manner and in the order they are received. Please only flag TRUE URGENT requests as high priority to the pool at this time.   If you have questions on status of PA's,  please send a note/message in the active PA encounter and send back to the [facility] PA pool [[phone]].    If you have questions about the turn-around time or about our process, please reach out to our supervisor Erin Javeir.   Thank you!   RPPA (Retail Pharmacy Prior Authorization) team    PRIOR AUTHORIZATION DENIED    Medication: FLUTICASONE-SALMETEROL 113-14 MCG/ACT IN AEPB  Insurance Company: [employer] Part D - Phone [phone] Fax [phone]  Denial Date: 5/13/2025  Denial Rational:         Appeal Information: If provider would like to appeal please review the plan's reasons for denial listed above. Please utilize that information to complete letter and provide specific, detailed clinical information/rationale of your patient's health status to address their denial reasons.    Patient Notified: No

## 2025-05-13 NOTE — TELEPHONE ENCOUNTER
Contacts       Contact Date/Time Type Contact Phone/Fax    05/13/2025 11:25 AM CDT Phone (Outgoing) Alvina Maynard (Self) [phone] (H)          Attempted to reach patient to: Relay a message    Regarding: PA    Information needed: Which inh does she want or should he pick?    Action to take: OK to relay (verbatim): PA for FLUTICASONE-SALMETEROL 113-14 MCG/ACT IN AEPB was denied. Ins will cover Brand name Symbicort, Breo Ellitpta, or Willexa. Does she have a preference for which inh they will cover?  Transfer to TC line (or update telephone encounter in after-hours)

## 2025-05-14 DIAGNOSIS — G72.0 DRUG-INDUCED MYOPATHY: Primary | ICD-10-CM

## 2025-05-14 PROCEDURE — 99499 UNLISTED E&M SERVICE: CPT | Performed by: NURSE PRACTITIONER

## 2025-05-14 NOTE — PROGRESS NOTES
Patient has documented statin intolerance. For quality purposes, the ICD 10 code is being transmitted to the plan for appropriate exclusion from the SUPD/SPC quality measure.

## 2025-05-19 DIAGNOSIS — I10 ESSENTIAL HYPERTENSION: ICD-10-CM

## 2025-05-19 DIAGNOSIS — I67.1 CEREBRAL ANEURYSM, NONRUPTURED: ICD-10-CM

## 2025-05-19 DIAGNOSIS — E11.59 TYPE 2 DIABETES MELLITUS WITH OTHER CIRCULATORY COMPLICATION, WITHOUT LONG-TERM CURRENT USE OF INSULIN (H): ICD-10-CM

## 2025-05-19 DIAGNOSIS — R07.89 ATYPICAL CHEST PAIN: ICD-10-CM

## 2025-05-19 DIAGNOSIS — I25.10 ATHEROSCLEROSIS OF CORONARY ARTERY OF NATIVE HEART WITHOUT ANGINA PECTORIS, UNSPECIFIED VESSEL OR LESION TYPE: ICD-10-CM

## 2025-05-19 DIAGNOSIS — E66.01 MORBID OBESITY (H): ICD-10-CM

## 2025-05-19 DIAGNOSIS — E78.00 PRIMARY HYPERCHOLESTEROLEMIA: ICD-10-CM

## 2025-05-19 NOTE — TELEPHONE ENCOUNTER
Spoke to patient and relayed provider message. Patient verbalized understanding and is in agreemen with plan.     Chika Roberts RN  [facility]

## 2025-05-19 NOTE — TELEPHONE ENCOUNTER
Patient has not had her labs done.  There are lots of labs ordered.  Please get her a lab only appointment.  Then send back request for bridging.

## 2025-05-19 NOTE — TELEPHONE ENCOUNTER
Pt scheduled lab only visit. She just picked up her Furosemide today. Appt 2 wks from now.   She is out of her nifedipine. Please refill ASAP    While on the phone with patient:  Pt also stating that her ENT/sleep doctor wants her to check her thyroid per physical exam. Is Jong okay with ordering thyroid labs? Pt notified to have those records sent to us so Jong can see notes if necessary.   Also, pt asking if she should follow up with Dr Carrion. She doesn't want to unless it's really necessary per Jong's opinion.

## 2025-05-27 ENCOUNTER — NURSE TRIAGE (OUTPATIENT)
Dept: FAMILY MEDICINE | Facility: CLINIC | Age: 70
End: 2025-05-27
Payer: COMMERCIAL

## 2025-05-29 NOTE — TELEPHONE ENCOUNTER
Hermila Simpson RN called Alvina on 5/29 and left a message to return the call to the clinic.  If patient calls back, please route to [facility].    TC please route to RN.    Need clarity if needing a new referral to get in to ENT sooner?  Who is Jacob Cobb? Is that pulmonology?      CHALO Mccray RN  [facility]

## 2025-05-29 NOTE — TELEPHONE ENCOUNTER
Nurse Triage SBAR    Is this a 2nd Level Triage? YES, LICENSED PRACTITIONER REVIEW IS REQUIRED    Situation:   Patient returning call, noting that she saw a sleep doctor who was also and ENT that told her her thyroid was enlarged, patient additionally reporting trouble breathing    Background:   Symptoms started/worsened over a week and a half, patient would like ENT referral to be seen sooner. Notes she has appointments with other specialties such as endocrinology, but cannot see an ENT for over a year at the places she was looking into. Also noted recently starting furosemide.    Assessment:   Patient reports tight  like sensation on her throat that radiates to her chest, making it difficult to breathe. She reports moderate difficulty breathing and notes this is constant. Additionally reporting increased phlegm, cough, increased leg pain/swelling, and left ear pain.    Protocol Recommended Disposition:   Go to ED Now    Recommendation:   Gave care advise, patient refusing disposition, would like to see PCP. Visit scheduled for tomorrow 5/29/25. I stressed the importance of being seen in the ED and educated the patient on the risks. She verbalized an understanding, reported she would go if symptoms worsen.    Routing to PCP to advise on recommendations.    Routed to provider    Does the patient meet one of the following criteria for ADS visit consideration? 16+ years old, with an [facility] PCP     TIP  Providers, please consider if this condition is appropriate for management at one of our Acute and Diagnostic Services sites.     If patient is a good candidate, please use dotphrase <dot>triageresponse and select Refer to ADS to document.  Patient returning call    Reason for Disposition   MODERATE difficulty breathing (e.g., speaks in phrases, SOB even at rest, pulse 100-120) of new-onset or worse than normal    Additional Information   Negative: SEVERE difficulty breathing (e.g., struggling for each breath, speaks  "in single words, pulse > 120)   Negative: Breathing stopped and hasn't returned   Negative: Choking on something   Negative: Passed out (e.g., fainted, lost consciousness, blacked out and was not responding)   Negative: Wheezing started suddenly after medicine, an allergic food, or bee sting   Negative: Stridor (harsh sound while breathing in)   Negative: Slow, shallow and weak breathing   Negative: Sounds like a life-threatening emergency to the triager   Negative: Difficult to awaken or acting confused (e.g., disoriented, slurred speech)   Negative: Bluish (or gray) lips or face   Negative: Chest pain   Negative: Wheezing (high pitched whistling sound) and previous asthma attacks or use of asthma medicines   Negative: Breathing difficulty and within 14 days of COVID-19 EXPOSURE (close contact) with someone diagnosed with COVID-19 (e.g., COVID test positive)   Negative: Breathing difficulty and COVID-19 is widespread in the community   Negative: Breathing diffculty and only present when coughing   Negative: Breathing difficulty and only from stuffy nose   Negative: Breathing diffculty and only from stuffy nose or runny nose from common cold    Answer Assessment - Initial Assessment Questions  1. RESPIRATORY STATUS: \"Describe your breathing?\" (e.g., wheezing, shortness of breath, unable to speak, severe coughing)       Tight , in the base of the neck all down to the breast area.    2. ONSET: \"When did this breathing problem begin?\"       A couple weeks ago, progressively getting worse    3. PATTERN \"Does the difficult breathing come and go, or has it been constant since it started?\"       Progressively getting worse, constant    4. SEVERITY: \"How bad is your breathing?\" (e.g., mild, moderate, severe)       Can walk around, sometimes it gets tight, moderate difficulty    5. RECURRENT SYMPTOM: \"Have you had difficulty breathing before?\" If Yes, ask: \"When was the last time?\" and \"What happened that time?\"       " "No    6. CARDIAC HISTORY: \"Do you have any history of heart disease?\" (e.g., heart attack, angina, bypass surgery, angioplasty)       Yes, has a stent and on 4 BP pills    7. LUNG HISTORY: \"Do you have any history of lung disease?\"  (e.g., pulmonary embolus, asthma, emphysema)      Asthma     8. CAUSE: \"What do you think is causing the breathing problem?\"       Thyroid/parathyroid/medications    9. OTHER SYMPTOMS: \"Do you have any other symptoms?\" (e.g., chest pain, cough, dizziness, fever, runny nose)      Chest tightness, neck tightness, increased amounts of phlegm, left ear pain for a couple months, cough, increased pain in leg (increased a week and a half ago).     10. O2 SATURATION MONITOR:  \"Do you use an oxygen saturation monitor (pulse oximeter) at home?\" If Yes, ask: \"What is your reading (oxygen level) today?\" \"What is your usual oxygen saturation reading?\" (e.g., 95%)        N/a    11. PREGNANCY: \"Is there any chance you are pregnant?\" \"When was your last menstrual period?\"        N/A  12. TRAVEL: \"Have you traveled out of the country in the last month?\" (e.g., travel history, exposures)        No    Protocols used: Breathing Difficulty-A-[place]    "

## 2025-05-30 ENCOUNTER — OFFICE VISIT (OUTPATIENT)
Dept: FAMILY MEDICINE | Facility: CLINIC | Age: 70
End: 2025-05-30
Payer: COMMERCIAL

## 2025-05-30 VITALS
DIASTOLIC BLOOD PRESSURE: 80 MMHG | RESPIRATION RATE: 18 BRPM | SYSTOLIC BLOOD PRESSURE: 130 MMHG | HEART RATE: 63 BPM | OXYGEN SATURATION: 96 % | WEIGHT: 196 LBS | HEIGHT: 62 IN | BODY MASS INDEX: 36.07 KG/M2 | TEMPERATURE: 98.8 F

## 2025-05-30 DIAGNOSIS — E21.3 HYPERPARATHYROIDISM: ICD-10-CM

## 2025-05-30 DIAGNOSIS — R22.1 NECK FULLNESS: Primary | ICD-10-CM

## 2025-05-30 PROCEDURE — G2211 COMPLEX E/M VISIT ADD ON: HCPCS | Performed by: NURSE PRACTITIONER

## 2025-05-30 PROCEDURE — 1126F AMNT PAIN NOTED NONE PRSNT: CPT | Performed by: NURSE PRACTITIONER

## 2025-05-30 PROCEDURE — 3079F DIAST BP 80-89 MM HG: CPT | Performed by: NURSE PRACTITIONER

## 2025-05-30 PROCEDURE — 3075F SYST BP GE 130 - 139MM HG: CPT | Performed by: NURSE PRACTITIONER

## 2025-05-30 PROCEDURE — 99213 OFFICE O/P EST LOW 20 MIN: CPT | Performed by: NURSE PRACTITIONER

## 2025-05-30 ASSESSMENT — PAIN SCALES - GENERAL: PAINLEVEL_OUTOF10: NO PAIN (0)

## 2025-05-30 NOTE — PROGRESS NOTES
"Assessment & Plan     ICD-10-CM    1. Neck fullness  R22.1 US Thyroid      2. Hyperparathyroidism  E21.3 US Parathyroid        Ultrasound for further evaluation.  If normal, likely does need flexible laryngoscopic he.  Interested in possibly connecting with [facility] if that is the case.  Has follow-up for further evaluation/management of elevated PTH    Reviewed ENT note x 1    Subjective     HPI     Patient presents today following up on ENT appointment.  ENT thought that there was a growth or fullness in her thyroid gland and recommended imaging.  Also had extensive lab work ordered.  After this visit patient does feel like she can feel a pressure or fullness in her neck where her thyroid would be.  Swallowing okay.  Getting enough air.  VSS.  Also has hyperparathyroidism and is due for follow-up on that shortly.    There was discussion at the ENT appointment about doing an office flexible laryngoscopic he for further evaluation of swallowing complaints.  Not done yet but plans to be done at next appointment.    Review of Systems - negative except for what's listed in the HPI      Objective    /80 (BP Location: Right arm, Patient Position: Sitting, Cuff Size: Adult Large)   Pulse 63   Temp 98.8  F (37.1  C) (Oral)   Resp 18   Ht 1.575 m (5' 2\")   Wt 88.9 kg (196 lb)   SpO2 96%   BMI 35.85 kg/m    Physical Exam   General appearance - alert, well appearing, and in no distress  Mental status - alert, oriented to person, place, and time  Mouth - mucous membranes moist. No oral lesions.  Neck -thyroid lift.  Possible right sided thyroid fullness with palpation  Lymphatics - no palpable lymphadenopathy  Chest - clear to auscultation, no wheezes, rales or rhonchi, symmetric air entry  Heart - normal rate and regular rhythm, S1 and S2 normal, no murmurs noted  Abdomen - soft, nontender, nondistended, no masses or organomegaly  Neurological -grossly intact  Extremities - peripheral pulses normal, no " peripheral edema  Skin - normal coloration and turgor.    Jong Claudio, CNP    This note has been dictated using voice recognition software. Any grammatical or context distortions are unintentional and inherent to the software.

## 2025-05-30 NOTE — PATIENT INSTRUCTIONS
Let's get that ultrasound. If nothing is found on imaging, let's connect with [facility] next time for the swallowing difficulties. We'll be in contact with you regarding results.

## 2025-06-01 DIAGNOSIS — E87.6 HYPOKALEMIA: ICD-10-CM

## 2025-06-02 ENCOUNTER — LAB (OUTPATIENT)
Dept: LAB | Facility: CLINIC | Age: 70
End: 2025-06-02
Payer: COMMERCIAL

## 2025-06-02 DIAGNOSIS — I10 ESSENTIAL HYPERTENSION: ICD-10-CM

## 2025-06-02 LAB
EST. AVERAGE GLUCOSE BLD GHB EST-MCNC: 137 MG/DL
HBA1C MFR BLD: 6.4 % (ref 0–5.6)

## 2025-06-02 PROCEDURE — 36415 COLL VENOUS BLD VENIPUNCTURE: CPT

## 2025-06-02 PROCEDURE — 80053 COMPREHEN METABOLIC PANEL: CPT

## 2025-06-02 RX ORDER — POTASSIUM CHLORIDE 1500 MG/1
40 TABLET, EXTENDED RELEASE ORAL
Qty: 360 TABLET | Refills: 2 | Status: SHIPPED | OUTPATIENT
Start: 2025-06-02

## 2025-06-03 ENCOUNTER — RESULTS FOLLOW-UP (OUTPATIENT)
Dept: FAMILY MEDICINE | Facility: CLINIC | Age: 70
End: 2025-06-03

## 2025-06-03 ENCOUNTER — THERAPY VISIT (OUTPATIENT)
Dept: PHYSICAL THERAPY | Facility: REHABILITATION | Age: 70
End: 2025-06-03
Payer: COMMERCIAL

## 2025-06-03 DIAGNOSIS — M54.2 CERVICAL PAIN: Primary | ICD-10-CM

## 2025-06-03 LAB
ALBUMIN SERPL BCG-MCNC: 4.3 G/DL (ref 3.5–5.2)
ALP SERPL-CCNC: 86 U/L (ref 40–150)
ALT SERPL W P-5'-P-CCNC: 16 U/L (ref 0–50)
ANION GAP SERPL CALCULATED.3IONS-SCNC: 9 MMOL/L (ref 7–15)
AST SERPL W P-5'-P-CCNC: 19 U/L (ref 0–45)
BILIRUB SERPL-MCNC: 0.3 MG/DL
BUN SERPL-MCNC: 19.5 MG/DL (ref 8–23)
CALCIUM SERPL-MCNC: 10.7 MG/DL (ref 8.8–10.4)
CHLORIDE SERPL-SCNC: 104 MMOL/L (ref 98–107)
CREAT SERPL-MCNC: 0.74 MG/DL (ref 0.51–0.95)
EGFRCR SERPLBLD CKD-EPI 2021: 87 ML/MIN/1.73M2
GLUCOSE SERPL-MCNC: 119 MG/DL (ref 70–99)
HCO3 SERPL-SCNC: 28 MMOL/L (ref 22–29)
POTASSIUM SERPL-SCNC: 4.3 MMOL/L (ref 3.4–5.3)
PROT SERPL-MCNC: 6.8 G/DL (ref 6.4–8.3)
SODIUM SERPL-SCNC: 141 MMOL/L (ref 135–145)

## 2025-06-03 PROCEDURE — 97110 THERAPEUTIC EXERCISES: CPT | Mod: GP | Performed by: PHYSICAL THERAPIST

## 2025-06-03 NOTE — PROGRESS NOTES
DISCHARGE  Reason for Discharge: Change in medical status.    Discharge Plan: Patient to continue home program.    Referring Provider:  Aure Scott        06/03/25 0500   Appointment Info   Signing clinician's name / credentials Henri Werner PT   Total/Authorized Visits 12   Visits Used 6   Medical Diagnosis M48.02 (ICD-10-CM) - Cervical stenosis of spinal canal. Spinal Stenosis of Lumbar Region (M48.061)   PT Tx Diagnosis muscle power deficits in lumbo-pelvic-hip region, neck mobility deficits   Precautions/Limitations none   Progress Note/Certification   Start of Care Date 12/27/24   Onset of illness/injury or Date of Surgery 12/13/24   Therapy Frequency weekly   Predicted Duration 12 weeks   Certification date from 03/11/25   Certification date to 06/03/25   Progress Note Due Date 06/03/25   [facility] Authorization Information   Condition type Chronic (continuous duration <3 months)   Cause of current episode Unspecified   Nature of treatment Rehabilitative   Documented POC (choose all that apply) Measurable short and long term/discharge treatment goals related to physical and functional deficits.;Frequency of treatment visits and treatment activities to address deficit areas.;Patient agrees to program participation including home program   How did the symptoms start patient reports having neck symptoms beginning 4 months ago, had a modified work station for computer and near the end of the work day would develop pain down left arm and into left hand digits 4-5. Has occasional tinging in left hand. Notices difficulty with grasping objects.   General health reported by patient Fair   PT Goal 1   Goal Identifier HEP   Goal Description Patient will demonstrate at least 50% compliance in their HEP to support progress towards functional and patient specific goals   Goal Progress met though it's been inconsistent recently due to other illnesses   Target Date 03/21/25   PT Goal 2   Goal Identifier  Bending/lifting   Goal Description Pt will be able to bend and  object from floor with proper body mechanics for ADLs.   Goal Progress Met   Target Date 02/13/24   PT Goal 3   Goal Identifier Sitting   Goal Description Patient will be able to sit for 15+ minutes without an inc in symptoms to improve ADLs   Goal Progress not met, will feel burning in leg with sitting   Target Date 02/13/24   PT Goal 4   Goal Identifier neck ROM   Goal Description Patient will perform neck ROM through available range withotu experiencing pain or paresthesias into left upper extremity   Goal Progress met   Target Date 03/21/25   Subjective Report   Subjective Report patient has been dealing with throat symptoms as well as ear symptoms. they are mentioning today will be their last PT visit for a while as they attend to other health concerns. they woudl liek to review and perhaps learn additional stretches/exercises for their neck and left arm as they continue to experience tingling in left lateral forearm and 4th and 5th digits   Objective Measures   Objective Measures Objective Measure 1   Objective Measure 1   Objective Measure neck ROM   Details WFL all planes, no pain   Treatment Interventions (PT)   Interventions Therapeutic Procedure/Exercise;Therapeutic Activity;Neuromuscular Re-education;Gait Training;Manual Therapy   Therapeutic Procedure/Exercise   Therapeutic Procedures: strength, endurance, ROM, flexibility minutes (08164) 40   PTRx Ther Proc 1 Supine Lumbar Hip Roll   PTRx Ther Proc 1 - Details not today   PTRx Ther Proc 2 Bridging #1   PTRx Ther Proc 2 - Details not today   PTRx Ther Proc 3 Front Pulldowns   PTRx Ther Proc 3 - Details L2 RB x15 progressed to L3 RB x 15   PTRx Ther Proc 4 Shoulder Theraband Rows   PTRx Ther Proc 4 - Details L2 RB   PTRx Ther Proc 5 Cervical Retraction With Patient Overpressure   PTRx Ther Proc 5 - Details x10ea   PTRx Ther Proc 6 Upper Trapezius Stretch   PTRx Ther Proc 6 - Details  x30   PTRx Ther Proc 7 Thoracic Rotation   PTRx Ther Proc 7 - Details x10   PTRx Ther Proc 8 Single Knee to Chest   PTRx Ther Proc 8 - Details not today   PTRx Ther Proc 9 Trunk Rotation Stretch   PTRx Ther Proc 9 - Details x10   PTRx Ther Proc 10 Thoracic Flexion   PTRx Ther Proc 10 - Details x10   PTRx Ther Proc 11 Thoracic Extension   PTRx Ther Proc 11 - Details x10   PTRx Ther Proc 12 Levator Scapulae Stretch   PTRx Ther Proc 12 - Details 2 x 30 sec   PTRx Ther Proc 13 Scalene Stretch   PTRx Ther Proc 13 - Details 2 x 30 sec   Skilled Intervention Patient was instructed in their home exercise program. Patient performed at least 1 set of each exercise during the session. Cues were provided to ensure proper movement and control. Patient reported appropriate tolerance with each exercise.   Therapeutic Activity   PTRx Ther Act 1 Soft Tissue work for Upper Quadrant -Theracane   PTRx Ther Act 1 - Details 2 x 30 sec   Skilled Intervention Performed to improve technique and tolerance with dynamic functional activities and movement patterns related to activities of daily living, occupation, home, and/or wellness activities.   Neuromuscular Re-education   PTRx Neuro Re-ed 1 Supine Sciatic Nerve Sliders   PTRx Neuro Re-ed 1 - Details No Notes   PTRx Neuro Re-ed 2 Standing Pelvic Floor with Transverse Abdominus   PTRx Neuro Re-ed 2 - Details No Notes   PTRx Neuro Re-ed 3 Belly Breathing   PTRx Neuro Re-ed 3 - Details No Notes   PTRx Neuro Re-ed 4 Pallof Press   PTRx Neuro Re-ed 4 - Details L2 RB x 10 reps   PTRx Neuro Re-ed 5 Radial Nerve Mobility   PTRx Neuro Re-ed 5 - Details No Notes   Skilled Intervention Performed to improve movement coordination, static and dynamic balance, kinesthetic awareness and positioning of involved body regions in the environment.   Gait Training   Skilled Intervention Performed to improve safety, coordination, and weight bearing redistribution for improved balance and tolerance with  ambulation.   Self Care/home Management   Skilled Intervention Education regarding mechanisms and rationale for physical therapy interventions selected to address their goals. Discussed self management strategies for ways patient can actively support progress towards goals. Education regarding activity precautions/restrictions. Addressed patients questions and concerns to their satisfaction prior to completion of visit.   Education   Learner/Method Patient   Plan   Home program cervical retractions, rows and lat pulldowns L2 RB   Plan for next session Discharged   Comments   Comments Assessment: Patient returns for follow-up visit of physical therapy to address their chief complaint of left sided neck pain with left arm paresthesias. The following impairments and functional limitations were addressed during today's visit: neck muscle hypertonicity and movement coordination impairments of cervical spine and scapulohumeral complex. Progress towards goals are demonstrated by improved neck ROM to WFL without pain. Patient would benefit from additional skilled PT however they need to attend to other health concerns and so today patient will be discharged from PT.   Total Session Time   Timed Code Treatment Minutes 40   Total Treatment Time (sum of timed and untimed services) 40

## 2025-06-03 NOTE — LETTER
Yolanda 3, 2025      Alvina INA Maynard  [address] [place] [address]        Dear ,    We are writing to inform you of your test results.      Blood sugar control looks good!     Resulted Orders   Hemoglobin A1c   Result Value Ref Range    Estimated Average Glucose 137 (H) <117 mg/dL    Hemoglobin A1C 6.4 (H) 0.0 - 5.6 %      Comment:      Normal <5.7%   Prediabetes 5.7-6.4%    Diabetes 6.5% or higher     Note: Adopted from ADA consensus guidelines.       If you have any questions or concerns, please call the clinic at the number listed above.       Sincerely,      Jong Claudio NP    Electronically signed

## 2025-06-04 ENCOUNTER — HOSPITAL ENCOUNTER (OUTPATIENT)
Dept: ULTRASOUND IMAGING | Facility: CLINIC | Age: 70
Discharge: HOME | End: 2025-06-04
Attending: NURSE PRACTITIONER
Payer: COMMERCIAL

## 2025-06-04 ENCOUNTER — RESULTS FOLLOW-UP (OUTPATIENT)
Dept: FAMILY MEDICINE | Facility: CLINIC | Age: 70
End: 2025-06-04

## 2025-06-04 DIAGNOSIS — R13.12 OROPHARYNGEAL DYSPHAGIA: Primary | ICD-10-CM

## 2025-06-04 DIAGNOSIS — E21.3 HYPERPARATHYROIDISM: ICD-10-CM

## 2025-06-04 PROCEDURE — 76536 US EXAM OF HEAD AND NECK: CPT

## 2025-06-05 ENCOUNTER — PATIENT OUTREACH (OUTPATIENT)
Dept: CARE COORDINATION | Facility: CLINIC | Age: 70
End: 2025-06-05
Payer: COMMERCIAL

## 2025-06-09 ENCOUNTER — PATIENT OUTREACH (OUTPATIENT)
Dept: CARE COORDINATION | Facility: CLINIC | Age: 70
End: 2025-06-09
Payer: COMMERCIAL

## 2025-07-08 ENCOUNTER — TRANSFERRED RECORDS (OUTPATIENT)
Dept: HEALTH INFORMATION MANAGEMENT | Facility: CLINIC | Age: 70
End: 2025-07-08
Payer: COMMERCIAL

## 2025-07-09 DIAGNOSIS — I10 ESSENTIAL HYPERTENSION: ICD-10-CM

## 2025-07-09 RX ORDER — HYDROCHLOROTHIAZIDE 25 MG/1
50 TABLET ORAL DAILY
Qty: 180 TABLET | Refills: 2 | Status: SHIPPED | OUTPATIENT
Start: 2025-07-09

## 2025-07-15 ENCOUNTER — NURSE TRIAGE (OUTPATIENT)
Dept: FAMILY MEDICINE | Facility: CLINIC | Age: 70
End: 2025-07-15
Payer: COMMERCIAL

## 2025-07-15 NOTE — TELEPHONE ENCOUNTER
Symptoms    Describe your symptoms: Sever back pain     Any pain: Yes: Patient wants to know if this is possibly a symptom from her medication that PCP has her on.     How long have you been having symptoms: Weeks but today is the second day that its gotten very severe       Have you been seen for this:  No    Appointment offered?: Yes: 07/25/25 at 11:40AM     Triage offered?: No    Home remedies tried: NA     Preferred Pharmacy:   "[id number] - [place], [place] - [address] [place] [address]  Phone: [phone] Fax: [phone]    "[facility] #[id number] [address] [address]  Phone: [phone] Fax: [phone]      Could we send this information to you in [employer] or would you prefer to receive a phone call?:   Patient would prefer a phone call   Okay to leave a detailed message?: Yes at Cell number on file:    [phone]

## 2025-07-16 NOTE — TELEPHONE ENCOUNTER
Nurse Triage SBAR    Is this a 2nd Level Triage? NO    Situation: Patient has had intermittent back pain for past week and is not getting better.  She is concerned if she should have her imaging done with dye this Friday due to her history of polyps in colon.    Background: Medical history includes type 2 DM, obesity, IBS, colon polyps, cervicl spondylosis.      Assessment: Patient has found relief with back pain by using lidocaine patches.  Pain is worse with getting up and standing and has improved over past day.  Pain was across whole lower back and now is more on the right side.  She does get some spasms with the back pain.      She has no fever, no changes in bowel or bladder status, pain does not radiate.     Protocol Recommended Disposition:   Home Care    Recommendation: Patient advised to try ice on back and will ask provider if he has other recommendations for the imaging scheduled on Friday.  Has appointment next week with provider.      Routed to provider  CHALO Mccray RN       Does the patient meet one of the following criteria for ADS visit consideration? 16+ years old, with an [facility] PCP     TIP  Providers, please consider if this condition is appropriate for management at one of our Acute and Diagnostic Services sites.     If patient is a good candidate, please use dotphrase <dot>triageresponse and select Refer to ADS to document.  Reason for Disposition   Caused by overuse from recent vigorous activity (e.g., exercise, gardening, lifting and carrying, sports)    Additional Information   Negative: Passed out (e.g., fainted, lost consciousness, blacked out and was not responding)   Negative: Shock suspected (e.g., cold/pale/clammy skin, too weak to stand, low BP, rapid pulse)   Negative: Sounds like a life-threatening emergency to the triager   Negative: Major injury to the back (e.g., MVA, fall > 10 feet or 3 meters, penetrating injury, etc.)   Negative: Pain in the upper back over the ribs (rib  cage) that radiates (travels) into the chest   Negative: Pain in the upper back over the ribs (rib cage) and worsened by coughing (or clearly increases with breathing)   Negative: Back pain during pregnancy   Negative: SEVERE back pain of sudden onset and age > 60 years   Negative: SEVERE abdominal pain (e.g., excruciating)   Negative: Abdominal pain and age > 60 years   Negative: Unable to urinate (or only a few drops) and bladder feels very full   Negative: Loss of bladder or bowel control (urine or bowel incontinence; wetting self, leaking stool) of new-onset   Negative: Numbness (loss of sensation) in groin or rectal area   Negative: Pain radiates into groin, scrotum   Negative: Blood in urine (red, pink, or tea-colored)   Negative: Vomiting and pain over lower ribs of back (i.e., flank - kidney area)   Negative: Weakness of a leg or foot (e.g., unable to bear weight, dragging foot)   Negative: Patient sounds very sick or weak to the triager   Negative: Fever > 100.4 F (38.0 C) and flank pain   Negative: Pain or burning with passing urine (urination)   Negative: SEVERE back pain (e.g., excruciating, unable to do any normal activities) and not improved after pain medicine and CARE ADVICE   Negative: Numbness in an arm or hand (i.e., loss of sensation) and upper back pain   Negative: Numbness in a leg or foot (i.e., loss of sensation)   Negative: High-risk adult (e.g., history of cancer, history of HIV, or history of IV Drug Use)   Negative: Soft tissue infection (e.g., abscess, cellulitis) or other serious infection (e.g., bacteremia) in last 2 weeks   Negative: Painful rash with multiple small blisters grouped together (i.e., dermatomal distribution or 'band' or 'stripe')   Negative: Pain radiates into the thigh or further down the leg, and in both legs   Negative: Age > 50 and no history of prior similar back pain   Negative: MODERATE back pain (e.g., interferes with normal activities) and present > 3 days    "Negative: Pain radiates into the thigh or further down the leg   Negative: Patient wants to be seen   Negative: Back pain lasts > 2 weeks   Negative: Back pain is a chronic symptom (recurrent or ongoing AND lasting > 4 weeks)   Negative: Caused by a twisting, bending, or lifting injury    Answer Assessment - Initial Assessment Questions  1. ONSET: \"When did the pain begin?\" (e.g., minutes, hours, days)      Off and on for a week and a half.   2. LOCATION: \"Where does it hurt?\" (upper, mid or lower back)      Very low back and some to the right side.  3. SEVERITY: \"How bad is the pain?\"  (e.g., Scale 1-10; mild, moderate, or severe)      Spasms a little better  4. PATTERN: \"Is the pain constant?\" (e.g., yes, no; constant, intermittent)       Intermittent   5. RADIATION: \"Does the pain shoot into your legs or somewhere else?\"      no  6. CAUSE:  \"What do you think is causing the back pain?\"       unsure  7. BACK OVERUSE:  \"Any recent lifting of heavy objects, strenuous work or exercise?\"      Planting and weeding  8. MEDICINES: \"What have you taken so far for the pain?\" (e.g., nothing, acetaminophen, NSAIDS)      mild  9. NEUROLOGIC SYMPTOMS: \"Do you have any weakness, numbness, or problems with bowel/bladder control?\"      no  10. OTHER SYMPTOMS: \"Do you have any other symptoms?\" (e.g., fever, abdomen pain, burning with urination, blood in urine)        no  11. PREGNANCY: \"Is there any chance you are pregnant?\" \"When was your last menstrual period?\"        no    Protocols used: Back Pain-A-[place]    "

## 2025-07-16 NOTE — TELEPHONE ENCOUNTER
The patient returned a call and the provider's message was relayed. She verbalized understanding.    Kenn Hutson RN  [facility]

## 2025-07-16 NOTE — TELEPHONE ENCOUNTER
A message was left for the patient to return a call to the clinic. If the patient calls back, please route to [facility].     TCs, please send to RNs.    Kenn Hutson RN  [facility]

## 2025-07-29 ENCOUNTER — OFFICE VISIT (OUTPATIENT)
Dept: ENDOCRINOLOGY | Facility: CLINIC | Age: 70
End: 2025-07-29
Attending: NURSE PRACTITIONER
Payer: COMMERCIAL

## 2025-07-29 ENCOUNTER — TELEPHONE (OUTPATIENT)
Dept: CARDIOLOGY | Facility: CLINIC | Age: 70
End: 2025-07-29

## 2025-07-29 VITALS
SYSTOLIC BLOOD PRESSURE: 167 MMHG | HEIGHT: 62 IN | WEIGHT: 198.2 LBS | OXYGEN SATURATION: 99 % | BODY MASS INDEX: 36.47 KG/M2 | HEART RATE: 65 BPM | DIASTOLIC BLOOD PRESSURE: 89 MMHG

## 2025-07-29 DIAGNOSIS — I10 ESSENTIAL HYPERTENSION: Primary | ICD-10-CM

## 2025-07-29 DIAGNOSIS — E83.52 HYPERCALCEMIA SYNDROME: ICD-10-CM

## 2025-07-29 DIAGNOSIS — Z78.0 POSTMENOPAUSAL: ICD-10-CM

## 2025-07-29 DIAGNOSIS — I10 HYPERTENSION, UNSPECIFIED TYPE: ICD-10-CM

## 2025-07-29 DIAGNOSIS — R79.89 ELEVATED PARATHYROID HORMONE: ICD-10-CM

## 2025-07-29 PROCEDURE — G2211 COMPLEX E/M VISIT ADD ON: HCPCS | Performed by: INTERNAL MEDICINE

## 2025-07-29 PROCEDURE — 99204 OFFICE O/P NEW MOD 45 MIN: CPT | Performed by: INTERNAL MEDICINE

## 2025-07-29 PROCEDURE — 3079F DIAST BP 80-89 MM HG: CPT | Performed by: INTERNAL MEDICINE

## 2025-07-29 PROCEDURE — 3077F SYST BP >= 140 MM HG: CPT | Performed by: INTERNAL MEDICINE

## 2025-07-29 RX ORDER — MULTIVIT-MIN/IRON/FOLIC ACID/K 18-600-40
1 CAPSULE ORAL DAILY
COMMUNITY

## 2025-07-29 RX ORDER — AMLODIPINE BESYLATE 5 MG/1
5 TABLET ORAL DAILY
Qty: 90 TABLET | Refills: 2 | Status: SHIPPED | OUTPATIENT
Start: 2025-07-29

## 2025-07-29 RX ORDER — POLYETHYLENE GLYCOL 3350 17 G/17G
1 POWDER, FOR SOLUTION ORAL DAILY
COMMUNITY

## 2025-07-29 NOTE — LETTER
7/29/2025      Alvina Maynard  [address] [place] [address]      Dear Colleague,    Thank you for referring your patient, Alvina Maynard, to the [facility] [place]. Please see a copy of my visit note below.    Endocrinology Clinic Visit 7/29/2025    NAME:  Alvina Maynard  PCP:  Jong Claudio  MRN:  [id number]  Reason for Consult:  elevated calcium, parathyroid hormone and thyroid concerns.  Requesting Provider:  Jong Claudio NP    Chief Complaint     Chief Complaint   Patient presents with     Consult     Thyroid and parathyroid- treatment plan  Breathing issue- unsure if it is the thyroid causing it.        History of Present Illness     Alvina Maynard is a 69 year old female who is seen in clinic for management of  elevated calcium, parathyroid hormone and thyroid concerns.    Currently has breathing problem for about 6 months but worse for the past 3 months,   Pt talked to her primary and got some inhaler  Regarding elevated calcium, patient has been having bloating and constipation since age 29s  Some brain fog  Kidney stone once 20-30 years ago  No h/o fracture/ osteoporosis    Problem List     Patient Active Problem List   Diagnosis     Hemianopia     Allergic contact dermatitis     Alopecia     Asthma     Cerebral aneurysm, nonruptured     Cervical spondylosis without myelopathy     Atherosclerosis of native coronary artery of native heart without angina pectoris     Esophageal reflux     Essential hypertension     Hypokalemia     Hypomagnesemia     Hypophosphatemia     Irritable bowel syndrome (IBS)     Morbid obesity (H)     Obstructive sleep apnea (adult) (pediatric)     Primary hypercholesterolemia     Type 2 diabetes mellitus with other circulatory complication, without long-term current use of insulin (H)     Atypical chest pain     Benign neoplasm of ascending colon     Chronic abdominal pain     Polyp of duodenum     Polyp of colon     Diverticular disease of large  intestine     Esophageal dysphagia     Non-sustained ventricular tachycardia (H)        Medications     Current Outpatient Medications   Medication Sig Dispense Refill     albuterol (PROAIR HFA/PROVENTIL HFA/VENTOLIN HFA) 108 (90 Base) MCG/ACT inhaler 2 puffs every 4 hours as needed for shortness of breath, wheezing or cough.       Ascorbic Acid (VITAMIN C) 500 MG CAPS Take 1 chew tab by mouth daily.       aspirin 81 MG EC tablet Take 81 mg by mouth daily       azelastine (ASTELIN) 0.1 % nasal spray USE 2 SPRAYS IN EACH NOSTRIL TWICE DAILY AS NEEDED 90 mL 3     beclomethasone HFA (QVAR REDIHALER) 80 MCG/ACT inhaler Inhale 1 puff into the lungs 2 times daily. 10.6 g 5     cetirizine (ZYRTEC) 10 MG tablet Take 10 mg by mouth daily       Cholecalciferol (VITAMIN D3 PO) Take 2,000 Units by mouth every morning        Cyanocobalamin (B-12) 5000 MCG SUBL Take by mouth every 24 hours       famotidine (PEPCID) 20 MG tablet TAKE 1 TABLET BY MOUTH TWICE DAILY AS NEEDED FOR HEARTBURN       Ferrous Sulfate (IRON) 90 (18 Fe) MG TABS Take by mouth every 24 hours       fluticasone (FLONASE) 50 MCG/ACT nasal spray Spray 1-2 sprays into both nostrils daily. 48 g 3     fluticasone-salmeterol (AIRDUO RESPICLICK) 113-14 MCG/ACT inhaler Inhale 1 puff into the lungs 2 times daily. 1 each 0     furosemide (LASIX) 20 MG tablet TAKE 1 TABLET(20 MG) BY MOUTH DAILY AS NEEDED FOR LEG SWELLING 90 tablet 2     losartan (COZAAR) 100 MG tablet Take 1 tablet (100 mg) by mouth daily. 100 tablet 3     MAGNESIUM GLUCONATE PO Take 400 mg by mouth daily       methylcellulose (CITRUCEL) 500 MG TABS tablet Take 500 mg by mouth daily.       metoprolol succinate ER (TOPROL XL) 100 MG 24 hr tablet Take 1 tablet (100 mg) by mouth 2 times daily. 180 tablet 3     montelukast (SINGULAIR) 10 MG tablet TAKE 1 TABLET BY MOUTH EVERY DAY 90 tablet 3     NIFEdipine ER (ADALAT CC) 60 MG 24 hr tablet Take 1 tablet (60 mg) by mouth daily. 90 tablet 0     nitroGLYcerin  (NITROSTAT) 0.4 MG sublingual tablet DISSOLVE 1 TABLET UNDER THE TONGUE EVERY 5 MINUTES FOR 3 DOSES. IF SYMPTOMS PERSIST AFTER FIRST DOSE CALL 911 25 tablet 1     Omega-3 Fatty Acids (OMEGA-3 FISH OIL PO) Take 1,400 mg by mouth At Bedtime       polyethylene glycol (MIRALAX) 17 g packet Take 1 packet by mouth daily.       potassium chloride sharonda ER (KLOR-CON M20) 20 MEQ CR tablet TAKE 2 TABLETS BY MOUTH TWICE DAILY 360 tablet 2     amLODIPine (NORVASC) 5 MG tablet Take 1 tablet (5 mg) by mouth daily. 90 tablet 2     No current facility-administered medications for this visit.        Allergies     Allergies   Allergen Reactions     Propylene Glycol Itching and Rash     Red Dye #40 (Allura Red) Rash     Any kind of red pills.     Adhesive Tape Itching     Atorvastatin Cramps     Mevacor, and zocor also leg cramping  Other reaction(s): leg cramps     Carvedilol      hives     Cephalosporins Hives     Clindamycin Hives     Demerol Nausea and Vomiting     Dilantin [Phenytoin]      Hives     Imdur [Isosorbide Nitrate] Other (See Comments)     Stool issues     Lisinopril      Trouble breathing     Meperidine Nausea and Vomiting     Hives  Other reaction(s): hives and vomiting     Metronidazole Hives     Nickel      Other reaction(s): Rash, itching     Penicillins Hives     Rosuvastatin Cramps     Leg cramping     Sulfa Antibiotics Hives     Latex Rash       Medical / Surgical History     Past Medical History:   Diagnosis Date     Arthritis      Arthritis      Basal cell cancer 2007     Benign neoplasm of colon      Brain aneurysm     removed on 12/31/13     Cancer (H)     basel cell     Cerebral embolism with cerebral infarction (H)      Cerebral vascular accident (H) 12/31/2013     Colitis      Coronary artery disease      External hemorrhoid 6/15/2016     Fibrocystic breast      High blood pressure      History of heart artery stent 9/2012     Hyperlipidemia      Hypertension      Hypertension      Myalgia and myositis,  unspecified      S/P hysterectomy 7/28/2019    Total hysterectomy with BSO benign disease, edometriosis?     Sleep apnea, obstructive     c-pap     Statin intolerance      Stroke (H)      Type 2 diabetes mellitus without complications (H)     borderline? blood sugars are fine/nosx no meds     Past Surgical History:   Procedure Laterality Date     BRAIN SURGERY  2013    brain aneurism     CARDIAC CATHETERIZATION  9/28/15    mild to moderate coronary disease only without obstructive stenosis     CEREBRAL ANEURYSM REPAIR  12/31/2013    [facility] Aneurysm clipping, Dr Harvey     CORONARY STENT PLACEMENT  9/20/12    bare metal stent in the proximal LAD      HEMORRHOID SURGERY  1975     HEMORRHOIDECTOMY INTERNAL LIGATION  1975     HYSTERECTOMY  2002     HYSTERECTOMY       NECK SURGERY  2001     OOPHORECTOMY       OVARY SURGERY  removed 1990     VT NASAL/SINUS ENDOSCOPY,BX/RMV POLYP/DEBRID  1998    Description: Nasal Endoscopy Polypectomy;     VT REMOVAL OF OVARY/TUBE(S) Right      Salpingo-oophorectomy Right Side     REPAIR ARTERY CORONARY  2012    femerol artery torn during angiogram     SINUS SURGERY  1998    polyp remvd     STENT  2012    heart     ZZC TOTAL ABDOM HYSTERECTOMY  08/01/2002    endometriosis       Social History     Social History     Socioeconomic History     Marital status: Single     Spouse name: Not on file     Number of children: Not on file     Years of education: Not on file     Highest education level: Not on file   Occupational History     Not on file   Tobacco Use     Smoking status: Never     Passive exposure: Never     Smokeless tobacco: Never   Vaping Use     Vaping status: Never Used   Substance and Sexual Activity     Alcohol use: Yes     Drug use: No     Sexual activity: Not on file   Other Topics Concern     Not on file   Social History Narrative     Not on file     Social Drivers of Health     Financial Resource Strain: Low Risk  (9/27/2024)    Financial Resource Strain      Within the past  12 months, have you or your family members you live with been unable to get utilities (heat, electricity) when it was really needed?: No   Food Insecurity: Low Risk  (9/27/2024)    Food Insecurity      Within the past 12 months, did you worry that your food would run out before you got money to buy more?: No      Within the past 12 months, did the food you bought just not last and you didn t have money to get more?: No   Transportation Needs: Low Risk  (9/27/2024)    Transportation Needs      Within the past 12 months, has lack of transportation kept you from medical appointments, getting your medicines, non-medical meetings or appointments, work, or from getting things that you need?: No   Physical Activity: Insufficiently Active (9/27/2024)    Exercise Vital Sign      Days of Exercise per Week: 2 days      Minutes of Exercise per Session: 50 min   Stress: Patient Declined (9/27/2024)    [employer] - Occupational Stress Questionnaire      Feeling of Stress : Patient declined   Social Connections: Unknown (9/27/2024)    Social Connection and Isolation Panel [NHANES]      Frequency of Communication with Friends and Family: Not on file      Frequency of Social Gatherings with Friends and Family: More than three times a week      Attends Restorationism Services: Not on file      Active Member of Clubs or Organizations: Not on file      Attends Club or Organization Meetings: Not on file      Marital Status: Not on file   Interpersonal Safety: Low Risk  (5/6/2025)    Interpersonal Safety      Do you feel physically and emotionally safe where you currently live?: Yes      Within the past 12 months, have you been hit, slapped, kicked or otherwise physically hurt by someone?: No      Within the past 12 months, have you been humiliated or emotionally abused in other ways by your partner or ex-partner?: No   Housing Stability: Low Risk  (9/27/2024)    Housing Stability      Do you have housing? : Yes  "     Are you worried about losing your housing?: No       Family History     Family History   Problem Relation Age of Onset     Cancer Mother      Diabetes Mother      Hypertension Mother      Thyroid Disease Mother      Heart Disease Mother      Stomach Cancer Mother      Coronary Artery Disease Mother 60     Hypertension Father      Heart Disease Sister      GERD Sister      CABG Sister      Colon Cancer Brother      Heart Disease Maternal Grandfather      Breast Cancer Maternal Aunt          Physical Exam   BP (!) 167/89   Pulse 65   Ht 1.575 m (5' 2\")   Wt 89.9 kg (198 lb 3.2 oz)   SpO2 99%   BMI 36.25 kg/m       General: Comfortable, no obvious distress  Eyes: Sclera anicteric, moist conjunctiva  Neck: no mass   Resp:  Normal breathing  Psych: Alert and oriented x 3. Appropriate affect, good insight      Labs/Imaging         TSH   Date Value Ref Range Status   10/01/2024 1.41 0.30 - 4.20 uIU/mL Final   08/07/2024 1.96 0.30 - 4.20 uIU/mL Final   12/20/2021 1.40 0.30 - 5.00 uIU/mL Final   10/01/2020 1.37 0.30 - 5.00 uIU/mL Final   11/05/2018 0.91 0.30 - 5.00 uIU/mL Final   04/19/2018 0.97 0.30 - 5.00 uIU/mL Final     Free T4   Date Value Ref Range Status   10/01/2024 1.36 0.90 - 1.70 ng/dL Final       Creatinine   Date Value Ref Range Status   06/02/2025 0.74 0.51 - 0.95 mg/dL Final           EXAM: US PARATHYROID  LOCATION: [facility]  DATE: 6/4/2025     INDICATION: Neck fullness sensation. Hyperparathyroidism.  COMPARISON: None.  TECHNIQUE: Thyroid ultrasound.      FINDINGS:  PARATHYROID:  1. A 1.0 x 0.7 x 0.6 cm ovoid mostly solid but partially cystic lesion arising from or adjacent to the posterior aspect of the inferior right thyroid lobe could represent a parathyroid adenoma or thyroid nodule (also described below as lesion 1).     2. A 0.9 x 0.7 x 0.5 cm solid mixed echogenicity nodule adjacent to the inferior tip of the right thyroid lobe could represent a parathyroid adenoma " or physiologic lymph node.        RIGHT THYROID LOBE: 4.7 x 1.9 x 1.5 cm. Homogeneous echotexture.     THYROID ISTHMUS: 3 mm.     LEFT THYROID LOBE: 4.4 x 1.7 x 1.5 cm. Homogeneous echotexture.     NECK: No cervical lymphadenopathy.     THYROID NODULES:     Nodule 1: As described above a 1.0 x 0.7 x 0.6 cm nodule arising from or adjacent to the posterior aspect of the inferior right thyroid lobe.   Composition: Solid or almost completely solid, 2 points   Echogenicity: Hyperechoic or isoechoic, 1 point   Shape: Wider-than-tall, 0 points   Margin: Smooth, 0 points   Echogenic Foci: None, or large comet-tail artifacts, 0 points   Point Total: 3 points. TI-RADS 3. If 2.5 cm or larger, recommend FNA; if 1.5 cm or larger, recommend follow up US at 1, 3, and 5 years.                                                                          IMPRESSION:  1.  A 1.0 x 0.7 x 0.6 cm ovoid mostly solid but partially cystic lesion arising from or adjacent to the posterior aspect of the inferior right thyroid lobe could represent a parathyroid adenoma or a TI-RADS 3 thyroid nodule.  2.  A 0.9 x 0.7 x 0.5 cm solid mixed echogenicity nodule adjacent to the inferior tip of the right thyroid lobe could represent a parathyroid adenoma or physiologic lymph node.  3.  Recommend correlation with nuclear medicine parathyroid study.      ;  Results for orders placed in visit on 01/18/21    DX Hip/Pelvis/Spine    Narrative  1/18/2021      RE: Alvina Maynard  YOB: 1955        Dear Atilio Baez,    Patient Profile:  65 y.o. female, postmenopausal, is here for the follow up bone density test.  History of fractures - None. Family history of osteoporosis - None.  Family history of hip fracture: None. Smoking history - No. Osteoporosis treatment past -  Yes;  HRT. Osteoporosis treatment current - No.  Chronic medical problems - Diabetes Mellitus  and Inflammatory bowel disease. High risk medications -  Anti-seizure;  Yes, in the  Past and Blood thinner (Coumadin or Heparin);  Yes, in the Past.      Assessment:    1. The spine bone density L1-L4 with T-score 1.8, with no statistically significant change compared to the previous DXA scan done in 2010.  2. Femoral bone densities show left femoral neck T- score -0.3 and right femoral neck T-score -0.6, with no statistically significant change compared to the previous DXA scan.  3. Trabecular bone score indicates good trabecular bone architecture.      65 y.o. female with NORMAL BONE DENSITY.        Recommendations:  Appropriate calcium and vitamin D supplements recommended with follow up bone density scan in 3- 5 years.      Bone densitometry was performed on your patient using our [employer] densitometer. The results are summarized and a copy of the actual scans are included for your review. In conformity with the International Society of Clinical Densitometry's most  recent position statement for DXA interpretation (2015), the diagnosis will be made on the lowest measured T-score of the lumbar spine, femoral neck, total proximal femur or 33% radius. Note the change in terminology for diagnostic classification from  OSTEOPENIA to LOW BONE MASS. All trending for sequential exams will be done using multiple vertebrae or the total proximal femur. Fracture risk is based on the WHO Fracture Risk Assessment Tool (FRAX). If additional information is needed or if you would  like to discuss the results, please do not hesitate to call me.      Thank you for referring this patient to [facility]. We are happy to be of service in support of you and your practice. If you have any questions or suggestions to improve our service, please call me at [phone].    Sincerely,    Rashida Ramos M.D. C.C.D.  Osteoporosis Services, [facility]    No results found for this or any previous visit.    CT ABDOMEN PELVIS W ORAL W IV CONTRAST  4/19/2015 4:11 PM        INDICATION:  Abdominal pain  TECHNIQUE: CT abdomen and pelvis. Multiplanar reformation images (MPR).   IV CONTRAST: Iohexol (Omni) 100 mL  COMPARISON: CT abdomen and pelvis 10/18/2008     FINDINGS:  LUNG BASES: Minimal atelectasis right base.     ABDOMEN: Fatty infiltration of the liver the spleen, pancreas, adrenal glands, gallbladder, and left kidney are normal. Small cyst right kidney. No adenopathy.     PELVIS: No pelvic masses or fluid collections. Hysterectomy. No evidence for bowel obstruction or inflammatory changes.     MUSCULOSKELETAL: Degenerative disc and facet disease L5 level.     IMPRESSION:  CONCLUSION:  1.  Hysterectomy.  2.  Fatty infiltration of the liver  3.  No findings to account for the patient's abdominal pain.      Impression / Plan   Alvina Maynard is a 69 year old female who is seen in clinic for management of  elevated calcium, parathyroid hormone and thyroid concerns.    ## PTH mediated hypercalcemia  You have some brain fog and constipation which could be from high calcium.  No other symptoms.  Labs suggest primary hyperparathyroidism, will get a 24 hour urine collection to confirm diagnosis.  However, you are taking hydrochlorothiazide which can decrease urine calcium, I'm checking with your cardiologist, we it could be changed to something else to allow us to get a more accurate test result  -- once get hydrochlorothiazide change, will recheck blood work and 24 hour urine collection in 4-6 weeks after  -- given your high blood pressure and sleep apnea, will screen for primary hyperaldosteronism (hormone condition that can cause high blood pressure)  -- DXA at [place]  -- base on the size of thyroid nodule/ possible parathyroid adenoma 1 cm on the right side, less likely to cause any breathing/ swallowing problem  -- will get CT result from [place] radiology to review    Thyroid function test and US unremarkable.    Follow-up 2-3 months  Orders Placed This Encounter   Procedures     DX Bone  Density     25 Hydroxyvitamin D2 and D3     Albumin level     Alkaline phosphatase     BASIC METABOLIC PANEL     Calcium timed urine     Creatinine timed urine     Parathyroid Hormone Intact     Phosphorus     TSH with free T4 reflex     The longitudinal plan of care for the diagnosis(es)/condition(s) as documented were addressed during this visit. Due to the added complexity in care, I will continue to support Alvina in the subsequent management and with ongoing continuity of care.      Jacob Cobb MD          Again, thank you for allowing me to participate in the care of your patient.        Sincerely,        Jacob Cobb MD    Electronically signed

## 2025-07-29 NOTE — TELEPHONE ENCOUNTER
PC to patient with recommendation to stop hydrochlorothiazide for urine test and start amlodipine, instead. Reviewed most common, possible side effects and to call with bothersome ankle swelling. Instructed patient we will await test results and then she will follow up with Endo to see if she may resume hydrochlorothiazide instead of amlodipine.  Rx sent to patient's preferred pharmacy.

## 2025-07-29 NOTE — PROGRESS NOTES
Endocrinology Clinic Visit 7/29/2025    NAME:  Alvina Maynard  PCP:  Jong Claudio  MRN:  [id number]  Reason for Consult:  elevated calcium, parathyroid hormone and thyroid concerns.  Requesting Provider:  Jong Claudio NP    Chief Complaint     Chief Complaint   Patient presents with    Consult     Thyroid and parathyroid- treatment plan  Breathing issue- unsure if it is the thyroid causing it.        History of Present Illness     Alvina Maynard is a 69 year old female who is seen in clinic for management of  elevated calcium, parathyroid hormone and thyroid concerns.    Currently has breathing problem for about 6 months but worse for the past 3 months,   Pt talked to her primary and got some inhaler  Regarding elevated calcium, patient has been having bloating and constipation since age 29s  Some brain fog  Kidney stone once 20-30 years ago  No h/o fracture/ osteoporosis    Problem List     Patient Active Problem List   Diagnosis    Hemianopia    Allergic contact dermatitis    Alopecia    Asthma    Cerebral aneurysm, nonruptured    Cervical spondylosis without myelopathy    Atherosclerosis of native coronary artery of native heart without angina pectoris    Esophageal reflux    Essential hypertension    Hypokalemia    Hypomagnesemia    Hypophosphatemia    Irritable bowel syndrome (IBS)    Morbid obesity (H)    Obstructive sleep apnea (adult) (pediatric)    Primary hypercholesterolemia    Type 2 diabetes mellitus with other circulatory complication, without long-term current use of insulin (H)    Atypical chest pain    Benign neoplasm of ascending colon    Chronic abdominal pain    Polyp of duodenum    Polyp of colon    Diverticular disease of large intestine    Esophageal dysphagia    Non-sustained ventricular tachycardia (H)        Medications     Current Outpatient Medications   Medication Sig Dispense Refill    albuterol (PROAIR HFA/PROVENTIL HFA/VENTOLIN HFA) 108 (90 Base) MCG/ACT inhaler 2 puffs every 4  hours as needed for shortness of breath, wheezing or cough.      Ascorbic Acid (VITAMIN C) 500 MG CAPS Take 1 chew tab by mouth daily.      aspirin 81 MG EC tablet Take 81 mg by mouth daily      azelastine (ASTELIN) 0.1 % nasal spray USE 2 SPRAYS IN EACH NOSTRIL TWICE DAILY AS NEEDED 90 mL 3    beclomethasone HFA (QVAR REDIHALER) 80 MCG/ACT inhaler Inhale 1 puff into the lungs 2 times daily. 10.6 g 5    cetirizine (ZYRTEC) 10 MG tablet Take 10 mg by mouth daily      Cholecalciferol (VITAMIN D3 PO) Take 2,000 Units by mouth every morning       Cyanocobalamin (B-12) 5000 MCG SUBL Take by mouth every 24 hours      famotidine (PEPCID) 20 MG tablet TAKE 1 TABLET BY MOUTH TWICE DAILY AS NEEDED FOR HEARTBURN      Ferrous Sulfate (IRON) 90 (18 Fe) MG TABS Take by mouth every 24 hours      fluticasone (FLONASE) 50 MCG/ACT nasal spray Spray 1-2 sprays into both nostrils daily. 48 g 3    fluticasone-salmeterol (AIRDUO RESPICLICK) 113-14 MCG/ACT inhaler Inhale 1 puff into the lungs 2 times daily. 1 each 0    furosemide (LASIX) 20 MG tablet TAKE 1 TABLET(20 MG) BY MOUTH DAILY AS NEEDED FOR LEG SWELLING 90 tablet 2    losartan (COZAAR) 100 MG tablet Take 1 tablet (100 mg) by mouth daily. 100 tablet 3    MAGNESIUM GLUCONATE PO Take 400 mg by mouth daily      methylcellulose (CITRUCEL) 500 MG TABS tablet Take 500 mg by mouth daily.      metoprolol succinate ER (TOPROL XL) 100 MG 24 hr tablet Take 1 tablet (100 mg) by mouth 2 times daily. 180 tablet 3    montelukast (SINGULAIR) 10 MG tablet TAKE 1 TABLET BY MOUTH EVERY DAY 90 tablet 3    NIFEdipine ER (ADALAT CC) 60 MG 24 hr tablet Take 1 tablet (60 mg) by mouth daily. 90 tablet 0    nitroGLYcerin (NITROSTAT) 0.4 MG sublingual tablet DISSOLVE 1 TABLET UNDER THE TONGUE EVERY 5 MINUTES FOR 3 DOSES. IF SYMPTOMS PERSIST AFTER FIRST DOSE CALL 911 25 tablet 1    Omega-3 Fatty Acids (OMEGA-3 FISH OIL PO) Take 1,400 mg by mouth At Bedtime      polyethylene glycol (MIRALAX) 17 g packet Take  1 packet by mouth daily.      potassium chloride sharonda ER (KLOR-CON M20) 20 MEQ CR tablet TAKE 2 TABLETS BY MOUTH TWICE DAILY 360 tablet 2    amLODIPine (NORVASC) 5 MG tablet Take 1 tablet (5 mg) by mouth daily. 90 tablet 2     No current facility-administered medications for this visit.        Allergies     Allergies   Allergen Reactions    Propylene Glycol Itching and Rash    Red Dye #40 (Allura Red) Rash     Any kind of red pills.    Adhesive Tape Itching    Atorvastatin Cramps     Mevacor, and zocor also leg cramping  Other reaction(s): leg cramps    Carvedilol      hives    Cephalosporins Hives    Clindamycin Hives    Demerol Nausea and Vomiting    Dilantin [Phenytoin]      Hives    Imdur [Isosorbide Nitrate] Other (See Comments)     Stool issues    Lisinopril      Trouble breathing    Meperidine Nausea and Vomiting     Hives  Other reaction(s): hives and vomiting    Metronidazole Hives    Nickel      Other reaction(s): Rash, itching    Penicillins Hives    Rosuvastatin Cramps     Leg cramping    Sulfa Antibiotics Hives    Latex Rash       Medical / Surgical History     Past Medical History:   Diagnosis Date    Arthritis     Arthritis     Basal cell cancer 2007    Benign neoplasm of colon     Brain aneurysm     removed on 12/31/13    Cancer (H)     basel cell    Cerebral embolism with cerebral infarction (H)     Cerebral vascular accident (H) 12/31/2013    Colitis     Coronary artery disease     External hemorrhoid 6/15/2016    Fibrocystic breast     High blood pressure     History of heart artery stent 9/2012    Hyperlipidemia     Hypertension     Hypertension     Myalgia and myositis, unspecified     S/P hysterectomy 7/28/2019    Total hysterectomy with BSO benign disease, edometriosis?    Sleep apnea, obstructive     c-pap    Statin intolerance     Stroke (H)     Type 2 diabetes mellitus without complications (H)     borderline? blood sugars are fine/nosx no meds     Past Surgical History:   Procedure  Laterality Date    BRAIN SURGERY  2013    brain aneurism    CARDIAC CATHETERIZATION  9/28/15    mild to moderate coronary disease only without obstructive stenosis    CEREBRAL ANEURYSM REPAIR  12/31/2013    [facility] Aneurysm clipping, Dr Harvey    CORONARY STENT PLACEMENT  9/20/12    bare metal stent in the proximal LAD     HEMORRHOID SURGERY  1975    HEMORRHOIDECTOMY INTERNAL LIGATION  1975    HYSTERECTOMY  2002    HYSTERECTOMY      NECK SURGERY  2001    OOPHORECTOMY      OVARY SURGERY  removed 1990    WA NASAL/SINUS ENDOSCOPY,BX/RMV POLYP/DEBRID  1998    Description: Nasal Endoscopy Polypectomy;    WA REMOVAL OF OVARY/TUBE(S) Right      Salpingo-oophorectomy Right Side    REPAIR ARTERY CORONARY  2012    femerol artery torn during angiogram    SINUS SURGERY  1998    polyp remvd    STENT  2012    heart    ZZC TOTAL ABDOM HYSTERECTOMY  08/01/2002    endometriosis       Social History     Social History     Socioeconomic History    Marital status: Single     Spouse name: Not on file    Number of children: Not on file    Years of education: Not on file    Highest education level: Not on file   Occupational History    Not on file   Tobacco Use    Smoking status: Never     Passive exposure: Never    Smokeless tobacco: Never   Vaping Use    Vaping status: Never Used   Substance and Sexual Activity    Alcohol use: Yes    Drug use: No    Sexual activity: Not on file   Other Topics Concern    Not on file   Social History Narrative    Not on file     Social Drivers of Health     Financial Resource Strain: Low Risk  (9/27/2024)    Financial Resource Strain     Within the past 12 months, have you or your family members you live with been unable to get utilities (heat, electricity) when it was really needed?: No   Food Insecurity: Low Risk  (9/27/2024)    Food Insecurity     Within the past 12 months, did you worry that your food would run out before you got money to buy more?: No     Within the past 12 months, did the food you  bought just not last and you didn t have money to get more?: No   Transportation Needs: Low Risk  (9/27/2024)    Transportation Needs     Within the past 12 months, has lack of transportation kept you from medical appointments, getting your medicines, non-medical meetings or appointments, work, or from getting things that you need?: No   Physical Activity: Insufficiently Active (9/27/2024)    Exercise Vital Sign     Days of Exercise per Week: 2 days     Minutes of Exercise per Session: 50 min   Stress: Patient Declined (9/27/2024)    [employer] - Occupational Stress Questionnaire     Feeling of Stress : Patient declined   Social Connections: Unknown (9/27/2024)    Social Connection and Isolation Panel [NHANES]     Frequency of Communication with Friends and Family: Not on file     Frequency of Social Gatherings with Friends and Family: More than three times a week     Attends Qu[employer] Services: Not on file     Active Member of Clubs or Organizations: Not on file     Attends Club or Organization Meetings: Not on file     Marital Status: Not on file   Interpersonal Safety: Low Risk  (5/6/2025)    Interpersonal Safety     Do you feel physically and emotionally safe where you currently live?: Yes     Within the past 12 months, have you been hit, slapped, kicked or otherwise physically hurt by someone?: No     Within the past 12 months, have you been humiliated or emotionally abused in other ways by your partner or ex-partner?: No   Housing Stability: Low Risk  (9/27/2024)    Housing Stability     Do you have housing? : Yes     Are you worried about losing your housing?: No       Family History     Family History   Problem Relation Age of Onset    Cancer Mother     Diabetes Mother     Hypertension Mother     Thyroid Disease Mother     Heart Disease Mother     Stomach Cancer Mother     Coronary Artery Disease Mother 60    Hypertension Father     Heart Disease Sister     GERD Sister     CABG  "Sister     Colon Cancer Brother     Heart Disease Maternal Grandfather     Breast Cancer Maternal Aunt          Physical Exam   BP (!) 167/89   Pulse 65   Ht 1.575 m (5' 2\")   Wt 89.9 kg (198 lb 3.2 oz)   SpO2 99%   BMI 36.25 kg/m       General: Comfortable, no obvious distress  Eyes: Sclera anicteric, moist conjunctiva  Neck: no mass   Resp:  Normal breathing  Psych: Alert and oriented x 3. Appropriate affect, good insight      Labs/Imaging         TSH   Date Value Ref Range Status   10/01/2024 1.41 0.30 - 4.20 uIU/mL Final   08/07/2024 1.96 0.30 - 4.20 uIU/mL Final   12/20/2021 1.40 0.30 - 5.00 uIU/mL Final   10/01/2020 1.37 0.30 - 5.00 uIU/mL Final   11/05/2018 0.91 0.30 - 5.00 uIU/mL Final   04/19/2018 0.97 0.30 - 5.00 uIU/mL Final     Free T4   Date Value Ref Range Status   10/01/2024 1.36 0.90 - 1.70 ng/dL Final       Creatinine   Date Value Ref Range Status   06/02/2025 0.74 0.51 - 0.95 mg/dL Final           EXAM: US PARATHYROID  LOCATION: [facility]  DATE: 6/4/2025     INDICATION: Neck fullness sensation. Hyperparathyroidism.  COMPARISON: None.  TECHNIQUE: Thyroid ultrasound.      FINDINGS:  PARATHYROID:  1. A 1.0 x 0.7 x 0.6 cm ovoid mostly solid but partially cystic lesion arising from or adjacent to the posterior aspect of the inferior right thyroid lobe could represent a parathyroid adenoma or thyroid nodule (also described below as lesion 1).     2. A 0.9 x 0.7 x 0.5 cm solid mixed echogenicity nodule adjacent to the inferior tip of the right thyroid lobe could represent a parathyroid adenoma or physiologic lymph node.        RIGHT THYROID LOBE: 4.7 x 1.9 x 1.5 cm. Homogeneous echotexture.     THYROID ISTHMUS: 3 mm.     LEFT THYROID LOBE: 4.4 x 1.7 x 1.5 cm. Homogeneous echotexture.     NECK: No cervical lymphadenopathy.     THYROID NODULES:     Nodule 1: As described above a 1.0 x 0.7 x 0.6 cm nodule arising from or adjacent to the posterior aspect of the inferior right " thyroid lobe.   Composition: Solid or almost completely solid, 2 points   Echogenicity: Hyperechoic or isoechoic, 1 point   Shape: Wider-than-tall, 0 points   Margin: Smooth, 0 points   Echogenic Foci: None, or large comet-tail artifacts, 0 points   Point Total: 3 points. TI-RADS 3. If 2.5 cm or larger, recommend FNA; if 1.5 cm or larger, recommend follow up US at 1, 3, and 5 years.                                                                          IMPRESSION:  1.  A 1.0 x 0.7 x 0.6 cm ovoid mostly solid but partially cystic lesion arising from or adjacent to the posterior aspect of the inferior right thyroid lobe could represent a parathyroid adenoma or a TI-RADS 3 thyroid nodule.  2.  A 0.9 x 0.7 x 0.5 cm solid mixed echogenicity nodule adjacent to the inferior tip of the right thyroid lobe could represent a parathyroid adenoma or physiologic lymph node.  3.  Recommend correlation with nuclear medicine parathyroid study.      ;  Results for orders placed in visit on 01/18/21    DX Hip/Pelvis/Spine    Narrative  1/18/2021      RE: Alvina Maynard  YOB: 1955        Dear Atilio Baez,    Patient Profile:  65 y.o. female, postmenopausal, is here for the follow up bone density test.  History of fractures - None. Family history of osteoporosis - None.  Family history of hip fracture: None. Smoking history - No. Osteoporosis treatment past -  Yes;  HRT. Osteoporosis treatment current - No.  Chronic medical problems - Diabetes Mellitus  and Inflammatory bowel disease. High risk medications -  Anti-seizure;  Yes, in the Past and Blood thinner (Coumadin or Heparin);  Yes, in the Past.      Assessment:    1. The spine bone density L1-L4 with T-score 1.8, with no statistically significant change compared to the previous DXA scan done in 2010.  2. Femoral bone densities show left femoral neck T- score -0.3 and right femoral neck T-score -0.6, with no statistically significant change compared to the  previous DXA scan.  3. Trabecular bone score indicates good trabecular bone architecture.      65 y.o. female with NORMAL BONE DENSITY.        Recommendations:  Appropriate calcium and vitamin D supplements recommended with follow up bone density scan in 3- 5 years.      Bone densitometry was performed on your patient using our Yugma densitometer. The results are summarized and a copy of the actual scans are included for your review. In conformity with the International Society of Clinical Densitometry's most  recent position statement for DXA interpretation (2015), the diagnosis will be made on the lowest measured T-score of the lumbar spine, femoral neck, total proximal femur or 33% radius. Note the change in terminology for diagnostic classification from  OSTEOPENIA to LOW BONE MASS. All trending for sequential exams will be done using multiple vertebrae or the total proximal femur. Fracture risk is based on the WHO Fracture Risk Assessment Tool (FRAX). If additional information is needed or if you would  like to discuss the results, please do not hesitate to call me.      Thank you for referring this patient to [facility]. We are happy to be of service in support of you and your practice. If you have any questions or suggestions to improve our service, please call me at [phone].    Sincerely,    MOIZ TuckerCRADHA.  Osteoporosis Services, [facility]    No results found for this or any previous visit.    CT ABDOMEN PELVIS W ORAL W IV CONTRAST  4/19/2015 4:11 PM        INDICATION: Abdominal pain  TECHNIQUE: CT abdomen and pelvis. Multiplanar reformation images (MPR).   IV CONTRAST: Iohexol (Omni) 100 mL  COMPARISON: CT abdomen and pelvis 10/18/2008     FINDINGS:  LUNG BASES: Minimal atelectasis right base.     ABDOMEN: Fatty infiltration of the liver the spleen, pancreas, adrenal glands, gallbladder, and left kidney are normal. Small cyst right kidney. No  adenopathy.     PELVIS: No pelvic masses or fluid collections. Hysterectomy. No evidence for bowel obstruction or inflammatory changes.     MUSCULOSKELETAL: Degenerative disc and facet disease L5 level.     IMPRESSION:  CONCLUSION:  1.  Hysterectomy.  2.  Fatty infiltration of the liver  3.  No findings to account for the patient's abdominal pain.      Impression / Plan   Alvina Maynard is a 69 year old female who is seen in clinic for management of  elevated calcium, parathyroid hormone and thyroid concerns.    ## PTH mediated hypercalcemia  You have some brain fog and constipation which could be from high calcium.  No other symptoms.  Labs suggest primary hyperparathyroidism, will get a 24 hour urine collection to confirm diagnosis.  However, you are taking hydrochlorothiazide which can decrease urine calcium, I'm checking with your cardiologist, we it could be changed to something else to allow us to get a more accurate test result  -- once get hydrochlorothiazide change, will recheck blood work and 24 hour urine collection in 4-6 weeks after  -- given your high blood pressure and sleep apnea, will screen for primary hyperaldosteronism (hormone condition that can cause high blood pressure)  -- DXA at [place]  -- base on the size of thyroid nodule/ possible parathyroid adenoma 1 cm on the right side, less likely to cause any breathing/ swallowing problem  -- will get CT result from [place] [facility] to review    Thyroid function test and US unremarkable.    Follow-up 2-3 months  Orders Placed This Encounter   Procedures    DX Bone Density    25 Hydroxyvitamin D2 and D3    Albumin level    Alkaline phosphatase    BASIC METABOLIC PANEL    Calcium timed urine    Creatinine timed urine    Parathyroid Hormone Intact    Phosphorus    TSH with free T4 reflex     The longitudinal plan of care for the diagnosis(es)/condition(s) as documented were addressed during this visit. Due to the added complexity in care, I will  continue to support Alvina in the subsequent management and with ongoing continuity of care.      Jacob Cobb MD

## 2025-07-29 NOTE — TELEPHONE ENCOUNTER
----- Message from Latonia Rae sent at 7/29/2025 12:02 PM CDT -----  Regarding: FW: Donald patient  Hi Liz,  Please see note from Dr. Cobb regarding hydrochlorothiazide hold for 4-6 weeks.  If Alvina is willing, lets have her go ahead and stop the hydrochlorothiazide, but have her start amlodipine 5 mg daily.  Thanks.  ----- Message -----  From: Jacob Cobb MD  Sent: 7/29/2025  10:32 AM CDT  To: Latonia Rae MD  Subject: Donald patient                                   Hi Dr. Rae,    I am seeing Alvina today to evaluate for possible primary hyperparathyroidism and will need to do a 24 hour urine collection.  She is on hydrochlorothiazide which can decrease urine calcium and wondering if it is safe from your standpoint, to switch her to something else for 4-6 weeks, to get a 24 hour urine collection.    Please let me know if it is doable and will planing accordingly.    Best,  Jacob Cobb MD  Endocrine

## 2025-07-29 NOTE — PATIENT INSTRUCTIONS
## PTH mediated hypercalcemia  You have some brain fog and constipation which could be from high calcium.  No other symptoms.  Labs suggest primary hyperparathyroidism, will get a 24 hour urine collection to confirm diagnosis.  However, you are taking hydrochlorothiazide which can decrease urine calcium, I'm checking with your cardiologist, we it could be changed to something else to allow us to get a more accurate test result  -- once get hydrochlorothiazide change, will recheck blood work and 24 hour urine collection in 4-6 weeks after  -- given your high blood pressure and sleep apnea, will screen for primary hyperaldosteronism (hormone condition that can cause high blood pressure)  -- DXA at Akron  -- base on the size of thyroid nodule/ possible parathyroid adenoma 1 cm on the right side, less likely to cause any breathing/ swallowing problem  -- will get CT result from Eagle radiology to review      Follow-up 2-3 months

## 2025-07-31 ENCOUNTER — TELEPHONE (OUTPATIENT)
Dept: CARDIOLOGY | Facility: CLINIC | Age: 70
End: 2025-07-31
Payer: COMMERCIAL

## 2025-08-01 ENCOUNTER — ANCILLARY PROCEDURE (OUTPATIENT)
Dept: ULTRASOUND IMAGING | Facility: CLINIC | Age: 70
End: 2025-08-01
Attending: NURSE PRACTITIONER
Payer: COMMERCIAL

## 2025-08-01 DIAGNOSIS — R22.41 LUMP OF SKIN OF RIGHT LOWER EXTREMITY: ICD-10-CM

## 2025-08-01 PROCEDURE — 76882 US LMTD JT/FCL EVL NVASC XTR: CPT | Mod: RT | Performed by: RADIOLOGY

## 2025-08-11 ENCOUNTER — TELEPHONE (OUTPATIENT)
Dept: FAMILY MEDICINE | Facility: CLINIC | Age: 70
End: 2025-08-11
Payer: COMMERCIAL

## 2025-08-18 DIAGNOSIS — E78.00 PRIMARY HYPERCHOLESTEROLEMIA: ICD-10-CM

## 2025-08-18 DIAGNOSIS — E66.01 MORBID OBESITY (H): ICD-10-CM

## 2025-08-18 DIAGNOSIS — R07.89 ATYPICAL CHEST PAIN: ICD-10-CM

## 2025-08-18 DIAGNOSIS — E11.59 TYPE 2 DIABETES MELLITUS WITH OTHER CIRCULATORY COMPLICATION, WITHOUT LONG-TERM CURRENT USE OF INSULIN (H): ICD-10-CM

## 2025-08-18 DIAGNOSIS — I25.10 ATHEROSCLEROSIS OF CORONARY ARTERY OF NATIVE HEART WITHOUT ANGINA PECTORIS, UNSPECIFIED VESSEL OR LESION TYPE: ICD-10-CM

## 2025-08-18 DIAGNOSIS — I67.1 CEREBRAL ANEURYSM, NONRUPTURED: ICD-10-CM

## 2025-08-18 DIAGNOSIS — I10 ESSENTIAL HYPERTENSION: ICD-10-CM

## 2025-08-20 ENCOUNTER — ANCILLARY PROCEDURE (OUTPATIENT)
Dept: BONE DENSITY | Facility: CLINIC | Age: 70
End: 2025-08-20
Attending: INTERNAL MEDICINE
Payer: COMMERCIAL

## 2025-08-20 DIAGNOSIS — E83.52 HYPERCALCEMIA SYNDROME: ICD-10-CM

## 2025-08-20 DIAGNOSIS — Z78.0 POSTMENOPAUSAL: ICD-10-CM

## 2025-08-20 PROCEDURE — 77080 DXA BONE DENSITY AXIAL: CPT | Mod: TC | Performed by: PHYSICIAN ASSISTANT

## 2025-08-20 PROCEDURE — 77091 TBS TECHL CALCULATION ONLY: CPT | Performed by: PHYSICIAN ASSISTANT

## 2025-08-20 PROCEDURE — 77081 DXA BONE DENSITY APPENDICULR: CPT | Mod: TC | Performed by: PHYSICIAN ASSISTANT

## 2025-08-27 ENCOUNTER — PATIENT OUTREACH (OUTPATIENT)
Dept: CARE COORDINATION | Facility: CLINIC | Age: 70
End: 2025-08-27
Payer: COMMERCIAL